# Patient Record
Sex: MALE | Race: WHITE | NOT HISPANIC OR LATINO | Employment: OTHER | URBAN - METROPOLITAN AREA
[De-identification: names, ages, dates, MRNs, and addresses within clinical notes are randomized per-mention and may not be internally consistent; named-entity substitution may affect disease eponyms.]

---

## 2025-05-12 ENCOUNTER — HOSPITAL ENCOUNTER (INPATIENT)
Facility: HOSPITAL | Age: 70
DRG: 329 | End: 2025-05-12
Attending: EMERGENCY MEDICINE | Admitting: INTERNAL MEDICINE
Payer: MEDICARE

## 2025-05-12 ENCOUNTER — APPOINTMENT (OUTPATIENT)
Dept: RADIOLOGY | Facility: HOSPITAL | Age: 70
DRG: 329 | End: 2025-05-12
Payer: MEDICARE

## 2025-05-12 DIAGNOSIS — K63.1: Primary | ICD-10-CM

## 2025-05-12 DIAGNOSIS — K57.20 DIVERTICULITIS OF LARGE INTESTINE WITH PERFORATION WITHOUT ABSCESS OR BLEEDING: ICD-10-CM

## 2025-05-12 LAB
ALBUMIN SERPL BCP-MCNC: 4 G/DL (ref 3.4–5)
ALP SERPL-CCNC: 57 U/L (ref 33–136)
ALT SERPL W P-5'-P-CCNC: 16 U/L (ref 10–52)
ANION GAP SERPL CALC-SCNC: 10 MMOL/L (ref 10–20)
APPEARANCE UR: CLEAR
AST SERPL W P-5'-P-CCNC: 17 U/L (ref 9–39)
BASOPHILS # BLD AUTO: 0.03 X10*3/UL (ref 0–0.1)
BASOPHILS NFR BLD AUTO: 0.2 %
BILIRUB SERPL-MCNC: 2 MG/DL (ref 0–1.2)
BILIRUB UR STRIP.AUTO-MCNC: NEGATIVE MG/DL
BUN SERPL-MCNC: 22 MG/DL (ref 6–23)
CALCIUM SERPL-MCNC: 9 MG/DL (ref 8.6–10.3)
CARDIAC TROPONIN I PNL SERPL HS: 6 NG/L (ref 0–20)
CHLORIDE SERPL-SCNC: 99 MMOL/L (ref 98–107)
CO2 SERPL-SCNC: 28 MMOL/L (ref 21–32)
COLOR UR: ABNORMAL
CREAT SERPL-MCNC: 0.98 MG/DL (ref 0.5–1.3)
EGFRCR SERPLBLD CKD-EPI 2021: 82 ML/MIN/1.73M*2
EOSINOPHIL # BLD AUTO: 0.01 X10*3/UL (ref 0–0.4)
EOSINOPHIL NFR BLD AUTO: 0.1 %
ERYTHROCYTE [DISTWIDTH] IN BLOOD BY AUTOMATED COUNT: 12.3 % (ref 11.5–14.5)
GLUCOSE SERPL-MCNC: 91 MG/DL (ref 74–99)
GLUCOSE UR STRIP.AUTO-MCNC: NORMAL MG/DL
HCT VFR BLD AUTO: 41.1 % (ref 41–52)
HGB BLD-MCNC: 14.7 G/DL (ref 13.5–17.5)
IMM GRANULOCYTES # BLD AUTO: 0.05 X10*3/UL (ref 0–0.5)
IMM GRANULOCYTES NFR BLD AUTO: 0.4 % (ref 0–0.9)
INR PPP: 1.3 (ref 0.9–1.1)
KETONES UR STRIP.AUTO-MCNC: ABNORMAL MG/DL
LACTATE SERPL-SCNC: 0.8 MMOL/L (ref 0.4–2)
LEUKOCYTE ESTERASE UR QL STRIP.AUTO: NEGATIVE
LIPASE SERPL-CCNC: 13 U/L (ref 9–82)
LYMPHOCYTES # BLD AUTO: 0.51 X10*3/UL (ref 0.8–3)
LYMPHOCYTES NFR BLD AUTO: 3.9 %
MAGNESIUM SERPL-MCNC: 1.63 MG/DL (ref 1.6–2.4)
MCH RBC QN AUTO: 33.4 PG (ref 26–34)
MCHC RBC AUTO-ENTMCNC: 35.8 G/DL (ref 32–36)
MCV RBC AUTO: 93 FL (ref 80–100)
MONOCYTES # BLD AUTO: 0.51 X10*3/UL (ref 0.05–0.8)
MONOCYTES NFR BLD AUTO: 3.9 %
NEUTROPHILS # BLD AUTO: 11.99 X10*3/UL (ref 1.6–5.5)
NEUTROPHILS NFR BLD AUTO: 91.5 %
NITRITE UR QL STRIP.AUTO: NEGATIVE
NRBC BLD-RTO: 0 /100 WBCS (ref 0–0)
PH UR STRIP.AUTO: 5.5 [PH]
PLATELET # BLD AUTO: 167 X10*3/UL (ref 150–450)
POTASSIUM SERPL-SCNC: 3.9 MMOL/L (ref 3.5–5.3)
PROT SERPL-MCNC: 6.4 G/DL (ref 6.4–8.2)
PROT UR STRIP.AUTO-MCNC: NEGATIVE MG/DL
PROTHROMBIN TIME: 14.8 SECONDS (ref 9.8–12.4)
RBC # BLD AUTO: 4.4 X10*6/UL (ref 4.5–5.9)
RBC # UR STRIP.AUTO: NEGATIVE MG/DL
SODIUM SERPL-SCNC: 133 MMOL/L (ref 136–145)
SP GR UR STRIP.AUTO: 1.02
UROBILINOGEN UR STRIP.AUTO-MCNC: NORMAL MG/DL
WBC # BLD AUTO: 13.1 X10*3/UL (ref 4.4–11.3)

## 2025-05-12 PROCEDURE — 84484 ASSAY OF TROPONIN QUANT: CPT | Performed by: HEALTH CARE PROVIDER

## 2025-05-12 PROCEDURE — 96361 HYDRATE IV INFUSION ADD-ON: CPT

## 2025-05-12 PROCEDURE — 99223 1ST HOSP IP/OBS HIGH 75: CPT | Performed by: INTERNAL MEDICINE

## 2025-05-12 PROCEDURE — 96365 THER/PROPH/DIAG IV INF INIT: CPT

## 2025-05-12 PROCEDURE — 74177 CT ABD & PELVIS W/CONTRAST: CPT | Performed by: RADIOLOGY

## 2025-05-12 PROCEDURE — 83605 ASSAY OF LACTIC ACID: CPT | Performed by: HEALTH CARE PROVIDER

## 2025-05-12 PROCEDURE — 71260 CT THORAX DX C+: CPT | Performed by: RADIOLOGY

## 2025-05-12 PROCEDURE — 2500000004 HC RX 250 GENERAL PHARMACY W/ HCPCS (ALT 636 FOR OP/ED): Mod: JZ | Performed by: HEALTH CARE PROVIDER

## 2025-05-12 PROCEDURE — 71046 X-RAY EXAM CHEST 2 VIEWS: CPT | Performed by: RADIOLOGY

## 2025-05-12 PROCEDURE — 80053 COMPREHEN METABOLIC PANEL: CPT | Performed by: HEALTH CARE PROVIDER

## 2025-05-12 PROCEDURE — 81003 URINALYSIS AUTO W/O SCOPE: CPT | Performed by: HEALTH CARE PROVIDER

## 2025-05-12 PROCEDURE — 85025 COMPLETE CBC W/AUTO DIFF WBC: CPT | Performed by: HEALTH CARE PROVIDER

## 2025-05-12 PROCEDURE — 83735 ASSAY OF MAGNESIUM: CPT | Performed by: HEALTH CARE PROVIDER

## 2025-05-12 PROCEDURE — 99285 EMERGENCY DEPT VISIT HI MDM: CPT | Mod: 25 | Performed by: EMERGENCY MEDICINE

## 2025-05-12 PROCEDURE — 71046 X-RAY EXAM CHEST 2 VIEWS: CPT

## 2025-05-12 PROCEDURE — 74177 CT ABD & PELVIS W/CONTRAST: CPT

## 2025-05-12 PROCEDURE — 36415 COLL VENOUS BLD VENIPUNCTURE: CPT | Performed by: HEALTH CARE PROVIDER

## 2025-05-12 PROCEDURE — 83690 ASSAY OF LIPASE: CPT | Performed by: HEALTH CARE PROVIDER

## 2025-05-12 PROCEDURE — 1100000001 HC PRIVATE ROOM DAILY

## 2025-05-12 PROCEDURE — 2550000001 HC RX 255 CONTRASTS: Performed by: HEALTH CARE PROVIDER

## 2025-05-12 PROCEDURE — 85610 PROTHROMBIN TIME: CPT | Performed by: HEALTH CARE PROVIDER

## 2025-05-12 RX ORDER — ASPIRIN 81 MG/1
1 TABLET ORAL DAILY
COMMUNITY
Start: 2025-05-28

## 2025-05-12 RX ORDER — METOPROLOL SUCCINATE 25 MG/1
25 TABLET, EXTENDED RELEASE ORAL DAILY
COMMUNITY

## 2025-05-12 RX ADMIN — IOHEXOL 75 ML: 350 INJECTION, SOLUTION INTRAVENOUS at 19:52

## 2025-05-12 RX ADMIN — PIPERACILLIN SODIUM AND TAZOBACTAM SODIUM 4.5 G: 4; .5 INJECTION, SOLUTION INTRAVENOUS at 20:35

## 2025-05-12 RX ADMIN — SODIUM CHLORIDE 500 ML: 0.9 INJECTION, SOLUTION INTRAVENOUS at 19:12

## 2025-05-12 SDOH — SOCIAL STABILITY: SOCIAL INSECURITY: WITHIN THE LAST YEAR, HAVE YOU BEEN HUMILIATED OR EMOTIONALLY ABUSED IN OTHER WAYS BY YOUR PARTNER OR EX-PARTNER?: NO

## 2025-05-12 SDOH — SOCIAL STABILITY: SOCIAL INSECURITY: WERE YOU ABLE TO COMPLETE ALL THE BEHAVIORAL HEALTH SCREENINGS?: YES

## 2025-05-12 SDOH — SOCIAL STABILITY: SOCIAL INSECURITY: DO YOU FEEL ANYONE HAS EXPLOITED OR TAKEN ADVANTAGE OF YOU FINANCIALLY OR OF YOUR PERSONAL PROPERTY?: NO

## 2025-05-12 SDOH — SOCIAL STABILITY: SOCIAL INSECURITY: WITHIN THE LAST YEAR, HAVE YOU BEEN AFRAID OF YOUR PARTNER OR EX-PARTNER?: NO

## 2025-05-12 SDOH — ECONOMIC STABILITY: INCOME INSECURITY: IN THE PAST 12 MONTHS HAS THE ELECTRIC, GAS, OIL, OR WATER COMPANY THREATENED TO SHUT OFF SERVICES IN YOUR HOME?: NO

## 2025-05-12 SDOH — SOCIAL STABILITY: SOCIAL INSECURITY
WITHIN THE LAST YEAR, HAVE YOU BEEN KICKED, HIT, SLAPPED, OR OTHERWISE PHYSICALLY HURT BY YOUR PARTNER OR EX-PARTNER?: NO

## 2025-05-12 SDOH — SOCIAL STABILITY: SOCIAL INSECURITY: ARE YOU OR HAVE YOU BEEN THREATENED OR ABUSED PHYSICALLY, EMOTIONALLY, OR SEXUALLY BY ANYONE?: NO

## 2025-05-12 SDOH — ECONOMIC STABILITY: FOOD INSECURITY: WITHIN THE PAST 12 MONTHS, THE FOOD YOU BOUGHT JUST DIDN'T LAST AND YOU DIDN'T HAVE MONEY TO GET MORE.: NEVER TRUE

## 2025-05-12 SDOH — SOCIAL STABILITY: SOCIAL INSECURITY: ABUSE: ADULT

## 2025-05-12 SDOH — SOCIAL STABILITY: SOCIAL INSECURITY
WITHIN THE LAST YEAR, HAVE YOU BEEN RAPED OR FORCED TO HAVE ANY KIND OF SEXUAL ACTIVITY BY YOUR PARTNER OR EX-PARTNER?: NO

## 2025-05-12 SDOH — ECONOMIC STABILITY: FOOD INSECURITY: WITHIN THE PAST 12 MONTHS, YOU WORRIED THAT YOUR FOOD WOULD RUN OUT BEFORE YOU GOT THE MONEY TO BUY MORE.: NEVER TRUE

## 2025-05-12 SDOH — SOCIAL STABILITY: SOCIAL INSECURITY: DO YOU FEEL UNSAFE GOING BACK TO THE PLACE WHERE YOU ARE LIVING?: NO

## 2025-05-12 SDOH — SOCIAL STABILITY: SOCIAL INSECURITY: ARE THERE ANY APPARENT SIGNS OF INJURIES/BEHAVIORS THAT COULD BE RELATED TO ABUSE/NEGLECT?: NO

## 2025-05-12 SDOH — SOCIAL STABILITY: SOCIAL INSECURITY: HAS ANYONE EVER THREATENED TO HURT YOUR FAMILY OR YOUR PETS?: NO

## 2025-05-12 SDOH — SOCIAL STABILITY: SOCIAL INSECURITY: HAVE YOU HAD THOUGHTS OF HARMING ANYONE ELSE?: NO

## 2025-05-12 SDOH — SOCIAL STABILITY: SOCIAL INSECURITY: HAVE YOU HAD ANY THOUGHTS OF HARMING ANYONE ELSE?: NO

## 2025-05-12 SDOH — SOCIAL STABILITY: SOCIAL INSECURITY: DOES ANYONE TRY TO KEEP YOU FROM HAVING/CONTACTING OTHER FRIENDS OR DOING THINGS OUTSIDE YOUR HOME?: NO

## 2025-05-12 ASSESSMENT — LIFESTYLE VARIABLES
AUDIT-C TOTAL SCORE: 4
HOW OFTEN DURING THE LAST YEAR HAVE YOU BEEN UNABLE TO REMEMBER WHAT HAPPENED THE NIGHT BEFORE BECAUSE YOU HAD BEEN DRINKING: NEVER
SKIP TO QUESTIONS 9-10: 1
HAVE YOU OR SOMEONE ELSE BEEN INJURED AS A RESULT OF YOUR DRINKING: NO
HOW OFTEN DURING THE LAST YEAR HAVE YOU FAILED TO DO WHAT WAS NORMALLY EXPECTED FROM YOU BECAUSE OF DRINKING: NEVER
HOW MANY STANDARD DRINKS CONTAINING ALCOHOL DO YOU HAVE ON A TYPICAL DAY: 1 OR 2
HAS A RELATIVE, FRIEND, DOCTOR, OR ANOTHER HEALTH PROFESSIONAL EXPRESSED CONCERN ABOUT YOUR DRINKING OR SUGGESTED YOU CUT DOWN: NO
AUDIT-C TOTAL SCORE: 4
AUDIT TOTAL SCORE: 4
HOW OFTEN DURING THE LAST YEAR HAVE YOU FOUND THAT YOU WERE NOT ABLE TO STOP DRINKING ONCE YOU HAD STARTED: NEVER
HOW OFTEN DO YOU HAVE A DRINK CONTAINING ALCOHOL: 4 OR MORE TIMES A WEEK
HOW OFTEN DO YOU HAVE 6 OR MORE DRINKS ON ONE OCCASION: NEVER
HOW OFTEN DURING THE LAST YEAR HAVE YOU HAD A FEELING OF GUILT OR REMORSE AFTER DRINKING: NEVER
AUDIT TOTAL SCORE: 0
HOW OFTEN DURING THE LAST YEAR HAVE YOU NEEDED AN ALCOHOLIC DRINK FIRST THING IN THE MORNING TO GET YOURSELF GOING AFTER A NIGHT OF HEAVY DRINKING: NEVER

## 2025-05-12 ASSESSMENT — ACTIVITIES OF DAILY LIVING (ADL)
GROOMING: INDEPENDENT
PATIENT'S MEMORY ADEQUATE TO SAFELY COMPLETE DAILY ACTIVITIES?: YES
ADEQUATE_TO_COMPLETE_ADL: YES
TOILETING: INDEPENDENT
JUDGMENT_ADEQUATE_SAFELY_COMPLETE_DAILY_ACTIVITIES: YES
HEARING - LEFT EAR: FUNCTIONAL
LACK_OF_TRANSPORTATION: NO
WALKS IN HOME: INDEPENDENT
FEEDING YOURSELF: INDEPENDENT
BATHING: INDEPENDENT
DRESSING YOURSELF: INDEPENDENT
HEARING - RIGHT EAR: FUNCTIONAL

## 2025-05-12 ASSESSMENT — PAIN - FUNCTIONAL ASSESSMENT
PAIN_FUNCTIONAL_ASSESSMENT: 0-10
PAIN_FUNCTIONAL_ASSESSMENT: 0-10

## 2025-05-12 ASSESSMENT — COGNITIVE AND FUNCTIONAL STATUS - GENERAL
WALKING IN HOSPITAL ROOM: A LITTLE
MOBILITY SCORE: 22
DAILY ACTIVITIY SCORE: 24
PATIENT BASELINE BEDBOUND: NO
CLIMB 3 TO 5 STEPS WITH RAILING: A LITTLE

## 2025-05-12 ASSESSMENT — PATIENT HEALTH QUESTIONNAIRE - PHQ9
1. LITTLE INTEREST OR PLEASURE IN DOING THINGS: NOT AT ALL
2. FEELING DOWN, DEPRESSED OR HOPELESS: NOT AT ALL
SUM OF ALL RESPONSES TO PHQ9 QUESTIONS 1 & 2: 0

## 2025-05-12 ASSESSMENT — PAIN SCALES - GENERAL
PAINLEVEL_OUTOF10: 5 - MODERATE PAIN
PAINLEVEL_OUTOF10: 5 - MODERATE PAIN

## 2025-05-12 ASSESSMENT — COLUMBIA-SUICIDE SEVERITY RATING SCALE - C-SSRS
1. IN THE PAST MONTH, HAVE YOU WISHED YOU WERE DEAD OR WISHED YOU COULD GO TO SLEEP AND NOT WAKE UP?: NO
2. HAVE YOU ACTUALLY HAD ANY THOUGHTS OF KILLING YOURSELF?: NO
6. HAVE YOU EVER DONE ANYTHING, STARTED TO DO ANYTHING, OR PREPARED TO DO ANYTHING TO END YOUR LIFE?: NO

## 2025-05-12 NOTE — ED TRIAGE NOTES
Pt BIB POV from home w/ c/o abdominal pain. 2000 yesterday excruciating. Taking Ibuprofen PRN. Not as bad today. Ate today. Denies n/v. Pain is lingering. Dull pain. C/o some dysuria and constipation yesterday. Feels similar to a kidney stone in past. Ambulatory, speaking in full sentences. Oriented.

## 2025-05-12 NOTE — ED PROVIDER NOTES
HPI   Chief Complaint   Patient presents with    Abdominal Pain       CC: Diffuse abdominal pain  HPI:   70-year-old male presents ED complaining of diffuse lower abdominal pain he reported pain started acutely last night after he had a milkshake, he notes having normal bowel movements today, he states his appetite is decreased, he is not sure if he had a appendectomy as a child, he notes that the pain has subsided slightly however still feels some diffuse tenderness cramping in the lower abdominal region he denies any history of diverticulitis he does report prior kidney stones, he also noted that when the pain initially started he was having pain in the groin however that has since resolved.  He denies having any fevers, denies any chest pain, shortness of breath.    Additional Limitations to History:   External Records Reviewed: I reviewed recent and relevant outside records including   History Obtained From:     Past Medical History: Per HPI  Medications: Reviewed in EMR and with patient  Allergies:  Reviewed in EMR  Past Surgical History:   Social History:     ------------------------------------------------------------------------------------------------------  Physical Exam:  --Vital signs reviewed in nursing triage note, EMR flow sheets, and at patient's bedside  GEN:  A&Ox3, no acute distress, appears comfortable.  Conversational and appropriate.  No confusion or gross mental status changes.  EYES: EOMI, non-injected sclera.  ENT: Moist mucous membranes, no apparent injuries or lesions.   CARDIO: Normal rate and regular rhythm. No murmurs, rubs, or gallops.  2+ equal pulses of the distal extremities.   PULM: Clear to auscultation bilaterally. No rales, rhonchi, or wheezes. Good symmetric chest expansion.  GI: Soft, non-distended, mild diffuse tenderness however no rigidity, nonperitonitic no rebound tenderness or guarding.  SKIN: Warm and dry, no rashes or lesions.  MSK: ROM intact the extremities without  contractures.   EXT: No peripheral edema, contusions, or wounds.   NEURO: Cranial nerves II-XII grossly intact. Sensation to light touch intact and equal bilaterally in upper and lower extremities.  Symmetric 5/5 strength in upper and lower extremities.  PSYCH: Appropriate mood and behavior, converses and responds appropriately during exam.  -------------------------------------------------------------------------------------------------------      Differential Diagnoses Considered:   Chronic Medical Conditions Significantly Affecting Care:   Diagnostic testing considered: [PERC, D-Dimer, PECARN, etc.]    -  - I independently interpreted: [CXR, CT, POCUS, etc. including your interpretation]  - Labs notable for     Escalation of Care: Appropriate for   Social Determinants of Health Significantly Affecting Care: [Homelessness, lacking transportation, uninsured, unable to afford medications]  Prescription Drug Consideration: [Antibiotics, antivirals, pain medications, etc.]  Discussion of Management with Other Providers:  I discussed the patient/results with: [admitting team, consultant, radiologist, social work, EPAT, case management, PT/OT, RT, PCP, etc.]      Guzman Green PA-C              Patient History   Medical History[1]  Surgical History[2]  Family History[3]  Social History[4]    Physical Exam   ED Triage Vitals [05/12/25 1608]   Temperature Heart Rate Respirations BP   37 °C (98.6 °F) 82 16 107/71      Pulse Ox Temp Source Heart Rate Source Patient Position   96 % Temporal Monitor Sitting      BP Location FiO2 (%)     Left arm --       Physical Exam      ED Course & MDM   Diagnoses as of 05/14/25 1054   Intestinal perforation (Multi)                 No data recorded     Srini Coma Scale Score: 15 (05/12/25 1607 : Ellis Garcia, RN)                           Medical Decision Making  70-year-old male with acute onset of diffuse abdominal pain likely in the setting of hollow visceral perforation,  indeterminant location there is from CT findings suggesting acute sigmoidal diverticulitis, mild free air within the abdomen and pelvis, patient has a mild leukocytosis at 13.1 remaining laboratory workup appears unremarkable, patient was started on Zosyn, and given some IV fluids, he is neurologically intact hemodynamically stable, nontoxic-appearing afebrile in no acute distress, there is low suspicion for SBP, discussed findings with the on-call general surgery patient will be admitted to medicine at this time for further evaluation surgery consult.        Procedure  Procedures       Guzman Green PA-C  05/12/25 9099       [1] History reviewed. No pertinent past medical history.  [2]   Past Surgical History:  Procedure Laterality Date    TONSILLECTOMY  05/11/2016    Tonsillectomy   [3] No family history on file.  [4]   Social History  Tobacco Use    Smoking status: Never    Smokeless tobacco: Never   Substance Use Topics    Alcohol use: Yes     Comment: social    Drug use: Never        Guzman Green PA-C  05/14/25 2061

## 2025-05-13 ENCOUNTER — APPOINTMENT (OUTPATIENT)
Dept: CARDIOLOGY | Facility: HOSPITAL | Age: 70
DRG: 329 | End: 2025-05-13
Payer: MEDICARE

## 2025-05-13 ENCOUNTER — APPOINTMENT (OUTPATIENT)
Dept: RADIOLOGY | Facility: HOSPITAL | Age: 70
DRG: 329 | End: 2025-05-13
Payer: MEDICARE

## 2025-05-13 LAB
ANION GAP SERPL CALC-SCNC: 12 MMOL/L (ref 10–20)
BUN SERPL-MCNC: 15 MG/DL (ref 6–23)
CALCIUM SERPL-MCNC: 8.4 MG/DL (ref 8.6–10.3)
CHLORIDE SERPL-SCNC: 104 MMOL/L (ref 98–107)
CO2 SERPL-SCNC: 25 MMOL/L (ref 21–32)
CREAT SERPL-MCNC: 0.9 MG/DL (ref 0.5–1.3)
EGFRCR SERPLBLD CKD-EPI 2021: >90 ML/MIN/1.73M*2
ERYTHROCYTE [DISTWIDTH] IN BLOOD BY AUTOMATED COUNT: 12.3 % (ref 11.5–14.5)
GLUCOSE SERPL-MCNC: 82 MG/DL (ref 74–99)
HCT VFR BLD AUTO: 41.1 % (ref 41–52)
HGB BLD-MCNC: 14.7 G/DL (ref 13.5–17.5)
HOLD SPECIMEN: NORMAL
MCH RBC QN AUTO: 33.5 PG (ref 26–34)
MCHC RBC AUTO-ENTMCNC: 35.8 G/DL (ref 32–36)
MCV RBC AUTO: 94 FL (ref 80–100)
NRBC BLD-RTO: 0 /100 WBCS (ref 0–0)
PLATELET # BLD AUTO: 162 X10*3/UL (ref 150–450)
POTASSIUM SERPL-SCNC: 3.7 MMOL/L (ref 3.5–5.3)
RBC # BLD AUTO: 4.39 X10*6/UL (ref 4.5–5.9)
SODIUM SERPL-SCNC: 137 MMOL/L (ref 136–145)
WBC # BLD AUTO: 11.3 X10*3/UL (ref 4.4–11.3)

## 2025-05-13 PROCEDURE — 1200000002 HC GENERAL ROOM WITH TELEMETRY DAILY

## 2025-05-13 PROCEDURE — 2500000004 HC RX 250 GENERAL PHARMACY W/ HCPCS (ALT 636 FOR OP/ED): Mod: JZ | Performed by: PHYSICIAN ASSISTANT

## 2025-05-13 PROCEDURE — 80048 BASIC METABOLIC PNL TOTAL CA: CPT | Performed by: INTERNAL MEDICINE

## 2025-05-13 PROCEDURE — 2500000004 HC RX 250 GENERAL PHARMACY W/ HCPCS (ALT 636 FOR OP/ED): Mod: JZ | Performed by: INTERNAL MEDICINE

## 2025-05-13 PROCEDURE — 2500000001 HC RX 250 WO HCPCS SELF ADMINISTERED DRUGS (ALT 637 FOR MEDICARE OP): Performed by: PHYSICIAN ASSISTANT

## 2025-05-13 PROCEDURE — 36415 COLL VENOUS BLD VENIPUNCTURE: CPT | Performed by: INTERNAL MEDICINE

## 2025-05-13 PROCEDURE — 74177 CT ABD & PELVIS W/CONTRAST: CPT | Performed by: RADIOLOGY

## 2025-05-13 PROCEDURE — 2500000004 HC RX 250 GENERAL PHARMACY W/ HCPCS (ALT 636 FOR OP/ED): Performed by: STUDENT IN AN ORGANIZED HEALTH CARE EDUCATION/TRAINING PROGRAM

## 2025-05-13 PROCEDURE — 94760 N-INVAS EAR/PLS OXIMETRY 1: CPT

## 2025-05-13 PROCEDURE — 86481 TB AG RESPONSE T-CELL SUSP: CPT | Performed by: INTERNAL MEDICINE

## 2025-05-13 PROCEDURE — 93005 ELECTROCARDIOGRAM TRACING: CPT

## 2025-05-13 PROCEDURE — 99233 SBSQ HOSP IP/OBS HIGH 50: CPT | Performed by: STUDENT IN AN ORGANIZED HEALTH CARE EDUCATION/TRAINING PROGRAM

## 2025-05-13 PROCEDURE — 9420000001 HC RT PATIENT EDUCATION 5 MIN

## 2025-05-13 PROCEDURE — 74177 CT ABD & PELVIS W/CONTRAST: CPT

## 2025-05-13 PROCEDURE — 2550000001 HC RX 255 CONTRASTS: Performed by: STUDENT IN AN ORGANIZED HEALTH CARE EDUCATION/TRAINING PROGRAM

## 2025-05-13 PROCEDURE — 2500000001 HC RX 250 WO HCPCS SELF ADMINISTERED DRUGS (ALT 637 FOR MEDICARE OP): Performed by: NURSE PRACTITIONER

## 2025-05-13 PROCEDURE — 99223 1ST HOSP IP/OBS HIGH 75: CPT | Performed by: SURGERY

## 2025-05-13 PROCEDURE — 2500000004 HC RX 250 GENERAL PHARMACY W/ HCPCS (ALT 636 FOR OP/ED): Mod: JZ | Performed by: ANESTHESIOLOGY

## 2025-05-13 PROCEDURE — 85027 COMPLETE CBC AUTOMATED: CPT | Performed by: INTERNAL MEDICINE

## 2025-05-13 RX ORDER — ACETAMINOPHEN 325 MG/1
650 TABLET ORAL EVERY 6 HOURS
Status: DISCONTINUED | OUTPATIENT
Start: 2025-05-13 | End: 2025-05-15

## 2025-05-13 RX ORDER — MORPHINE SULFATE 2 MG/ML
2 INJECTION, SOLUTION INTRAMUSCULAR; INTRAVENOUS EVERY 4 HOURS PRN
Status: DISCONTINUED | OUTPATIENT
Start: 2025-05-13 | End: 2025-05-13

## 2025-05-13 RX ORDER — METOPROLOL SUCCINATE 25 MG/1
25 TABLET, EXTENDED RELEASE ORAL DAILY
Status: DISCONTINUED | OUTPATIENT
Start: 2025-05-13 | End: 2025-05-15

## 2025-05-13 RX ORDER — ONDANSETRON 4 MG/1
4 TABLET, FILM COATED ORAL EVERY 8 HOURS PRN
Status: DISCONTINUED | OUTPATIENT
Start: 2025-05-13 | End: 2025-05-15

## 2025-05-13 RX ORDER — KETOROLAC TROMETHAMINE 15 MG/ML
15 INJECTION, SOLUTION INTRAMUSCULAR; INTRAVENOUS EVERY 6 HOURS
Status: DISCONTINUED | OUTPATIENT
Start: 2025-05-13 | End: 2025-05-15

## 2025-05-13 RX ORDER — ACETAMINOPHEN 325 MG/1
650 TABLET ORAL EVERY 4 HOURS PRN
Status: DISCONTINUED | OUTPATIENT
Start: 2025-05-13 | End: 2025-05-13

## 2025-05-13 RX ORDER — ONDANSETRON HYDROCHLORIDE 2 MG/ML
4 INJECTION, SOLUTION INTRAVENOUS EVERY 8 HOURS PRN
Status: DISCONTINUED | OUTPATIENT
Start: 2025-05-13 | End: 2025-05-21 | Stop reason: HOSPADM

## 2025-05-13 RX ORDER — ENOXAPARIN SODIUM 100 MG/ML
40 INJECTION SUBCUTANEOUS DAILY
Status: DISCONTINUED | OUTPATIENT
Start: 2025-05-13 | End: 2025-05-17

## 2025-05-13 RX ORDER — NALOXONE HYDROCHLORIDE 0.4 MG/ML
0.2 INJECTION, SOLUTION INTRAMUSCULAR; INTRAVENOUS; SUBCUTANEOUS EVERY 5 MIN PRN
Status: DISCONTINUED | OUTPATIENT
Start: 2025-05-13 | End: 2025-05-21 | Stop reason: HOSPADM

## 2025-05-13 RX ORDER — SODIUM CHLORIDE 9 MG/ML
125 INJECTION, SOLUTION INTRAVENOUS CONTINUOUS
Status: DISCONTINUED | OUTPATIENT
Start: 2025-05-13 | End: 2025-05-13

## 2025-05-13 RX ORDER — TALC
3 POWDER (GRAM) TOPICAL NIGHTLY PRN
Status: DISCONTINUED | OUTPATIENT
Start: 2025-05-13 | End: 2025-05-21 | Stop reason: HOSPADM

## 2025-05-13 RX ORDER — ACETAMINOPHEN 650 MG/1
650 SUPPOSITORY RECTAL EVERY 4 HOURS PRN
Status: DISCONTINUED | OUTPATIENT
Start: 2025-05-13 | End: 2025-05-13

## 2025-05-13 RX ORDER — MORPHINE SULFATE 2 MG/ML
1 INJECTION, SOLUTION INTRAMUSCULAR; INTRAVENOUS EVERY 4 HOURS PRN
Status: DISCONTINUED | OUTPATIENT
Start: 2025-05-13 | End: 2025-05-13

## 2025-05-13 RX ORDER — DEXTROSE MONOHYDRATE, SODIUM CHLORIDE, AND POTASSIUM CHLORIDE 50; 1.49; 4.5 G/1000ML; G/1000ML; G/1000ML
125 INJECTION, SOLUTION INTRAVENOUS CONTINUOUS
Status: DISCONTINUED | OUTPATIENT
Start: 2025-05-13 | End: 2025-05-17

## 2025-05-13 RX ORDER — ACETAMINOPHEN 160 MG/5ML
650 SOLUTION ORAL EVERY 4 HOURS PRN
Status: DISCONTINUED | OUTPATIENT
Start: 2025-05-13 | End: 2025-05-13

## 2025-05-13 RX ORDER — SODIUM CHLORIDE, SODIUM LACTATE, POTASSIUM CHLORIDE, CALCIUM CHLORIDE 600; 310; 30; 20 MG/100ML; MG/100ML; MG/100ML; MG/100ML
500 INJECTION, SOLUTION INTRAVENOUS ONCE
Status: COMPLETED | OUTPATIENT
Start: 2025-05-13 | End: 2025-05-13

## 2025-05-13 RX ORDER — POLYETHYLENE GLYCOL 3350 17 G/17G
17 POWDER, FOR SOLUTION ORAL DAILY PRN
Status: DISCONTINUED | OUTPATIENT
Start: 2025-05-13 | End: 2025-05-15

## 2025-05-13 RX ADMIN — ACETAMINOPHEN 650 MG: 325 TABLET, FILM COATED ORAL at 23:54

## 2025-05-13 RX ADMIN — IOHEXOL 75 ML: 350 INJECTION, SOLUTION INTRAVENOUS at 22:08

## 2025-05-13 RX ADMIN — HYDROMORPHONE HYDROCHLORIDE 0.2 MG: 1 INJECTION, SOLUTION INTRAMUSCULAR; INTRAVENOUS; SUBCUTANEOUS at 18:45

## 2025-05-13 RX ADMIN — SODIUM CHLORIDE 125 ML/HR: 9 INJECTION, SOLUTION INTRAVENOUS at 01:18

## 2025-05-13 RX ADMIN — METOPROLOL SUCCINATE 25 MG: 25 TABLET, EXTENDED RELEASE ORAL at 21:17

## 2025-05-13 RX ADMIN — ACETAMINOPHEN 650 MG: 325 TABLET, FILM COATED ORAL at 10:21

## 2025-05-13 RX ADMIN — PIPERACILLIN SODIUM AND TAZOBACTAM SODIUM 4.5 G: 4; .5 INJECTION, SOLUTION INTRAVENOUS at 21:17

## 2025-05-13 RX ADMIN — KETOROLAC TROMETHAMINE 15 MG: 15 INJECTION, SOLUTION INTRAMUSCULAR; INTRAVENOUS at 23:54

## 2025-05-13 RX ADMIN — DEXTROSE MONOHYDRATE, SODIUM CHLORIDE, AND POTASSIUM CHLORIDE 100 ML/HR: 50; 1.49; 4.5 INJECTION, SOLUTION INTRAVENOUS at 21:17

## 2025-05-13 RX ADMIN — KETOROLAC TROMETHAMINE 15 MG: 15 INJECTION, SOLUTION INTRAMUSCULAR; INTRAVENOUS at 15:42

## 2025-05-13 RX ADMIN — SODIUM CHLORIDE, SODIUM LACTATE, POTASSIUM CHLORIDE, AND CALCIUM CHLORIDE 500 ML: .6; .31; .03; .02 INJECTION, SOLUTION INTRAVENOUS at 21:40

## 2025-05-13 RX ADMIN — PIPERACILLIN SODIUM AND TAZOBACTAM SODIUM 4.5 G: 4; .5 INJECTION, SOLUTION INTRAVENOUS at 15:42

## 2025-05-13 RX ADMIN — HYDROMORPHONE HYDROCHLORIDE 0.4 MG: 1 INJECTION, SOLUTION INTRAMUSCULAR; INTRAVENOUS; SUBCUTANEOUS at 18:08

## 2025-05-13 RX ADMIN — PIPERACILLIN SODIUM AND TAZOBACTAM SODIUM 4.5 G: 4; .5 INJECTION, SOLUTION INTRAVENOUS at 10:21

## 2025-05-13 RX ADMIN — DEXTROSE MONOHYDRATE, SODIUM CHLORIDE, AND POTASSIUM CHLORIDE 100 ML/HR: 50; 1.49; 4.5 INJECTION, SOLUTION INTRAVENOUS at 10:23

## 2025-05-13 RX ADMIN — KETOROLAC TROMETHAMINE 15 MG: 15 INJECTION, SOLUTION INTRAMUSCULAR; INTRAVENOUS at 10:23

## 2025-05-13 RX ADMIN — ACETAMINOPHEN 650 MG: 325 TABLET, FILM COATED ORAL at 15:42

## 2025-05-13 ASSESSMENT — COGNITIVE AND FUNCTIONAL STATUS - GENERAL
MOBILITY SCORE: 24
WALKING IN HOSPITAL ROOM: A LITTLE
MOBILITY SCORE: 22
CLIMB 3 TO 5 STEPS WITH RAILING: A LITTLE
DAILY ACTIVITIY SCORE: 24
DAILY ACTIVITIY SCORE: 24

## 2025-05-13 ASSESSMENT — ENCOUNTER SYMPTOMS
APPETITE CHANGE: 1
CHILLS: 0
ABDOMINAL PAIN: 1
PALPITATIONS: 0
FEVER: 0
SHORTNESS OF BREATH: 0
WEAKNESS: 0
DIZZINESS: 0
COUGH: 0

## 2025-05-13 ASSESSMENT — PAIN - FUNCTIONAL ASSESSMENT
PAIN_FUNCTIONAL_ASSESSMENT: 0-10
PAIN_FUNCTIONAL_ASSESSMENT: 0-10

## 2025-05-13 ASSESSMENT — PAIN SCALES - GENERAL
PAINLEVEL_OUTOF10: 0 - NO PAIN
PAINLEVEL_OUTOF10: 0 - NO PAIN

## 2025-05-13 NOTE — CONSULTS
"General/Trauma Surgery History and Physical      History Of Present Illness  Diego Morales is a 70 y.o. male who presents with complaints of abdominal pain starting Sunday night. Patient reports right lower abdominal pain starting without any elicited events that gradually worsened. Pain has been constant, sharp, however worsened while at work yesterday and therefore came to the ED. Pain is more controlled after IV medications, however is still present in the RLQ. Denies nausea or vomiting but does have a decreased appetite, lacking hunger. He does report several episodes of diarrhea throughout the night. Denies blood in the stool. Denies fevers or chills. He has never been diagnosed with diverticulitis, has never had a colonoscopy or any abdominal surgeries. There is history of cardiac arrest after a cardiac catheterization in 2016 that was related to ventricular fibrillation requiring cardioversion.        Past Medical History  History of angina pectoris, heart murmur, cardiac arrest following cardiac catheterization with cardioversion     Surgical History  Tonsillectomy  No history of colonoscopy  No history of abdominal surgery  Cardiac catheterization      Social History  Never smoker, drinks 1 beer daily. Denies illicits. Lives at home with wife. Works as a .     Family History  Denies family history.     Allergies  Patient has no known allergies.    Meds  Current Outpatient Medications   Medication Instructions    aspirin 81 mg, Daily    metoprolol succinate XL (TOPROL-XL) 25 mg, Daily        Review of Systems   A complete 10 point review of systems was performed and is negative except as noted in the history of present illness.     Last Recorded Vitals  Blood pressure 116/60, pulse 81, temperature 36.6 °C (97.9 °F), temperature source Temporal, resp. rate 17, height 1.803 m (5' 10.98\"), weight 67.7 kg (149 lb 4.8 oz), SpO2 96%.    0-10 (Numeric) Pain Score: 0 - No pain     Physical " Exam  Constitutional: No acute distress. Laying in bed.  Neuro: Alert, oriented. Follows commands.   Eyes: Extraocular motions intact. No scleral icterus. Conjunctiva pink.   EENT: Mucous membranes moist. Normal dentition. Hears normal speaking voice.  Neck: Supple. No masses.  Cardiovascular: Regular rate. Palpable pulses bilaterally. No pitting edema.   Respiratory: No increased work of breathing or audible wheeze. Equal expansion.  Abdomen: Soft, thin abdomen. Nondistended. TTP RLQ without guarding or involuntary rigidity. No scars or hernias appreciated.  MSK: Moves all extremities. No atrophy.  Lymphatic: No palpable lymph nodes. No lymphedema.   Skin: Warm, dry, intact. No rashes or lesions.   Psychological: Appropriate mood and behavior.           Relevant Results  Laboratory Results:  CBC:   Lab Results   Component Value Date    WBC 11.3 05/13/2025    RBC 4.39 (L) 05/13/2025    HGB 14.7 05/13/2025    HCT 41.1 05/13/2025     05/13/2025       RFP:   Lab Results   Component Value Date     05/13/2025    K 3.7 05/13/2025     05/13/2025    CO2 25 05/13/2025    BUN 15 05/13/2025    CREATININE 0.90 05/13/2025    CALCIUM 8.4 (L) 05/13/2025    MG 1.63 05/12/2025        LFTs:   Lab Results   Component Value Date    PROT 6.4 05/12/2025    ALBUMIN 4.0 05/12/2025    BILITOT 2.0 (H) 05/12/2025    ALKPHOS 57 05/12/2025    AST 17 05/12/2025    ALT 16 05/12/2025         Imaging:  CT chest abdomen pelvis w IV contrast  Narrative: Interpreted By:  Elena Gallardo,   STUDY:  CT CHEST ABDOMEN PELVIS W IV CONTRAST;  5/12/2025 7:57 pm      INDICATION:  Signs/Symptoms:acute abd pain, cxr showing free air in the  intraperitoneal right hemidiaphragm.      COMPARISON:  06/23/2016      ACCESSION NUMBER(S):  [id number]      ORDERING CLINICIAN:  LORRIE MOORE      TECHNIQUE:  Axial CT images of the chest, abdomen and pelvis with coronal and  sagittal reconstructed images obtained after intravenous  administration of  contrast.      FINDINGS:  CHEST:      VESSELS: Aorta is normal caliber. Mild aortic calcifications.  Coronary arteries are grossly unremarkable. HEART: Normal size. No  significant pericardial effusion. MEDIASTINUM AND AIDE: No  pathologically enlarged thoracic lymph nodes.  Shotty subcentimeter  lymph nodes measuring up to 9 mm. No pneumomediastinum. The esophagus  appears within normal limits. LUNG, PLEURA, LARGE AIRWAYS:  Redemonstrated biapical pleuroparenchymal thickening as well as  reticulonodular opacities in the upper lobes, greater on the right,  similar to prior imaging. Lungs are hyperinflated. No sizable pleural  effusion or pneumothorax. CHEST WALL AND LOWER NECK: Within normal  limits. No acute osseous abnormality.      ABDOMEN:      BONES: No acute osseous abnormality. Multilevel degenerative changes.  ABDOMINAL WALL: Within normal limits.      LIVER: Diffusely decreased attenuation of the liver parenchyma.  BILE DUCTS: Normal caliber.  GALLBLADDER: No calcified gallstones. No wall thickening.  PANCREAS: Within normal limits.  SPLEEN: Within normal limits.  ADRENALS: Within normal limits.  KIDNEYS AND URETERS: Symmetric renal enhancement. No hydronephrosis  or perinephric fluid collection.  No hydroureter.      VESSELS: Atherosclerotic calcifications without aneurysmal dilatation  seen. RETROPERITONEUM: Within normal limits.      PELVIS:      REPRODUCTIVE ORGANS: The prostate is mildly enlarged up to 5.7 cm.  BLADDER: Within normal limits.      BOWEL: Th. Fluid contents are present within the proximal colon.  There is diffuse wall thickening of the sigmoid colon with  diverticulosis and pericolonic stranding. E stomach is grossly  unremarkable. There are few fluid filled dilated loops of small bowel  with possible mild wall thickening. There is a indeterminate locule  of air also noted in the pelvis adjacent to small bowel. Normal  appendix. PERITONEUM: Trace pelvic ascites and mild  intraperitoneal  free air corresponding with the same day radiograph. No discrete  fluid collection.      Impression: Reticulonodular opacities may right greater than left upper lobe  similar to prior imaging from 06/23/2016 suggestive of chronic  etiology. Mycobacterial infection again not excluded.      Mild free air noted within the abdomen and pelvis in keeping with  hollow visceral perforation. There are regions of small bowel with  wall thickening and fluid including the indeterminate region with  adjacent non dependent free air which is indeterminate for location  of perforation. There is also findings suggestive of acute sigmoidal  diverticulitis. Surgical recommended.      Prostatomegaly. Correlation with PSA is suggested.      Additional findings as detailed.          MACRO:  Elena Gallardo discussed the significance and urgency of this  critical finding by telephone with  LORRIE MOORE on 5/12/2025 at  8:54 pm.  (**-RCF-**) Findings:  See findings.      Signed by: Elena Gallardo 5/12/2025 8:59 PM  Dictation workstation:   [id number]  XR chest 2 views  Narrative: Interpreted By:  Laron Baez,   STUDY:  XR CHEST 2 VIEWS;  5/12/2025 7:22 pm      INDICATION:  Signs/Symptoms:diffuse abd pain,.          COMPARISON:  None.      ACCESSION NUMBER(S):  [id number]      ORDERING CLINICIAN:  LORRIE MOORE      FINDINGS:                  CARDIOMEDIASTINAL SILHOUETTE:  Cardiomediastinal silhouette is normal in size and configuration.      LUNGS:  Lungs lungs are hyperinflated but clear. No consolidation seen.      ABDOMEN:  There is free intraperitoneal air underlying the right hemidiaphragm..      BONES:  No acute osseous changes.      Impression: 1.  Free intraperitoneal air under the right hemidiaphragm. In the  absence of recent of ancient CT is advised for complete evaluation  2. Emphysematous changes in the lungs without acute abnormality              MACRO:  None      Signed by: Laron Baez 5/12/2025  7:28 PM  Dictation workstation:   [id number]         Assessment/Plan   This is a 70 y.o. male who presents with complaints of RLQ abdominal pain starting Sunday night. Mild leukocytosis of 13 on admission. In ED, he was noted to have free air under the right hemidiaphragm on XR and proceeded with a CT that has a mild amount of free air over the liver with inflammation consistent with sigmoid diverticulitis. Patient is nontoxic appearing and abdominal exam is mild with tenderness to the RLQ, non peritonitic in nature. Despite mild exam findings, patient is at higher risk of needing operative intervention this hospital stay depending on how he improves with conservative care. In anticipation of surgery, we consulted cardiology for perioperative optimization given a history of cardiac arrest following a previous cardiac catheterization.     Plan:  -- NPO, ice chips and sips with meds okay  -- IV Zosyn q6h  -- Multimodal pain regimen with tylenol, toradol, prn narcotic  -- Ambulate  -- Daily CBC, RFP, Mg with repletion to maintain a K > 4.0, Phos >2.0, Mg > 2.0  -- Lovenox, SCD  -- Cardiology consult for perioperative optimization  -- Potentially will need operative intervention pending response to conservative care          Seen and discussed with Dr. Canales who is in agreement with plan. Please secure chat with questions.    Cindy Baldwin PA-C      Currently, we will plan on nonoperative management.  He is at high risk of worsening and needing urgent operative management.    -- Complete a course of 2 weeks of antibiotics, usually either ciprofloxacin and metronidazole, or augmentin.  -- Continue with a low residue (low fiber) diet for the first 2-4 weeks after the infection, until the inflammation has subsided.  -- After the inflammation has subsided, start and continue a high fiber diet.  I recommend over-the-counter fiber supplements such as Metamucil or Benafiber.  -- After the inflammation has subsided, nuts,  popcorn, etc, are okay to eat.  -- I recommend a colonoscopy in 6-8 weeks to ensure there is no colon cancer.  -- At this stage no surgery is required.  -- Continued outpatient follow up with me to discuss when the bouts of diverticulitis become either severe enough or frequent enough to outweigh the risks and recovery time of surgery.  At that time, we will plan for partial colectomy.      I have seen and reviewed the patient with the PA.  I agree with their note as written above.  Any changes or corrections have been made in the body of the note.    Anoop Canales MD  General Surgery  Office: [phone]  Fax:     [phone]  1:02 PM   05/13/25

## 2025-05-13 NOTE — ASSESSMENT & PLAN NOTE
Diego Morales is a 70 y.o. male with a past medical history of cardiac arrest during cardiac catheterization in 2016 secondary to ventricular arrhythmia/V-fib requiring cardioversion (normal coronary arteries), heart murmur, and angina pectoris who was admitted to the hospital for suspected small intestinal perforation and acute diverticulitis.      Small intestinal perforation/acute diverticulitis  - CT scan showed mild free air noted within the abdomen and pelvis in keeping with hollow visceral perforation. There are regions of small bowel with wall thickening and fluid including the indeterminate region with adjacent non dependent free air which is indeterminate for location  of perforation. There is also findings suggestive of acute sigmoidal diverticulitis.  - Admit to general medicine  - Consult general surgery  - Continue Zosyn  - Give IV fluids  - Keep n.p.o.  - Continue morphine as needed for pain    Bilateral lung reticulonodular opacity  - Right greater than left based on CT scan which is similar to prior imaging from 6/23/2016  - Mycobacterial infection not excluded per CT scan report  - Patient is asymptomatic  - Follow-up with [facility]    DVT prophylaxis  - SCDs

## 2025-05-13 NOTE — ED PROVIDER NOTES
The patient was seen by the midlevel/resident.  I have personally saw the patient and made/approved the management plan and take responsibility for the patient management.  I reviewed the EKG's (when done) and agree with the interpretation.  I have seen and examined the patient; agree with the workup, evaluation, MDM, and diagnosis.  The care plan has been discussed with the midlevel/resident; I have reviewed the note and agree with the documented findings.     Presents ED with complaint of abdominal pain.  Diagnoses as of 05/13/25 0621   Intestinal perforation (Multi)   Patient reports the starting pain last night after having milkshake.  He is unsure exact was causing it.  His plain films suggest possible free air under diaphragm.  He was sent for a CT scan.  We are awaiting results but anticipate if this is a perforation hospitalization with general surgery consult.  Patient was given antibiotics in the interim.  MD Anton Ambriz MD  05/13/25 0621

## 2025-05-13 NOTE — CONSULTS
Inpatient consult to Cardiology  Consult performed by: DAVID Egan-CNP  Consult ordered by: Cindy Baldwin PA-C  Reason for consult: pre op clearance          History Of Present Illness  Diego Morales is a 70 y.o. male presenting with complaints of sudden onset abd pain. He denies any chest pain, shortness of breath, dizziness, or palpitations. His appetite was decreased and he tried taking meds to help the pain but it didn't help.     Lab work in the ER showed glucose 91, sodium 133, potassium 3.9, BUN/creatinine 22/0.98, lactate 0.8, troponin negative, INR 1.3, WBCs 13.1, H&H 14.7/41.1, UA negative, chest x-ray showed free intraperitoneal air under the right hemidiaphragm and emphysematous changes in the lungs without acute abnormality.  CTA of the chest abdomen and pelvis showed reticular nodular opacities right greater than left upper lobe, mild free air noted within the abdomen and pelvis, small bowel with wall thickening and fluid including the indeterminate region with adjacent nondependent free air which is indeterminate for location of perforation.    EKG showed SR, no acute ischemic changes.        Past Medical History  Normal coronary arteries, brief episode of ventricular fibrillation during injection of RCA during LHC, converted with cardioversion, mitral valve prolapse with mild to moderate MR, hyperlipidemia    Surgical History  Surgical History[1]     Social History  He reports that he has never smoked. He has never used smokeless tobacco. He reports current alcohol use. He reports that he does not use drugs.    Family History  Family History[2]     Allergies  Patient has no known allergies.    Review of Systems  Review of Systems   Constitutional:  Positive for appetite change. Negative for chills and fever.   Respiratory:  Negative for cough and shortness of breath.    Cardiovascular:  Negative for chest pain, palpitations and leg swelling.   Gastrointestinal:  Positive for abdominal  "pain.   Neurological:  Negative for dizziness and weakness.   All other systems reviewed and are negative.         Physical Exam  Constitutional: Well developed, awake/alert/oriented x3, no distress, alert and cooperative  Eyes: PERRL, EOMI, clear sclera  ENMT: mucous membranes moist, no apparent injury, no lesions seen  Head/Neck: Neck supple, no apparent injury, thyroid without mass or tenderness, No JVD, trachea midline, no bruits  Respiratory/Thorax: Patent airways, CTAB, normal breath sounds with good chest expansion, thorax symmetric  Cardiovascular: Regular, rate and rhythm, 2/6 systolic murmur, 2+ equal pulses of the extremities, normal S 1and S 2  Gastrointestinal: Nondistended, soft, tender, no masses palpable, no organomegaly, +BS, no bruits  Musculoskeletal: ROM intact, no joint swelling, normal strength  Extremities: normal extremities, no cyanosis edema, contusions or wounds, no clubbing  Neurological: alert and oriented x3, intact senses, motor, response and reflexes, normal strength  Lymphatic: No significant lymphadenopathy  Psychological: Appropriate mood and behavior  Skin: Warm and dry, no lesions, no rashes       Last Recorded Vitals  Blood pressure 118/70, pulse 85, temperature 36.6 °C (97.9 °F), temperature source Temporal, resp. rate 21, height 1.803 m (5' 10.98\"), weight 67.7 kg (149 lb 4.8 oz), SpO2 97%.    Medications  Scheduled medications  Scheduled Medications[3]  Continuous medications  Continuous Medications[4]  PRN medications  PRN Medications[5]    Relevant Results  Results for orders placed or performed during the hospital encounter of 05/12/25 (from the past 24 hours)   CBC and Auto Differential   Result Value Ref Range    WBC 13.1 (H) 4.4 - 11.3 x10*3/uL    nRBC 0.0 0.0 - 0.0 /100 WBCs    RBC 4.40 (L) 4.50 - 5.90 x10*6/uL    Hemoglobin 14.7 13.5 - 17.5 g/dL    Hematocrit 41.1 41.0 - 52.0 %    MCV 93 80 - 100 fL    MCH 33.4 26.0 - 34.0 pg    MCHC 35.8 32.0 - 36.0 g/dL    RDW 12.3 " 11.5 - 14.5 %    Platelets 167 150 - 450 x10*3/uL    Neutrophils % 91.5 40.0 - 80.0 %    Immature Granulocytes %, Automated 0.4 0.0 - 0.9 %    Lymphocytes % 3.9 13.0 - 44.0 %    Monocytes % 3.9 2.0 - 10.0 %    Eosinophils % 0.1 0.0 - 6.0 %    Basophils % 0.2 0.0 - 2.0 %    Neutrophils Absolute 11.99 (H) 1.60 - 5.50 x10*3/uL    Immature Granulocytes Absolute, Automated 0.05 0.00 - 0.50 x10*3/uL    Lymphocytes Absolute 0.51 (L) 0.80 - 3.00 x10*3/uL    Monocytes Absolute 0.51 0.05 - 0.80 x10*3/uL    Eosinophils Absolute 0.01 0.00 - 0.40 x10*3/uL    Basophils Absolute 0.03 0.00 - 0.10 x10*3/uL   Magnesium   Result Value Ref Range    Magnesium 1.63 1.60 - 2.40 mg/dL   Comprehensive metabolic panel   Result Value Ref Range    Glucose 91 74 - 99 mg/dL    Sodium 133 (L) 136 - 145 mmol/L    Potassium 3.9 3.5 - 5.3 mmol/L    Chloride 99 98 - 107 mmol/L    Bicarbonate 28 21 - 32 mmol/L    Anion Gap 10 10 - 20 mmol/L    Urea Nitrogen 22 6 - 23 mg/dL    Creatinine 0.98 0.50 - 1.30 mg/dL    eGFR 82 >60 mL/min/1.73m*2    Calcium 9.0 8.6 - 10.3 mg/dL    Albumin 4.0 3.4 - 5.0 g/dL    Alkaline Phosphatase 57 33 - 136 U/L    Total Protein 6.4 6.4 - 8.2 g/dL    AST 17 9 - 39 U/L    Bilirubin, Total 2.0 (H) 0.0 - 1.2 mg/dL    ALT 16 10 - 52 U/L   Lactate   Result Value Ref Range    Lactate 0.8 0.4 - 2.0 mmol/L   Lipase   Result Value Ref Range    Lipase 13 9 - 82 U/L   Protime-INR   Result Value Ref Range    Protime 14.8 (H) 9.8 - 12.4 seconds    INR 1.3 (H) 0.9 - 1.1   Troponin I, High Sensitivity   Result Value Ref Range    Troponin I, High Sensitivity 6 0 - 20 ng/L   Urinalysis with Reflex Culture and Microscopic   Result Value Ref Range    Color, Urine Light-Orange (N) Light-Yellow, Yellow, Dark-Yellow    Appearance, Urine Clear Clear    Specific Gravity, Urine 1.020 1.005 - 1.035    pH, Urine 5.5 5.0, 5.5, 6.0, 6.5, 7.0, 7.5, 8.0    Protein, Urine NEGATIVE NEGATIVE, 10 (TRACE), 20 (TRACE) mg/dL    Glucose, Urine Normal Normal mg/dL     Blood, Urine NEGATIVE NEGATIVE mg/dL    Ketones, Urine 10 (1+) (A) NEGATIVE mg/dL    Bilirubin, Urine NEGATIVE NEGATIVE mg/dL    Urobilinogen, Urine Normal Normal mg/dL    Nitrite, Urine NEGATIVE NEGATIVE    Leukocyte Esterase, Urine NEGATIVE NEGATIVE   Extra Urine Gray Tube   Result Value Ref Range    Extra Tube Hold for add-ons.    CBC   Result Value Ref Range    WBC 11.3 4.4 - 11.3 x10*3/uL    nRBC 0.0 0.0 - 0.0 /100 WBCs    RBC 4.39 (L) 4.50 - 5.90 x10*6/uL    Hemoglobin 14.7 13.5 - 17.5 g/dL    Hematocrit 41.1 41.0 - 52.0 %    MCV 94 80 - 100 fL    MCH 33.5 26.0 - 34.0 pg    MCHC 35.8 32.0 - 36.0 g/dL    RDW 12.3 11.5 - 14.5 %    Platelets 162 150 - 450 x10*3/uL   Basic metabolic panel   Result Value Ref Range    Glucose 82 74 - 99 mg/dL    Sodium 137 136 - 145 mmol/L    Potassium 3.7 3.5 - 5.3 mmol/L    Chloride 104 98 - 107 mmol/L    Bicarbonate 25 21 - 32 mmol/L    Anion Gap 12 10 - 20 mmol/L    Urea Nitrogen 15 6 - 23 mg/dL    Creatinine 0.90 0.50 - 1.30 mg/dL    eGFR >90 >60 mL/min/1.73m*2    Calcium 8.4 (L) 8.6 - 10.3 mg/dL   ECG 12 Lead   Result Value Ref Range    Ventricular Rate 76 BPM    Atrial Rate 76 BPM    MS Interval 158 ms    QRS Duration 100 ms    QT Interval 406 ms    QTC Calculation(Bazett) 456 ms    P Axis 64 degrees    R Axis 23 degrees    T Axis 4 degrees    QRS Count 13 beats    Q Onset 222 ms    P Onset 143 ms    P Offset 206 ms    T Offset 425 ms    QTC Fredericia 439 ms       CT chest abdomen pelvis w IV contrast   Final Result   Reticulonodular opacities may right greater than left upper lobe   similar to prior imaging from 06/23/2016 suggestive of chronic   etiology. Mycobacterial infection again not excluded.        Mild free air noted within the abdomen and pelvis in keeping with   hollow visceral perforation. There are regions of small bowel with   wall thickening and fluid including the indeterminate region with   adjacent non dependent free air which is indeterminate for  location   of perforation. There is also findings suggestive of acute sigmoidal   diverticulitis. Surgical recommended.        Prostatomegaly. Correlation with PSA is suggested.        Additional findings as detailed.             MACRO:   Elena Gallardo discussed the significance and urgency of this   critical finding by telephone with  LORRIE MOORE on 5/12/2025 at   8:54 pm.  (**-RCF-**) Findings:  See findings.        Signed by: Elena Gallardo 5/12/2025 8:59 PM   Dictation workstation:   [id number]      XR chest 2 views   Final Result   1.  Free intraperitoneal air under the right hemidiaphragm. In the   absence of recent of ancient CT is advised for complete evaluation   2. Emphysematous changes in the lungs without acute abnormality                  MACRO:   None        Signed by: Laron Baez 5/12/2025 7:28 PM   Dictation workstation:   [id number]          No echocardiogram results found for the past 12 months       Assessment/Plan   Echocardiogram from May 2, 2025, LVEF 60 to 65%, mild to moderate LAE, mild AI, bileaflet mitral valve prolapse, moderate to severe MR, moderate TR, mild LVH and diastolic dysfunction    Acute diverticulitis/small intestinal perforation  -surgery consulted, note reviewed  -Antibiotics   -NPO  -IVF  -Pain meds    -I reviewed the EKG, no acute changes, ST elevation, or depression  -Denies ACS symptoms  -Normal coronary arteries per LHC 2016  -Had VF during [facility] with injection of RCA  -Ok to hold aspirin  -Cont metoprolol  -He is cleared for surgery at a low cardiac risk  -Monitor on tele    2. Tachycardia  -I reviewed the EKG, no acute changes, ST elevation, or depression  -Telemetry reviewed, short run of tachycardia  -Restart Metoprolol succinate 25mg daily  -Monitor on tele    3. Hx of HTN  -BP stable  -metoprolol  -Monitor    4. Mitral valve prolapse  -Echo as above        DAVID Egan-CNP         [1]   Past Surgical History:  Procedure Laterality Date     TONSILLECTOMY  05/11/2016    Tonsillectomy   [2] No family history on file.  [3] acetaminophen, 650 mg, oral, q6h  enoxaparin, 40 mg, subcutaneous, Daily  ketorolac, 15 mg, intravenous, q6h  metoprolol succinate XL, 25 mg, oral, Daily  piperacillin-tazobactam, 4.5 g, intravenous, q6h  [4] potassium [id number]%NaCl, 100 mL/hr, Last Rate: 100 mL/hr (05/13/25 1439)  [5] PRN medications: HYDROmorphone, HYDROmorphone, melatonin, naloxone, ondansetron **OR** ondansetron, polyethylene glycol

## 2025-05-13 NOTE — PROGRESS NOTES
"Diego Morales is a 70 y.o. male on day 1 of admission presenting with Intestinal perforation (Multi).    Subjective   Pt is complaining of sharp abdominal pain in lower abdomen that is constant.        Objective     Physical Exam  Vitals and nursing note reviewed.   Constitutional:       General: He is not in acute distress.     Appearance: He is ill-appearing. He is not toxic-appearing.   HENT:      Head: Normocephalic and atraumatic.      Nose: Nose normal.      Mouth/Throat:      Mouth: Mucous membranes are moist.   Eyes:      Extraocular Movements: Extraocular movements intact.      Conjunctiva/sclera: Conjunctivae normal.      Pupils: Pupils are equal, round, and reactive to light.   Cardiovascular:      Rate and Rhythm: Normal rate and regular rhythm.      Heart sounds: No murmur heard.     No gallop.   Pulmonary:      Effort: Pulmonary effort is normal. No respiratory distress.      Breath sounds: Normal breath sounds. No wheezing, rhonchi or rales.   Abdominal:      General: Abdomen is flat. Bowel sounds are normal. There is no distension.      Palpations: Abdomen is soft. There is no mass.      Tenderness: There is abdominal tenderness.   Musculoskeletal:         General: No swelling or tenderness. Normal range of motion.      Cervical back: Normal range of motion and neck supple.   Skin:     General: Skin is warm and dry.   Neurological:      General: No focal deficit present.      Mental Status: He is alert and oriented to person, place, and time. Mental status is at baseline.   Psychiatric:         Mood and Affect: Mood normal.         Behavior: Behavior normal.         Thought Content: Thought content normal.         Judgment: Judgment normal.         Last Recorded Vitals:  /60 (BP Location: Right arm)   Pulse 81   Temp 36.6 °C (97.9 °F) (Temporal)   Resp 17   Ht 1.803 m (5' 10.98\")   Wt 67.7 kg (149 lb 4.8 oz)   SpO2 96%   BMI 20.83 kg/m²      Scheduled medications:  Scheduled " Medications[1]  Continuous medications:  Continuous Medications[2]  PRN medications:  PRN Medications[3]     Relevant Results:  Results for orders placed or performed during the hospital encounter of 05/12/25 (from the past 24 hours)   CBC and Auto Differential   Result Value Ref Range    WBC 13.1 (H) 4.4 - 11.3 x10*3/uL    nRBC 0.0 0.0 - 0.0 /100 WBCs    RBC 4.40 (L) 4.50 - 5.90 x10*6/uL    Hemoglobin 14.7 13.5 - 17.5 g/dL    Hematocrit 41.1 41.0 - 52.0 %    MCV 93 80 - 100 fL    MCH 33.4 26.0 - 34.0 pg    MCHC 35.8 32.0 - 36.0 g/dL    RDW 12.3 11.5 - 14.5 %    Platelets 167 150 - 450 x10*3/uL    Neutrophils % 91.5 40.0 - 80.0 %    Immature Granulocytes %, Automated 0.4 0.0 - 0.9 %    Lymphocytes % 3.9 13.0 - 44.0 %    Monocytes % 3.9 2.0 - 10.0 %    Eosinophils % 0.1 0.0 - 6.0 %    Basophils % 0.2 0.0 - 2.0 %    Neutrophils Absolute 11.99 (H) 1.60 - 5.50 x10*3/uL    Immature Granulocytes Absolute, Automated 0.05 0.00 - 0.50 x10*3/uL    Lymphocytes Absolute 0.51 (L) 0.80 - 3.00 x10*3/uL    Monocytes Absolute 0.51 0.05 - 0.80 x10*3/uL    Eosinophils Absolute 0.01 0.00 - 0.40 x10*3/uL    Basophils Absolute 0.03 0.00 - 0.10 x10*3/uL   Magnesium   Result Value Ref Range    Magnesium 1.63 1.60 - 2.40 mg/dL   Comprehensive metabolic panel   Result Value Ref Range    Glucose 91 74 - 99 mg/dL    Sodium 133 (L) 136 - 145 mmol/L    Potassium 3.9 3.5 - 5.3 mmol/L    Chloride 99 98 - 107 mmol/L    Bicarbonate 28 21 - 32 mmol/L    Anion Gap 10 10 - 20 mmol/L    Urea Nitrogen 22 6 - 23 mg/dL    Creatinine 0.98 0.50 - 1.30 mg/dL    eGFR 82 >60 mL/min/1.73m*2    Calcium 9.0 8.6 - 10.3 mg/dL    Albumin 4.0 3.4 - 5.0 g/dL    Alkaline Phosphatase 57 33 - 136 U/L    Total Protein 6.4 6.4 - 8.2 g/dL    AST 17 9 - 39 U/L    Bilirubin, Total 2.0 (H) 0.0 - 1.2 mg/dL    ALT 16 10 - 52 U/L   Lactate   Result Value Ref Range    Lactate 0.8 0.4 - 2.0 mmol/L   Lipase   Result Value Ref Range    Lipase 13 9 - 82 U/L   Protime-INR   Result Value  Ref Range    Protime 14.8 (H) 9.8 - 12.4 seconds    INR 1.3 (H) 0.9 - 1.1   Troponin I, High Sensitivity   Result Value Ref Range    Troponin I, High Sensitivity 6 0 - 20 ng/L   Urinalysis with Reflex Culture and Microscopic   Result Value Ref Range    Color, Urine Light-Orange (N) Light-Yellow, Yellow, Dark-Yellow    Appearance, Urine Clear Clear    Specific Gravity, Urine 1.020 1.005 - 1.035    pH, Urine 5.5 5.0, 5.5, 6.0, 6.5, 7.0, 7.5, 8.0    Protein, Urine NEGATIVE NEGATIVE, 10 (TRACE), 20 (TRACE) mg/dL    Glucose, Urine Normal Normal mg/dL    Blood, Urine NEGATIVE NEGATIVE mg/dL    Ketones, Urine 10 (1+) (A) NEGATIVE mg/dL    Bilirubin, Urine NEGATIVE NEGATIVE mg/dL    Urobilinogen, Urine Normal Normal mg/dL    Nitrite, Urine NEGATIVE NEGATIVE    Leukocyte Esterase, Urine NEGATIVE NEGATIVE   Extra Urine Gray Tube   Result Value Ref Range    Extra Tube Hold for add-ons.    CBC   Result Value Ref Range    WBC 11.3 4.4 - 11.3 x10*3/uL    nRBC 0.0 0.0 - 0.0 /100 WBCs    RBC 4.39 (L) 4.50 - 5.90 x10*6/uL    Hemoglobin 14.7 13.5 - 17.5 g/dL    Hematocrit 41.1 41.0 - 52.0 %    MCV 94 80 - 100 fL    MCH 33.5 26.0 - 34.0 pg    MCHC 35.8 32.0 - 36.0 g/dL    RDW 12.3 11.5 - 14.5 %    Platelets 162 150 - 450 x10*3/uL   Basic metabolic panel   Result Value Ref Range    Glucose 82 74 - 99 mg/dL    Sodium 137 136 - 145 mmol/L    Potassium 3.7 3.5 - 5.3 mmol/L    Chloride 104 98 - 107 mmol/L    Bicarbonate 25 21 - 32 mmol/L    Anion Gap 12 10 - 20 mmol/L    Urea Nitrogen 15 6 - 23 mg/dL    Creatinine 0.90 0.50 - 1.30 mg/dL    eGFR >90 >60 mL/min/1.73m*2    Calcium 8.4 (L) 8.6 - 10.3 mg/dL       Imaging  CT chest abdomen pelvis w IV contrast  Result Date: 5/12/2025  Reticulonodular opacities may right greater than left upper lobe similar to prior imaging from 06/23/2016 suggestive of chronic etiology. Mycobacterial infection again not excluded.   Mild free air noted within the abdomen and pelvis in keeping with hollow visceral  perforation. There are regions of small bowel with wall thickening and fluid including the indeterminate region with adjacent non dependent free air which is indeterminate for location of perforation. There is also findings suggestive of acute sigmoidal diverticulitis. Surgical recommended.   Prostatomegaly. Correlation with PSA is suggested.   Additional findings as detailed.     MACRO: Elena Gallardo discussed the significance and urgency of this critical finding by telephone with  LORRIE WYATTR on 5/12/2025 at 8:54 pm.  (**-RCF-**) Findings:  See findings.   Signed by: Elena Gallardo 5/12/2025 8:59 PM Dictation workstation:   [id number]    XR chest 2 views  Result Date: 5/12/2025  1.  Free intraperitoneal air under the right hemidiaphragm. In the absence of recent of ancient CT is advised for complete evaluation 2. Emphysematous changes in the lungs without acute abnormality       MACRO: None   Signed by: Laron Baez 5/12/2025 7:28 PM Dictation workstation:   [id number]      Cardiology, Vascular, and Other Imaging  No other imaging results found for the past 2 days                     Assessment/Plan   Assessment & Plan  Intestinal perforation (Multi)      Diego Morales is a 70 y.o. male with a past medical history of cardiac arrest during cardiac catheterization in 2016 secondary to ventricular arrhythmia/V-fib requiring cardioversion (normal coronary arteries), heart murmur, and angina pectoris who was admitted to the hospital for suspected small intestinal perforation and acute diverticulitis.       Small intestinal perforation/acute diverticulitis  - CT scan showed mild free air noted within the abdomen and pelvis in keeping with hollow visceral perforation. There are regions of small bowel with wall thickening and fluid including the indeterminate region with adjacent non dependent free air which is indeterminate for location  of perforation. There is also findings suggestive of acute sigmoidal  diverticulitis.  - surg consulted  - Zosyn  - IVF  - Keep n.p.o.  - Continue morphine as needed for pain     Bilateral lung reticulonodular opacity  - Right greater than left based on CT scan which is similar to prior imaging from 6/23/2016  - Mycobacterial infection not excluded per CT scan report  - Patient is asymptomatic  - Follow-up with [facility]  - ID consult     HTN  BP low  - hold metoprolol    DVT prophylaxis: SCD  Dispo: monitor clinically          Ofeila Felix MD  Hospitalist         [1] piperacillin-tazobactam, 4.5 g, intravenous, q6h  [2] sodium chloride 0.9%, 125 mL/hr, Last Rate: 125 mL/hr (05/13/25 0908)  [3] PRN medications: acetaminophen **OR** acetaminophen **OR** acetaminophen, melatonin, morphine, morphine, naloxone, ondansetron **OR** ondansetron, polyethylene glycol

## 2025-05-13 NOTE — H&P
History Of Present Illness  Diego Morales is a 70 y.o. male with a past medical history of cardiac arrest during cardiac catheterization in 2016 secondary to ventricular arrhythmia/V-fib requiring cardioversion (normal coronary arteries), heart murmur, and angina pectoris who was admitted to the hospital for suspected small intestinal perforation and acute diverticulitis.  Patient states that he started to have pain Sunday night.  He thought that it was a kidney stone.  He works as a .  He went to work yesterday but decided to come to the hospital because the pain was worsening.  He denies fever but states that he had chills last night.  He has been taking ibuprofen for the pain.  Nausea, vomiting or diarrhea.  Denies melena, hematochezia or hematuria.  Denies similar episode in the past.     Past Medical History  As stated above in the HPI    Surgical History  He has a past surgical history that includes Tonsillectomy (05/11/2016).  Cardiac catheterization     Social History  He reports that he has never smoked. He has never used smokeless tobacco.  He drinks 1 beer a day.  Denies alcohol problem. He reports that he does not use drugs.  He lives with his wife.  Works as a .    Family History  Family History[1]     Allergies  Patient has no known allergies.    Medications  Scheduled medications  Scheduled Medications[2]  Continuous medications  Continuous Medications[3]  PRN medications  PRN Medications[4]  He takes aspirin and metoprolol at home.    Review of systems: 10-point review of systems is negative.     Physical Exam  Constitutional: alert and oriented x 3, awake, cooperative, no acute distress  Skin: warm and dry  Head/Neck: Normocephalic, atraumatic  Eyes: clear sclera  ENMT: mucous membranes moist  Cardio: Regular rate and rhythm  Resp: CTA bilaterally, good respiratory effort  Gastrointestinal: Soft, mild diffuse tenderness, nondistended, bowel sounds are present  Musculoskeletal: ROM  intact, no joint swelling  Extremities: No edema, cyanosis, or clubbing  Neuro: lert and oriented x 3, sensation is intact.  Patient moves all limbs against resistance.  Psychological: Appropriate mood and behavior     Last Recorded Vitals  /60 (BP Location: Right arm)   Pulse 81   Temp 36.6 °C (97.9 °F) (Temporal)   Resp 17   Wt 67.7 kg (149 lb 4.8 oz)   SpO2 96%     Relevant Results  Results for orders placed or performed during the hospital encounter of 05/12/25 (from the past 24 hours)   CBC and Auto Differential   Result Value Ref Range    WBC 13.1 (H) 4.4 - 11.3 x10*3/uL    nRBC 0.0 0.0 - 0.0 /100 WBCs    RBC 4.40 (L) 4.50 - 5.90 x10*6/uL    Hemoglobin 14.7 13.5 - 17.5 g/dL    Hematocrit 41.1 41.0 - 52.0 %    MCV 93 80 - 100 fL    MCH 33.4 26.0 - 34.0 pg    MCHC 35.8 32.0 - 36.0 g/dL    RDW 12.3 11.5 - 14.5 %    Platelets 167 150 - 450 x10*3/uL    Neutrophils % 91.5 40.0 - 80.0 %    Immature Granulocytes %, Automated 0.4 0.0 - 0.9 %    Lymphocytes % 3.9 13.0 - 44.0 %    Monocytes % 3.9 2.0 - 10.0 %    Eosinophils % 0.1 0.0 - 6.0 %    Basophils % 0.2 0.0 - 2.0 %    Neutrophils Absolute 11.99 (H) 1.60 - 5.50 x10*3/uL    Immature Granulocytes Absolute, Automated 0.05 0.00 - 0.50 x10*3/uL    Lymphocytes Absolute 0.51 (L) 0.80 - 3.00 x10*3/uL    Monocytes Absolute 0.51 0.05 - 0.80 x10*3/uL    Eosinophils Absolute 0.01 0.00 - 0.40 x10*3/uL    Basophils Absolute 0.03 0.00 - 0.10 x10*3/uL   Magnesium   Result Value Ref Range    Magnesium 1.63 1.60 - 2.40 mg/dL   Comprehensive metabolic panel   Result Value Ref Range    Glucose 91 74 - 99 mg/dL    Sodium 133 (L) 136 - 145 mmol/L    Potassium 3.9 3.5 - 5.3 mmol/L    Chloride 99 98 - 107 mmol/L    Bicarbonate 28 21 - 32 mmol/L    Anion Gap 10 10 - 20 mmol/L    Urea Nitrogen 22 6 - 23 mg/dL    Creatinine 0.98 0.50 - 1.30 mg/dL    eGFR 82 >60 mL/min/1.73m*2    Calcium 9.0 8.6 - 10.3 mg/dL    Albumin 4.0 3.4 - 5.0 g/dL    Alkaline Phosphatase 57 33 - 136 U/L     Total Protein 6.4 6.4 - 8.2 g/dL    AST 17 9 - 39 U/L    Bilirubin, Total 2.0 (H) 0.0 - 1.2 mg/dL    ALT 16 10 - 52 U/L   Lactate   Result Value Ref Range    Lactate 0.8 0.4 - 2.0 mmol/L   Lipase   Result Value Ref Range    Lipase 13 9 - 82 U/L   Protime-INR   Result Value Ref Range    Protime 14.8 (H) 9.8 - 12.4 seconds    INR 1.3 (H) 0.9 - 1.1   Troponin I, High Sensitivity   Result Value Ref Range    Troponin I, High Sensitivity 6 0 - 20 ng/L   Urinalysis with Reflex Culture and Microscopic   Result Value Ref Range    Color, Urine Light-Orange (N) Light-Yellow, Yellow, Dark-Yellow    Appearance, Urine Clear Clear    Specific Gravity, Urine 1.020 1.005 - 1.035    pH, Urine 5.5 5.0, 5.5, 6.0, 6.5, 7.0, 7.5, 8.0    Protein, Urine NEGATIVE NEGATIVE, 10 (TRACE), 20 (TRACE) mg/dL    Glucose, Urine Normal Normal mg/dL    Blood, Urine NEGATIVE NEGATIVE mg/dL    Ketones, Urine 10 (1+) (A) NEGATIVE mg/dL    Bilirubin, Urine NEGATIVE NEGATIVE mg/dL    Urobilinogen, Urine Normal Normal mg/dL    Nitrite, Urine NEGATIVE NEGATIVE    Leukocyte Esterase, Urine NEGATIVE NEGATIVE   Extra Urine Gray Tube   Result Value Ref Range    Extra Tube Hold for add-ons.    CBC   Result Value Ref Range    WBC 11.3 4.4 - 11.3 x10*3/uL    nRBC 0.0 0.0 - 0.0 /100 WBCs    RBC 4.39 (L) 4.50 - 5.90 x10*6/uL    Hemoglobin 14.7 13.5 - 17.5 g/dL    Hematocrit 41.1 41.0 - 52.0 %    MCV 94 80 - 100 fL    MCH 33.5 26.0 - 34.0 pg    MCHC 35.8 32.0 - 36.0 g/dL    RDW 12.3 11.5 - 14.5 %    Platelets 162 150 - 450 x10*3/uL   Basic metabolic panel   Result Value Ref Range    Glucose 82 74 - 99 mg/dL    Sodium 137 136 - 145 mmol/L    Potassium 3.7 3.5 - 5.3 mmol/L    Chloride 104 98 - 107 mmol/L    Bicarbonate 25 21 - 32 mmol/L    Anion Gap 12 10 - 20 mmol/L    Urea Nitrogen 15 6 - 23 mg/dL    Creatinine 0.90 0.50 - 1.30 mg/dL    eGFR >90 >60 mL/min/1.73m*2    Calcium 8.4 (L) 8.6 - 10.3 mg/dL     Imaging  CT chest abdomen pelvis w IV contrast  Result Date:  5/12/2025  Reticulonodular opacities may right greater than left upper lobe similar to prior imaging from 06/23/2016 suggestive of chronic etiology. Mycobacterial infection again not excluded.   Mild free air noted within the abdomen and pelvis in keeping with hollow visceral perforation. There are regions of small bowel with wall thickening and fluid including the indeterminate region with adjacent non dependent free air which is indeterminate for location of perforation. There is also findings suggestive of acute sigmoidal diverticulitis. Surgical recommended.   Prostatomegaly. Correlation with PSA is suggested.   Additional findings as detailed.     MACRO: Elena Gallardo discussed the significance and urgency of this critical finding by telephone with  LORRIE WYATTR on 5/12/2025 at 8:54 pm.  (**-RCF-**) Findings:  See findings.   Signed by: Elena Gallardo 5/12/2025 8:59 PM Dictation workstation:   [id number]    XR chest 2 views  Result Date: 5/12/2025  1.  Free intraperitoneal air under the right hemidiaphragm. In the absence of recent of ancient CT is advised for complete evaluation 2. Emphysematous changes in the lungs without acute abnormality       MACRO: None   Signed by: Laron Baez 5/12/2025 7:28 PM Dictation workstation:   [id number]      Cardiology, Vascular, and Other Imaging  No other imaging results found for the past 7 days       Assessment/Plan   Assessment & Plan  Intestinal perforation (Multi)  Diego Morales is a 70 y.o. male with a past medical history of cardiac arrest during cardiac catheterization in 2016 secondary to ventricular arrhythmia/V-fib requiring cardioversion (normal coronary arteries), heart murmur, and angina pectoris who was admitted to the hospital for suspected small intestinal perforation and acute diverticulitis.      Small intestinal perforation/acute diverticulitis  - CT scan showed mild free air noted within the abdomen and pelvis in keeping with hollow visceral  perforation. There are regions of small bowel with wall thickening and fluid including the indeterminate region with adjacent non dependent free air which is indeterminate for location  of perforation. There is also findings suggestive of acute sigmoidal diverticulitis.  - Admit to general medicine  - Consult general surgery  - Continue Zosyn  - Give IV fluids  - Keep n.p.o.  - Continue morphine as needed for pain    Bilateral lung reticulonodular opacity  - Right greater than left based on CT scan which is similar to prior imaging from 6/23/2016  - Mycobacterial infection not excluded per CT scan report  - Patient is asymptomatic  - Follow-up with [facility]    DVT prophylaxis  - SCDs                   Josie Berman MD         [1] No family history on file.  [2] piperacillin-tazobactam, 4.5 g, intravenous, q6h  [3] sodium chloride 0.9%, 125 mL/hr, Last Rate: 125 mL/hr (05/13/25 0118)  [4] PRN medications: acetaminophen **OR** acetaminophen **OR** acetaminophen, melatonin, morphine, morphine, naloxone, ondansetron **OR** ondansetron, polyethylene glycol

## 2025-05-13 NOTE — CONSULTS
Consults  Referred by DOMINGUEZ Felix MD: Ramon Gould MD    Reason For Consult  Concern for mycobacterial disease    History Of Present Illness  Diego Morales is a 70 y.o. male, hx of cardiac arrythmias / arrest, he was admitted for a 2 day hx of lower abdominal pain, severe, sharp, intermittent, radiates to the groin and rectal area, relived by pain medications, no fever, no emesis, no diarrhea, no bleeding, he had dysuria, WBC were up, the CT with sigmoid diverticulitis / ? 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    Surgical History  He has a past surgical history that includes Tonsillectomy (05/11/2016).     Social History     Occupational History    Not on file   Tobacco Use    Smoking status: Never    Smokeless tobacco: Never   Substance and Sexual Activity    Alcohol use: Yes     Comment: social    Drug use: Never    Sexual activity: Not on file     Travel History   Travel since 04/13/25    No documented travel since 04/13/25          Family History  Family History[1], no immunodeficiency  Allergies  Patient has no known allergies.       There is no immunization history on file for this patient.  Medications  Home medications:  Prescriptions Prior to Admission[2]  Current medications:  Scheduled medications  Scheduled Medications[3]  Continuous medications  Continuous Medications[4]  PRN medications  PRN Medications[5]    Review of Systems   All other systems reviewed and are negative.       Objective  Range of Vitals (last 24 hours)  Heart Rate:  [77-85]   Temp:  [36.5 °C (97.7 °F)-37.1 °C (98.8 °F)]   Resp:  [16-21]   BP:  "(107-119)/(54-78)   Height:  [180.3 cm (5' 10.98\")-180.3 cm (5' 11\")]   Weight:  [67.7 kg (149 lb 4.8 oz)-68 kg (150 lb)]   SpO2:  [95 %-97 %]   Daily Weight  05/12/25 : 67.7 kg (149 lb 4.8 oz)    Body mass index is 20.83 kg/m².     Physical Exam  Constitutional:       Appearance: Normal appearance.   HENT:      Head: Normocephalic and atraumatic.      Mouth/Throat:      Mouth: Mucous membranes are moist.      Pharynx: Oropharynx is clear.   Eyes:      Pupils: Pupils are equal, round, and reactive to light.   Cardiovascular:      Rate and Rhythm: Normal rate and regular rhythm.      Heart sounds: Murmur heard.   Pulmonary:      Effort: Pulmonary effort is normal.      Breath sounds: Normal breath sounds.   Abdominal:      General: Abdomen is flat. Bowel sounds are normal.      Palpations: Abdomen is soft.      Tenderness: There is abdominal tenderness.   Musculoskeletal:      Cervical back: Normal range of motion.   Neurological:      Mental Status: He is alert.          Relevant Results  Outside Hospital Results  reviewed  Labs  Results from last 72 hours   Lab Units 05/13/25  0710 05/12/25 1907   WBC AUTO x10*3/uL 11.3 13.1*   HEMOGLOBIN g/dL 14.7 14.7   HEMATOCRIT % 41.1 41.1   PLATELETS AUTO x10*3/uL 162 167   NEUTROS PCT AUTO %  --  91.5   LYMPHS PCT AUTO %  --  3.9   MONOS PCT AUTO %  --  3.9   EOS PCT AUTO %  --  0.1     Results from last 72 hours   Lab Units 05/13/25  0710 05/12/25  1907   SODIUM mmol/L 137 133*   POTASSIUM mmol/L 3.7 3.9   CHLORIDE mmol/L 104 99   CO2 mmol/L 25 28   BUN mg/dL 15 22   CREATININE mg/dL 0.90 0.98   GLUCOSE mg/dL 82 91   CALCIUM mg/dL 8.4* 9.0   ANION GAP mmol/L 12 10   EGFR mL/min/1.73m*2 >90 82     Results from last 72 hours   Lab Units 05/12/25 1907   ALK PHOS U/L 57   BILIRUBIN TOTAL mg/dL 2.0*   PROTEIN TOTAL g/dL 6.4   ALT U/L 16   AST U/L 17   ALBUMIN g/dL 4.0     Estimated Creatinine Clearance: 72.1 mL/min (by C-G formula based on SCr of 0.9 mg/dL).  CRP   Date Value " "Ref Range Status   06/18/2022 0.17 mg/dL Final     Comment:     REF VALUE  < 1.00       Sedimentation Rate   Date Value Ref Range Status   06/18/2022 1 0 - 20 mm/h Final     No results found for: \"HIV1X2\", \"HIVCONF\", \"TKELMV4MD\"  No results found for: \"HEPCABINIT\", \"HEPCAB\", \"HCVPCRQUANT\"  Microbiology  Reviewed  Imaging  Reviewed      Assessment/Plan   Abnormal CT of the chest, seems to be stable compared to old CT, likely old granulomatous disease, most likely fungal or atypical mycobacteria, MTB is unlikely  Sigmoid diverticulitis    Recommendations :  Continue Zosyn  No need for TB isolation  Chest PT  Bowel rest    I spent minutes in the professional and overall care of this patient.          Umm Vega MD       [1] No family history on file.  [2]   Medications Prior to Admission   Medication Sig Dispense Refill Last Dose/Taking    aspirin 81 mg EC tablet Take 1 tablet (81 mg) by mouth once daily.   5/11/2025 Evening    metoprolol succinate XL (Toprol-XL) 25 mg 24 hr tablet Take 1 tablet (25 mg) by mouth once daily.   5/11/2025 Evening   [3] acetaminophen, 650 mg, oral, q6h  enoxaparin, 40 mg, subcutaneous, Daily  ketorolac, 15 mg, intravenous, q6h  piperacillin-tazobactam, 4.5 g, intravenous, q6h    [4] potassium gvljabn-L6-4.45%NaCl, 100 mL/hr, Last Rate: 100 mL/hr (05/13/25 1100)    [5] PRN medications: HYDROmorphone, HYDROmorphone, melatonin, naloxone, ondansetron **OR** ondansetron, polyethylene glycol    "

## 2025-05-13 NOTE — NURSING NOTE
1930: assumed pt care, pt's family at bedside, pt is stable     5-14-25/0106: let Dr. Velasquez know pt's results to his CT abdomen are back, pt is still stable, sleeping in bed    5-14-25/0623: pt was nauseated, offered to give IV zofran, but pt states he felt better after he threw up

## 2025-05-13 NOTE — CARE PLAN
The patient's goals for the shift include  pain controlled    The clinical goals for the shift include pain management      Problem: Pain - Adult  Goal: Verbalizes/displays adequate comfort level or baseline comfort level  Outcome: Progressing     Problem: Safety - Adult  Goal: Free from fall injury  Outcome: Progressing     Problem: Discharge Planning  Goal: Discharge to home or other facility with appropriate resources  Outcome: Progressing     Problem: Chronic Conditions and Co-morbidities  Goal: Patient's chronic conditions and co-morbidity symptoms are monitored and maintained or improved  Outcome: Progressing     Problem: Nutrition  Goal: Nutrient intake appropriate for maintaining nutritional needs  Outcome: Progressing     Problem: Pain  Goal: Takes deep breaths with improved pain control throughout the shift  Outcome: Progressing  Goal: Turns in bed with improved pain control throughout the shift  Outcome: Progressing  Goal: Walks with improved pain control throughout the shift  Outcome: Progressing  Goal: Performs ADL's with improved pain control throughout shift  Outcome: Progressing  Goal: Participates in PT with improved pain control throughout the shift  Outcome: Progressing  Goal: Free from opioid side effects throughout the shift  Outcome: Progressing  Goal: Free from acute confusion related to pain meds throughout the shift  Outcome: Progressing

## 2025-05-13 NOTE — PROGRESS NOTES
05/13/25 1124   Discharge Planning   Living Arrangements Spouse/significant other   Support Systems Spouse/significant other   Assistance Needed patient is A&Ox3, independent in ADL's, no AD, no DME, drives PCP- Ramon Gould MD   Type of Residence Private residence   Number of Stairs to Enter Residence 3   Number of Stairs Within Residence 20   Do you have animals or pets at home? Yes   Type of Animals or Pets 1 dog- Max   Who is requesting discharge planning? Provider   Home or Post Acute Services None   Expected Discharge Disposition Home

## 2025-05-14 ENCOUNTER — ANESTHESIA EVENT (OUTPATIENT)
Dept: OPERATING ROOM | Facility: HOSPITAL | Age: 70
End: 2025-05-14
Payer: MEDICARE

## 2025-05-14 PROBLEM — K57.20 DIVERTICULITIS OF LARGE INTESTINE WITH PERFORATION WITHOUT ABSCESS OR BLEEDING: Status: ACTIVE | Noted: 2025-05-12

## 2025-05-14 LAB
ALBUMIN SERPL BCP-MCNC: 3.3 G/DL (ref 3.4–5)
ANION GAP SERPL CALC-SCNC: 9 MMOL/L (ref 10–20)
BUN SERPL-MCNC: 13 MG/DL (ref 6–23)
CALCIUM SERPL-MCNC: 8.2 MG/DL (ref 8.6–10.3)
CHLORIDE SERPL-SCNC: 103 MMOL/L (ref 98–107)
CO2 SERPL-SCNC: 27 MMOL/L (ref 21–32)
CREAT SERPL-MCNC: 1.07 MG/DL (ref 0.5–1.3)
EGFRCR SERPLBLD CKD-EPI 2021: 74 ML/MIN/1.73M*2
ERYTHROCYTE [DISTWIDTH] IN BLOOD BY AUTOMATED COUNT: 12.7 % (ref 11.5–14.5)
GLUCOSE SERPL-MCNC: 128 MG/DL (ref 74–99)
HCT VFR BLD AUTO: 40.5 % (ref 41–52)
HGB BLD-MCNC: 14.4 G/DL (ref 13.5–17.5)
MAGNESIUM SERPL-MCNC: 1.74 MG/DL (ref 1.6–2.4)
MCH RBC QN AUTO: 33.6 PG (ref 26–34)
MCHC RBC AUTO-ENTMCNC: 35.6 G/DL (ref 32–36)
MCV RBC AUTO: 95 FL (ref 80–100)
NRBC BLD-RTO: 0 /100 WBCS (ref 0–0)
PHOSPHATE SERPL-MCNC: 1.8 MG/DL (ref 2.5–4.9)
PLATELET # BLD AUTO: 169 X10*3/UL (ref 150–450)
POTASSIUM SERPL-SCNC: 4 MMOL/L (ref 3.5–5.3)
RBC # BLD AUTO: 4.28 X10*6/UL (ref 4.5–5.9)
SODIUM SERPL-SCNC: 135 MMOL/L (ref 136–145)
WBC # BLD AUTO: 10.5 X10*3/UL (ref 4.4–11.3)

## 2025-05-14 PROCEDURE — 85027 COMPLETE CBC AUTOMATED: CPT | Performed by: PHYSICIAN ASSISTANT

## 2025-05-14 PROCEDURE — 83735 ASSAY OF MAGNESIUM: CPT | Performed by: PHYSICIAN ASSISTANT

## 2025-05-14 PROCEDURE — 1200000002 HC GENERAL ROOM WITH TELEMETRY DAILY

## 2025-05-14 PROCEDURE — 2500000004 HC RX 250 GENERAL PHARMACY W/ HCPCS (ALT 636 FOR OP/ED): Mod: JZ | Performed by: INTERNAL MEDICINE

## 2025-05-14 PROCEDURE — 99232 SBSQ HOSP IP/OBS MODERATE 35: CPT | Performed by: SURGERY

## 2025-05-14 PROCEDURE — 2500000001 HC RX 250 WO HCPCS SELF ADMINISTERED DRUGS (ALT 637 FOR MEDICARE OP): Performed by: NURSE PRACTITIONER

## 2025-05-14 PROCEDURE — 2500000001 HC RX 250 WO HCPCS SELF ADMINISTERED DRUGS (ALT 637 FOR MEDICARE OP): Performed by: SURGERY

## 2025-05-14 PROCEDURE — 2500000001 HC RX 250 WO HCPCS SELF ADMINISTERED DRUGS (ALT 637 FOR MEDICARE OP): Performed by: PHYSICIAN ASSISTANT

## 2025-05-14 PROCEDURE — 36415 COLL VENOUS BLD VENIPUNCTURE: CPT | Performed by: PHYSICIAN ASSISTANT

## 2025-05-14 PROCEDURE — 2500000004 HC RX 250 GENERAL PHARMACY W/ HCPCS (ALT 636 FOR OP/ED): Performed by: PHYSICIAN ASSISTANT

## 2025-05-14 PROCEDURE — 80069 RENAL FUNCTION PANEL: CPT | Performed by: PHYSICIAN ASSISTANT

## 2025-05-14 PROCEDURE — 2500000004 HC RX 250 GENERAL PHARMACY W/ HCPCS (ALT 636 FOR OP/ED): Performed by: SURGERY

## 2025-05-14 PROCEDURE — 99233 SBSQ HOSP IP/OBS HIGH 50: CPT | Performed by: STUDENT IN AN ORGANIZED HEALTH CARE EDUCATION/TRAINING PROGRAM

## 2025-05-14 RX ORDER — MAGNESIUM SULFATE HEPTAHYDRATE 40 MG/ML
2 INJECTION, SOLUTION INTRAVENOUS ONCE
Status: COMPLETED | OUTPATIENT
Start: 2025-05-14 | End: 2025-05-14

## 2025-05-14 RX ADMIN — ACETAMINOPHEN 650 MG: 325 TABLET, FILM COATED ORAL at 06:03

## 2025-05-14 RX ADMIN — METOPROLOL SUCCINATE 25 MG: 25 TABLET, EXTENDED RELEASE ORAL at 20:17

## 2025-05-14 RX ADMIN — HYDROMORPHONE HYDROCHLORIDE 0.2 MG: 1 INJECTION, SOLUTION INTRAMUSCULAR; INTRAVENOUS; SUBCUTANEOUS at 11:15

## 2025-05-14 RX ADMIN — PIPERACILLIN SODIUM AND TAZOBACTAM SODIUM 4.5 G: 4; .5 INJECTION, SOLUTION INTRAVENOUS at 03:16

## 2025-05-14 RX ADMIN — Medication 500 MG: at 09:18

## 2025-05-14 RX ADMIN — DEXTROSE MONOHYDRATE, SODIUM CHLORIDE, AND POTASSIUM CHLORIDE 100 ML/HR: 50; 1.49; 4.5 INJECTION, SOLUTION INTRAVENOUS at 20:18

## 2025-05-14 RX ADMIN — KETOROLAC TROMETHAMINE 15 MG: 15 INJECTION, SOLUTION INTRAMUSCULAR; INTRAVENOUS at 13:43

## 2025-05-14 RX ADMIN — ACETAMINOPHEN 650 MG: 325 TABLET, FILM COATED ORAL at 16:08

## 2025-05-14 RX ADMIN — PIPERACILLIN SODIUM AND TAZOBACTAM SODIUM 4.5 G: 4; .5 INJECTION, SOLUTION INTRAVENOUS at 14:01

## 2025-05-14 RX ADMIN — DEXTROSE MONOHYDRATE, SODIUM CHLORIDE, AND POTASSIUM CHLORIDE 100 ML/HR: 50; 1.49; 4.5 INJECTION, SOLUTION INTRAVENOUS at 08:16

## 2025-05-14 RX ADMIN — Medication 500 MG: at 14:01

## 2025-05-14 RX ADMIN — KETOROLAC TROMETHAMINE 15 MG: 15 INJECTION, SOLUTION INTRAMUSCULAR; INTRAVENOUS at 20:16

## 2025-05-14 RX ADMIN — PIPERACILLIN SODIUM AND TAZOBACTAM SODIUM 4.5 G: 4; .5 INJECTION, SOLUTION INTRAVENOUS at 20:16

## 2025-05-14 RX ADMIN — KETOROLAC TROMETHAMINE 15 MG: 15 INJECTION, SOLUTION INTRAMUSCULAR; INTRAVENOUS at 06:03

## 2025-05-14 RX ADMIN — MAGNESIUM SULFATE HEPTAHYDRATE 2 G: 40 INJECTION, SOLUTION INTRAVENOUS at 09:28

## 2025-05-14 RX ADMIN — PIPERACILLIN SODIUM AND TAZOBACTAM SODIUM 4.5 G: 4; .5 INJECTION, SOLUTION INTRAVENOUS at 08:08

## 2025-05-14 RX ADMIN — ACETAMINOPHEN 650 MG: 325 TABLET, FILM COATED ORAL at 23:46

## 2025-05-14 ASSESSMENT — PAIN - FUNCTIONAL ASSESSMENT
PAIN_FUNCTIONAL_ASSESSMENT: 0-10
PAIN_FUNCTIONAL_ASSESSMENT: 0-10

## 2025-05-14 ASSESSMENT — COGNITIVE AND FUNCTIONAL STATUS - GENERAL
MOBILITY SCORE: 19
WALKING IN HOSPITAL ROOM: A LITTLE
CLIMB 3 TO 5 STEPS WITH RAILING: A LITTLE
STANDING UP FROM CHAIR USING ARMS: A LITTLE
DAILY ACTIVITIY SCORE: 24
TOILETING: A LITTLE
DRESSING REGULAR UPPER BODY CLOTHING: A LITTLE
TURNING FROM BACK TO SIDE WHILE IN FLAT BAD: A LITTLE
DRESSING REGULAR LOWER BODY CLOTHING: A LITTLE
DAILY ACTIVITIY SCORE: 20
HELP NEEDED FOR BATHING: A LITTLE
MOBILITY SCORE: 24
MOVING TO AND FROM BED TO CHAIR: A LITTLE

## 2025-05-14 ASSESSMENT — PAIN SCALES - GENERAL
PAINLEVEL_OUTOF10: 0 - NO PAIN
PAINLEVEL_OUTOF10: 0 - NO PAIN

## 2025-05-14 NOTE — PROGRESS NOTES
Diego Morales is a 70 y.o. male on day 2 of admission presenting with Intestinal perforation (Multi).    Subjective   Interval History: no fever, has abdominal pain, no emesis        Review of Systems    Objective   Range of Vitals (last 24 hours)  Heart Rate:  [68-89]   Temp:  [36.4 °C (97.5 °F)-36.9 °C (98.4 °F)]   Resp:  [16-21]   BP: (111-132)/(63-80)   SpO2:  [94 %-97 %]   Daily Weight  05/12/25 : 67.7 kg (149 lb 4.8 oz)    Body mass index is 20.83 kg/m².    Physical Exam  Constitutional:       Appearance: Normal appearance.   HENT:      Head: Normocephalic and atraumatic.      Mouth/Throat:      Mouth: Mucous membranes are moist.      Pharynx: Oropharynx is clear.   Eyes:      Pupils: Pupils are equal, round, and reactive to light.   Cardiovascular:      Rate and Rhythm: Normal rate and regular rhythm.      Heart sounds: Normal heart sounds.   Pulmonary:      Effort: Pulmonary effort is normal.      Breath sounds: Normal breath sounds.   Abdominal:      General: Abdomen is flat. Bowel sounds are normal.      Palpations: Abdomen is soft.      Tenderness: There is abdominal tenderness.   Musculoskeletal:      Cervical back: Normal range of motion.   Neurological:      Mental Status: He is alert.         Antibiotics  piperacillin-tazobactam - 4.5 gram/100 mL    Relevant Results  Labs  Results from last 72 hours   Lab Units 05/14/25  0706 05/13/25  0710 05/12/25  1907   WBC AUTO x10*3/uL 10.5 11.3 13.1*   HEMOGLOBIN g/dL 14.4 14.7 14.7   HEMATOCRIT % 40.5* 41.1 41.1   PLATELETS AUTO x10*3/uL 169 162 167   NEUTROS PCT AUTO %  --   --  91.5   LYMPHS PCT AUTO %  --   --  3.9   MONOS PCT AUTO %  --   --  3.9   EOS PCT AUTO %  --   --  0.1     Results from last 72 hours   Lab Units 05/14/25  0706 05/13/25  0710 05/12/25  1907   SODIUM mmol/L 135* 137 133*   POTASSIUM mmol/L 4.0 3.7 3.9   CHLORIDE mmol/L 103 104 99   CO2 mmol/L 27 25 28   BUN mg/dL 13 15 22   CREATININE mg/dL 1.07 0.90 0.98   GLUCOSE mg/dL 128* 82 91    CALCIUM mg/dL 8.2* 8.4* 9.0   ANION GAP mmol/L 9* 12 10   EGFR mL/min/1.73m*2 74 >90 82   PHOSPHORUS mg/dL 1.8*  --   --      Results from last 72 hours   Lab Units 05/14/25  0706 05/12/25  1907   ALK PHOS U/L  --  57   BILIRUBIN TOTAL mg/dL  --  2.0*   PROTEIN TOTAL g/dL  --  6.4   ALT U/L  --  16   AST U/L  --  17   ALBUMIN g/dL 3.3* 4.0     Estimated Creatinine Clearance: 60.6 mL/min (by C-G formula based on SCr of 1.07 mg/dL).  CRP   Date Value Ref Range Status   06/18/2022 0.17 mg/dL Final     Comment:     REF VALUE  < 1.00       Microbiology  Reviewed  Imaging  Reviewed        Assessment/Plan   Abnormal CT of the chest, seems to be stable compared to old CT, likely old granulomatous disease, most likely fungal or atypical mycobacteria, MTB is unlikely  Sigmoid diverticulitis, the repeat CT was reviewed     Recommendations :  Continue Zosyn  Discussed with the medical team     I spent minutes in the professional and overall care of this patient.          Umm Vega MD

## 2025-05-14 NOTE — CARE PLAN
The patient's goals for the shift include  pain management    The clinical goals for the shift include pt's pain will be under control      Problem: Pain - Adult  Goal: Verbalizes/displays adequate comfort level or baseline comfort level  Outcome: Progressing     Problem: Safety - Adult  Goal: Free from fall injury  Outcome: Progressing     Problem: Discharge Planning  Goal: Discharge to home or other facility with appropriate resources  Outcome: Progressing     Problem: Chronic Conditions and Co-morbidities  Goal: Patient's chronic conditions and co-morbidity symptoms are monitored and maintained or improved  Outcome: Progressing     Problem: Nutrition  Goal: Nutrient intake appropriate for maintaining nutritional needs  Outcome: Progressing     Problem: Pain  Goal: Takes deep breaths with improved pain control throughout the shift  Outcome: Progressing  Goal: Turns in bed with improved pain control throughout the shift  Outcome: Progressing  Goal: Walks with improved pain control throughout the shift  Outcome: Progressing  Goal: Performs ADL's with improved pain control throughout shift  Outcome: Progressing  Goal: Participates in PT with improved pain control throughout the shift  Outcome: Progressing  Goal: Free from opioid side effects throughout the shift  Outcome: Progressing  Goal: Free from acute confusion related to pain meds throughout the shift  Outcome: Progressing

## 2025-05-14 NOTE — PROGRESS NOTES
"Diego Morales is a 70 y.o. male on day 2 of admission presenting with Intestinal perforation (Multi).    Subjective   Pt is complaining of sharp abdominal pain in lower abdomen that is constant. He had episode overnight where dilaudid wasn't even helping.        Objective     Physical Exam  Vitals and nursing note reviewed.   Constitutional:       General: He is not in acute distress.     Appearance: He is ill-appearing. He is not toxic-appearing.   HENT:      Head: Normocephalic and atraumatic.      Nose: Nose normal.      Mouth/Throat:      Mouth: Mucous membranes are moist.   Eyes:      Extraocular Movements: Extraocular movements intact.      Conjunctiva/sclera: Conjunctivae normal.      Pupils: Pupils are equal, round, and reactive to light.   Cardiovascular:      Rate and Rhythm: Normal rate and regular rhythm.      Heart sounds: No murmur heard.     No gallop.   Pulmonary:      Effort: Pulmonary effort is normal. No respiratory distress.      Breath sounds: Normal breath sounds. No wheezing, rhonchi or rales.   Abdominal:      General: Abdomen is flat. Bowel sounds are normal. There is no distension.      Palpations: Abdomen is soft. There is no mass.      Tenderness: There is abdominal tenderness.   Musculoskeletal:         General: No swelling or tenderness. Normal range of motion.      Cervical back: Normal range of motion and neck supple.   Skin:     General: Skin is warm and dry.   Neurological:      General: No focal deficit present.      Mental Status: He is alert and oriented to person, place, and time. Mental status is at baseline.   Psychiatric:         Mood and Affect: Mood normal.         Behavior: Behavior normal.         Thought Content: Thought content normal.         Judgment: Judgment normal.         Last Recorded Vitals:  /80 (BP Location: Left arm)   Pulse 68   Temp 36.4 °C (97.5 °F) (Temporal)   Resp 20   Ht 1.803 m (5' 10.98\")   Wt 67.7 kg (149 lb 4.8 oz)   SpO2 97%   BMI " 20.83 kg/m²      Scheduled medications:  Scheduled Medications[1]  Continuous medications:  Continuous Medications[2]  PRN medications:  PRN Medications[3]     Relevant Results:  Results for orders placed or performed during the hospital encounter of 05/12/25 (from the past 24 hours)   ECG 12 Lead   Result Value Ref Range    Ventricular Rate 76 BPM    Atrial Rate 76 BPM    MS Interval 158 ms    QRS Duration 100 ms    QT Interval 406 ms    QTC Calculation(Bazett) 456 ms    P Axis 64 degrees    R Axis 23 degrees    T Axis 4 degrees    QRS Count 13 beats    Q Onset 222 ms    P Onset 143 ms    P Offset 206 ms    T Offset 425 ms    QTC Fredericia 439 ms   CBC   Result Value Ref Range    WBC 10.5 4.4 - 11.3 x10*3/uL    nRBC 0.0 0.0 - 0.0 /100 WBCs    RBC 4.28 (L) 4.50 - 5.90 x10*6/uL    Hemoglobin 14.4 13.5 - 17.5 g/dL    Hematocrit 40.5 (L) 41.0 - 52.0 %    MCV 95 80 - 100 fL    MCH 33.6 26.0 - 34.0 pg    MCHC 35.6 32.0 - 36.0 g/dL    RDW 12.7 11.5 - 14.5 %    Platelets 169 150 - 450 x10*3/uL   Renal Function Panel   Result Value Ref Range    Glucose 128 (H) 74 - 99 mg/dL    Sodium 135 (L) 136 - 145 mmol/L    Potassium 4.0 3.5 - 5.3 mmol/L    Chloride 103 98 - 107 mmol/L    Bicarbonate 27 21 - 32 mmol/L    Anion Gap 9 (L) 10 - 20 mmol/L    Urea Nitrogen 13 6 - 23 mg/dL    Creatinine 1.07 0.50 - 1.30 mg/dL    eGFR 74 >60 mL/min/1.73m*2    Calcium 8.2 (L) 8.6 - 10.3 mg/dL    Phosphorus 1.8 (L) 2.5 - 4.9 mg/dL    Albumin 3.3 (L) 3.4 - 5.0 g/dL   Magnesium   Result Value Ref Range    Magnesium 1.74 1.60 - 2.40 mg/dL       Imaging  CT abdomen pelvis w IV contrast  Result Date: 5/14/2025  Redemonstrated scattered foci of free air throughout the abdomen and pelvis most compatible with a perforated viscus. Inflammatory stranding is most pronounced in the lower pelvis surrounding loops of small bowel with wall thickening as well as the sigmoid colon where there are several associated diverticula. Findings suggest diverticulitis,  which may represent a site of perforation in the setting of free air. Perforation of adjacent small bowel, however, is not entirely excluded.   Dilated loops of proximal small bowel with tapering to more normal caliber in the lower pelvis. Dilated loops of bowel are new compared to prior imaging and may represent a reactive ileus versus developing obstruction.     MACRO: None.   Signed by: Evan Finkelstein 5/14/2025 12:27 AM Dictation workstation:   [id number]    CT chest abdomen pelvis w IV contrast  Result Date: 5/12/2025  Reticulonodular opacities may right greater than left upper lobe similar to prior imaging from 06/23/2016 suggestive of chronic etiology. Mycobacterial infection again not excluded.   Mild free air noted within the abdomen and pelvis in keeping with hollow visceral perforation. There are regions of small bowel with wall thickening and fluid including the indeterminate region with adjacent non dependent free air which is indeterminate for location of perforation. There is also findings suggestive of acute sigmoidal diverticulitis. Surgical recommended.   Prostatomegaly. Correlation with PSA is suggested.   Additional findings as detailed.     MACRO: Elena Gallardo discussed the significance and urgency of this critical finding by telephone with  LORRIE MOORE on 5/12/2025 at 8:54 pm.  (**-RCF-**) Findings:  See findings.   Signed by: Elena Gallardo 5/12/2025 8:59 PM Dictation workstation:   [id number]    XR chest 2 views  Result Date: 5/12/2025  1.  Free intraperitoneal air under the right hemidiaphragm. In the absence of recent of ancient CT is advised for complete evaluation 2. Emphysematous changes in the lungs without acute abnormality       MACRO: None   Signed by: Laron Baez 5/12/2025 7:28 PM Dictation workstation:   [id number]      Cardiology, Vascular, and Other Imaging  ECG 12 Lead  Result Date: 5/13/2025  Normal sinus rhythm Normal ECG When compared with ECG of 23-JUN-2016  06:47, Premature ventricular complexes are no longer Present T wave inversion now evident in Inferior leads                       Assessment/Plan   Assessment & Plan  Intestinal perforation (Multi)      Diego Morales is a 70 y.o. male with a past medical history of cardiac arrest during cardiac catheterization in 2016 secondary to ventricular arrhythmia/V-fib requiring cardioversion (normal coronary arteries), heart murmur, and angina pectoris who was admitted to the hospital for suspected small intestinal perforation and acute diverticulitis.       Small intestinal perforation/acute diverticulitis  - CT scan showed mild free air noted within the abdomen and pelvis in keeping with hollow visceral perforation. There are regions of small bowel with wall thickening and fluid including the indeterminate region with adjacent non dependent free air which is indeterminate for location  Repeat CT on 5/13 shows no changes from above CT  of perforation. There is also findings suggestive of acute sigmoidal diverticulitis.  - surg following  - Zosyn  - IVF  - ID following  - Keep n.p.o.  - Continue dilaudid as needed for pain     Bilateral lung reticulonodular opacity  - Right greater than left based on CT scan which is similar to prior imaging from 6/23/2016  - Mycobacterial infection not excluded per CT scan report  - Patient is asymptomatic  - Follow-up with [facility]  - ID following     HTN  BP low  - hold metoprolol    DVT prophylaxis: SCD  Dispo: monitor clinically, continue pain control          Ofelia Felix MD  Hospitalist         [1] acetaminophen, 650 mg, oral, q6h  enoxaparin, 40 mg, subcutaneous, Daily  ketorolac, 15 mg, intravenous, q6h  metoprolol succinate XL, 25 mg, oral, Daily  piperacillin-tazobactam, 4.5 g, intravenous, q6h  potassium phosphate (monobasic), 500 mg, oral, 4x daily     [2] potassium qlmhkxb-R2-5.45%NaCl, 100 mL/hr, Last Rate: 100 mL/hr (05/14/25 1030)     [3] PRN medications: HYDROmorphone,  HYDROmorphone, melatonin, naloxone, ondansetron **OR** ondansetron, polyethylene glycol

## 2025-05-14 NOTE — PROGRESS NOTES
"Diego Morales is a 70 y.o. male on day 2 of admission presenting with Intestinal perforation (Multi).      Subjective   He is resting in the bed, had abd pain overnight but now resolved. Still with diarrhea.       Review of systems:  Constitutional: negative for fever, chills, or malaise  Neuro: negative for dizziness, headache, numbness, tingling  ENT: Negative for nasal congestion or sore throat  Resp: negative for shortness of breath, cough, or wheezing  CV: negative for chest pain, palpitations  GI: negative for abd pain, nausea, vomiting  : negative for dysuria, frequency, or urgency  Skin: negative for lesions, wounds, or rash  Musculoskeletal: Negative for weakness, myalgia, or arthralgia  Endocrine: Negative for polyuria or polydipsia         Objective   Constitutional: Well developed, awake/alert/oriented x3, no distress, alert and cooperative  Eyes: PERRL, EOMI, clear sclera  ENMT: mucous membranes moist, no apparent injury, no lesions seen  Head/Neck: Neck supple, no apparent injury, thyroid without mass or tenderness, No JVD, trachea midline, no bruits  Respiratory/Thorax: Patent airways, CTAB, normal breath sounds with good chest expansion, thorax symmetric  Cardiovascular: Regular, rate and rhythm, 2/6 systolic murmur, 2+ equal pulses of the extremities, normal S 1and S 2  Gastrointestinal: Nondistended, soft, tender, no masses palpable, no organomegaly, +BS, no bruits  Musculoskeletal: ROM intact, no joint swelling, normal strength  Extremities: normal extremities, no cyanosis edema, contusions or wounds, no clubbing  Neurological: alert and oriented x3, intact senses, motor, response and reflexes, normal strength  Lymphatic: No significant lymphadenopathy  Psychological: Appropriate mood and behavior  Skin: Warm and dry, no lesions, no rashes      Last Recorded Vitals  /67   Pulse 75   Temp 36.3 °C (97.3 °F) (Temporal)   Resp 20   Ht 1.803 m (5' 10.98\")   Wt 67.7 kg (149 lb 4.8 oz)   " SpO2 94%   BMI 20.83 kg/m²     Intake/Output last 3 Shifts:  I/O last 3 completed shifts:  In: 4065 (60 mL/kg) [P.O.:120; I.V.:1583.3 (23.4 mL/kg); IV Piggyback:2361.7]  Out: - (0 mL/kg)   Weight: 67.7 kg   I/O this shift:  In: 631.7 [I.V.:48.3; IV Piggyback:583.3]  Out: -     Relevant Results  Scheduled medications  Scheduled Medications[1]  Continuous medications  Continuous Medications[2]  PRN medications  PRN Medications[3]    Results for orders placed or performed during the hospital encounter of 05/12/25 (from the past 24 hours)   CBC   Result Value Ref Range    WBC 10.5 4.4 - 11.3 x10*3/uL    nRBC 0.0 0.0 - 0.0 /100 WBCs    RBC 4.28 (L) 4.50 - 5.90 x10*6/uL    Hemoglobin 14.4 13.5 - 17.5 g/dL    Hematocrit 40.5 (L) 41.0 - 52.0 %    MCV 95 80 - 100 fL    MCH 33.6 26.0 - 34.0 pg    MCHC 35.6 32.0 - 36.0 g/dL    RDW 12.7 11.5 - 14.5 %    Platelets 169 150 - 450 x10*3/uL   Renal Function Panel   Result Value Ref Range    Glucose 128 (H) 74 - 99 mg/dL    Sodium 135 (L) 136 - 145 mmol/L    Potassium 4.0 3.5 - 5.3 mmol/L    Chloride 103 98 - 107 mmol/L    Bicarbonate 27 21 - 32 mmol/L    Anion Gap 9 (L) 10 - 20 mmol/L    Urea Nitrogen 13 6 - 23 mg/dL    Creatinine 1.07 0.50 - 1.30 mg/dL    eGFR 74 >60 mL/min/1.73m*2    Calcium 8.2 (L) 8.6 - 10.3 mg/dL    Phosphorus 1.8 (L) 2.5 - 4.9 mg/dL    Albumin 3.3 (L) 3.4 - 5.0 g/dL   Magnesium   Result Value Ref Range    Magnesium 1.74 1.60 - 2.40 mg/dL       CT abdomen pelvis w IV contrast   Final Result   Redemonstrated scattered foci of free air throughout the abdomen and   pelvis most compatible with a perforated viscus. Inflammatory   stranding is most pronounced in the lower pelvis surrounding loops of   small bowel with wall thickening as well as the sigmoid colon where   there are several associated diverticula. Findings suggest   diverticulitis, which may represent a site of perforation in the   setting of free air. Perforation of adjacent small bowel, however, is    not entirely excluded.        Dilated loops of proximal small bowel with tapering to more normal   caliber in the lower pelvis. Dilated loops of bowel are new compared   to prior imaging and may represent a reactive ileus versus developing   obstruction.             MACRO:   None.        Signed by: Evan Finkelstein 5/14/2025 12:27 AM   Dictation workstation:   [id number]      CT chest abdomen pelvis w IV contrast   Final Result   Reticulonodular opacities may right greater than left upper lobe   similar to prior imaging from 06/23/2016 suggestive of chronic   etiology. Mycobacterial infection again not excluded.        Mild free air noted within the abdomen and pelvis in keeping with   hollow visceral perforation. There are regions of small bowel with   wall thickening and fluid including the indeterminate region with   adjacent non dependent free air which is indeterminate for location   of perforation. There is also findings suggestive of acute sigmoidal   diverticulitis. Surgical recommended.        Prostatomegaly. Correlation with PSA is suggested.        Additional findings as detailed.             MACRO:   Elena Gallardo discussed the significance and urgency of this   critical finding by telephone with  LORRIE MOORE on 5/12/2025 at   8:54 pm.  (**-RCF-**) Findings:  See findings.        Signed by: Elena Gallardo 5/12/2025 8:59 PM   Dictation workstation:   [id number]      XR chest 2 views   Final Result   1.  Free intraperitoneal air under the right hemidiaphragm. In the   absence of recent of ancient CT is advised for complete evaluation   2. Emphysematous changes in the lungs without acute abnormality                  MACRO:   None        Signed by: Laron Baez 5/12/2025 7:28 PM   Dictation workstation:   [id number]          No echocardiogram results found for the past 12 months         Assessment/Plan   Assessment & Plan  Intestinal perforation (Multi)    Diverticulitis of large intestine with  perforation without abscess or bleeding      Echocardiogram from May 2, 2025, LVEF 60 to 65%, mild to moderate LAE, mild AI, bileaflet mitral valve prolapse, moderate to severe MR, moderate TR, mild LVH and diastolic dysfunction     Acute diverticulitis/small intestinal perforation  -surgery consulted, note reviewed  -Antibiotics   -NPO  -IVF  -Pain meds     -I reviewed the EKG, no acute changes, ST elevation, or depression  -Denies ACS symptoms  -Normal coronary arteries per [facility] 2016  -Had VF during [facility] with injection of RCA  -Ok to hold aspirin  -Cont metoprolol  -He is cleared for surgery at a low cardiac risk  -Monitor on tele     2. Tachycardia  -I reviewed the EKG, no acute changes, ST elevation, or depression  -Telemetry reviewed 5/13, short run of tachycardia  -Metoprolol restarted and no further tachycardia  -Cont Metoprolol succinate 25mg daily  -Monitor on tele     3. Hx of HTN  -BP stable  -metoprolol  -Monitor     4. Mitral valve prolapse  -Echo as above         Imani Nieves, DAVID-CNP         [1] acetaminophen, 650 mg, oral, q6h  enoxaparin, 40 mg, subcutaneous, Daily  ketorolac, 15 mg, intravenous, q6h  metoprolol succinate XL, 25 mg, oral, Daily  piperacillin-tazobactam, 4.5 g, intravenous, q6h  potassium phosphate (monobasic), 500 mg, oral, 4x daily  [2] potassium uygbtng-W9-4.45%NaCl, 100 mL/hr, Last Rate: 100 mL/hr (05/14/25 1150)  [3] PRN medications: HYDROmorphone, HYDROmorphone, melatonin, naloxone, ondansetron **OR** ondansetron, polyethylene glycol

## 2025-05-14 NOTE — PROGRESS NOTES
"General/Trauma Surgery Daily Progress Note    Subjective   Had an episode of severe abdominal pain last night requiring extra dosing of IV narcotic that then improved pain.  Overnight, slept well with no issues.  Denies nausea currently but did have an episode of emesis this morning.  Diarrhea has improved.  Abdominal pain is controlled this morning, reports pain experienced last night is improved.  Also reports that the pain experienced last night is to a lesser degree than when he first presented to the emergency department.  Afebrile, nontachycardic.       Objective   Last Recorded Vitals:  Blood pressure 122/67, pulse 75, temperature 36.3 °C (97.3 °F), temperature source Temporal, resp. rate 20, height 1.803 m (5' 10.98\"), weight 67.7 kg (149 lb 4.8 oz), SpO2 94%.    Intake/Output last 3 Shifts:  I/O last 3 completed shifts:  In: 4065 (60 mL/kg) [P.O.:120; I.V.:1583.3 (23.4 mL/kg); IV Piggyback:2361.7]  Out: - (0 mL/kg)   Weight: 67.7 kg     Pain Score:  0-10 (Numeric) Pain Score: 0 - No pain     Physical Exam:  Constitutional: No acute distress, sitting up in bed.  Neuro: Alert, oriented. Follows commands.   Eyes: EOMI. No scleral icterus. Conjunctiva pink.  ENT: MMM.   Heart: Regular rate.  Respiratory: No increased work of breathing or audible wheeze.  Abdomen: Soft, slightly more distended.  Nontender on exam.  MSK: Moves all extremities.  Vascular: Palpable pulses throughout, no pitting edema.  Skin: No rashes.   Psychological: Appropriate mood and behavior.            Relevant Results  Laboratory Results:  CBC:   Lab Results   Component Value Date    WBC 10.5 05/14/2025    RBC 4.28 (L) 05/14/2025    HGB 14.4 05/14/2025    HCT 40.5 (L) 05/14/2025     05/14/2025       RFP:   Lab Results   Component Value Date     (L) 05/14/2025    K 4.0 05/14/2025     05/14/2025    CO2 27 05/14/2025    BUN 13 05/14/2025    CREATININE 1.07 05/14/2025    CALCIUM 8.2 (L) 05/14/2025    MG 1.74 05/14/2025    " PHOS 1.8 (L) 05/14/2025        LFTs:   Lab Results   Component Value Date    PROT 6.4 05/12/2025    ALBUMIN 3.3 (L) 05/14/2025    BILITOT 2.0 (H) 05/12/2025    ALKPHOS 57 05/12/2025    AST 17 05/12/2025    ALT 16 05/12/2025         Imaging:  Imaging  CT abdomen pelvis w IV contrast  Result Date: 5/14/2025  Redemonstrated scattered foci of free air throughout the abdomen and pelvis most compatible with a perforated viscus. Inflammatory stranding is most pronounced in the lower pelvis surrounding loops of small bowel with wall thickening as well as the sigmoid colon where there are several associated diverticula. Findings suggest diverticulitis, which may represent a site of perforation in the setting of free air. Perforation of adjacent small bowel, however, is not entirely excluded.   Dilated loops of proximal small bowel with tapering to more normal caliber in the lower pelvis. Dilated loops of bowel are new compared to prior imaging and may represent a reactive ileus versus developing obstruction.     MACRO: None.   Signed by: Evan Finkelstein 5/14/2025 12:27 AM Dictation workstation:   [id number]    CT chest abdomen pelvis w IV contrast  Result Date: 5/12/2025  Reticulonodular opacities may right greater than left upper lobe similar to prior imaging from 06/23/2016 suggestive of chronic etiology. Mycobacterial infection again not excluded.   Mild free air noted within the abdomen and pelvis in keeping with hollow visceral perforation. There are regions of small bowel with wall thickening and fluid including the indeterminate region with adjacent non dependent free air which is indeterminate for location of perforation. There is also findings suggestive of acute sigmoidal diverticulitis. Surgical recommended.   Prostatomegaly. Correlation with PSA is suggested.   Additional findings as detailed.     MACRO: Elena Gallardo discussed the significance and urgency of this critical finding by telephone with  LORRIE  TERESA on 5/12/2025 at 8:54 pm.  (**-RCF-**) Findings:  See findings.   Signed by: Elena Gallardo 5/12/2025 8:59 PM Dictation workstation:   [id number]    XR chest 2 views  Result Date: 5/12/2025  1.  Free intraperitoneal air under the right hemidiaphragm. In the absence of recent of ancient CT is advised for complete evaluation 2. Emphysematous changes in the lungs without acute abnormality       MACRO: None   Signed by: Laron Baez 5/12/2025 7:28 PM Dictation workstation:   [id number]      Cardiology, Vascular, and Other Imaging  ECG 12 Lead  Result Date: 5/13/2025  Normal sinus rhythm Normal ECG When compared with ECG of 23-JUN-2016 06:47, Premature ventricular complexes are no longer Present T wave inversion now evident in Inferior leads             Assessment/Plan   This is a 70 y.o. male who presents with complaints of RLQ abdominal pain starting Sunday night. Mild leukocytosis of 13 on admission. In ED, he was noted to have free air under the right hemidiaphragm on XR and proceeded with a CT that has a mild amount of free air over the liver with inflammation consistent with sigmoid diverticulitis. Patient is nontoxic appearing and abdominal exam is mild with tenderness to the RLQ, non peritonitic in nature.     Patient had increased, severe abdominal pain last night requiring extra dose of IV narcotic.  He remained nontachycardic, afebrile and pain improved with pain medication.  He did receive a CT last night that revealed increased distention of the small bowel related to likely ileus as well as the previously seen pneumoperitoneum.  This morning, he is ambulating hallways without difficulty and is nontoxic-appearing.    We did discuss that he likely will require surgery this admission, however does not appear emergent at this time.  Patient is very hopeful to avoid surgery, but does understand that he may require surgical intervention if unable to significantly improve by tomorrow.     Plan:  --  NPO, ice chips and sips with meds okay  -- IV Zosyn q6h  -- Multimodal pain regimen with tylenol, toradol, prn narcotic  -- Ambulate  -- Daily CBC, RFP, Mg with repletion to maintain a K > 4.0, Phos >2.0, Mg > 2.0  -- Lovenox, SCD  -- Cardiology consult for perioperative optimization  -- Will plan for surgical intervention tomorrow with there is no significant improvement              Seen and discussed with Dr. Canales who is in agreement with plan. Please secure chat with questions.     Cindy Baldwin PA-C          Plan for OR tomorrow if he does have significant improvement.  Plan for OR emergently if he deteriorates.      I have seen and reviewed the patient with the PA.  I agree with their note as written above.  Any changes or corrections have been made in the body of the note.    Anoop Canales MD  General Surgery  Office: [phone]  Fax:     [phone]  1:16 PM   05/14/25

## 2025-05-14 NOTE — PROGRESS NOTES
05/14/25 1032   Discharge Planning   Living Arrangements Spouse/significant other   Support Systems Spouse/significant other   Assistance Needed patient is A&Ox3, independent in ADL's, no AD, no DME, drives PCP- Ramon Gould MD   Type of Residence Private residence   Number of Stairs to Enter Residence 3   Number of Stairs Within Residence 20   Do you have animals or pets at home? Yes   Type of Animals or Pets 1 dog- Max   Who is requesting discharge planning? Provider   Home or Post Acute Services None   Expected Discharge Disposition Home   Does the patient need discharge transport arranged? No   Stroke Family Assessment   Stroke Family Assessment Needed No   Intensity of Service   Intensity of Service 0-30 min

## 2025-05-15 ENCOUNTER — APPOINTMENT (OUTPATIENT)
Dept: RADIOLOGY | Facility: HOSPITAL | Age: 70
DRG: 329 | End: 2025-05-15
Payer: MEDICARE

## 2025-05-15 ENCOUNTER — ANESTHESIA (OUTPATIENT)
Dept: OPERATING ROOM | Facility: HOSPITAL | Age: 70
End: 2025-05-15
Payer: MEDICARE

## 2025-05-15 ENCOUNTER — SURGERY (OUTPATIENT)
Age: 70
End: 2025-05-15
Payer: MEDICARE

## 2025-05-15 LAB
ALBUMIN SERPL BCP-MCNC: 3.1 G/DL (ref 3.4–5)
ANION GAP SERPL CALC-SCNC: 11 MMOL/L (ref 10–20)
BUN SERPL-MCNC: 12 MG/DL (ref 6–23)
CALCIUM SERPL-MCNC: 8 MG/DL (ref 8.6–10.3)
CHLORIDE SERPL-SCNC: 104 MMOL/L (ref 98–107)
CO2 SERPL-SCNC: 24 MMOL/L (ref 21–32)
CREAT SERPL-MCNC: 1.02 MG/DL (ref 0.5–1.3)
EGFRCR SERPLBLD CKD-EPI 2021: 79 ML/MIN/1.73M*2
ERYTHROCYTE [DISTWIDTH] IN BLOOD BY AUTOMATED COUNT: 12.6 % (ref 11.5–14.5)
GLUCOSE SERPL-MCNC: 113 MG/DL (ref 74–99)
HCT VFR BLD AUTO: 39.6 % (ref 41–52)
HGB BLD-MCNC: 14 G/DL (ref 13.5–17.5)
MAGNESIUM SERPL-MCNC: 1.9 MG/DL (ref 1.6–2.4)
MCH RBC QN AUTO: 33.6 PG (ref 26–34)
MCHC RBC AUTO-ENTMCNC: 35.4 G/DL (ref 32–36)
MCV RBC AUTO: 95 FL (ref 80–100)
NIL(NEG) CONTROL SPOT COUNT: NORMAL
NRBC BLD-RTO: 0 /100 WBCS (ref 0–0)
PANEL A SPOT COUNT: 0
PANEL B SPOT COUNT: 0
PHOSPHATE SERPL-MCNC: 1.9 MG/DL (ref 2.5–4.9)
PLATELET # BLD AUTO: 185 X10*3/UL (ref 150–450)
POS CONTROL SPOT COUNT: NORMAL
POTASSIUM SERPL-SCNC: 3.9 MMOL/L (ref 3.5–5.3)
RBC # BLD AUTO: 4.17 X10*6/UL (ref 4.5–5.9)
SODIUM SERPL-SCNC: 135 MMOL/L (ref 136–145)
T-SPOT. TB INTERPRETATION: NEGATIVE
WBC # BLD AUTO: 7.6 X10*3/UL (ref 4.4–11.3)

## 2025-05-15 PROCEDURE — 36415 COLL VENOUS BLD VENIPUNCTURE: CPT | Performed by: PHYSICIAN ASSISTANT

## 2025-05-15 PROCEDURE — 99233 SBSQ HOSP IP/OBS HIGH 50: CPT

## 2025-05-15 PROCEDURE — 0DBP0ZZ EXCISION OF RECTUM, OPEN APPROACH: ICD-10-PCS | Performed by: SURGERY

## 2025-05-15 PROCEDURE — 3700000001 HC GENERAL ANESTHESIA TIME - INITIAL BASE CHARGE: Performed by: SURGERY

## 2025-05-15 PROCEDURE — 85027 COMPLETE CBC AUTOMATED: CPT | Performed by: PHYSICIAN ASSISTANT

## 2025-05-15 PROCEDURE — 7100000002 HC RECOVERY ROOM TIME - EACH INCREMENTAL 1 MINUTE: Performed by: SURGERY

## 2025-05-15 PROCEDURE — 0DJD8ZZ INSPECTION OF LOWER INTESTINAL TRACT, VIA NATURAL OR ARTIFICIAL OPENING ENDOSCOPIC: ICD-10-PCS | Performed by: SURGERY

## 2025-05-15 PROCEDURE — 2500000004 HC RX 250 GENERAL PHARMACY W/ HCPCS (ALT 636 FOR OP/ED): Performed by: INTERNAL MEDICINE

## 2025-05-15 PROCEDURE — 0DTN0ZZ RESECTION OF SIGMOID COLON, OPEN APPROACH: ICD-10-PCS | Performed by: SURGERY

## 2025-05-15 PROCEDURE — 99232 SBSQ HOSP IP/OBS MODERATE 35: CPT | Performed by: SURGERY

## 2025-05-15 PROCEDURE — 2500000004 HC RX 250 GENERAL PHARMACY W/ HCPCS (ALT 636 FOR OP/ED): Performed by: SURGERY

## 2025-05-15 PROCEDURE — 83735 ASSAY OF MAGNESIUM: CPT | Performed by: PHYSICIAN ASSISTANT

## 2025-05-15 PROCEDURE — 80069 RENAL FUNCTION PANEL: CPT | Performed by: PHYSICIAN ASSISTANT

## 2025-05-15 PROCEDURE — 3600000004 HC OR TIME - INITIAL BASE CHARGE - PROCEDURE LEVEL FOUR: Performed by: SURGERY

## 2025-05-15 PROCEDURE — 0D1M0Z4 BYPASS DESCENDING COLON TO CUTANEOUS, OPEN APPROACH: ICD-10-PCS | Performed by: SURGERY

## 2025-05-15 PROCEDURE — 7100000001 HC RECOVERY ROOM TIME - INITIAL BASE CHARGE: Performed by: SURGERY

## 2025-05-15 PROCEDURE — 74018 RADEX ABDOMEN 1 VIEW: CPT

## 2025-05-15 PROCEDURE — 3700000002 HC GENERAL ANESTHESIA TIME - EACH INCREMENTAL 1 MINUTE: Performed by: SURGERY

## 2025-05-15 PROCEDURE — 3600000009 HC OR TIME - EACH INCREMENTAL 1 MINUTE - PROCEDURE LEVEL FOUR: Performed by: SURGERY

## 2025-05-15 PROCEDURE — 2500000004 HC RX 250 GENERAL PHARMACY W/ HCPCS (ALT 636 FOR OP/ED): Mod: JZ | Performed by: ANESTHESIOLOGY

## 2025-05-15 PROCEDURE — 2500000001 HC RX 250 WO HCPCS SELF ADMINISTERED DRUGS (ALT 637 FOR MEDICARE OP): Performed by: INTERNAL MEDICINE

## 2025-05-15 PROCEDURE — 87116 MYCOBACTERIA CULTURE: CPT | Mod: GEALAB | Performed by: SURGERY

## 2025-05-15 PROCEDURE — 2500000004 HC RX 250 GENERAL PHARMACY W/ HCPCS (ALT 636 FOR OP/ED): Mod: JZ | Performed by: INTERNAL MEDICINE

## 2025-05-15 PROCEDURE — 44143 PARTIAL REMOVAL OF COLON: CPT | Performed by: SURGERY

## 2025-05-15 PROCEDURE — 2720000007 HC OR 272 NO HCPCS: Performed by: SURGERY

## 2025-05-15 PROCEDURE — 2500000004 HC RX 250 GENERAL PHARMACY W/ HCPCS (ALT 636 FOR OP/ED): Mod: JZ | Performed by: PHYSICIAN ASSISTANT

## 2025-05-15 PROCEDURE — 2500000001 HC RX 250 WO HCPCS SELF ADMINISTERED DRUGS (ALT 637 FOR MEDICARE OP): Performed by: PHYSICIAN ASSISTANT

## 2025-05-15 PROCEDURE — C1889 IMPLANT/INSERT DEVICE, NOC: HCPCS | Performed by: SURGERY

## 2025-05-15 PROCEDURE — 2500000004 HC RX 250 GENERAL PHARMACY W/ HCPCS (ALT 636 FOR OP/ED): Mod: JZ | Performed by: SURGERY

## 2025-05-15 PROCEDURE — 2500000004 HC RX 250 GENERAL PHARMACY W/ HCPCS (ALT 636 FOR OP/ED): Performed by: NURSE ANESTHETIST, CERTIFIED REGISTERED

## 2025-05-15 PROCEDURE — 87102 FUNGUS ISOLATION CULTURE: CPT | Mod: GEALAB | Performed by: SURGERY

## 2025-05-15 PROCEDURE — A6213 FOAM DRG >16<=48 SQ IN W/BDR: HCPCS | Performed by: SURGERY

## 2025-05-15 PROCEDURE — 74018 RADEX ABDOMEN 1 VIEW: CPT | Performed by: RADIOLOGY

## 2025-05-15 PROCEDURE — 1200000002 HC GENERAL ROOM WITH TELEMETRY DAILY

## 2025-05-15 PROCEDURE — 87205 SMEAR GRAM STAIN: CPT | Mod: GEALAB | Performed by: SURGERY

## 2025-05-15 RX ORDER — ONDANSETRON HYDROCHLORIDE 2 MG/ML
4 INJECTION, SOLUTION INTRAVENOUS ONCE
Status: DISCONTINUED | OUTPATIENT
Start: 2025-05-15 | End: 2025-05-15 | Stop reason: HOSPADM

## 2025-05-15 RX ORDER — ACETAMINOPHEN 10 MG/ML
1000 INJECTION, SOLUTION INTRAVENOUS EVERY 6 HOURS
Status: DISCONTINUED | OUTPATIENT
Start: 2025-05-15 | End: 2025-05-17

## 2025-05-15 RX ORDER — PROPOFOL 10 MG/ML
INJECTION, EMULSION INTRAVENOUS AS NEEDED
Status: DISCONTINUED | OUTPATIENT
Start: 2025-05-15 | End: 2025-05-15

## 2025-05-15 RX ORDER — ACETAMINOPHEN 325 MG/1
650 TABLET ORAL EVERY 4 HOURS PRN
Status: DISCONTINUED | OUTPATIENT
Start: 2025-05-15 | End: 2025-05-15 | Stop reason: HOSPADM

## 2025-05-15 RX ORDER — DIPHENHYDRAMINE HYDROCHLORIDE 50 MG/ML
12.5 INJECTION, SOLUTION INTRAMUSCULAR; INTRAVENOUS ONCE
Status: COMPLETED | OUTPATIENT
Start: 2025-05-15 | End: 2025-05-15

## 2025-05-15 RX ORDER — DEXMEDETOMIDINE IN 0.9 % NACL 20 MCG/5ML
SYRINGE (ML) INTRAVENOUS AS NEEDED
Status: DISCONTINUED | OUTPATIENT
Start: 2025-05-15 | End: 2025-05-15

## 2025-05-15 RX ORDER — GABAPENTIN 250 MG/5ML
100 SOLUTION ORAL EVERY 12 HOURS SCHEDULED
Status: DISCONTINUED | OUTPATIENT
Start: 2025-05-15 | End: 2025-05-20

## 2025-05-15 RX ORDER — ALBUTEROL SULFATE 0.83 MG/ML
2.5 SOLUTION RESPIRATORY (INHALATION) ONCE AS NEEDED
Status: DISCONTINUED | OUTPATIENT
Start: 2025-05-15 | End: 2025-05-15 | Stop reason: HOSPADM

## 2025-05-15 RX ORDER — METHOCARBAMOL 100 MG/ML
500 INJECTION, SOLUTION INTRAMUSCULAR; INTRAVENOUS EVERY 8 HOURS
Status: DISCONTINUED | OUTPATIENT
Start: 2025-05-15 | End: 2025-05-20

## 2025-05-15 RX ORDER — SODIUM CHLORIDE, SODIUM LACTATE, POTASSIUM CHLORIDE, CALCIUM CHLORIDE 600; 310; 30; 20 MG/100ML; MG/100ML; MG/100ML; MG/100ML
100 INJECTION, SOLUTION INTRAVENOUS CONTINUOUS
Status: DISCONTINUED | OUTPATIENT
Start: 2025-05-15 | End: 2025-05-15 | Stop reason: HOSPADM

## 2025-05-15 RX ORDER — BUPIVACAINE HYDROCHLORIDE 5 MG/ML
INJECTION, SOLUTION PERINEURAL AS NEEDED
Status: DISCONTINUED | OUTPATIENT
Start: 2025-05-15 | End: 2025-05-15 | Stop reason: HOSPADM

## 2025-05-15 RX ORDER — KETOROLAC TROMETHAMINE 30 MG/ML
INJECTION, SOLUTION INTRAMUSCULAR; INTRAVENOUS AS NEEDED
Status: DISCONTINUED | OUTPATIENT
Start: 2025-05-15 | End: 2025-05-15

## 2025-05-15 RX ORDER — KETOROLAC TROMETHAMINE 15 MG/ML
15 INJECTION, SOLUTION INTRAMUSCULAR; INTRAVENOUS EVERY 6 HOURS
Status: DISCONTINUED | OUTPATIENT
Start: 2025-05-15 | End: 2025-05-17

## 2025-05-15 RX ORDER — MAGNESIUM SULFATE HEPTAHYDRATE 40 MG/ML
2 INJECTION, SOLUTION INTRAVENOUS ONCE
Status: COMPLETED | OUTPATIENT
Start: 2025-05-15 | End: 2025-05-16

## 2025-05-15 RX ORDER — ROCURONIUM BROMIDE 10 MG/ML
INJECTION, SOLUTION INTRAVENOUS AS NEEDED
Status: DISCONTINUED | OUTPATIENT
Start: 2025-05-15 | End: 2025-05-15

## 2025-05-15 RX ORDER — LIDOCAINE HCL/PF 100 MG/5ML
SYRINGE (ML) INTRAVENOUS AS NEEDED
Status: DISCONTINUED | OUTPATIENT
Start: 2025-05-15 | End: 2025-05-15

## 2025-05-15 RX ADMIN — GABAPENTIN 100 MG: 250 SOLUTION ORAL at 22:28

## 2025-05-15 RX ADMIN — KETOROLAC TROMETHAMINE 30 MG: 30 INJECTION, SOLUTION INTRAMUSCULAR at 15:29

## 2025-05-15 RX ADMIN — ROCURONIUM BROMIDE 20 MG: 10 INJECTION INTRAVENOUS at 14:40

## 2025-05-15 RX ADMIN — PIPERACILLIN SODIUM AND TAZOBACTAM SODIUM 4.5 G: 4; .5 INJECTION, SOLUTION INTRAVENOUS at 13:37

## 2025-05-15 RX ADMIN — MAGNESIUM SULFATE HEPTAHYDRATE 2 G: 2 INJECTION, SOLUTION INTRAVENOUS at 09:28

## 2025-05-15 RX ADMIN — HYDROMORPHONE HYDROCHLORIDE 0.2 MG: 1 INJECTION, SOLUTION INTRAMUSCULAR; INTRAVENOUS; SUBCUTANEOUS at 09:27

## 2025-05-15 RX ADMIN — KETOROLAC TROMETHAMINE 15 MG: 15 INJECTION, SOLUTION INTRAMUSCULAR; INTRAVENOUS at 22:39

## 2025-05-15 RX ADMIN — ACETAMINOPHEN 650 MG: 325 TABLET, FILM COATED ORAL at 06:23

## 2025-05-15 RX ADMIN — HYDROMORPHONE HYDROCHLORIDE 0.4 MG: 1 INJECTION, SOLUTION INTRAMUSCULAR; INTRAVENOUS; SUBCUTANEOUS at 03:26

## 2025-05-15 RX ADMIN — PIPERACILLIN SODIUM AND TAZOBACTAM SODIUM 4.5 G: 4; .5 INJECTION, SOLUTION INTRAVENOUS at 09:34

## 2025-05-15 RX ADMIN — PROPOFOL 150 MG: 10 INJECTION, EMULSION INTRAVENOUS at 12:32

## 2025-05-15 RX ADMIN — DIPHENHYDRAMINE HYDROCHLORIDE 12.5 MG: 50 INJECTION, SOLUTION INTRAMUSCULAR; INTRAVENOUS at 16:11

## 2025-05-15 RX ADMIN — BUPIVACAINE HYDROCHLORIDE 30 ML: 5 INJECTION, SOLUTION PERINEURAL at 14:36

## 2025-05-15 RX ADMIN — PIPERACILLIN SODIUM AND TAZOBACTAM SODIUM 4.5 G: 4; .5 INJECTION, SOLUTION INTRAVENOUS at 22:09

## 2025-05-15 RX ADMIN — ONDANSETRON 4 MG: 2 INJECTION, SOLUTION INTRAMUSCULAR; INTRAVENOUS at 18:27

## 2025-05-15 RX ADMIN — Medication 8 MCG: at 15:38

## 2025-05-15 RX ADMIN — DEXAMETHASONE SODIUM PHOSPHATE 4 MG: 4 INJECTION INTRA-ARTICULAR; INTRALESIONAL; INTRAMUSCULAR; INTRAVENOUS; SOFT TISSUE at 15:29

## 2025-05-15 RX ADMIN — LIDOCAINE HYDROCHLORIDE 50 MG: 20 INJECTION INTRAVENOUS at 12:32

## 2025-05-15 RX ADMIN — ACETAMINOPHEN 1000 MG: 1000 INJECTION, SOLUTION INTRAVENOUS at 22:39

## 2025-05-15 RX ADMIN — ENOXAPARIN SODIUM 40 MG: 40 INJECTION SUBCUTANEOUS at 09:28

## 2025-05-15 RX ADMIN — HYDROMORPHONE HYDROCHLORIDE 1 MG: 2 INJECTION, SOLUTION INTRAMUSCULAR; INTRAVENOUS; SUBCUTANEOUS at 13:11

## 2025-05-15 RX ADMIN — KETOROLAC TROMETHAMINE 15 MG: 15 INJECTION, SOLUTION INTRAMUSCULAR; INTRAVENOUS at 06:23

## 2025-05-15 RX ADMIN — HYDROMORPHONE HYDROCHLORIDE 1 MG: 2 INJECTION, SOLUTION INTRAMUSCULAR; INTRAVENOUS; SUBCUTANEOUS at 15:21

## 2025-05-15 RX ADMIN — ROCURONIUM BROMIDE 50 MG: 10 INJECTION INTRAVENOUS at 12:33

## 2025-05-15 RX ADMIN — SUGAMMADEX 200 MG: 100 INJECTION, SOLUTION INTRAVENOUS at 15:29

## 2025-05-15 RX ADMIN — ONDANSETRON 4 MG: 2 INJECTION, SOLUTION INTRAMUSCULAR; INTRAVENOUS at 15:25

## 2025-05-15 RX ADMIN — PIPERACILLIN SODIUM AND TAZOBACTAM SODIUM 4.5 G: 4; .5 INJECTION, SOLUTION INTRAVENOUS at 02:59

## 2025-05-15 RX ADMIN — ROCURONIUM BROMIDE 20 MG: 10 INJECTION INTRAVENOUS at 14:00

## 2025-05-15 RX ADMIN — DEXTROSE MONOHYDRATE, SODIUM CHLORIDE, AND POTASSIUM CHLORIDE 100 ML/HR: 50; 1.49; 4.5 INJECTION, SOLUTION INTRAVENOUS at 06:30

## 2025-05-15 SDOH — HEALTH STABILITY: MENTAL HEALTH: CURRENT SMOKER: 0

## 2025-05-15 ASSESSMENT — COGNITIVE AND FUNCTIONAL STATUS - GENERAL
CLIMB 3 TO 5 STEPS WITH RAILING: A LITTLE
TURNING FROM BACK TO SIDE WHILE IN FLAT BAD: A LITTLE
TOILETING: A LITTLE
TURNING FROM BACK TO SIDE WHILE IN FLAT BAD: A LITTLE
CLIMB 3 TO 5 STEPS WITH RAILING: A LITTLE
DRESSING REGULAR UPPER BODY CLOTHING: A LITTLE
DRESSING REGULAR LOWER BODY CLOTHING: A LITTLE
TURNING FROM BACK TO SIDE WHILE IN FLAT BAD: A LITTLE
CLIMB 3 TO 5 STEPS WITH RAILING: A LITTLE
MOVING TO AND FROM BED TO CHAIR: A LITTLE
WALKING IN HOSPITAL ROOM: A LITTLE
MOVING TO AND FROM BED TO CHAIR: A LITTLE
STANDING UP FROM CHAIR USING ARMS: A LITTLE
DAILY ACTIVITIY SCORE: 20
TOILETING: A LITTLE
HELP NEEDED FOR BATHING: A LITTLE
TOILETING: A LITTLE
DRESSING REGULAR UPPER BODY CLOTHING: A LITTLE
DRESSING REGULAR LOWER BODY CLOTHING: A LITTLE
MOBILITY SCORE: 19
MOBILITY SCORE: 19
HELP NEEDED FOR BATHING: A LITTLE
DRESSING REGULAR LOWER BODY CLOTHING: A LITTLE
STANDING UP FROM CHAIR USING ARMS: A LITTLE
WALKING IN HOSPITAL ROOM: A LITTLE
DAILY ACTIVITIY SCORE: 21
DAILY ACTIVITIY SCORE: 20
MOBILITY SCORE: 19
DRESSING REGULAR UPPER BODY CLOTHING: A LITTLE
MOVING TO AND FROM BED TO CHAIR: A LITTLE
STANDING UP FROM CHAIR USING ARMS: A LITTLE
WALKING IN HOSPITAL ROOM: A LITTLE

## 2025-05-15 ASSESSMENT — PAIN DESCRIPTION - ORIENTATION: ORIENTATION: LOWER

## 2025-05-15 ASSESSMENT — PAIN DESCRIPTION - LOCATION
LOCATION: ABDOMEN
LOCATION: ABDOMEN

## 2025-05-15 ASSESSMENT — PAIN - FUNCTIONAL ASSESSMENT
PAIN_FUNCTIONAL_ASSESSMENT: 0-10

## 2025-05-15 ASSESSMENT — PAIN DESCRIPTION - DESCRIPTORS: DESCRIPTORS: DISCOMFORT

## 2025-05-15 ASSESSMENT — PAIN SCALES - GENERAL
PAINLEVEL_OUTOF10: 0 - NO PAIN
PAINLEVEL_OUTOF10: 0 - NO PAIN
PAINLEVEL_OUTOF10: 6
PAINLEVEL_OUTOF10: 0 - NO PAIN
PAINLEVEL_OUTOF10: 7
PAINLEVEL_OUTOF10: 0 - NO PAIN

## 2025-05-15 NOTE — PROGRESS NOTES
"General/Trauma Surgery Daily Progress Note    Subjective   He remains unchanged.  Still having pain.  Breathing shallowly.       Objective   Last Recorded Vitals:  Blood pressure 128/83, pulse 72, temperature 35.8 °C (96.4 °F), temperature source Temporal, resp. rate 16, height 1.803 m (5' 10.98\"), weight 67.7 kg (149 lb 4.8 oz), SpO2 96%.    Intake/Output last 3 Shifts:  I/O last 3 completed shifts:  In: 5015 (74.1 mL/kg) [P.O.:150; I.V.:548.3 (8.1 mL/kg); IV Piggyback:4316.7]  Out: - (0 mL/kg)   Weight: 67.7 kg     Pain Score:  0-10 (Numeric) Pain Score: 6     Physical Exam:  Constitutional: No acute distress, sitting up in bed.  Neuro: Alert, oriented. Follows commands.   Eyes: EOMI. No scleral icterus. Conjunctiva pink.  ENT: MMM.   Heart: Regular rate.  Respiratory: No increased work of breathing or audible wheeze.  Abdomen: Soft, slightly more distended.  Nontender on exam.  More firm.  MSK: Moves all extremities.  Vascular: Palpable pulses throughout, no pitting edema.  Skin: No rashes.   Psychological: Appropriate mood and behavior.            Relevant Results  Laboratory Results:  CBC:   Lab Results   Component Value Date    WBC 7.6 05/15/2025    RBC 4.17 (L) 05/15/2025    HGB 14.0 05/15/2025    HCT 39.6 (L) 05/15/2025     05/15/2025       RFP:   Lab Results   Component Value Date     (L) 05/15/2025    K 3.9 05/15/2025     05/15/2025    CO2 24 05/15/2025    BUN 12 05/15/2025    CREATININE 1.02 05/15/2025    CALCIUM 8.0 (L) 05/15/2025    MG 1.90 05/15/2025    PHOS 1.9 (L) 05/15/2025        LFTs:   Lab Results   Component Value Date    PROT 6.4 05/12/2025    ALBUMIN 3.1 (L) 05/15/2025    BILITOT 2.0 (H) 05/12/2025    ALKPHOS 57 05/12/2025    AST 17 05/12/2025    ALT 16 05/12/2025         Imaging:  No new      Assessment/Plan   This is a 70 y.o. male who presents with complaints of RLQ abdominal pain starting Sunday night. Mild leukocytosis of 13 on admission. In ED, he was noted to have " free air under the right hemidiaphragm on XR and proceeded with a CT that has a mild amount of free air over the liver with inflammation consistent with sigmoid diverticulitis. Patient is nontoxic appearing and abdominal exam is mild with tenderness to the RLQ, non peritonitic in nature.     He has the same to worse clinical exam.  We will plan for surgery today, likely a sigmoid colectomy, washout, and colostomy.     Plan:  -- OR today  -- NPO, ice chips and sips with meds okay  -- IV Zosyn q6h  -- Multimodal pain regimen with tylenol, toradol, prn narcotic  -- Ambulate  -- Daily CBC, RFP, Mg with repletion to maintain a K > 4.0, Phos >2.0, Mg > 2.0  -- Lovenox, SCD  -- Cardiology consult for perioperative optimization          Anoop Canales MD  General Surgery  Office: [phone]  Fax:     [phone]  10:41 AM   05/15/25

## 2025-05-15 NOTE — OP NOTE
RESECTION, RECTUM AND SIGMOID COLON, COLONOSCOPY     Operative Date: 05/15/25  Patient's Name: Diego Morales  Patient's YOB: 1955  Patient's MRN: [id number]  Patient's Age: 70 y.o.  Operating Room Location: [facility]  Patient's ASA: II  Patient's Estimated Blood Loss: 50 mL        PREOPERATIVE DIAGNOSIS:   Perforated sigmoid diverticulitis         POSTOPERATIVE DIAGNOSIS:   Perforated sigmoid diverticulitis         OPERATION/PROCEDURE:   Open sigmoid colectomy with end colostomy (Surinder Procedure)        SURGEON:   Iván Canales MD.         ASSISTANT:   Scrub assist.     No surgical resident was available to assist.  Hospital-employed assistant was available to assist.  The SA assisted with opening, exposure, dissection, and closure.           INDICATIONS:   This is a 70 y.o. male who presented with abdominal pain and free air on CT imaging.  His findings are most consistent with perforated sigmoid diverticulitis and a reactive ileus.  Interestingly, he was hemodynamically normal without peritonitis on presentation.  We therefore attempted to treat this nonoperatively for couple of days.  However, he slowly worsened.  We therefore planned on proceeding with surgery, with a likely open Guerrero's colectomy.        OPERATION:   The reasons for, benefits to, and risks of surgery were discussed with the patient.  The risks included, but not limited to, bleeding, infection, persistent pain, injury to surrounding structures, and postoperative abscess.  The patient appeared to understand and consented for surgery.  He was therefore brought back to the operating room and placed on the operating room table in the supine position.  His abdomen was prepped and draped in a standard fashion.       We started with an open midline incision.  After dissecting through the abdomen we came to the feculent peritonitis.  We swabbed this for culture.  We then irrigated out the  abdomen.  We then found the phlegmon and broke this up with blunt dissection.  We then inspected the sigmoid colon.  It was significantly inflamed.  We then began attempting dissection of the sigmoid colon from the lateral sidewall.  We used a combination of blunt dissection and electrocautery with right angled dissectors.  This was slow and tedious.  However, we did make progress.  We then went back to the medial side, and opened up the peritoneum along the length of the sigmoid colon towards the rectum to gain mobility.  We continued our dissection going back and forth between medial and lateral working inferiorly towards the rectum and superiorly towards the descending colon.  We began taking down the white line of Toldt working up the descending colon to give us mobility.  Eventually, the phlegmon of the sigmoid colon was too large to continue mobilizing.  We therefore bluntly dissected a window under the descending colon superior to the inflammation.  We then used a blue load TA stapler to staple across the descending colon.  We then began dissecting longitudinally down the sigmoid colon towards the rectum transecting the mesocolon with a LigaSure.  Given the poor visibility of the planes, we made this dissection plane right along the sigmoid colon to stay out of the retroperitoneum.  This obviously did not give us a high ligation.  However, we were successful in dissecting the inflamed sigmoid colon up and out of the abdomen without any obvious injury to any retroperitoneal structures.  As we came down to the rectum, we able to dissect around the rectum itself.  Unfortunately, this area of the rectosigmoid was still inflamed enough that we could not fit the TA stapler around it.  It appeared that we were quite low, and I did not want to proceed any further for fear of leaving to short of a rectal stump.  We therefore sharply transected across the rectosigmoid at this location.  We then closed the rectal stump  with a running 2-0 PDS suture.  We started on the 2 corners, and met in the middle.  We then sent off the rectosigmoid colon as specimen.    Given that our resection of the rectosigmoid was not standard, I elected to perform a sigmoidoscopy.  We used a disposable EGD scope to inspect the rectum.  Externally, we held the suture line under saline while we insufflated the rectum.  We did not see any obvious air leak.  We measured the sutured end of the rectum at about 20 cm from the anal verge.  We then desufflated the rectum and removed the scope.    We then irrigated out the abdomen again.  We confirmed hemostasis.  We then began mobilizing the lateral side of the descending colon to gain mobility and form a colostomy.  We eventually felt that we had adequate mobility to bring up the descending colon.  We therefore performed a bilateral transversus abdominis plane block instilling 15 mL of half percent Marcaine into each transversus abdominis plane under direct visualization.  We then brought a 19 Vincentian [place] drain out of the abdomen and the right mid abdomen, and brought this down the right paracolic gutter and into the pelvis and up the left paracolic gutter.  We then grasped the left side of the fascia and marked out our colostomy in the left upper quadrant.  Made a circular incision through the skin, and then dissected down to the anterior rectus sheath longitudinally.  We then opened the anterior rectus sheath, bluntly retracted the rectus muscle out of the way, and then opened the posterior rectus sheath longitudinally.  We then brought the stapled end of the descending colon out through our colostomy site.  We then closed the fascia with running looped #1 PDS sutures.  We then stapled the skin closed . We then sutured the drain in place with a 2-0 Prolene suture.  We then matured the colostomy with interrupted 3-0 Vicryl sutures.  We then applied a colostomy appliance over top.        DISPOSITION:   The  patient tolerated the procedure well.  There were no immediate complications.  He will be brought to the postanesthesia care unit. From there, he will be admitted for ongoing care.  He will have a Maxwell catheter, an NG tube, and a KATY drain.      I was present and scrubbed in for the entire procedure.      CPT Code:  87817       Operating Room Staff:  Anesthesiologist: Marshal Marcos MD  CRNA: ZACH Luciano  Frontline Breaker: ZACH Rock  Circulator: Saniya Sanchez RN; Jessi Liu RN  Relief Scrub: Maria Del Carmen Heaton  Scrub Person: Seema Canales MD  General Surgery  Office: [phone]  Fax:     [phone]  3:40 PM  05/15/25

## 2025-05-15 NOTE — PROGRESS NOTES
Diego Morales is a 70 y.o. male on day 3 of admission presenting with Intestinal perforation (Multi).    Subjective   Interval History: no fever, has abdominal pain, no emesis        Review of Systems    Objective   Range of Vitals (last 24 hours)  Heart Rate:  [71-82]   Temp:  [35.8 °C (96.4 °F)-37.1 °C (98.8 °F)]   Resp:  [16-20]   BP: (120-144)/(67-92)   SpO2:  [93 %-97 %]   Daily Weight  05/12/25 : 67.7 kg (149 lb 4.8 oz)    Body mass index is 20.83 kg/m².    Physical Exam  Constitutional:       Appearance: Normal appearance.   HENT:      Head: Normocephalic and atraumatic.      Mouth/Throat:      Mouth: Mucous membranes are moist.      Pharynx: Oropharynx is clear.   Eyes:      Pupils: Pupils are equal, round, and reactive to light.   Cardiovascular:      Rate and Rhythm: Normal rate and regular rhythm.      Heart sounds: Normal heart sounds.   Pulmonary:      Effort: Pulmonary effort is normal.      Breath sounds: Normal breath sounds.   Abdominal:      General: Abdomen is flat. Bowel sounds are normal.      Palpations: Abdomen is soft.      Tenderness: There is abdominal tenderness.   Musculoskeletal:      Cervical back: Normal range of motion.   Neurological:      Mental Status: He is alert.         Antibiotics  piperacillin-tazobactam - 4.5 gram/100 mL    Relevant Results  Labs  Results from last 72 hours   Lab Units 05/15/25  0732 05/14/25  0706 05/13/25  0710 05/12/25  1907   WBC AUTO x10*3/uL 7.6 10.5 11.3 13.1*   HEMOGLOBIN g/dL 14.0 14.4 14.7 14.7   HEMATOCRIT % 39.6* 40.5* 41.1 41.1   PLATELETS AUTO x10*3/uL 185 169 162 167   NEUTROS PCT AUTO %  --   --   --  91.5   LYMPHS PCT AUTO %  --   --   --  3.9   MONOS PCT AUTO %  --   --   --  3.9   EOS PCT AUTO %  --   --   --  0.1     Results from last 72 hours   Lab Units 05/15/25  0732 05/14/25  0706 05/13/25  0710   SODIUM mmol/L 135* 135* 137   POTASSIUM mmol/L 3.9 4.0 3.7   CHLORIDE mmol/L 104 103 104   CO2 mmol/L 24 27 25   BUN mg/dL 12 13 15    CREATININE mg/dL 1.02 1.07 0.90   GLUCOSE mg/dL 113* 128* 82   CALCIUM mg/dL 8.0* 8.2* 8.4*   ANION GAP mmol/L 11 9* 12   EGFR mL/min/1.73m*2 79 74 >90   PHOSPHORUS mg/dL 1.9* 1.8*  --      Results from last 72 hours   Lab Units 05/15/25  0732 05/14/25  0706 05/12/25  1907   ALK PHOS U/L  --   --  57   BILIRUBIN TOTAL mg/dL  --   --  2.0*   PROTEIN TOTAL g/dL  --   --  6.4   ALT U/L  --   --  16   AST U/L  --   --  17   ALBUMIN g/dL 3.1* 3.3* 4.0     Estimated Creatinine Clearance: 63.6 mL/min (by C-G formula based on SCr of 1.02 mg/dL).  CRP   Date Value Ref Range Status   06/18/2022 0.17 mg/dL Final     Comment:     REF VALUE  < 1.00       Microbiology  Reviewed  Imaging  Reviewed        Assessment/Plan   Abnormal CT of the chest, seems to be stable compared to old CT, likely old granulomatous disease, most likely fungal or atypical mycobacteria, MTB is unlikely  Sigmoid diverticulitis, the repeat CT was reviewed, for surgery     Recommendations :  Continue Zosyn  Discussed with the medical team     I spent minutes in the professional and overall care of this patient.          Umm Vega MD

## 2025-05-15 NOTE — ANESTHESIA PREPROCEDURE EVALUATION
Patient: Diego Morales    Procedure Information       Date/Time: 05/15/25 1155    Procedure: RESECTION, RECTUM AND SIGMOID COLON    Location: [facility] / [facility]    Surgeons: Iván Canales MD            Relevant Problems   No relevant active problems       Clinical information reviewed:   Tobacco  Allergies  Meds  Problems  Med Hx  Surg Hx   Fam Hx  Soc   Hx        NPO Detail:  NPO/Void Status  Carbohydrate Drink Given Prior to Surgery? : N  Date of Last Liquid: 25  Time of Last Liquid: 30  Date of Last Solid: 25  Last Intake Type: Clear fluids  Time of Last Void: 003         Physical Exam    Airway  Mallampati: II  TM distance: >3 FB  Mouth openin finger widths     Cardiovascular - normal exam   Dental    Pulmonary - normal exam   Abdominal Abdomen: rigid and tender  Bowel sounds: decreased           Anesthesia Plan    History of general anesthesia?: no  History of complications of general anesthesia?: unknown/emergency    ASA 2 - emergent     general     The patient is not a current smoker.    intravenous induction     Plan discussed with attending and CRNA.

## 2025-05-15 NOTE — ANESTHESIA PROCEDURE NOTES
Airway  Date/Time: 5/15/2025 12:34 PM  Reason: elective    Airway not difficult    Staffing  Performed: CRNA   Authorized by: Marshal Marcos MD    Performed by: DAVID Luciano-LINO  Patient location during procedure: OR    Patient Condition  Indications for airway management: anesthesia  Sedation level: deep     Final Airway Details   Preoxygenated: yes  Final airway type: endotracheal airway  Successful airway: ETT  Cuffed: yes   Successful intubation technique: video laryngoscopy  Adjuncts used in placement: intubating stylet  Blade: [place]  Blade size: #4  ETT size (mm): 7.0  Cormack-Lehane Classification: grade IIa - partial view of glottis  Placement verified by: chest auscultation and capnometry   Measured from: lips  ETT to lips (cm): 24  Number of attempts at approach: 1

## 2025-05-15 NOTE — PROGRESS NOTES
"Diego Morales is a 70 y.o. male on day 3 of admission presenting with Intestinal perforation (Multi).    Subjective   Laying comfortably in pain  Pain is well managed with the current regimen   Spouse at bedside          Objective     Physical Exam  HENT:      Head: Normocephalic and atraumatic.      Nose: No congestion or rhinorrhea.      Mouth/Throat:      Mouth: Mucous membranes are moist.   Eyes:      Conjunctiva/sclera: Conjunctivae normal.   Cardiovascular:      Rate and Rhythm: Normal rate and regular rhythm.      Pulses: Normal pulses.      Heart sounds: Murmur heard.   Pulmonary:      Effort: Pulmonary effort is normal.      Breath sounds: Normal breath sounds.   Abdominal:      General: There is distension.      Tenderness: There is abdominal tenderness. There is no guarding or rebound.   Musculoskeletal:      Right lower leg: No edema.      Left lower leg: No edema.   Skin:     General: Skin is warm.      Capillary Refill: Capillary refill takes less than 2 seconds.   Neurological:      Mental Status: He is alert and oriented to person, place, and time.   Psychiatric:         Mood and Affect: Mood normal.         Behavior: Behavior normal.         Thought Content: Thought content normal.         Judgment: Judgment normal.         Last Recorded Vitals  Blood pressure 128/83, pulse 72, temperature 35.8 °C (96.4 °F), temperature source Temporal, resp. rate 16, height 1.803 m (5' 10.98\"), weight 67.7 kg (149 lb 4.8 oz), SpO2 96%.  Intake/Output last 3 Shifts:  I/O last 3 completed shifts:  In: 5015 (74.1 mL/kg) [P.O.:150; I.V.:548.3 (8.1 mL/kg); IV Piggyback:4316.7]  Out: - (0 mL/kg)   Weight: 67.7 kg     Relevant Results    Results for orders placed or performed during the hospital encounter of 05/12/25 (from the past 24 hours)   CBC   Result Value Ref Range    WBC 7.6 4.4 - 11.3 x10*3/uL    nRBC 0.0 0.0 - 0.0 /100 WBCs    RBC 4.17 (L) 4.50 - 5.90 x10*6/uL    Hemoglobin 14.0 13.5 - 17.5 g/dL    Hematocrit " 39.6 (L) 41.0 - 52.0 %    MCV 95 80 - 100 fL    MCH 33.6 26.0 - 34.0 pg    MCHC 35.4 32.0 - 36.0 g/dL    RDW 12.6 11.5 - 14.5 %    Platelets 185 150 - 450 x10*3/uL   Renal Function Panel   Result Value Ref Range    Glucose 113 (H) 74 - 99 mg/dL    Sodium 135 (L) 136 - 145 mmol/L    Potassium 3.9 3.5 - 5.3 mmol/L    Chloride 104 98 - 107 mmol/L    Bicarbonate 24 21 - 32 mmol/L    Anion Gap 11 10 - 20 mmol/L    Urea Nitrogen 12 6 - 23 mg/dL    Creatinine 1.02 0.50 - 1.30 mg/dL    eGFR 79 >60 mL/min/1.73m*2    Calcium 8.0 (L) 8.6 - 10.3 mg/dL    Phosphorus 1.9 (L) 2.5 - 4.9 mg/dL    Albumin 3.1 (L) 3.4 - 5.0 g/dL   Magnesium   Result Value Ref Range    Magnesium 1.90 1.60 - 2.40 mg/dL     CT abdomen pelvis w IV contrast  Result Date: 5/14/2025  Interpreted By:  Finkelstein, Evan, STUDY: CT ABDOMEN PELVIS W IV CONTRAST;  5/13/2025 10:06 pm   INDICATION: Signs/Symptoms:Abd pain.     COMPARISON: CT chest abdomen pelvis 05/12/2025   ACCESSION NUMBER(S): [id number]   ORDERING CLINICIAN: EDNA GREENFIELD   TECHNIQUE: Axial CT images of the abdomen and pelvis with coronal and sagittal reconstructed images obtained after intravenous administration of 75 mL Omnipaque 350   FINDINGS: LOWER CHEST: Mild bibasilar atelectasis.   ABDOMEN:   LIVER: Normal attenuation and contour. BILE DUCTS: Normal caliber. GALLBLADDER: No calcified gallstones. No wall thickening. PORTAL VEIN: Patent SPLEEN: Unremarkable. PANCREAS: Unremarkable. ADRENALS: Unremarkable. KIDNEYS, URETERS and URINARY BLADDER: Symmetric renal enhancement. No hydronephrosis or perinephric fluid collection. The bladder is not fully distended, which limits evaluation. There is adjacent stranding. REPRODUCTIVE ORGANS: No pelvic masses.   ABDOMINAL WALL: Within normal limits. PERITONEUM: Scattered foci of free air throughout the abdomen and pelvis.   BOWEL: Dilated loops of proximal small bowel with tapering to more normal caliber in the lower pelvis. There are scattered colonic  diverticula with wall thickening of the sigmoid colon and adjacent inflammatory stranding. There is wall thickening involving small bowel in the lower pelvis where there is adjacent inflammatory stranding as well as foci of extraluminal air. Nondilated appendix.   VESSELS: No aortic aneurysm. RETROPERITONEUM: No pathologically enlarged retroperitoneal lymph nodes.   BONES: No acute osseous abnormality.       Redemonstrated scattered foci of free air throughout the abdomen and pelvis most compatible with a perforated viscus. Inflammatory stranding is most pronounced in the lower pelvis surrounding loops of small bowel with wall thickening as well as the sigmoid colon where there are several associated diverticula. Findings suggest diverticulitis, which may represent a site of perforation in the setting of free air. Perforation of adjacent small bowel, however, is not entirely excluded.   Dilated loops of proximal small bowel with tapering to more normal caliber in the lower pelvis. Dilated loops of bowel are new compared to prior imaging and may represent a reactive ileus versus developing obstruction.     MACRO: None.   Signed by: Evan Finkelstein 5/14/2025 12:27 AM Dictation workstation:   [id number]    ECG 12 Lead  Result Date: 5/13/2025  Normal sinus rhythm Normal ECG When compared with ECG of 23-JUN-2016 06:47, Premature ventricular complexes are no longer Present T wave inversion now evident in Inferior leads                     Assessment & Plan  Intestinal perforation (Multi)      Diverticulitis of large intestine with perforation without abscess or bleeding    Diego Morales is a 70 y.o. male with a past medical history of cardiac arrest during cardiac catheterization in 2016 secondary to ventricular arrhythmia/V-fib requiring cardioversion (normal coronary arteries), heart murmur, and angina pectoris who was admitted to the hospital for suspected small intestinal perforation and acute diverticulitis.        Small intestinal perforation/acute diverticulitis  - CT scan showed mild free air noted within the abdomen and pelvis in keeping with hollow visceral perforation. There are regions of small bowel with wall thickening and fluid including the indeterminate region with adjacent non dependent free air which is indeterminate for location  Repeat CT on 5/13 shows no changes from above CT  of perforation. There is also findings suggestive of acute sigmoidal diverticulitis.  - surg following with plan for surgery 5/15 AM   - Continue Zosyn  - IVF D5/NS while NPO  - ID following recommendations to continue zosyn  - Keep n.p.o.  - Continue dilaudid as needed for pain  - Zofran for nausea PRN  - potassium phosphate tablets given to keep K> 4 and phosphate >2 and Mg >2      Bilateral lung reticulonodular opacity  - Right greater than left based on CT scan which is similar to prior imaging from 6/23/2016  - Mycobacterial infection not excluded per CT scan report  - Patient is asymptomatic  - Follow-up with [facility] continue to be pending  - ID following      CAD with hx of VF with C   - Cardiology following for cardiac clearance  - Holding home aspiring   - continuing metoprolol 25 mg   - cleared for surgery at low cardiac risk  - on telemetry     HTN  - continue metoprolol 25mg      DVT prophylaxis: SCD, lovenox   Dispo: monitor clinically, continue pain control       Patient seen and discussed with Dr. Isidro Flores MD  Family Medicine, PGY3

## 2025-05-16 LAB
ALBUMIN SERPL BCP-MCNC: 2.8 G/DL (ref 3.4–5)
ANION GAP SERPL CALC-SCNC: 12 MMOL/L (ref 10–20)
ATRIAL RATE: 76 BPM
BUN SERPL-MCNC: 18 MG/DL (ref 6–23)
CALCIUM SERPL-MCNC: 7.5 MG/DL (ref 8.6–10.3)
CHLORIDE SERPL-SCNC: 101 MMOL/L (ref 98–107)
CO2 SERPL-SCNC: 25 MMOL/L (ref 21–32)
CREAT SERPL-MCNC: 1.2 MG/DL (ref 0.5–1.3)
EGFRCR SERPLBLD CKD-EPI 2021: 65 ML/MIN/1.73M*2
ERYTHROCYTE [DISTWIDTH] IN BLOOD BY AUTOMATED COUNT: 12.8 % (ref 11.5–14.5)
GLUCOSE SERPL-MCNC: 148 MG/DL (ref 74–99)
HCT VFR BLD AUTO: 35.8 % (ref 41–52)
HGB BLD-MCNC: 12.2 G/DL (ref 13.5–17.5)
MAGNESIUM SERPL-MCNC: 1.95 MG/DL (ref 1.6–2.4)
MCH RBC QN AUTO: 32.8 PG (ref 26–34)
MCHC RBC AUTO-ENTMCNC: 34.1 G/DL (ref 32–36)
MCV RBC AUTO: 96 FL (ref 80–100)
NRBC BLD-RTO: 0 /100 WBCS (ref 0–0)
P AXIS: 64 DEGREES
P OFFSET: 206 MS
P ONSET: 143 MS
PHOSPHATE SERPL-MCNC: 3 MG/DL (ref 2.5–4.9)
PLATELET # BLD AUTO: 234 X10*3/UL (ref 150–450)
POTASSIUM SERPL-SCNC: 4.5 MMOL/L (ref 3.5–5.3)
PR INTERVAL: 158 MS
Q ONSET: 222 MS
QRS COUNT: 13 BEATS
QRS DURATION: 100 MS
QT INTERVAL: 406 MS
QTC CALCULATION(BAZETT): 456 MS
QTC FREDERICIA: 439 MS
R AXIS: 23 DEGREES
RBC # BLD AUTO: 3.72 X10*6/UL (ref 4.5–5.9)
SODIUM SERPL-SCNC: 133 MMOL/L (ref 136–145)
T AXIS: 4 DEGREES
T OFFSET: 425 MS
VENTRICULAR RATE: 76 BPM
WBC # BLD AUTO: 7.8 X10*3/UL (ref 4.4–11.3)

## 2025-05-16 PROCEDURE — 2500000004 HC RX 250 GENERAL PHARMACY W/ HCPCS (ALT 636 FOR OP/ED): Mod: JZ | Performed by: SURGERY

## 2025-05-16 PROCEDURE — 2500000004 HC RX 250 GENERAL PHARMACY W/ HCPCS (ALT 636 FOR OP/ED): Mod: JZ | Performed by: INTERNAL MEDICINE

## 2025-05-16 PROCEDURE — 1100000001 HC PRIVATE ROOM DAILY

## 2025-05-16 PROCEDURE — 36415 COLL VENOUS BLD VENIPUNCTURE: CPT | Performed by: INTERNAL MEDICINE

## 2025-05-16 PROCEDURE — 83735 ASSAY OF MAGNESIUM: CPT | Performed by: INTERNAL MEDICINE

## 2025-05-16 PROCEDURE — 2500000001 HC RX 250 WO HCPCS SELF ADMINISTERED DRUGS (ALT 637 FOR MEDICARE OP): Performed by: INTERNAL MEDICINE

## 2025-05-16 PROCEDURE — 2500000004 HC RX 250 GENERAL PHARMACY W/ HCPCS (ALT 636 FOR OP/ED): Performed by: STUDENT IN AN ORGANIZED HEALTH CARE EDUCATION/TRAINING PROGRAM

## 2025-05-16 PROCEDURE — 99233 SBSQ HOSP IP/OBS HIGH 50: CPT

## 2025-05-16 PROCEDURE — 80069 RENAL FUNCTION PANEL: CPT | Performed by: INTERNAL MEDICINE

## 2025-05-16 PROCEDURE — 85027 COMPLETE CBC AUTOMATED: CPT | Performed by: INTERNAL MEDICINE

## 2025-05-16 RX ORDER — METOPROLOL SUCCINATE 25 MG/1
25 TABLET, EXTENDED RELEASE ORAL NIGHTLY
Status: DISCONTINUED | OUTPATIENT
Start: 2025-05-16 | End: 2025-05-21 | Stop reason: HOSPADM

## 2025-05-16 RX ORDER — ASPIRIN 81 MG/1
81 TABLET ORAL DAILY
Status: DISCONTINUED | OUTPATIENT
Start: 2025-05-16 | End: 2025-05-21 | Stop reason: HOSPADM

## 2025-05-16 RX ADMIN — DEXTROSE MONOHYDRATE, SODIUM CHLORIDE, AND POTASSIUM CHLORIDE 125 ML/HR: 50; 1.49; 4.5 INJECTION, SOLUTION INTRAVENOUS at 09:44

## 2025-05-16 RX ADMIN — METHOCARBAMOL 500 MG: 1000 INJECTION, SOLUTION INTRAMUSCULAR; INTRAVENOUS at 10:24

## 2025-05-16 RX ADMIN — GABAPENTIN 100 MG: 250 SOLUTION ORAL at 20:43

## 2025-05-16 RX ADMIN — HYDROMORPHONE HYDROCHLORIDE 0.2 MG: 1 INJECTION, SOLUTION INTRAMUSCULAR; INTRAVENOUS; SUBCUTANEOUS at 17:21

## 2025-05-16 RX ADMIN — GABAPENTIN 100 MG: 250 SOLUTION ORAL at 10:48

## 2025-05-16 RX ADMIN — ACETAMINOPHEN 1000 MG: 1000 INJECTION, SOLUTION INTRAVENOUS at 09:46

## 2025-05-16 RX ADMIN — KETOROLAC TROMETHAMINE 15 MG: 15 INJECTION, SOLUTION INTRAMUSCULAR; INTRAVENOUS at 09:45

## 2025-05-16 RX ADMIN — ENOXAPARIN SODIUM 40 MG: 40 INJECTION SUBCUTANEOUS at 08:06

## 2025-05-16 RX ADMIN — HYDROMORPHONE HYDROCHLORIDE 0.2 MG: 1 INJECTION, SOLUTION INTRAMUSCULAR; INTRAVENOUS; SUBCUTANEOUS at 19:06

## 2025-05-16 RX ADMIN — PIPERACILLIN SODIUM AND TAZOBACTAM SODIUM 4.5 G: 4; .5 INJECTION, SOLUTION INTRAVENOUS at 21:14

## 2025-05-16 RX ADMIN — PIPERACILLIN SODIUM AND TAZOBACTAM SODIUM 4.5 G: 4; .5 INJECTION, SOLUTION INTRAVENOUS at 08:06

## 2025-05-16 RX ADMIN — PIPERACILLIN SODIUM AND TAZOBACTAM SODIUM 4.5 G: 4; .5 INJECTION, SOLUTION INTRAVENOUS at 15:55

## 2025-05-16 RX ADMIN — KETOROLAC TROMETHAMINE 15 MG: 15 INJECTION, SOLUTION INTRAMUSCULAR; INTRAVENOUS at 03:35

## 2025-05-16 RX ADMIN — METOPROLOL SUCCINATE 25 MG: 25 TABLET, EXTENDED RELEASE ORAL at 21:07

## 2025-05-16 RX ADMIN — PIPERACILLIN SODIUM AND TAZOBACTAM SODIUM 4.5 G: 4; .5 INJECTION, SOLUTION INTRAVENOUS at 03:35

## 2025-05-16 RX ADMIN — ACETAMINOPHEN 1000 MG: 1000 INJECTION, SOLUTION INTRAVENOUS at 04:12

## 2025-05-16 RX ADMIN — METHOCARBAMOL 500 MG: 1000 INJECTION, SOLUTION INTRAMUSCULAR; INTRAVENOUS at 17:26

## 2025-05-16 RX ADMIN — ACETAMINOPHEN 1000 MG: 1000 INJECTION, SOLUTION INTRAVENOUS at 16:11

## 2025-05-16 RX ADMIN — DEXTROSE MONOHYDRATE, SODIUM CHLORIDE, AND POTASSIUM CHLORIDE 125 ML/HR: 50; 1.49; 4.5 INJECTION, SOLUTION INTRAVENOUS at 19:57

## 2025-05-16 RX ADMIN — DEXTROSE MONOHYDRATE, SODIUM CHLORIDE, AND POTASSIUM CHLORIDE 100 ML/HR: 50; 1.49; 4.5 INJECTION, SOLUTION INTRAVENOUS at 00:14

## 2025-05-16 RX ADMIN — KETOROLAC TROMETHAMINE 15 MG: 15 INJECTION, SOLUTION INTRAMUSCULAR; INTRAVENOUS at 16:12

## 2025-05-16 RX ADMIN — KETOROLAC TROMETHAMINE 15 MG: 15 INJECTION, SOLUTION INTRAMUSCULAR; INTRAVENOUS at 21:49

## 2025-05-16 ASSESSMENT — COGNITIVE AND FUNCTIONAL STATUS - GENERAL
CLIMB 3 TO 5 STEPS WITH RAILING: A LITTLE
DRESSING REGULAR LOWER BODY CLOTHING: A LITTLE
DRESSING REGULAR LOWER BODY CLOTHING: A LITTLE
MOVING TO AND FROM BED TO CHAIR: A LITTLE
DRESSING REGULAR UPPER BODY CLOTHING: A LITTLE
MOBILITY SCORE: 19
WALKING IN HOSPITAL ROOM: A LITTLE
TURNING FROM BACK TO SIDE WHILE IN FLAT BAD: A LITTLE
MOVING TO AND FROM BED TO CHAIR: A LITTLE
STANDING UP FROM CHAIR USING ARMS: A LITTLE
MOBILITY SCORE: 19
CLIMB 3 TO 5 STEPS WITH RAILING: A LITTLE
STANDING UP FROM CHAIR USING ARMS: A LITTLE
DAILY ACTIVITIY SCORE: 21
TOILETING: A LITTLE
TOILETING: A LITTLE
DAILY ACTIVITIY SCORE: 21
DRESSING REGULAR UPPER BODY CLOTHING: A LITTLE
WALKING IN HOSPITAL ROOM: A LITTLE
TURNING FROM BACK TO SIDE WHILE IN FLAT BAD: A LITTLE

## 2025-05-16 ASSESSMENT — PAIN DESCRIPTION - DESCRIPTORS: DESCRIPTORS: SHOOTING

## 2025-05-16 ASSESSMENT — PAIN DESCRIPTION - LOCATION
LOCATION: ABDOMEN
LOCATION: PENIS

## 2025-05-16 ASSESSMENT — PAIN - FUNCTIONAL ASSESSMENT
PAIN_FUNCTIONAL_ASSESSMENT: 0-10

## 2025-05-16 ASSESSMENT — PAIN SCALES - GENERAL
PAINLEVEL_OUTOF10: 6
PAINLEVEL_OUTOF10: 0 - NO PAIN
PAINLEVEL_OUTOF10: 0 - NO PAIN

## 2025-05-16 NOTE — CARE PLAN
The patient's goals for the shift include      The clinical goals for the shift include Patient will have pain managed, remain free from injury and rest comfortably this shift

## 2025-05-16 NOTE — PROGRESS NOTES
05/16/25 1606   Discharge Planning   Living Arrangements Spouse/significant other   Support Systems Spouse/significant other   Assistance Needed patient is A&Ox3, independent in ADL's, no AD, no DME, drives PCP- Ramon Gould MD   Type of Residence Private residence   Number of Stairs to Enter Residence 3   Number of Stairs Within Residence 20   Do you have animals or pets at home? Yes   Type of Animals or Pets 1 dog- Max   Who is requesting discharge planning? Provider   Home or Post Acute Services None   Expected Discharge Disposition Home H  (new home care services for ostomy care/education)   Does the patient need discharge transport arranged? No

## 2025-05-16 NOTE — CARE PLAN
The patient's goals for the shift include      The clinical goals for the shift include pt will ambulate safetly and continue bronchial hygine.

## 2025-05-16 NOTE — CONSULTS
Wound Care Consult     Visit Date: 5/16/2025      Patient Name: Diego Morales         MRN: [id number]             Reason for Consult:   New end colostomy     Wound History:   Patient admitted 5-12-25 with questionable small intestinal perforation and acute diverticulitis.  Underwent resection of rectum and sigmoid colon with open colectomy and Guerrero's end colostomy on 5-15-25.  EMR reviewed and status d/w patient's RN, Janet.  RN reports Diego has been up to walk in halls, is anxious about his surgery and the course of recovery post-op.  His wife is at his side, his support person who identifies herself as a caregiver.     Assessment:    Met with patient and wife Yohana.  Patient is soft spoken and reports feeling great fatigue, was congratulated on his walking and encouraged to rest in between walks.  At this time they have no questions re: colostomy care.  Our colostomy education folder was provided.  We planned to meet on Monday 5/19 to begin incremental ostomy care education.      NPO with NGT to LIS, large amount dark brown fluid.  The left mid-abdominal colostomy is with fully intact pouching system, scant amount s/s fluid, no flatus or enteric output yet.  The stoma is nicely budded, pale pink and moist.  The postop AG dressing is intact with abdominal binder in place.            Colostomy LUQ (Active)   Placement Date/Time: 05/15/25 1447   Placed by: PAULINA FLORES MD  Hand Hygiene Completed: Yes  Location: LUQ   Number of days: 1      Colostomy LUQ (Active)   Stomal Appliance 1 piece;Clean;Dry;Intact 05/16/25 0610   Site/Stoma Assessment Clean;Dry;Intact 05/16/25 0610   Peristomal Assessment Clean;Intact 05/16/25 0610   Output (mL) 0 mL 05/16/25 0610                 Wound Plan:   Begin ostomy care education with patient and his wife on Monday, 5-19-25.      Ted Carreon RN, [facility], C  5/16/2025  3:28 PM

## 2025-05-16 NOTE — PROGRESS NOTES
"General/Trauma Surgery Daily Progress Note    Subjective   Pain is controlled. NG is his main complaint, however is tolerable, bilious output. Denies nausea. Colostomy without function. Motivated to ambulate today. KATY serosanguinous.       Objective   Last Recorded Vitals:  Blood pressure 137/84, pulse 85, temperature 36.5 °C (97.7 °F), temperature source Temporal, resp. rate 18, height 1.803 m (5' 10.98\"), weight 67.7 kg (149 lb 4.8 oz), SpO2 95%.    Intake/Output last 3 Shifts:  I/O last 3 completed shifts:  In: 3795.3 (56 mL/kg) [P.O.:510; I.V.:1132 (16.7 mL/kg); NG/GT:200; IV Piggyback:1953.3]  Out: 815 (12 mL/kg) [Urine:275 (0.1 mL/kg/hr); Emesis/NG output:450; Drains:90]  Weight: 67.7 kg     Pain Score:  0-10 (Numeric) Pain Score: 0 - No pain     Physical Exam:  Constitutional: No acute distress, sitting up in bed.  Neuro: Alert, oriented. Follows commands.   Eyes: EOMI. No scleral icterus. Conjunctiva pink.  ENT: NG bilious.  Heart: Regular rate.  Respiratory: No increased work of breathing or audible wheeze.  Abdomen: Soft, nondistended. Appropriately tender. Incisions clean, dry, intact. Colostomy pink and budded. No gas or stool. KATY serosanguinous.   MSK: Moves all extremities.  Vascular: Palpable pulses throughout, no pitting edema.  Skin: No rashes.   Psychological: Appropriate mood and behavior.            Relevant Results  Laboratory Results:  CBC:   Lab Results   Component Value Date    WBC 7.8 05/16/2025    RBC 3.72 (L) 05/16/2025    HGB 12.2 (L) 05/16/2025    HCT 35.8 (L) 05/16/2025     05/16/2025       RFP:   Lab Results   Component Value Date     (L) 05/16/2025    K 4.5 05/16/2025     05/16/2025    CO2 25 05/16/2025    BUN 18 05/16/2025    CREATININE 1.20 05/16/2025    CALCIUM 7.5 (L) 05/16/2025    MG 1.95 05/16/2025    PHOS 3.0 05/16/2025        LFTs:   Lab Results   Component Value Date    ALBUMIN 2.8 (L) 05/16/2025         Imaging:  XR abdomen 1 view  Result Date: " 5/15/2025  1. Enteric tube terminates in the left upper quadrant with side hole at or just below the GE junction. Consider slight advancement. 2. There are multiple gaseous distended dilated loops of bowel centrally in the visualized upper abdomen. Cutaneous staples noted. Findings may relate to postoperative ileus with developing obstruction not excluded. Attention on continued radiographic follow-up is advised.   MACRO: None   Signed by: Quin Reich 5/15/2025 8:57 PM Dictation workstation:   [id number]      Cardiology, Vascular, and Other Imaging  No other imaging results found for the past 2 days           Assessment/Plan   This is a 70 y.o. male who is now POD 1 from laparotomy with Hartmanns procedure for Hinchey 4 diverticulitis. Doing well, however anticipate a post operative ileus over the next few days.      Plan:   -- NG decompression to low continuous suction, flush 4 times daily  -- NPO, ice chips and sips with meds ok. Clamp NG for 30 min after PO meds.   -- Multimodal pain regimen with tylenol, toradol, robaxin, gabapentin, prn narcotic  -- IV hydration with 125ml/hr D5 1/2NS +20K  -- Encourage ambulation, please walk in halls 5 times per day and sit in bedside chair when not asleep  -- Continue IV Zosyn  -- Incentive spirometer  -- Daily CBC, RFP, Mg - maintain K > 4.0, Mg > 2.0, Phos >2.5  -- Ostomy education            Seen and discussed with Dr. Canales who is in agreement with plan. Please secure chat with questions.    Cindy Baldwin PA-C

## 2025-05-16 NOTE — PROGRESS NOTES
"Diego Morales is a 70 y.o. male on day 4 of admission presenting with Intestinal perforation (Multi).    Subjective     Laying in bed with spouse at bedside   He reports a difficult night with pain management however he reports that the pain is now post surgical and manageable          Objective     Physical Exam  HENT:      Nose:      Comments: NG tube draining actively     Mouth/Throat:      Mouth: Mucous membranes are moist.   Eyes:      Conjunctiva/sclera: Conjunctivae normal.   Cardiovascular:      Rate and Rhythm: Normal rate and regular rhythm.      Heart sounds: Murmur heard.   Pulmonary:      Effort: Pulmonary effort is normal.      Breath sounds: Normal breath sounds.   Abdominal:      General: There is no distension.      Tenderness: There is abdominal tenderness.      Comments: Abdominal binder with colostomy bag   KATY drain    Musculoskeletal:      Right lower leg: No edema.      Left lower leg: No edema.   Skin:     General: Skin is warm.      Capillary Refill: Capillary refill takes less than 2 seconds.   Neurological:      Mental Status: He is alert and oriented to person, place, and time.   Psychiatric:         Mood and Affect: Mood normal.         Behavior: Behavior normal.         Thought Content: Thought content normal.         Judgment: Judgment normal.         Last Recorded Vitals  Blood pressure 129/77, pulse 74, temperature 36.5 °C (97.7 °F), temperature source Temporal, resp. rate 18, height 1.803 m (5' 10.98\"), weight 67.7 kg (149 lb 4.8 oz), SpO2 96%.  Intake/Output last 3 Shifts:  I/O last 3 completed shifts:  In: 3795.3 (56 mL/kg) [P.O.:510; I.V.:1132 (16.7 mL/kg); NG/GT:200; IV Piggyback:1953.3]  Out: 815 (12 mL/kg) [Urine:275 (0.1 mL/kg/hr); Emesis/NG output:450; Drains:90]  Weight: 67.7 kg     Relevant Results    Results for orders placed or performed during the hospital encounter of 05/12/25 (from the past 24 hours)   CBC   Result Value Ref Range    WBC 7.8 4.4 - 11.3 x10*3/uL    " nRBC 0.0 0.0 - 0.0 /100 WBCs    RBC 3.72 (L) 4.50 - 5.90 x10*6/uL    Hemoglobin 12.2 (L) 13.5 - 17.5 g/dL    Hematocrit 35.8 (L) 41.0 - 52.0 %    MCV 96 80 - 100 fL    MCH 32.8 26.0 - 34.0 pg    MCHC 34.1 32.0 - 36.0 g/dL    RDW 12.8 11.5 - 14.5 %    Platelets 234 150 - 450 x10*3/uL   Renal Function Panel   Result Value Ref Range    Glucose 148 (H) 74 - 99 mg/dL    Sodium 133 (L) 136 - 145 mmol/L    Potassium 4.5 3.5 - 5.3 mmol/L    Chloride 101 98 - 107 mmol/L    Bicarbonate 25 21 - 32 mmol/L    Anion Gap 12 10 - 20 mmol/L    Urea Nitrogen 18 6 - 23 mg/dL    Creatinine 1.20 0.50 - 1.30 mg/dL    eGFR 65 >60 mL/min/1.73m*2    Calcium 7.5 (L) 8.6 - 10.3 mg/dL    Phosphorus 3.0 2.5 - 4.9 mg/dL    Albumin 2.8 (L) 3.4 - 5.0 g/dL   Magnesium   Result Value Ref Range    Magnesium 1.95 1.60 - 2.40 mg/dL     XR abdomen 1 view  Result Date: 5/15/2025  Interpreted By:  Quin Reich, STUDY: XR ABDOMEN 1 VIEW;  5/15/2025 8:42 pm   INDICATION: Signs/Symptoms:Confim NGT placement.   COMPARISON: None.   ACCESSION NUMBER(S): [id number]   ORDERING CLINICIAN: LATASHA RILEY   FINDINGS: Enteric tube terminates in the left upper quadrant with side hole at or just below the GE junction. Consider slight advancement. Multiple overlying leads are present.   The mid abdomen and pelvis are excluded from the field of view thereby limiting evaluation. There are multiple gaseous distended dilated loops of bowel centrally in the visualized abdomen. Cutaneous staples noted. Findings may relate to postoperative ileus with developing obstruction not excluded.   Bibasilar atelectasis.   Osseous structures demonstrate no acute bony changes.       1. Enteric tube terminates in the left upper quadrant with side hole at or just below the GE junction. Consider slight advancement. 2. There are multiple gaseous distended dilated loops of bowel centrally in the visualized upper abdomen. Cutaneous staples noted. Findings may relate to postoperative ileus  with developing obstruction not excluded. Attention on continued radiographic follow-up is advised.   MACRO: None   Signed by: Quin Reich 5/15/2025 8:57 PM Dictation workstation:   [id number]                   Assessment & Plan  Intestinal perforation (Multi)      Diverticulitis of large intestine with perforation without abscess or bleeding      Diego Morales is a 70 y.o. male with a past medical history of cardiac arrest during cardiac catheterization in 2016 secondary to ventricular arrhythmia/V-fib requiring cardioversion (normal coronary arteries), heart murmur, and angina pectoris who was admitted to the hospital for suspected small intestinal perforation and acute diverticulitis.       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 There are regions of small bowel with wall thickening and fluid including the indeterminate region with adjacent non dependent free air which is indeterminate for location  Repeat CT on 5/13 shows no changes from above CT  of perforation. There is also findings suggestive of acute sigmoidal diverticulitis.  - now s/p sigmoid colectomy on 5/15 monitoring with NG and KATY and colostomy output  - surgical pathology and cultures pending   - Xray confirmed NG positioning however will need to monitor for postoperative ileus an obstruction  - Continue Zosyn  - IVF D5/NS while NPO and NG tube continues to drain  - ID following recommendations to continue zosyn  - Continue dilaudid as needed for pain  - Zofran for nausea PRN  - potassium phosphate tablets given to keep K> 4 and phosphate >2 and Mg >2      Bilateral lung reticulonodular opacity  - Right greater than left based on CT scan which is similar to prior imaging from 6/23/2016  - Patient is asymptomatic continues to be remain on RA  - T spot negative TB excluded  - ID following        CAD  with hx of VF with C   - Cardiology following for cardiac clearance  - continue home aspiring   - continuing metoprolol 25 mg   - cleared for surgery at low cardiac risk  - on telemetry      HTN  - continue metoprolol 25mg      DVT prophylaxis: SCD, lovenox   Dispo: monitor clinically, continue pain control         Patient seen and discussed with Dr. Isidro Flores MD  Family Medicine, PGY3

## 2025-05-16 NOTE — PROGRESS NOTES
Diego Morales is a 70 y.o. male on day 4 of admission presenting with Intestinal perforation (Multi).    Subjective   Interval History: no fever, has abdominal pain,        Review of Systems    Objective   Range of Vitals (last 24 hours)  Heart Rate:  [65-85]   Temp:  [36.1 °C (97 °F)-36.7 °C (98.1 °F)]   Resp:  [12-18]   BP: ()/(56-84)   SpO2:  [93 %-100 %]   Daily Weight  05/12/25 : 67.7 kg (149 lb 4.8 oz)    Body mass index is 20.83 kg/m².    Physical Exam  Constitutional:       Appearance: Normal appearance.   HENT:      Head: Normocephalic and atraumatic.      Mouth/Throat:      Mouth: Mucous membranes are moist.      Pharynx: Oropharynx is clear.   Eyes:      Pupils: Pupils are equal, round, and reactive to light.   Cardiovascular:      Rate and Rhythm: Normal rate and regular rhythm.      Heart sounds: Normal heart sounds.   Pulmonary:      Effort: Pulmonary effort is normal.      Breath sounds: Normal breath sounds.   Abdominal:      General: There is distension.      Palpations: Abdomen is soft.      Tenderness: There is abdominal tenderness.      Comments: Ostomy, dressing   Musculoskeletal:      Cervical back: Normal range of motion.   Neurological:      Mental Status: He is alert.         Antibiotics  piperacillin-tazobactam - 4.5 gram/100 mL    Relevant Results  Labs  Results from last 72 hours   Lab Units 05/16/25  0748 05/15/25  0732 05/14/25  0706   WBC AUTO x10*3/uL 7.8 7.6 10.5   HEMOGLOBIN g/dL 12.2* 14.0 14.4   HEMATOCRIT % 35.8* 39.6* 40.5*   PLATELETS AUTO x10*3/uL 234 185 169     Results from last 72 hours   Lab Units 05/16/25  0748 05/15/25  0732 05/14/25  0706   SODIUM mmol/L 133* 135* 135*   POTASSIUM mmol/L 4.5 3.9 4.0   CHLORIDE mmol/L 101 104 103   CO2 mmol/L 25 24 27   BUN mg/dL 18 12 13   CREATININE mg/dL 1.20 1.02 1.07   GLUCOSE mg/dL 148* 113* 128*   CALCIUM mg/dL 7.5* 8.0* 8.2*   ANION GAP mmol/L 12 11 9*   EGFR mL/min/1.73m*2 65 79 74   PHOSPHORUS mg/dL 3.0 1.9* 1.8*      Results from last 72 hours   Lab Units 05/16/25  0748 05/15/25  0732 05/14/25  0706   ALBUMIN g/dL 2.8* 3.1* 3.3*     Estimated Creatinine Clearance: 54.1 mL/min (by C-G formula based on SCr of 1.2 mg/dL).  CRP   Date Value Ref Range Status   06/18/2022 0.17 mg/dL Final     Comment:     REF VALUE  < 1.00       Microbiology  Reviewed  Imaging  Reviewed        Assessment/Plan   Abnormal CT of the chest, seems to be stable compared to old CT, likely old granulomatous disease, most likely fungal or atypical mycobacteria, MTB is unlikely  Sigmoid diverticulitis, the repeat CT was reviewed, sp surgery     Recommendations :  Continue Zosyn  Incentive spirometry  Discussed with the medical team     I spent minutes in the professional and overall care of this patient.          Umm Vega MD

## 2025-05-16 NOTE — ANESTHESIA POSTPROCEDURE EVALUATION
Patient: Diego Morales    Procedure Summary       Date: 05/15/25 Room / Location: [facility] OR 07 / [facility]    Anesthesia Start: 1222 Anesthesia Stop: 1549    Procedures:       RESECTION, RECTUM AND SIGMOID COLON      COLONOSCOPY (Anus) Diagnosis:       Diverticulitis of large intestine with perforation without abscess or bleeding      (Diverticulitis of large intestine with perforation without abscess or bleeding [K57.20])    Surgeons: Iván Canales MD Responsible Provider: Marshal Marcos MD    Anesthesia Type: general ASA Status: 2 - Emergent            Anesthesia Type: general    Vitals Value Taken Time   /63 05/15/25 17:00   Temp 36.1 °C (97 °F) 05/15/25 15:44   Pulse 70 05/15/25 17:00   Resp 16 05/15/25 17:00   SpO2 95 % 05/15/25 17:00       Anesthesia Post Evaluation    Patient participation: complete - patient cannot participate  Level of consciousness: responsive to verbal stimuli  Pain management: adequate  Airway patency: patent  Cardiovascular status: acceptable  Respiratory status: acceptable  Hydration status: acceptable  Postoperative Nausea and Vomiting: none        No notable events documented.

## 2025-05-17 ENCOUNTER — APPOINTMENT (OUTPATIENT)
Dept: RADIOLOGY | Facility: HOSPITAL | Age: 70
DRG: 329 | End: 2025-05-17
Payer: MEDICARE

## 2025-05-17 LAB
ACID FAST STN SPEC: NORMAL
ALBUMIN SERPL BCP-MCNC: 2.8 G/DL (ref 3.4–5)
ALBUMIN SERPL BCP-MCNC: 2.8 G/DL (ref 3.4–5)
ALP SERPL-CCNC: 78 U/L (ref 33–136)
ALT SERPL W P-5'-P-CCNC: 11 U/L (ref 10–52)
ANION GAP SERPL CALC-SCNC: 13 MMOL/L (ref 10–20)
AST SERPL W P-5'-P-CCNC: 13 U/L (ref 9–39)
BILIRUB DIRECT SERPL-MCNC: 0.3 MG/DL (ref 0–0.3)
BILIRUB SERPL-MCNC: 0.9 MG/DL (ref 0–1.2)
BUN SERPL-MCNC: 28 MG/DL (ref 6–23)
CALCIUM SERPL-MCNC: 7.6 MG/DL (ref 8.6–10.3)
CHLORIDE SERPL-SCNC: 101 MMOL/L (ref 98–107)
CO2 SERPL-SCNC: 21 MMOL/L (ref 21–32)
CREAT SERPL-MCNC: 1.96 MG/DL (ref 0.5–1.3)
EGFRCR SERPLBLD CKD-EPI 2021: 36 ML/MIN/1.73M*2
ERYTHROCYTE [DISTWIDTH] IN BLOOD BY AUTOMATED COUNT: 12.8 % (ref 11.5–14.5)
ERYTHROCYTE [DISTWIDTH] IN BLOOD BY AUTOMATED COUNT: 12.8 % (ref 11.5–14.5)
GLUCOSE SERPL-MCNC: 217 MG/DL (ref 74–99)
HCT VFR BLD AUTO: 23.5 % (ref 41–52)
HCT VFR BLD AUTO: 25.3 % (ref 41–52)
HGB BLD-MCNC: 8.1 G/DL (ref 13.5–17.5)
HGB BLD-MCNC: 8.7 G/DL (ref 13.5–17.5)
MAGNESIUM SERPL-MCNC: 1.95 MG/DL (ref 1.6–2.4)
MCH RBC QN AUTO: 32.8 PG (ref 26–34)
MCH RBC QN AUTO: 33.5 PG (ref 26–34)
MCHC RBC AUTO-ENTMCNC: 34.4 G/DL (ref 32–36)
MCHC RBC AUTO-ENTMCNC: 34.5 G/DL (ref 32–36)
MCV RBC AUTO: 96 FL (ref 80–100)
MCV RBC AUTO: 97 FL (ref 80–100)
MYCOBACTERIUM SPEC CULT: NORMAL
NRBC BLD-RTO: 0 /100 WBCS (ref 0–0)
NRBC BLD-RTO: 0 /100 WBCS (ref 0–0)
PHOSPHATE SERPL-MCNC: 3.9 MG/DL (ref 2.5–4.9)
PLATELET # BLD AUTO: 253 X10*3/UL (ref 150–450)
PLATELET # BLD AUTO: 311 X10*3/UL (ref 150–450)
POTASSIUM SERPL-SCNC: 4.9 MMOL/L (ref 3.5–5.3)
PROT SERPL-MCNC: 5.1 G/DL (ref 6.4–8.2)
RBC # BLD AUTO: 2.42 X10*6/UL (ref 4.5–5.9)
RBC # BLD AUTO: 2.65 X10*6/UL (ref 4.5–5.9)
SODIUM SERPL-SCNC: 130 MMOL/L (ref 136–145)
WBC # BLD AUTO: 10.7 X10*3/UL (ref 4.4–11.3)
WBC # BLD AUTO: 11.7 X10*3/UL (ref 4.4–11.3)

## 2025-05-17 PROCEDURE — 94760 N-INVAS EAR/PLS OXIMETRY 1: CPT

## 2025-05-17 PROCEDURE — 1100000001 HC PRIVATE ROOM DAILY

## 2025-05-17 PROCEDURE — 2500000001 HC RX 250 WO HCPCS SELF ADMINISTERED DRUGS (ALT 637 FOR MEDICARE OP)

## 2025-05-17 PROCEDURE — 84100 ASSAY OF PHOSPHORUS: CPT | Performed by: INTERNAL MEDICINE

## 2025-05-17 PROCEDURE — 2500000004 HC RX 250 GENERAL PHARMACY W/ HCPCS (ALT 636 FOR OP/ED): Performed by: INTERNAL MEDICINE

## 2025-05-17 PROCEDURE — 74018 RADEX ABDOMEN 1 VIEW: CPT | Performed by: STUDENT IN AN ORGANIZED HEALTH CARE EDUCATION/TRAINING PROGRAM

## 2025-05-17 PROCEDURE — 2500000004 HC RX 250 GENERAL PHARMACY W/ HCPCS (ALT 636 FOR OP/ED): Mod: JZ | Performed by: STUDENT IN AN ORGANIZED HEALTH CARE EDUCATION/TRAINING PROGRAM

## 2025-05-17 PROCEDURE — 2500000004 HC RX 250 GENERAL PHARMACY W/ HCPCS (ALT 636 FOR OP/ED): Performed by: SURGERY

## 2025-05-17 PROCEDURE — 2500000004 HC RX 250 GENERAL PHARMACY W/ HCPCS (ALT 636 FOR OP/ED): Mod: JZ | Performed by: SURGERY

## 2025-05-17 PROCEDURE — 85027 COMPLETE CBC AUTOMATED: CPT | Performed by: SURGERY

## 2025-05-17 PROCEDURE — 2500000001 HC RX 250 WO HCPCS SELF ADMINISTERED DRUGS (ALT 637 FOR MEDICARE OP): Performed by: SURGERY

## 2025-05-17 PROCEDURE — 2500000001 HC RX 250 WO HCPCS SELF ADMINISTERED DRUGS (ALT 637 FOR MEDICARE OP): Performed by: INTERNAL MEDICINE

## 2025-05-17 PROCEDURE — 36415 COLL VENOUS BLD VENIPUNCTURE: CPT | Performed by: SURGERY

## 2025-05-17 PROCEDURE — 99233 SBSQ HOSP IP/OBS HIGH 50: CPT

## 2025-05-17 PROCEDURE — 84075 ASSAY ALKALINE PHOSPHATASE: CPT

## 2025-05-17 PROCEDURE — 83735 ASSAY OF MAGNESIUM: CPT | Performed by: INTERNAL MEDICINE

## 2025-05-17 PROCEDURE — 2500000004 HC RX 250 GENERAL PHARMACY W/ HCPCS (ALT 636 FOR OP/ED): Mod: JZ | Performed by: INTERNAL MEDICINE

## 2025-05-17 PROCEDURE — 36415 COLL VENOUS BLD VENIPUNCTURE: CPT | Performed by: INTERNAL MEDICINE

## 2025-05-17 PROCEDURE — 94669 MECHANICAL CHEST WALL OSCILL: CPT

## 2025-05-17 PROCEDURE — 85027 COMPLETE CBC AUTOMATED: CPT | Performed by: INTERNAL MEDICINE

## 2025-05-17 PROCEDURE — 74018 RADEX ABDOMEN 1 VIEW: CPT

## 2025-05-17 PROCEDURE — 2500000002 HC RX 250 W HCPCS SELF ADMINISTERED DRUGS (ALT 637 FOR MEDICARE OP, ALT 636 FOR OP/ED): Performed by: INTERNAL MEDICINE

## 2025-05-17 RX ORDER — ACETAMINOPHEN 325 MG/1
975 TABLET ORAL EVERY 6 HOURS
Status: DISCONTINUED | OUTPATIENT
Start: 2025-05-17 | End: 2025-05-17

## 2025-05-17 RX ORDER — CALCIUM GLUCONATE 20 MG/ML
1 INJECTION, SOLUTION INTRAVENOUS EVERY 4 HOURS
Status: COMPLETED | OUTPATIENT
Start: 2025-05-17 | End: 2025-05-17

## 2025-05-17 RX ORDER — ACETAMINOPHEN 160 MG/5ML
650 SOLUTION ORAL EVERY 6 HOURS
Status: DISCONTINUED | OUTPATIENT
Start: 2025-05-17 | End: 2025-05-20

## 2025-05-17 RX ORDER — KETOROLAC TROMETHAMINE 30 MG/ML
INJECTION, SOLUTION INTRAMUSCULAR; INTRAVENOUS EVERY 6 HOURS PRN
Status: CANCELLED | OUTPATIENT
Start: 2025-05-17 | End: 2025-05-22

## 2025-05-17 RX ORDER — METOPROLOL TARTRATE 1 MG/ML
5 INJECTION, SOLUTION INTRAVENOUS ONCE
Status: COMPLETED | OUTPATIENT
Start: 2025-05-17 | End: 2025-05-17

## 2025-05-17 RX ORDER — GUAIFENESIN 600 MG/1
600 TABLET, EXTENDED RELEASE ORAL 2 TIMES DAILY PRN
Status: DISCONTINUED | OUTPATIENT
Start: 2025-05-17 | End: 2025-05-21

## 2025-05-17 RX ORDER — FLUTICASONE PROPIONATE 50 MCG
2 SPRAY, SUSPENSION (ML) NASAL DAILY
Status: DISCONTINUED | OUTPATIENT
Start: 2025-05-17 | End: 2025-05-21

## 2025-05-17 RX ORDER — DEXTROSE MONOHYDRATE AND SODIUM CHLORIDE 5; .9 G/100ML; G/100ML
100 INJECTION, SOLUTION INTRAVENOUS CONTINUOUS
Status: ACTIVE | OUTPATIENT
Start: 2025-05-17 | End: 2025-05-18

## 2025-05-17 RX ORDER — HEPARIN SODIUM 5000 [USP'U]/ML
5000 INJECTION, SOLUTION INTRAVENOUS; SUBCUTANEOUS EVERY 8 HOURS
Status: DISCONTINUED | OUTPATIENT
Start: 2025-05-17 | End: 2025-05-21 | Stop reason: HOSPADM

## 2025-05-17 RX ADMIN — PIPERACILLIN SODIUM AND TAZOBACTAM SODIUM 4.5 G: 4; .5 INJECTION, SOLUTION INTRAVENOUS at 20:51

## 2025-05-17 RX ADMIN — DEXTROSE AND SODIUM CHLORIDE 150 ML/HR: 5; 900 INJECTION, SOLUTION INTRAVENOUS at 20:50

## 2025-05-17 RX ADMIN — METOPROLOL SUCCINATE 25 MG: 25 TABLET, EXTENDED RELEASE ORAL at 20:53

## 2025-05-17 RX ADMIN — CALCIUM GLUCONATE 1 G: 20 INJECTION, SOLUTION INTRAVENOUS at 12:43

## 2025-05-17 RX ADMIN — GABAPENTIN 100 MG: 250 SOLUTION ORAL at 21:36

## 2025-05-17 RX ADMIN — METOPROLOL TARTRATE 5 MG: 5 INJECTION INTRAVENOUS at 10:20

## 2025-05-17 RX ADMIN — KETOROLAC TROMETHAMINE 15 MG: 15 INJECTION, SOLUTION INTRAMUSCULAR; INTRAVENOUS at 04:38

## 2025-05-17 RX ADMIN — FLUTICASONE PROPIONATE 2 SPRAY: 50 SPRAY, METERED NASAL at 12:45

## 2025-05-17 RX ADMIN — PIPERACILLIN SODIUM AND TAZOBACTAM SODIUM 4.5 G: 4; .5 INJECTION, SOLUTION INTRAVENOUS at 02:06

## 2025-05-17 RX ADMIN — ONDANSETRON 4 MG: 2 INJECTION, SOLUTION INTRAMUSCULAR; INTRAVENOUS at 08:17

## 2025-05-17 RX ADMIN — METHOCARBAMOL 500 MG: 1000 INJECTION, SOLUTION INTRAMUSCULAR; INTRAVENOUS at 10:21

## 2025-05-17 RX ADMIN — PIPERACILLIN SODIUM AND TAZOBACTAM SODIUM 4.5 G: 4; .5 INJECTION, SOLUTION INTRAVENOUS at 15:30

## 2025-05-17 RX ADMIN — HEPARIN SODIUM 5000 UNITS: 5000 INJECTION, SOLUTION INTRAVENOUS; SUBCUTANEOUS at 20:52

## 2025-05-17 RX ADMIN — ACETAMINOPHEN 650 MG: 650 SOLUTION ORAL at 16:07

## 2025-05-17 RX ADMIN — PIPERACILLIN SODIUM AND TAZOBACTAM SODIUM 4.5 G: 4; .5 INJECTION, SOLUTION INTRAVENOUS at 08:17

## 2025-05-17 RX ADMIN — BENZOCAINE AND MENTHOL 1 LOZENGE: 15; 3.6 LOZENGE ORAL at 08:37

## 2025-05-17 RX ADMIN — SALINE NASAL SPRAY 1 SPRAY: 1.5 SOLUTION NASAL at 16:05

## 2025-05-17 RX ADMIN — DEXTROSE MONOHYDRATE, SODIUM CHLORIDE, AND POTASSIUM CHLORIDE 125 ML/HR: 50; 1.49; 4.5 INJECTION, SOLUTION INTRAVENOUS at 04:38

## 2025-05-17 RX ADMIN — ACETAMINOPHEN 650 MG: 650 SOLUTION ORAL at 21:00

## 2025-05-17 RX ADMIN — DEXTROSE AND SODIUM CHLORIDE 150 ML/HR: 5; 900 INJECTION, SOLUTION INTRAVENOUS at 12:43

## 2025-05-17 RX ADMIN — CALCIUM GLUCONATE 1 G: 20 INJECTION, SOLUTION INTRAVENOUS at 16:07

## 2025-05-17 RX ADMIN — ACETAMINOPHEN 1000 MG: 1000 INJECTION, SOLUTION INTRAVENOUS at 04:44

## 2025-05-17 RX ADMIN — BENZOCAINE AND MENTHOL 1 LOZENGE: 15; 3.6 LOZENGE ORAL at 18:51

## 2025-05-17 RX ADMIN — HEPARIN SODIUM 5000 UNITS: 5000 INJECTION, SOLUTION INTRAVENOUS; SUBCUTANEOUS at 12:45

## 2025-05-17 RX ADMIN — ACETAMINOPHEN 1000 MG: 1000 INJECTION, SOLUTION INTRAVENOUS at 10:20

## 2025-05-17 RX ADMIN — GABAPENTIN 100 MG: 250 SOLUTION ORAL at 08:17

## 2025-05-17 ASSESSMENT — COGNITIVE AND FUNCTIONAL STATUS - GENERAL
TURNING FROM BACK TO SIDE WHILE IN FLAT BAD: A LITTLE
TOILETING: A LITTLE
MOVING TO AND FROM BED TO CHAIR: A LITTLE
WALKING IN HOSPITAL ROOM: A LITTLE
DRESSING REGULAR LOWER BODY CLOTHING: A LITTLE
DRESSING REGULAR LOWER BODY CLOTHING: A LITTLE
MOBILITY SCORE: 19
CLIMB 3 TO 5 STEPS WITH RAILING: A LITTLE
STANDING UP FROM CHAIR USING ARMS: A LITTLE
MOVING TO AND FROM BED TO CHAIR: A LITTLE
DRESSING REGULAR UPPER BODY CLOTHING: A LITTLE
DAILY ACTIVITIY SCORE: 21
WALKING IN HOSPITAL ROOM: A LITTLE
DRESSING REGULAR UPPER BODY CLOTHING: A LITTLE
STANDING UP FROM CHAIR USING ARMS: A LITTLE
MOBILITY SCORE: 19
CLIMB 3 TO 5 STEPS WITH RAILING: A LITTLE
DAILY ACTIVITIY SCORE: 21
TOILETING: A LITTLE
TURNING FROM BACK TO SIDE WHILE IN FLAT BAD: A LITTLE

## 2025-05-17 ASSESSMENT — PAIN SCALES - GENERAL
PAINLEVEL_OUTOF10: 0 - NO PAIN
PAINLEVEL_OUTOF10: 0 - NO PAIN

## 2025-05-17 NOTE — CARE PLAN
The patient's goals for the shift include  rest    The clinical goals for the shift include patient will have no pain and remain comfortable

## 2025-05-17 NOTE — CARE PLAN
The patient's goals for the shift include      The clinical goals for the shift include Patient will remain safe and rest comfortably this shift

## 2025-05-17 NOTE — PROGRESS NOTES
"Diego Morales is a 70 y.o. male on day 5 of admission presenting with Intestinal perforation (Multi).    Subjective     Diego had a difficult night of coughing and pain as well as increased sputum production making him spit up a lot  Spouse reports he had a nosebleed yesterday and is having difficulty swallowing   He reports an episode of \"desks and spinning when I closed my eyes\" after receiving breakthrough pain medication at 4am from pain exacerbated by coughing  Feels defeated by current status of health       Objective     Physical Exam  Constitutional:       Appearance: He is ill-appearing.   HENT:      Head: Normocephalic and atraumatic.      Nose:      Comments: NG tube in place     Mouth/Throat:      Mouth: Mucous membranes are moist.   Eyes:      Conjunctiva/sclera: Conjunctivae normal.   Cardiovascular:      Rate and Rhythm: Normal rate and regular rhythm.      Heart sounds: Murmur heard.   Pulmonary:      Effort: Pulmonary effort is normal.      Breath sounds: Rhonchi present. No wheezing.      Comments: Coughing up clear sputum  Abdominal:      Tenderness: There is abdominal tenderness.      Comments: KATY drain  Colostomy bag in place   Musculoskeletal:      Right lower leg: No edema.      Left lower leg: No edema.   Skin:     General: Skin is warm.   Neurological:      Mental Status: He is oriented to person, place, and time.   Psychiatric:         Mood and Affect: Mood normal.         Behavior: Behavior normal.         Thought Content: Thought content normal.         Judgment: Judgment normal.         Last Recorded Vitals  Blood pressure 136/86, pulse 104, temperature 36.5 °C (97.7 °F), temperature source Temporal, resp. rate 20, height 1.803 m (5' 10.98\"), weight 67.7 kg (149 lb 4.8 oz), SpO2 98%.  Intake/Output last 3 Shifts:  I/O last 3 completed shifts:  In: 2144 (31.7 mL/kg) [P.O.:510; I.V.:54 (0.8 mL/kg); NG/GT:380; IV Piggyback:1200]  Out: 2375 (35.1 mL/kg) [Urine:550 (0.2 mL/kg/hr); " Emesis/NG output:1700; Drains:125]  Weight: 67.7 kg     Relevant Results    Results for orders placed or performed during the hospital encounter of 05/12/25 (from the past 24 hours)   CBC   Result Value Ref Range    WBC 11.7 (H) 4.4 - 11.3 x10*3/uL    nRBC 0.0 0.0 - 0.0 /100 WBCs    RBC 2.65 (L) 4.50 - 5.90 x10*6/uL    Hemoglobin 8.7 (L) 13.5 - 17.5 g/dL    Hematocrit 25.3 (L) 41.0 - 52.0 %    MCV 96 80 - 100 fL    MCH 32.8 26.0 - 34.0 pg    MCHC 34.4 32.0 - 36.0 g/dL    RDW 12.8 11.5 - 14.5 %    Platelets 311 150 - 450 x10*3/uL   Renal Function Panel   Result Value Ref Range    Glucose 217 (H) 74 - 99 mg/dL    Sodium 130 (L) 136 - 145 mmol/L    Potassium 4.9 3.5 - 5.3 mmol/L    Chloride 101 98 - 107 mmol/L    Bicarbonate 21 21 - 32 mmol/L    Anion Gap 13 10 - 20 mmol/L    Urea Nitrogen 28 (H) 6 - 23 mg/dL    Creatinine 1.96 (H) 0.50 - 1.30 mg/dL    eGFR 36 (L) >60 mL/min/1.73m*2    Calcium 7.6 (L) 8.6 - 10.3 mg/dL    Phosphorus 3.9 2.5 - 4.9 mg/dL    Albumin 2.8 (L) 3.4 - 5.0 g/dL   Magnesium   Result Value Ref Range    Magnesium 1.95 1.60 - 2.40 mg/dL     XR abdomen 1 view  Result Date: 5/17/2025  Interpreted By:  Marissa Weeks, STUDY: XR ABDOMEN 1 VIEW;  5/17/2025 6:13 am   INDICATION: Signs/Symptoms:Abdominal pain, confirm NG tube placement.   COMPARISON: Radiographs 05/15/2025.   ACCESSION NUMBER(S): [id number]   ORDERING CLINICIAN: LATASHA RILEY   FINDINGS: Tip of NG tube in the proximal stomach and sidehole below the diaphragm. Left lower quadrant ostomy and surgical drain. A few loops of dilated small bowel again noted measuring up to 5.1 cm, previously 4.5 cm in the midabdomen. No obvious hemoperitoneum visualized.       Satisfactory NG tube placement. Worsening ileus versus small-bowel obstruction.   MACRO None   Signed by: Marissa Weeks 5/17/2025 7:04 AM Dictation workstation:   [id number]    XR abdomen 1 view  Result Date: 5/15/2025  Interpreted By:  Quin Reich, STUDY: XR ABDOMEN 1 VIEW;   5/15/2025 8:42 pm   INDICATION: Signs/Symptoms:Confim NGT placement.   COMPARISON: None.   ACCESSION NUMBER(S): [id number]   ORDERING CLINICIAN: LATASHA RILEY   FINDINGS: Enteric tube terminates in the left upper quadrant with side hole at or just below the GE junction. Consider slight advancement. Multiple overlying leads are present.   The mid abdomen and pelvis are excluded from the field of view thereby limiting evaluation. There are multiple gaseous distended dilated loops of bowel centrally in the visualized abdomen. Cutaneous staples noted. Findings may relate to postoperative ileus with developing obstruction not excluded.   Bibasilar atelectasis.   Osseous structures demonstrate no acute bony changes.       1. Enteric tube terminates in the left upper quadrant with side hole at or just below the GE junction. Consider slight advancement. 2. There are multiple gaseous distended dilated loops of bowel centrally in the visualized upper abdomen. Cutaneous staples noted. Findings may relate to postoperative ileus with developing obstruction not excluded. Attention on continued radiographic follow-up is advised.   MACRO: None   Signed by: Quin Reich 5/15/2025 8:57 PM Dictation workstation:   [id number]    Scheduled medications  Scheduled Medications[1]  Continuous medications  Continuous Medications[2]  PRN medications  PRN Medications[3]                 Assessment & Plan  Intestinal perforation (Multi)      Diverticulitis of large intestine with perforation without abscess or bleeding    Diego Morales is a 70 y.o. male with a past medical history of cardiac arrest during cardiac catheterization in 2016 secondary to ventricular arrhythmia/V-fib requiring cardioversion (normal coronary arteries), heart murmur, and angina pectoris who was admitted to the hospital for suspected small intestinal perforation and acute diverticulitis.       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 There are regions of small bowel with wall thickening and fluid including the indeterminate region with adjacent non dependent free air which is indeterminate for location  Repeat CT on 5/13 shows no changes from above CT  of perforation. There is also findings suggestive of acute sigmoidal diverticulitis.  - now s/p sigmoid colectomy on 5/15 monitoring with NG and KATY and colostomy output  - surgical pathology and cultures pending no NGTD no organisms seen   - Xray confirmed NG positioning however will need to monitor for postoperative ileus an obstruction  - is experiencing audible bowel sounds as of this AM 5/17  - Continue Zosyn  - IVF D5/NS with KCL while NPO and NG tube continues to drain, draining less on 5/17   - ID following recommendations to continue zosyn  - discontinued toradol due to ESTELITA and hallucination effect on 5/17   - Zofran for nausea PRN  - potassium phosphate tablets given to keep K> 4 and phosphate >2 and Mg >2   - discontinued lovenox for concerns of nosebleed, will continue aspirin     Bilateral lung reticulonodular opacity  - Right greater than left based on CT scan which is similar to prior imaging from 6/23/2016  - Patient is asymptomatic continues to be remain on RA  - T spot negative TB excluded  - ID following  - IS being used, throat lozenges used   - Nasal saline spray given   - Chest PT ordered  - mucinex ordered     ESTELITA 2/2 to decreased urine output vs toradol use  - increase in Cr to 1.96 from 1.20 and GFR of 36 from 65  - is on IVF 125ml/hr   - is NPO   -  has been receiving toradol every 6 hours since 5/15   - discontinuing toradol 5/17  - bladder scan ordered for evaluation of urine retention as urine output 5/16 was 350ml       CAD with hx of VF with LHC   - Cardiology following for cardiac clearance  - continue home aspirin  - continuing metoprolol  25 mg   - cleared for surgery at low cardiac risk  - on telemetry      HTN  - continue metoprolol 25mg   - IV metoprolol 5mg given for episodes of tachycardia 5/17 AM   - may be secondary to increase in pain and stress  - should tachycardia resolve will increase metoprolol to 50mg      DVT prophylaxis: SCD, continuing aspirin, discontinued Lovenox for concerns of increased bleeding  Dispo: monitor clinically, continue pain control        Patient seen and discussed with Dr. Isidro Flores MD  Family Medicine, PGY3         [1] acetaminophen, 1,000 mg, intravenous, q6h  aspirin, 81 mg, oral, Daily  [Held by provider] enoxaparin, 40 mg, subcutaneous, Daily  gabapentin, 100 mg, nasogastric tube, q12h LOU  ketorolac, 15 mg, intravenous, q6h  methocarbamol, 500 mg, intravenous, q8h  metoprolol succinate XL, 25 mg, oral, Nightly  metoprolol, 5 mg, intravenous, Once  piperacillin-tazobactam, 4.5 g, intravenous, q6h  [2] potassium [id number]%NaCl, 125 mL/hr, Last Rate: 125 mL/hr (05/17/25 2796)  [3] PRN medications: benzocaine-menthol, HYDROmorphone, HYDROmorphone, melatonin, naloxone, [DISCONTINUED] ondansetron **OR** ondansetron

## 2025-05-17 NOTE — PROGRESS NOTES
Diego Morales is a 70 y.o. male on day 5 of admission presenting with Intestinal perforation (Multi).    Subjective   Interval History: no fever, no abdominal pain,, gaging with postnasal drip        Review of Systems    Objective   Range of Vitals (last 24 hours)  Heart Rate:  []   Temp:  [36.4 °C (97.5 °F)-36.9 °C (98.4 °F)]   Resp:  [16-20]   BP: (115-151)/(75-98)   SpO2:  [94 %-98 %]   Daily Weight  05/12/25 : 67.7 kg (149 lb 4.8 oz)    Body mass index is 20.83 kg/m².    Physical Exam  Constitutional:       Appearance: Normal appearance.   HENT:      Head: Normocephalic and atraumatic.      Mouth/Throat:      Mouth: Mucous membranes are moist.      Pharynx: Oropharynx is clear.   Eyes:      Pupils: Pupils are equal, round, and reactive to light.   Cardiovascular:      Rate and Rhythm: Normal rate and regular rhythm.      Heart sounds: Normal heart sounds.   Pulmonary:      Effort: Pulmonary effort is normal.      Breath sounds: Normal breath sounds.   Abdominal:      General: There is distension.      Palpations: Abdomen is soft.      Tenderness: There is abdominal tenderness.      Comments: Ostomy, dressing   Musculoskeletal:      Cervical back: Normal range of motion.   Neurological:      Mental Status: He is alert.         Antibiotics  piperacillin-tazobactam - 4.5 gram/100 mL    Relevant Results  Labs  Results from last 72 hours   Lab Units 05/17/25  0635 05/16/25  0748 05/15/25  0732   WBC AUTO x10*3/uL 11.7* 7.8 7.6   HEMOGLOBIN g/dL 8.7* 12.2* 14.0   HEMATOCRIT % 25.3* 35.8* 39.6*   PLATELETS AUTO x10*3/uL 311 234 185     Results from last 72 hours   Lab Units 05/17/25  0635 05/16/25  0748 05/15/25  0732   SODIUM mmol/L 130* 133* 135*   POTASSIUM mmol/L 4.9 4.5 3.9   CHLORIDE mmol/L 101 101 104   CO2 mmol/L 21 25 24   BUN mg/dL 28* 18 12   CREATININE mg/dL 1.96* 1.20 1.02   GLUCOSE mg/dL 217* 148* 113*   CALCIUM mg/dL 7.6* 7.5* 8.0*   ANION GAP mmol/L 13 12 11   EGFR mL/min/1.73m*2 36* 65 79    PHOSPHORUS mg/dL 3.9 3.0 1.9*     Results from last 72 hours   Lab Units 05/17/25  0635 05/16/25  0748 05/15/25  0732   ALBUMIN g/dL 2.8* 2.8* 3.1*     Estimated Creatinine Clearance: 33.1 mL/min (A) (by C-G formula based on SCr of 1.96 mg/dL (H)).  CRP   Date Value Ref Range Status   06/18/2022 0.17 mg/dL Final     Comment:     REF VALUE  < 1.00       Microbiology  Reviewed  Imaging  Reviewed        Assessment/Plan   Abnormal CT of the chest, seems to be stable compared to old CT, likely old granulomatous disease, most likely fungal or atypical mycobacteria, MTB is unlikely  Sigmoid diverticulitis, the repeat CT was reviewed, sp surgery     Recommendations :  Continue Zosyn  Nasal spray saline / steroid  Discussed with the medical team     I spent minutes in the professional and overall care of this patient.          Umm Vega MD

## 2025-05-18 ENCOUNTER — APPOINTMENT (OUTPATIENT)
Dept: RADIOLOGY | Facility: HOSPITAL | Age: 70
DRG: 329 | End: 2025-05-18
Payer: MEDICARE

## 2025-05-18 VITALS
HEART RATE: 85 BPM | HEIGHT: 71 IN | BODY MASS INDEX: 20.9 KG/M2 | TEMPERATURE: 98.6 F | RESPIRATION RATE: 18 BRPM | WEIGHT: 149.3 LBS | OXYGEN SATURATION: 95 % | SYSTOLIC BLOOD PRESSURE: 150 MMHG | DIASTOLIC BLOOD PRESSURE: 80 MMHG

## 2025-05-18 LAB
ABO GROUP (TYPE) IN BLOOD: NORMAL
ABO GROUP (TYPE) IN BLOOD: NORMAL
ALBUMIN SERPL BCP-MCNC: 2.4 G/DL (ref 3.4–5)
ALP SERPL-CCNC: 104 U/L (ref 33–136)
ALT SERPL W P-5'-P-CCNC: 14 U/L (ref 10–52)
ANION GAP SERPL CALC-SCNC: 12 MMOL/L (ref 10–20)
ANTIBODY SCREEN: NORMAL
AST SERPL W P-5'-P-CCNC: 16 U/L (ref 9–39)
BACTERIA FLD CULT: NORMAL
BILIRUB DIRECT SERPL-MCNC: 0.3 MG/DL (ref 0–0.3)
BILIRUB SERPL-MCNC: 0.8 MG/DL (ref 0–1.2)
BLOOD EXPIRATION DATE: NORMAL
BLOOD EXPIRATION DATE: NORMAL
BUN SERPL-MCNC: 31 MG/DL (ref 6–23)
CALCIUM SERPL-MCNC: 7.2 MG/DL (ref 8.6–10.3)
CHLORIDE SERPL-SCNC: 105 MMOL/L (ref 98–107)
CO2 SERPL-SCNC: 24 MMOL/L (ref 21–32)
CREAT SERPL-MCNC: 1.45 MG/DL (ref 0.5–1.3)
DISPENSE STATUS: NORMAL
DISPENSE STATUS: NORMAL
EGFRCR SERPLBLD CKD-EPI 2021: 52 ML/MIN/1.73M*2
ERYTHROCYTE [DISTWIDTH] IN BLOOD BY AUTOMATED COUNT: 12.9 % (ref 11.5–14.5)
ERYTHROCYTE [DISTWIDTH] IN BLOOD BY AUTOMATED COUNT: 13.2 % (ref 11.5–14.5)
ERYTHROCYTE [DISTWIDTH] IN BLOOD BY AUTOMATED COUNT: 14.4 % (ref 11.5–14.5)
ERYTHROCYTE [DISTWIDTH] IN BLOOD BY AUTOMATED COUNT: 14.9 % (ref 11.5–14.5)
FUNGUS SPEC CULT: NORMAL
FUNGUS SPEC FUNGUS STN: NORMAL
GLUCOSE SERPL-MCNC: 131 MG/DL (ref 74–99)
GRAM STN SPEC: NORMAL
GRAM STN SPEC: NORMAL
HCT VFR BLD AUTO: 18.2 % (ref 41–52)
HCT VFR BLD AUTO: 18.9 % (ref 41–52)
HCT VFR BLD AUTO: 21.4 % (ref 41–52)
HCT VFR BLD AUTO: 26.5 % (ref 41–52)
HGB BLD-MCNC: 6 G/DL (ref 13.5–17.5)
HGB BLD-MCNC: 6.2 G/DL (ref 13.5–17.5)
HGB BLD-MCNC: 7.4 G/DL (ref 13.5–17.5)
HGB BLD-MCNC: 9 G/DL (ref 13.5–17.5)
MAGNESIUM SERPL-MCNC: 2.01 MG/DL (ref 1.6–2.4)
MCH RBC QN AUTO: 31.4 PG (ref 26–34)
MCH RBC QN AUTO: 32 PG (ref 26–34)
MCH RBC QN AUTO: 32.4 PG (ref 26–34)
MCH RBC QN AUTO: 32.6 PG (ref 26–34)
MCHC RBC AUTO-ENTMCNC: 32.8 G/DL (ref 32–36)
MCHC RBC AUTO-ENTMCNC: 33 G/DL (ref 32–36)
MCHC RBC AUTO-ENTMCNC: 34 G/DL (ref 32–36)
MCHC RBC AUTO-ENTMCNC: 34.6 G/DL (ref 32–36)
MCV RBC AUTO: 92 FL (ref 80–100)
MCV RBC AUTO: 94 FL (ref 80–100)
MCV RBC AUTO: 97 FL (ref 80–100)
MCV RBC AUTO: 98 FL (ref 80–100)
NRBC BLD-RTO: 0 /100 WBCS (ref 0–0)
NRBC BLD-RTO: 0.3 /100 WBCS (ref 0–0)
PLATELET # BLD AUTO: 203 X10*3/UL (ref 150–450)
PLATELET # BLD AUTO: 210 X10*3/UL (ref 150–450)
PLATELET # BLD AUTO: 217 X10*3/UL (ref 150–450)
PLATELET # BLD AUTO: 219 X10*3/UL (ref 150–450)
POTASSIUM SERPL-SCNC: 3.8 MMOL/L (ref 3.5–5.3)
PRODUCT BLOOD TYPE: 600
PRODUCT BLOOD TYPE: 6200
PRODUCT CODE: NORMAL
PRODUCT CODE: NORMAL
PROT SERPL-MCNC: 4.6 G/DL (ref 6.4–8.2)
RBC # BLD AUTO: 1.85 X10*6/UL (ref 4.5–5.9)
RBC # BLD AUTO: 1.94 X10*6/UL (ref 4.5–5.9)
RBC # BLD AUTO: 2.27 X10*6/UL (ref 4.5–5.9)
RBC # BLD AUTO: 2.87 X10*6/UL (ref 4.5–5.9)
RH FACTOR (ANTIGEN D): NORMAL
RH FACTOR (ANTIGEN D): NORMAL
SODIUM SERPL-SCNC: 137 MMOL/L (ref 136–145)
UNIT ABO: NORMAL
UNIT ABO: NORMAL
UNIT NUMBER: NORMAL
UNIT NUMBER: NORMAL
UNIT RH: NORMAL
UNIT RH: NORMAL
UNIT VOLUME: 290
UNIT VOLUME: 350
WBC # BLD AUTO: 8.2 X10*3/UL (ref 4.4–11.3)
WBC # BLD AUTO: 9.1 X10*3/UL (ref 4.4–11.3)
WBC # BLD AUTO: 9.7 X10*3/UL (ref 4.4–11.3)
WBC # BLD AUTO: 9.9 X10*3/UL (ref 4.4–11.3)
XM INTEP: NORMAL
XM INTEP: NORMAL

## 2025-05-18 PROCEDURE — P9016 RBC LEUKOCYTES REDUCED: HCPCS

## 2025-05-18 PROCEDURE — 2500000001 HC RX 250 WO HCPCS SELF ADMINISTERED DRUGS (ALT 637 FOR MEDICARE OP): Performed by: INTERNAL MEDICINE

## 2025-05-18 PROCEDURE — 2550000001 HC RX 255 CONTRASTS: Performed by: STUDENT IN AN ORGANIZED HEALTH CARE EDUCATION/TRAINING PROGRAM

## 2025-05-18 PROCEDURE — 36415 COLL VENOUS BLD VENIPUNCTURE: CPT | Performed by: INTERNAL MEDICINE

## 2025-05-18 PROCEDURE — 85027 COMPLETE CBC AUTOMATED: CPT | Performed by: INTERNAL MEDICINE

## 2025-05-18 PROCEDURE — 2500000004 HC RX 250 GENERAL PHARMACY W/ HCPCS (ALT 636 FOR OP/ED): Mod: JZ | Performed by: SURGERY

## 2025-05-18 PROCEDURE — 85027 COMPLETE CBC AUTOMATED: CPT | Performed by: STUDENT IN AN ORGANIZED HEALTH CARE EDUCATION/TRAINING PROGRAM

## 2025-05-18 PROCEDURE — 74177 CT ABD & PELVIS W/CONTRAST: CPT | Performed by: STUDENT IN AN ORGANIZED HEALTH CARE EDUCATION/TRAINING PROGRAM

## 2025-05-18 PROCEDURE — 74177 CT ABD & PELVIS W/CONTRAST: CPT

## 2025-05-18 PROCEDURE — 2500000001 HC RX 250 WO HCPCS SELF ADMINISTERED DRUGS (ALT 637 FOR MEDICARE OP): Performed by: SURGERY

## 2025-05-18 PROCEDURE — 2500000001 HC RX 250 WO HCPCS SELF ADMINISTERED DRUGS (ALT 637 FOR MEDICARE OP): Performed by: STUDENT IN AN ORGANIZED HEALTH CARE EDUCATION/TRAINING PROGRAM

## 2025-05-18 PROCEDURE — 99233 SBSQ HOSP IP/OBS HIGH 50: CPT

## 2025-05-18 PROCEDURE — 85027 COMPLETE CBC AUTOMATED: CPT

## 2025-05-18 PROCEDURE — 86923 COMPATIBILITY TEST ELECTRIC: CPT

## 2025-05-18 PROCEDURE — 80076 HEPATIC FUNCTION PANEL: CPT | Performed by: STUDENT IN AN ORGANIZED HEALTH CARE EDUCATION/TRAINING PROGRAM

## 2025-05-18 PROCEDURE — 36415 COLL VENOUS BLD VENIPUNCTURE: CPT | Performed by: STUDENT IN AN ORGANIZED HEALTH CARE EDUCATION/TRAINING PROGRAM

## 2025-05-18 PROCEDURE — 2500000004 HC RX 250 GENERAL PHARMACY W/ HCPCS (ALT 636 FOR OP/ED): Mod: JZ | Performed by: INTERNAL MEDICINE

## 2025-05-18 PROCEDURE — 83735 ASSAY OF MAGNESIUM: CPT | Performed by: INTERNAL MEDICINE

## 2025-05-18 PROCEDURE — 86901 BLOOD TYPING SEROLOGIC RH(D): CPT | Performed by: STUDENT IN AN ORGANIZED HEALTH CARE EDUCATION/TRAINING PROGRAM

## 2025-05-18 PROCEDURE — 36430 TRANSFUSION BLD/BLD COMPNT: CPT

## 2025-05-18 PROCEDURE — 1100000001 HC PRIVATE ROOM DAILY

## 2025-05-18 PROCEDURE — 82248 BILIRUBIN DIRECT: CPT | Performed by: SURGERY

## 2025-05-18 RX ORDER — DEXAMETHASONE SODIUM PHOSPHATE 1 MG/ML
2 SOLUTION/ DROPS OPHTHALMIC EVERY 6 HOURS SCHEDULED
Status: DISCONTINUED | OUTPATIENT
Start: 2025-05-18 | End: 2025-05-21

## 2025-05-18 RX ORDER — DEXTROSE MONOHYDRATE AND SODIUM CHLORIDE 5; .9 G/100ML; G/100ML
100 INJECTION, SOLUTION INTRAVENOUS CONTINUOUS
Status: DISCONTINUED | OUTPATIENT
Start: 2025-05-18 | End: 2025-05-19

## 2025-05-18 RX ADMIN — ACETAMINOPHEN 650 MG: 650 SOLUTION ORAL at 16:51

## 2025-05-18 RX ADMIN — FLUTICASONE PROPIONATE 2 SPRAY: 50 SPRAY, METERED NASAL at 09:32

## 2025-05-18 RX ADMIN — ACETAMINOPHEN 650 MG: 650 SOLUTION ORAL at 03:29

## 2025-05-18 RX ADMIN — PIPERACILLIN SODIUM AND TAZOBACTAM SODIUM 4.5 G: 4; .5 INJECTION, SOLUTION INTRAVENOUS at 08:30

## 2025-05-18 RX ADMIN — IOHEXOL 75 ML: 350 INJECTION, SOLUTION INTRAVENOUS at 08:55

## 2025-05-18 RX ADMIN — METHOCARBAMOL 500 MG: 1000 INJECTION, SOLUTION INTRAMUSCULAR; INTRAVENOUS at 03:29

## 2025-05-18 RX ADMIN — ACETAMINOPHEN 650 MG: 650 SOLUTION ORAL at 09:48

## 2025-05-18 RX ADMIN — METOPROLOL SUCCINATE 25 MG: 25 TABLET, EXTENDED RELEASE ORAL at 20:23

## 2025-05-18 RX ADMIN — SALINE NASAL SPRAY 1 SPRAY: 1.5 SOLUTION NASAL at 11:25

## 2025-05-18 RX ADMIN — DEXAMETHASONE SODIUM PHOSPHATE 2 DROP: 1 SOLUTION/ DROPS OPHTHALMIC at 13:15

## 2025-05-18 RX ADMIN — PIPERACILLIN SODIUM AND TAZOBACTAM SODIUM 4.5 G: 4; .5 INJECTION, SOLUTION INTRAVENOUS at 20:23

## 2025-05-18 RX ADMIN — DEXTROSE AND SODIUM CHLORIDE 150 ML/HR: 5; 900 INJECTION, SOLUTION INTRAVENOUS at 03:34

## 2025-05-18 RX ADMIN — SALINE NASAL SPRAY 1 SPRAY: 1.5 SOLUTION NASAL at 16:23

## 2025-05-18 RX ADMIN — PIPERACILLIN SODIUM AND TAZOBACTAM SODIUM 4.5 G: 4; .5 INJECTION, SOLUTION INTRAVENOUS at 03:29

## 2025-05-18 RX ADMIN — METHOCARBAMOL 500 MG: 1000 INJECTION, SOLUTION INTRAMUSCULAR; INTRAVENOUS at 09:48

## 2025-05-18 RX ADMIN — METHOCARBAMOL 500 MG: 1000 INJECTION, SOLUTION INTRAMUSCULAR; INTRAVENOUS at 18:08

## 2025-05-18 RX ADMIN — GABAPENTIN 100 MG: 250 SOLUTION ORAL at 20:23

## 2025-05-18 RX ADMIN — PIPERACILLIN SODIUM AND TAZOBACTAM SODIUM 4.5 G: 4; .5 INJECTION, SOLUTION INTRAVENOUS at 15:34

## 2025-05-18 RX ADMIN — GABAPENTIN 100 MG: 250 SOLUTION ORAL at 09:32

## 2025-05-18 RX ADMIN — ACETAMINOPHEN 650 MG: 650 SOLUTION ORAL at 21:02

## 2025-05-18 RX ADMIN — HEPARIN SODIUM 5000 UNITS: 5000 INJECTION, SOLUTION INTRAVENOUS; SUBCUTANEOUS at 03:29

## 2025-05-18 ASSESSMENT — COGNITIVE AND FUNCTIONAL STATUS - GENERAL
WALKING IN HOSPITAL ROOM: A LITTLE
DAILY ACTIVITIY SCORE: 21
STANDING UP FROM CHAIR USING ARMS: A LITTLE
WALKING IN HOSPITAL ROOM: A LITTLE
CLIMB 3 TO 5 STEPS WITH RAILING: A LITTLE
DAILY ACTIVITIY SCORE: 21
MOVING TO AND FROM BED TO CHAIR: A LITTLE
TOILETING: A LITTLE
DRESSING REGULAR UPPER BODY CLOTHING: A LITTLE
CLIMB 3 TO 5 STEPS WITH RAILING: A LITTLE
DRESSING REGULAR LOWER BODY CLOTHING: A LITTLE
TURNING FROM BACK TO SIDE WHILE IN FLAT BAD: A LITTLE
MOBILITY SCORE: 19
DRESSING REGULAR LOWER BODY CLOTHING: A LITTLE
DRESSING REGULAR UPPER BODY CLOTHING: A LITTLE
MOVING TO AND FROM BED TO CHAIR: A LITTLE
TURNING FROM BACK TO SIDE WHILE IN FLAT BAD: A LITTLE
STANDING UP FROM CHAIR USING ARMS: A LITTLE
TOILETING: A LITTLE
MOBILITY SCORE: 19

## 2025-05-18 ASSESSMENT — PAIN SCALES - GENERAL
PAINLEVEL_OUTOF10: 0 - NO PAIN
PAINLEVEL_OUTOF10: 0 - NO PAIN

## 2025-05-18 NOTE — PROGRESS NOTES
"Diego Morales is a 70 y.o. male on day 6 of admission presenting with Intestinal perforation (Multi).    Subjective   Alert, reports feeling well rested  Pain is well controlled and localized just to his incision sites now  Noting L eye irritation  Nasal saline helped with his nasal irritation from the NG tube  Reports peeing large volume and in frequency         Objective     Physical Exam  Constitutional:       Appearance: He is ill-appearing.   HENT:      Nose:      Comments: NG tube in place     Mouth/Throat:      Mouth: Mucous membranes are moist.   Eyes:      Conjunctiva/sclera: Conjunctivae normal.   Cardiovascular:      Rate and Rhythm: Normal rate and regular rhythm.      Heart sounds: Murmur heard.   Pulmonary:      Effort: Pulmonary effort is normal.      Breath sounds: No wheezing or rhonchi.   Abdominal:      General: Bowel sounds are normal. There is no distension.      Palpations: Abdomen is soft.      Tenderness: There is abdominal tenderness.      Comments: KATY drain with serosanguinous output  Colostomy with minimal yellow tinged output   Musculoskeletal:      Right lower leg: No edema.      Left lower leg: No edema.   Skin:     General: Skin is warm.      Capillary Refill: Capillary refill takes less than 2 seconds.      Coloration: Skin is jaundiced.   Neurological:      Mental Status: He is alert and oriented to person, place, and time.   Psychiatric:         Mood and Affect: Mood normal.         Behavior: Behavior normal.         Thought Content: Thought content normal.         Judgment: Judgment normal.         Last Recorded Vitals  Blood pressure 146/72, pulse 89, temperature 36.7 °C (98.1 °F), temperature source Temporal, resp. rate 19, height 1.803 m (5' 10.98\"), weight 67.7 kg (149 lb 4.8 oz), SpO2 94%.  Intake/Output last 3 Shifts:  I/O last 3 completed shifts:  In: 3624.5 (53.5 mL/kg) [P.O.:30; I.V.:2334.5 (34.5 mL/kg); NG/GT:260; IV Piggyback:1000]  Out: 1765 (26.1 mL/kg) [Urine:475 " (0.2 mL/kg/hr); Emesis/NG output:1200; Drains:90]  Weight: 67.7 kg     Relevant Results    Results for orders placed or performed during the hospital encounter of 05/12/25 (from the past 24 hours)   CBC   Result Value Ref Range    WBC 10.7 4.4 - 11.3 x10*3/uL    nRBC 0.0 0.0 - 0.0 /100 WBCs    RBC 2.42 (L) 4.50 - 5.90 x10*6/uL    Hemoglobin 8.1 (L) 13.5 - 17.5 g/dL    Hematocrit 23.5 (L) 41.0 - 52.0 %    MCV 97 80 - 100 fL    MCH 33.5 26.0 - 34.0 pg    MCHC 34.5 32.0 - 36.0 g/dL    RDW 12.8 11.5 - 14.5 %    Platelets 253 150 - 450 x10*3/uL   CBC   Result Value Ref Range    WBC 8.2 4.4 - 11.3 x10*3/uL    nRBC 0.0 0.0 - 0.0 /100 WBCs    RBC 1.85 (L) 4.50 - 5.90 x10*6/uL    Hemoglobin 6.0 (LL) 13.5 - 17.5 g/dL    Hematocrit 18.2 (L) 41.0 - 52.0 %    MCV 98 80 - 100 fL    MCH 32.4 26.0 - 34.0 pg    MCHC 33.0 32.0 - 36.0 g/dL    RDW 12.9 11.5 - 14.5 %    Platelets 203 150 - 450 x10*3/uL   Magnesium   Result Value Ref Range    Magnesium 2.01 1.60 - 2.40 mg/dL   Comprehensive Metabolic Panel   Result Value Ref Range    Glucose 131 (H) 74 - 99 mg/dL    Sodium 137 136 - 145 mmol/L    Potassium 3.8 3.5 - 5.3 mmol/L    Chloride 105 98 - 107 mmol/L    Bicarbonate 24 21 - 32 mmol/L    Anion Gap 12 10 - 20 mmol/L    Urea Nitrogen 31 (H) 6 - 23 mg/dL    Creatinine 1.45 (H) 0.50 - 1.30 mg/dL    eGFR 52 (L) >60 mL/min/1.73m*2    Calcium 7.2 (L) 8.6 - 10.3 mg/dL    Albumin 2.4 (L) 3.4 - 5.0 g/dL    Alkaline Phosphatase 104 33 - 136 U/L    Total Protein 4.6 (L) 6.4 - 8.2 g/dL    AST 16 9 - 39 U/L    Bilirubin, Total 0.8 0.0 - 1.2 mg/dL    ALT 14 10 - 52 U/L   Bilirubin, Direct   Result Value Ref Range    Bilirubin, Direct 0.3 0.0 - 0.3 mg/dL   Prepare RBC: 1 Units   Result Value Ref Range    PRODUCT CODE A4131L38     Unit Number [id number]     Unit ABO A     Unit RH NEG     XM INTEP COMP     Dispense Status XM     Blood Expiration Date 5/19/2025 11:59:00 PM EDT     PRODUCT BLOOD TYPE 0600     UNIT VOLUME 290    Type and screen    Result Value Ref Range    ABO TYPE A     Rh TYPE POS     ANTIBODY SCREEN NEG    CBC   Result Value Ref Range    WBC 9.1 4.4 - 11.3 x10*3/uL    nRBC 0.0 0.0 - 0.0 /100 WBCs    RBC 1.94 (L) 4.50 - 5.90 x10*6/uL    Hemoglobin 6.2 (LL) 13.5 - 17.5 g/dL    Hematocrit 18.9 (L) 41.0 - 52.0 %    MCV 97 80 - 100 fL    MCH 32.0 26.0 - 34.0 pg    MCHC 32.8 32.0 - 36.0 g/dL    RDW 13.2 11.5 - 14.5 %    Platelets 219 150 - 450 x10*3/uL   VERIFY ABO/Rh Group Test   Result Value Ref Range    ABO TYPE A     Rh TYPE POS      XR abdomen 1 view  Result Date: 5/17/2025  Interpreted By:  Marissa Weeks, STUDY: XR ABDOMEN 1 VIEW;  5/17/2025 6:13 am   INDICATION: Signs/Symptoms:Abdominal pain, confirm NG tube placement.   COMPARISON: Radiographs 05/15/2025.   ACCESSION NUMBER(S): [id number]   ORDERING CLINICIAN: LATASHA RILEY   FINDINGS: Tip of NG tube in the proximal stomach and sidehole below the diaphragm. Left lower quadrant ostomy and surgical drain. A few loops of dilated small bowel again noted measuring up to 5.1 cm, previously 4.5 cm in the midabdomen. No obvious hemoperitoneum visualized.       Satisfactory NG tube placement. Worsening ileus versus small-bowel obstruction.   MACRO None   Signed by: Marissa Weeks 5/17/2025 7:04 AM Dictation workstation:   [id number]    Scheduled medications  Scheduled Medications[1]  Continuous medications  Continuous Medications[2]  PRN medications  PRN Medications[3]                   Assessment & Plan  Intestinal perforation (Multi)      Diverticulitis of large intestine with perforation without abscess or bleeding    Diego Morales is a 70 y.o. male with a past medical history of cardiac arrest during cardiac catheterization in 2016 secondary to ventricular arrhythmia/V-fib requiring cardioversion (normal coronary arteries), heart murmur, and angina pectoris who was admitted to the hospital for suspected small intestinal perforation and acute diverticulitis.         Concerns for  acute blood loss anemia   - 5/18 AM CBC noting hemoglobin drop to 6.0 with repeat 6.2 and hematocrit 18.9 from 23.5  - H&H have been declining since surgery  - NGT drain has been declining in amount of output since surgery   - 2U RBC ordered today   - CTAP ruled out an active bleed  - Heparin and aspirin held    Small intestinal perforation s/p sigmoid colectomy with end colostomy 5/15  - CT scan showed mild free air noted within the abdomen and pelvis in keeping with hollow visceral perforation. There are regions of small bowel with wall thickening and fluid including the indeterminate region with adjacent non dependent free air which is indeterminate for location  Repeat CT on 5/13 shows no changes from above CT  of perforation. There is also findings suggestive of acute sigmoidal diverticulitis.  - now s/p sigmoid colectomy on 5/15 monitoring with NG and KATY and colostomy output  - surgical pathology and cultures pending no NGTD no organisms seen   - Xray confirmed NG positioning however will need to monitor for postoperative ileus an obstruction  - is experiencing audible bowel sounds as of this AM 5/17  - Continue Zosyn  - IVF D5/NS with KCL while NPO and NG tube continues to drain, draining less on 5/17   - ID following recommendations to continue zosyn  - discontinued toradol due to ESTELITA and hallucination effect on 5/17   - Zofran for nausea PRN  - potassium phosphate tablets given to keep K> 4 and phosphate >2 and Mg >2   - discontinued lovenox for concerns of nosebleed, will continue aspirin       ESTELITA 2/2 to decreased urine output vs toradol use  - increase in Cr to 1.96 from 1.20 and GFR of 36 from 65  - is on IVF 125ml/hr   - is NPO   -  has been receiving toradol every 6 hours since 5/15   - discontinuing toradol 5/17   - improvement of Cr and GFR since discontinuing toradol        CAD with hx of VF with LHC   - continuing metoprolol 25mg nightly per home regimen  - Cardiology following for cardiac  clearance  - continue home aspirin  - increasing metoprolol to 50mg should HR continue to be elevated after 2U RBC  - cleared for surgery at low cardiac risk  - on telemetry       Bilateral lung reticulonodular opacity  - Right greater than left based on CT scan which is similar to prior imaging from 6/23/2016  - Patient is asymptomatic continues to be remain on RA  - T spot negative TB excluded  - ID following  - IS being used, throat lozenges used   - Nasal saline spray given   - Chest PT ordered  - mucinex ordered    HTN  - home regimen metoprolol 25mg   - IV metoprolol 5mg given for episodes of tachycardia 5/17 AM   - may be secondary to increase in pain and stress  - Will increase metoprolol 50mg should HR continue to be elevated after 2U RBC    Jaundiced appearance  - AST and ALT within normal limits  - indirect and direct bilirubin within normal limits  - family notes improvement in jaundiced appearance 5/18 from previously  - Total protein levels are lowering 2/2 to NPO status    L corneal irritation  - dexamethasone ophthalmic solution drops      DVT prophylaxis: SCD, held aspirin and lovenox for concerns of increased bleeding, 2U RBC ordered 5/18  Dispo: monitor clinically, continue pain control         Patient seen and discussed with Dr. Isidro Flores MD  Family Medicine, PGY3            [1] acetaminophen, 650 mg, nasogastric tube, q6h  [Held by provider] aspirin, 81 mg, oral, Daily  dexAMETHasone, 2 drop, Left Eye, q6h LOU  fluticasone, 2 spray, Each Nostril, Daily  gabapentin, 100 mg, nasogastric tube, q12h LOU  [Held by provider] heparin, 5,000 Units, subcutaneous, q8h  methocarbamol, 500 mg, intravenous, q8h  metoprolol succinate XL, 25 mg, oral, Nightly  piperacillin-tazobactam, 4.5 g, intravenous, q6h  [2] D5 % and 0.9 % sodium chloride, 150 mL/hr, Last Rate: 150 mL/hr (05/18/25 0334)  [3] PRN medications: benzocaine-menthol, guaiFENesin, HYDROmorphone, HYDROmorphone, melatonin, naloxone,  [DISCONTINUED] ondansetron **OR** ondansetron, sodium chloride

## 2025-05-18 NOTE — PROGRESS NOTES
General Surgery Daily Progress Note      Subjective:  The patient is stable today.  He is still tired, like he was yesterday.  However, he still reports his abdominal pain is basically nonexistent, like it was yesterday.  He still has a KATY drain in place, and it is not been putting out anything significant.  He still has bilious output from his NG tube.        Objective:  Vitals:   Vitals:    05/18/25 1106   BP: 136/76   Pulse: 87   Resp: 20   Temp: 36.7 °C (98.1 °F)   SpO2: 95%       Physical Exam:   General: No acute distress. Sitting up in bed.   Neuro: Alert and oriented ×3. Follows commands.  Head: Atraumatic.  NG tube with bilious output  Eyes: Pupils equal reactive to light. Extraocular motions intact.  Ears: Hears normal speaking voice.  Neck: Supple. No appreciable masses.  Heart: Regular rate  Lungs: No audible wheeze.  Breathing comfortably.  Abdomen: Soft. Nondistended. Appropriately Tender.  Incision clean, dry, and intact.  KATY drain with scant serosanguineous output  Musculoskeletal: Moves all extremities.  Normal range of motion.  Skin: No rashes or lesions.  Psychological: Normal affect        Labs:  CBC:   Lab Results   Component Value Date    WBC 9.1 05/18/2025    RBC 1.94 (L) 05/18/2025    HGB 6.2 (LL) 05/18/2025    HCT 18.9 (L) 05/18/2025     05/18/2025       RFP:   Lab Results   Component Value Date     05/18/2025    K 3.8 05/18/2025     05/18/2025    CO2 24 05/18/2025    BUN 31 (H) 05/18/2025    CREATININE 1.45 (H) 05/18/2025    CALCIUM 7.2 (L) 05/18/2025    MG 2.01 05/18/2025    PHOS 3.9 05/17/2025        LFTs:   Lab Results   Component Value Date    PROT 4.6 (L) 05/18/2025    ALBUMIN 2.4 (L) 05/18/2025    BILITOT 0.8 05/18/2025    BILIDIR 0.3 05/18/2025    ALKPHOS 104 05/18/2025    AST 16 05/18/2025    ALT 14 05/18/2025              Images:  I personally reviewed the images and the radiologist's report.  CT abdomen pelvis: Hematoma in the pelvis with a KATY drain going  through it.      Assessment:  This is a 70 y.o. year old male who is Post Operative Day #3 from open Guerrero's colectomy for perforated sigmoid diverticulitis.  He was doing very well on postoperative day #1.  On postoperative day #2, he was mildly tachycardic, and appeared tired and unwell.  His KATY drain never put out anything significant.  However, on postoperative day #3, his hemoglobin and hematocrit continue to drift down, and a CT scan was performed.  The CT scan does show some hematoma in his pelvis, but no active bleeding.  The KATY drain is going straight through the hematoma, but is not draining for some reason.  His vital signs remain normal.      Plan:  -- 2 units packed red blood cells  -- Repeat CBC this afternoon  -- Currently, no plans to reoperate.  However, if he has signs of persistent bleeding, we will plan for an exploratory laparotomy.  -- Continue NG tube to low continuous suction with frequent flushing        Iván Canales MD  General Surgery  Office: [phone]  Fax: [phone]  11:23 AM  05/18/25

## 2025-05-18 NOTE — CARE PLAN
The patient's goals for the shift include  rest    The clinical goals for the shift include pain control

## 2025-05-18 NOTE — PROGRESS NOTES
Diego Morales is a 70 y.o. male on day 6 of admission presenting with Intestinal perforation (Multi).    Subjective   Interval History: no fever, no new complaints        Review of Systems    Objective   Range of Vitals (last 24 hours)  Heart Rate:  [86-98]   Temp:  [36.5 °C (97.7 °F)-37 °C (98.6 °F)]   Resp:  [18-24]   BP: (114-147)/(72-82)   SpO2:  [93 %-97 %]   Daily Weight  05/12/25 : 67.7 kg (149 lb 4.8 oz)    Body mass index is 20.83 kg/m².    Physical Exam  Constitutional:       Appearance: Normal appearance.   HENT:      Head: Normocephalic and atraumatic.      Mouth/Throat:      Mouth: Mucous membranes are moist.      Pharynx: Oropharynx is clear.   Eyes:      Pupils: Pupils are equal, round, and reactive to light.   Cardiovascular:      Rate and Rhythm: Normal rate and regular rhythm.      Heart sounds: Normal heart sounds.   Pulmonary:      Effort: Pulmonary effort is normal.      Breath sounds: Normal breath sounds.   Abdominal:      General: There is distension.      Palpations: Abdomen is soft.      Tenderness: There is abdominal tenderness.      Comments: Ostomy, dressing   Musculoskeletal:      Cervical back: Normal range of motion.   Neurological:      Mental Status: He is alert.         Antibiotics  piperacillin-tazobactam - 4.5 gram/100 mL    Relevant Results  Labs  Results from last 72 hours   Lab Units 05/18/25  0805 05/18/25  0627 05/17/25  1346   WBC AUTO x10*3/uL 9.1 8.2 10.7   HEMOGLOBIN g/dL 6.2* 6.0* 8.1*   HEMATOCRIT % 18.9* 18.2* 23.5*   PLATELETS AUTO x10*3/uL 219 203 253     Results from last 72 hours   Lab Units 05/18/25  0627 05/17/25  0635 05/16/25  0748   SODIUM mmol/L 137 130* 133*   POTASSIUM mmol/L 3.8 4.9 4.5   CHLORIDE mmol/L 105 101 101   CO2 mmol/L 24 21 25   BUN mg/dL 31* 28* 18   CREATININE mg/dL 1.45* 1.96* 1.20   GLUCOSE mg/dL 131* 217* 148*   CALCIUM mg/dL 7.2* 7.6* 7.5*   ANION GAP mmol/L 12 13 12   EGFR mL/min/1.73m*2 52* 36* 65   PHOSPHORUS mg/dL  --  3.9 3.0      Results from last 72 hours   Lab Units 05/18/25  0627 05/17/25  0635 05/16/25  0748   ALK PHOS U/L 104 78  --    BILIRUBIN TOTAL mg/dL 0.8 0.9  --    BILIRUBIN DIRECT mg/dL 0.3 0.3  --    PROTEIN TOTAL g/dL 4.6* 5.1*  --    ALT U/L 14 11  --    AST U/L 16 13  --    ALBUMIN g/dL 2.4* 2.8*  2.8* 2.8*     Estimated Creatinine Clearance: 44.7 mL/min (A) (by C-G formula based on SCr of 1.45 mg/dL (H)).  CRP   Date Value Ref Range Status   06/18/2022 0.17 mg/dL Final     Comment:     REF VALUE  < 1.00       Microbiology  Reviewed  Imaging  Reviewed        Assessment/Plan   Abnormal CT of the chest, seems to be stable compared to old CT, likely old granulomatous disease, most likely fungal or atypical mycobacteria, MTB is unlikely  Sigmoid diverticulitis, the repeat CT was reviewed, sp surgery, the culture is pending     Recommendations :  Continue Zosyn  Increase the activity  Discussed with the medical team     I spent minutes in the professional and overall care of this patient.          Umm Vega MD

## 2025-05-19 LAB
ALBUMIN SERPL BCP-MCNC: 2.7 G/DL (ref 3.4–5)
ALP SERPL-CCNC: 175 U/L (ref 33–136)
ALT SERPL W P-5'-P-CCNC: 32 U/L (ref 10–52)
ANION GAP SERPL CALC-SCNC: 12 MMOL/L (ref 10–20)
AST SERPL W P-5'-P-CCNC: 36 U/L (ref 9–39)
BACTERIA FLD CULT: NORMAL
BILIRUB SERPL-MCNC: 1.3 MG/DL (ref 0–1.2)
BUN SERPL-MCNC: 21 MG/DL (ref 6–23)
CALCIUM SERPL-MCNC: 7.4 MG/DL (ref 8.6–10.3)
CHLORIDE SERPL-SCNC: 106 MMOL/L (ref 98–107)
CO2 SERPL-SCNC: 23 MMOL/L (ref 21–32)
CREAT SERPL-MCNC: 0.91 MG/DL (ref 0.5–1.3)
EGFRCR SERPLBLD CKD-EPI 2021: 90 ML/MIN/1.73M*2
ERYTHROCYTE [DISTWIDTH] IN BLOOD BY AUTOMATED COUNT: 15.4 % (ref 11.5–14.5)
ERYTHROCYTE [DISTWIDTH] IN BLOOD BY AUTOMATED COUNT: 15.8 % (ref 11.5–14.5)
GLUCOSE SERPL-MCNC: 117 MG/DL (ref 74–99)
GRAM STN SPEC: NORMAL
GRAM STN SPEC: NORMAL
HCT VFR BLD AUTO: 23.6 % (ref 41–52)
HCT VFR BLD AUTO: 24.4 % (ref 41–52)
HGB BLD-MCNC: 8.1 G/DL (ref 13.5–17.5)
HGB BLD-MCNC: 8.6 G/DL (ref 13.5–17.5)
MAGNESIUM SERPL-MCNC: 2.07 MG/DL (ref 1.6–2.4)
MCH RBC QN AUTO: 31.8 PG (ref 26–34)
MCH RBC QN AUTO: 32.3 PG (ref 26–34)
MCHC RBC AUTO-ENTMCNC: 34.3 G/DL (ref 32–36)
MCHC RBC AUTO-ENTMCNC: 35.2 G/DL (ref 32–36)
MCV RBC AUTO: 92 FL (ref 80–100)
MCV RBC AUTO: 93 FL (ref 80–100)
NRBC BLD-RTO: 0.5 /100 WBCS (ref 0–0)
NRBC BLD-RTO: 0.6 /100 WBCS (ref 0–0)
PHOSPHATE SERPL-MCNC: 1.8 MG/DL (ref 2.5–4.9)
PLATELET # BLD AUTO: 210 X10*3/UL (ref 150–450)
PLATELET # BLD AUTO: 210 X10*3/UL (ref 150–450)
POTASSIUM SERPL-SCNC: 3.5 MMOL/L (ref 3.5–5.3)
PROT SERPL-MCNC: 4.9 G/DL (ref 6.4–8.2)
RBC # BLD AUTO: 2.55 X10*6/UL (ref 4.5–5.9)
RBC # BLD AUTO: 2.66 X10*6/UL (ref 4.5–5.9)
SODIUM SERPL-SCNC: 137 MMOL/L (ref 136–145)
WBC # BLD AUTO: 9.1 X10*3/UL (ref 4.4–11.3)
WBC # BLD AUTO: 9.5 X10*3/UL (ref 4.4–11.3)

## 2025-05-19 PROCEDURE — 99233 SBSQ HOSP IP/OBS HIGH 50: CPT

## 2025-05-19 PROCEDURE — 36415 COLL VENOUS BLD VENIPUNCTURE: CPT | Performed by: STUDENT IN AN ORGANIZED HEALTH CARE EDUCATION/TRAINING PROGRAM

## 2025-05-19 PROCEDURE — 2500000001 HC RX 250 WO HCPCS SELF ADMINISTERED DRUGS (ALT 637 FOR MEDICARE OP): Performed by: INTERNAL MEDICINE

## 2025-05-19 PROCEDURE — 2500000004 HC RX 250 GENERAL PHARMACY W/ HCPCS (ALT 636 FOR OP/ED): Mod: JZ | Performed by: PHYSICIAN ASSISTANT

## 2025-05-19 PROCEDURE — 2500000004 HC RX 250 GENERAL PHARMACY W/ HCPCS (ALT 636 FOR OP/ED): Mod: JZ | Performed by: SURGERY

## 2025-05-19 PROCEDURE — 84100 ASSAY OF PHOSPHORUS: CPT | Performed by: SURGERY

## 2025-05-19 PROCEDURE — 2500000004 HC RX 250 GENERAL PHARMACY W/ HCPCS (ALT 636 FOR OP/ED): Mod: JZ | Performed by: INTERNAL MEDICINE

## 2025-05-19 PROCEDURE — 83735 ASSAY OF MAGNESIUM: CPT | Performed by: INTERNAL MEDICINE

## 2025-05-19 PROCEDURE — 36415 COLL VENOUS BLD VENIPUNCTURE: CPT

## 2025-05-19 PROCEDURE — 2500000005 HC RX 250 GENERAL PHARMACY W/O HCPCS: Performed by: PHYSICIAN ASSISTANT

## 2025-05-19 PROCEDURE — 2500000001 HC RX 250 WO HCPCS SELF ADMINISTERED DRUGS (ALT 637 FOR MEDICARE OP): Performed by: STUDENT IN AN ORGANIZED HEALTH CARE EDUCATION/TRAINING PROGRAM

## 2025-05-19 PROCEDURE — 1100000001 HC PRIVATE ROOM DAILY

## 2025-05-19 PROCEDURE — 85027 COMPLETE CBC AUTOMATED: CPT

## 2025-05-19 PROCEDURE — 2500000001 HC RX 250 WO HCPCS SELF ADMINISTERED DRUGS (ALT 637 FOR MEDICARE OP): Performed by: SURGERY

## 2025-05-19 PROCEDURE — 85027 COMPLETE CBC AUTOMATED: CPT | Performed by: INTERNAL MEDICINE

## 2025-05-19 PROCEDURE — 2500000004 HC RX 250 GENERAL PHARMACY W/ HCPCS (ALT 636 FOR OP/ED): Mod: JZ

## 2025-05-19 PROCEDURE — 80053 COMPREHEN METABOLIC PANEL: CPT | Performed by: STUDENT IN AN ORGANIZED HEALTH CARE EDUCATION/TRAINING PROGRAM

## 2025-05-19 RX ORDER — DEXTROSE MONOHYDRATE, SODIUM CHLORIDE, AND POTASSIUM CHLORIDE 50; 1.49; 4.5 G/1000ML; G/1000ML; G/1000ML
20 INJECTION, SOLUTION INTRAVENOUS CONTINUOUS
Status: DISCONTINUED | OUTPATIENT
Start: 2025-05-19 | End: 2025-05-20

## 2025-05-19 RX ORDER — FUROSEMIDE 10 MG/ML
20 INJECTION INTRAMUSCULAR; INTRAVENOUS ONCE
Status: COMPLETED | OUTPATIENT
Start: 2025-05-19 | End: 2025-05-19

## 2025-05-19 RX ADMIN — DEXAMETHASONE SODIUM PHOSPHATE 2 DROP: 1 SOLUTION/ DROPS OPHTHALMIC at 17:09

## 2025-05-19 RX ADMIN — ACETAMINOPHEN 650 MG: 650 SOLUTION ORAL at 03:00

## 2025-05-19 RX ADMIN — DEXTROSE MONOHYDRATE, SODIUM CHLORIDE, AND POTASSIUM CHLORIDE 100 ML/HR: 50; 1.49; 4.5 INJECTION, SOLUTION INTRAVENOUS at 10:15

## 2025-05-19 RX ADMIN — FLUTICASONE PROPIONATE 2 SPRAY: 50 SPRAY, METERED NASAL at 08:04

## 2025-05-19 RX ADMIN — DEXTROSE MONOHYDRATE, SODIUM CHLORIDE, AND POTASSIUM CHLORIDE 100 ML/HR: 50; 1.49; 4.5 INJECTION, SOLUTION INTRAVENOUS at 20:52

## 2025-05-19 RX ADMIN — METOPROLOL SUCCINATE 25 MG: 25 TABLET, EXTENDED RELEASE ORAL at 20:43

## 2025-05-19 RX ADMIN — PIPERACILLIN SODIUM AND TAZOBACTAM SODIUM 4.5 G: 4; .5 INJECTION, SOLUTION INTRAVENOUS at 02:53

## 2025-05-19 RX ADMIN — DEXTROSE MONOHYDRATE, SODIUM CHLORIDE, AND POTASSIUM CHLORIDE 100 ML/HR: 50; 1.49; 4.5 INJECTION, SOLUTION INTRAVENOUS at 10:23

## 2025-05-19 RX ADMIN — PIPERACILLIN SODIUM AND TAZOBACTAM SODIUM 4.5 G: 4; .5 INJECTION, SOLUTION INTRAVENOUS at 08:04

## 2025-05-19 RX ADMIN — GABAPENTIN 100 MG: 250 SOLUTION ORAL at 20:42

## 2025-05-19 RX ADMIN — GABAPENTIN 100 MG: 250 SOLUTION ORAL at 08:04

## 2025-05-19 RX ADMIN — METHOCARBAMOL 500 MG: 1000 INJECTION, SOLUTION INTRAMUSCULAR; INTRAVENOUS at 02:54

## 2025-05-19 RX ADMIN — DEXAMETHASONE SODIUM PHOSPHATE 2 DROP: 1 SOLUTION/ DROPS OPHTHALMIC at 11:44

## 2025-05-19 RX ADMIN — FUROSEMIDE 20 MG: 10 INJECTION, SOLUTION INTRAMUSCULAR; INTRAVENOUS at 14:00

## 2025-05-19 RX ADMIN — ACETAMINOPHEN 650 MG: 650 SOLUTION ORAL at 20:42

## 2025-05-19 RX ADMIN — POTASSIUM PHOSPHATE, MONOBASIC AND POTASSIUM PHOSPHATE, DIBASIC 21 MMOL: 224; 236 INJECTION, SOLUTION, CONCENTRATE INTRAVENOUS at 10:14

## 2025-05-19 RX ADMIN — DEXAMETHASONE SODIUM PHOSPHATE 2 DROP: 1 SOLUTION/ DROPS OPHTHALMIC at 05:12

## 2025-05-19 RX ADMIN — METHOCARBAMOL 500 MG: 1000 INJECTION, SOLUTION INTRAMUSCULAR; INTRAVENOUS at 10:15

## 2025-05-19 RX ADMIN — DEXTROSE AND SODIUM CHLORIDE 100 ML/HR: 5; 900 INJECTION, SOLUTION INTRAVENOUS at 02:56

## 2025-05-19 RX ADMIN — PIPERACILLIN SODIUM AND TAZOBACTAM SODIUM 4.5 G: 4; .5 INJECTION, SOLUTION INTRAVENOUS at 20:42

## 2025-05-19 RX ADMIN — ACETAMINOPHEN 650 MG: 650 SOLUTION ORAL at 10:15

## 2025-05-19 RX ADMIN — PIPERACILLIN SODIUM AND TAZOBACTAM SODIUM 4.5 G: 4; .5 INJECTION, SOLUTION INTRAVENOUS at 15:18

## 2025-05-19 RX ADMIN — ACETAMINOPHEN 650 MG: 650 SOLUTION ORAL at 15:18

## 2025-05-19 RX ADMIN — METHOCARBAMOL 500 MG: 1000 INJECTION, SOLUTION INTRAMUSCULAR; INTRAVENOUS at 17:09

## 2025-05-19 ASSESSMENT — COGNITIVE AND FUNCTIONAL STATUS - GENERAL
MOBILITY SCORE: 19
STANDING UP FROM CHAIR USING ARMS: A LITTLE
TOILETING: A LITTLE
TURNING FROM BACK TO SIDE WHILE IN FLAT BAD: A LITTLE
MOVING TO AND FROM BED TO CHAIR: A LITTLE
TOILETING: A LITTLE
MOVING TO AND FROM BED TO CHAIR: A LITTLE
DAILY ACTIVITIY SCORE: 21
MOBILITY SCORE: 19
DRESSING REGULAR LOWER BODY CLOTHING: A LITTLE
TURNING FROM BACK TO SIDE WHILE IN FLAT BAD: A LITTLE
DRESSING REGULAR UPPER BODY CLOTHING: A LITTLE
WALKING IN HOSPITAL ROOM: A LITTLE
DAILY ACTIVITIY SCORE: 21
DRESSING REGULAR LOWER BODY CLOTHING: A LITTLE
CLIMB 3 TO 5 STEPS WITH RAILING: A LITTLE
STANDING UP FROM CHAIR USING ARMS: A LITTLE
WALKING IN HOSPITAL ROOM: A LITTLE
CLIMB 3 TO 5 STEPS WITH RAILING: A LITTLE
DRESSING REGULAR UPPER BODY CLOTHING: A LITTLE

## 2025-05-19 ASSESSMENT — PAIN SCALES - GENERAL
PAINLEVEL_OUTOF10: 0 - NO PAIN
PAINLEVEL_OUTOF10: 0 - NO PAIN

## 2025-05-19 NOTE — PROGRESS NOTES
"General/Trauma Surgery Daily Progress Note    Subjective   Denies abdominal pain. Remains fatigued, however excited to get NG out and ambulated to bathroom with me today with no lightheadedness. No nausea or bloating. Colostomy has been emptied 2 times this morning. KATY with only 20cc output overnight.      Objective   Last Recorded Vitals:  Blood pressure 154/81, pulse 80, temperature 36.6 °C (97.9 °F), resp. rate 16, height 1.803 m (5' 10.98\"), weight 67.7 kg (149 lb 4.8 oz), SpO2 94%.    Intake/Output last 3 Shifts:  I/O last 3 completed shifts:  In: 4402.5 (65 mL/kg) [I.V.:2792.5 (41.2 mL/kg); Blood:680; NG/GT:230; IV Piggyback:700]  Out: 2230 (32.9 mL/kg) [Urine:1075 (0.4 mL/kg/hr); Emesis/NG output:900; Drains:55; Stool:200]  Weight: 67.7 kg     Pain Score:  0-10 (Numeric) Pain Score: 0 - No pain     Physical Exam:  Constitutional: No acute distress, sitting up in bed.  Neuro: Alert, oriented. Follows commands.   Eyes: EOMI. No scleral icterus. Conjunctiva pink.  ENT: NG lightly bilious, mainly mucous. Low output.  Heart: Regular rate.  Respiratory: No increased work of breathing or audible wheeze.  Abdomen: Soft, nondistended. Appropriately tender. Incisions clean, dry, intact. KATY has mild liquid sanguinous output.   MSK: Moves all extremities.  Vascular: Palpable pulses throughout, no pitting edema.  Skin: No rashes.   Psychological: Appropriate mood and behavior.            Relevant Results  Laboratory Results:  CBC:   Lab Results   Component Value Date    WBC 9.1 05/19/2025    RBC 2.55 (L) 05/19/2025    HGB 8.1 (L) 05/19/2025    HCT 23.6 (L) 05/19/2025     05/19/2025       RFP:   Lab Results   Component Value Date     05/19/2025    K 3.5 05/19/2025     05/19/2025    CO2 23 05/19/2025    BUN 21 05/19/2025    CREATININE 0.91 05/19/2025    CALCIUM 7.4 (L) 05/19/2025    MG 2.07 05/19/2025    PHOS 1.8 (L) 05/19/2025        LFTs:   Lab Results   Component Value Date    PROT 4.9 (L) 05/19/2025 "    ALBUMIN 2.7 (L) 05/19/2025    BILITOT 1.3 (H) 05/19/2025    BILIDIR 0.3 05/18/2025    ALKPHOS 175 (H) 05/19/2025    AST 36 05/19/2025    ALT 32 05/19/2025         Imaging:  CT abdomen pelvis w IV contrast  Result Date: 5/19/2025  1.  Interval postsurgical changes of partial sigmoid colectomy with colostomy in the left lower quadrant. Findings concerning for hematoma in the lower pelvis, predominantly located in the rectovesical pouch extending superiorly to the perihepatic and perisplenic region. The largest pocket measures up to 14 x 14 x 7 cm in CC, AP and transverse dimensions respectively the surgical drain extending through the hematoma. Please note that evaluation for active contrast extravasation is limited due to single phase contrast examination. Recommend correlation with trending hemoglobin and hematocrit levels. A CT angiogram with arterial and delayed phases can be obtained for definitive evaluation if clinically warranted. 2. Diffusely distended fluid-filled small bowel loops throughout the abdomen without definite CT evidence of transition point. This could be related to postoperative ileus, however recommend correlation with patient's symptomatology for bowel obstruction. Serial close interval follow-up radiographs can be obtained as clinically warranted. 3. Incidental note of a 6.5 x 6 mm well-circumscribed lesion in the pancreatic neck demonstrating macroscopic fat with possible contiguity with the main pancreatic duct. This is most likely favored to represent a lipoma versus an interdigitating fat. No definite evidence of pancreatic ductal dilatation or pancreatic parenchymal atrophy. An MRI can be obtained for further evaluation as clinically warranted. 4. Interval evidence of body wall edema, likely favored to be reactive versus fluid overload. Recommend clinical correlation. 5. Small amount of gas in the non dependent region of the urinary bladder, likely favored to be related to recent  catheterization. However recommend correlation for emphysematous cystitis. 6. Mild prostatomegaly. Recommend correlation with PSA levels.   MACRO: Critical Finding:  See findings. Notification was initiated on 5/19/2025 at 9:50 am by Dr. Inés Hung.  (**-OCF-**) Instructions:     Signed by: Inés Hung 5/19/2025 9:50 AM Dictation workstation:   [id number]      Cardiology, Vascular, and Other Imaging  No other imaging results found for the past 2 days           Assessment/Plan   This is a 70 y.o. male now POD #4 from open Surinder's colectomy for perforated sigmoid diverticulitis. Over the weekend, he experienced increased lethargy and H&H downtrended, lowest being 6.0/18.2 and received 2 units pRBC. Improved today, however will need H&H trending.       Plan:   -- Continue CBC q12h  -- NG removed, will start CLD  -- Continue IVF 100cc/hr  -- Ambulate  -- Daily CBC, RFP, Mg with repletions  -- Will likely remove KATY tomorrow  -- Will continue to monitor closely, no plans to re-operate currently         Seen and discussed with Dr. Canales who is in agreement with plan. Please secure chat with questions.    Cindy Baldwin PA-C      I have seen and reviewed the patient with the PA.  I agree with their note as written above.  Any changes or corrections have been made in the body of the note.    Anoop Canales MD  General Surgery  Office: [phone]  Fax:     [phone]  2:25 PM   05/19/25

## 2025-05-19 NOTE — PROGRESS NOTES
05/19/25 1556   Discharge Planning   Living Arrangements Spouse/significant other   Support Systems Spouse/significant other   Assistance Needed patient is A&Ox3, independent in ADL's, no AD, no DME, drives PCP- Ramon Gould MD   Type of Residence Private residence   Number of Stairs to Enter Residence 3   Number of Stairs Within Residence 20   Do you have animals or pets at home? Yes   Type of Animals or Pets 1 dog- Max   Who is requesting discharge planning? Provider   Home or Post Acute Services None   Expected Discharge Disposition Home H  (new home care services for ostomy care/education)   Does the patient need discharge transport arranged? No   Stroke Family Assessment   Stroke Family Assessment Needed No   Intensity of Service   Intensity of Service 0-30 min

## 2025-05-19 NOTE — NURSING NOTE
Met with patient and wife.  Ho now has NGT out, stoma functioning for loose brown stool, beginning clear liquid diet.  Patient anemic, transfused, but c/o great fatigue.  Wife Yohana requests we begin stoma care education tomorrow - this will be the new plan, he needs to rest prior to being able to focus on this.  Meanwhile his stoma is pale pink, moist, budded and functional with ostomy pouching system fully intact.  Abdominal dressing dry with minimal s/s KTAY drainage.

## 2025-05-19 NOTE — PROGRESS NOTES
Diego Morales is a 70 y.o. male on day 7 of admission presenting with Intestinal perforation (Multi).    Subjective   Interval History: no fever, no new complaints        Review of Systems    Objective   Range of Vitals (last 24 hours)  Heart Rate:  [80-98]   Temp:  [36.5 °C (97.7 °F)-37.1 °C (98.8 °F)]   Resp:  [16-20]   BP: (129-154)/(74-82)   SpO2:  [93 %-97 %]   Daily Weight  05/12/25 : 67.7 kg (149 lb 4.8 oz)    Body mass index is 20.83 kg/m².    Physical Exam  Constitutional:       Appearance: Normal appearance.   HENT:      Head: Normocephalic and atraumatic.      Mouth/Throat:      Mouth: Mucous membranes are moist.      Pharynx: Oropharynx is clear.   Eyes:      Pupils: Pupils are equal, round, and reactive to light.   Cardiovascular:      Rate and Rhythm: Normal rate and regular rhythm.      Heart sounds: Normal heart sounds.   Pulmonary:      Effort: Pulmonary effort is normal.      Breath sounds: Normal breath sounds.   Abdominal:      General: There is distension.      Palpations: Abdomen is soft.      Tenderness: There is abdominal tenderness.      Comments: Ostomy, dressing   Musculoskeletal:      Cervical back: Normal range of motion.   Neurological:      Mental Status: He is alert.         Antibiotics  piperacillin-tazobactam - 4.5 gram/100 mL    Relevant Results  Labs  Results from last 72 hours   Lab Units 05/19/25  0642 05/18/25  1650 05/18/25  1222   WBC AUTO x10*3/uL 9.1 9.9 9.7   HEMOGLOBIN g/dL 8.1* 9.0* 7.4*   HEMATOCRIT % 23.6* 26.5* 21.4*   PLATELETS AUTO x10*3/uL 210 217 210     Results from last 72 hours   Lab Units 05/19/25  0642 05/18/25  0627 05/17/25  0635   SODIUM mmol/L 137 137 130*   POTASSIUM mmol/L 3.5 3.8 4.9   CHLORIDE mmol/L 106 105 101   CO2 mmol/L 23 24 21   BUN mg/dL 21 31* 28*   CREATININE mg/dL 0.91 1.45* 1.96*   GLUCOSE mg/dL 117* 131* 217*   CALCIUM mg/dL 7.4* 7.2* 7.6*   ANION GAP mmol/L 12 12 13   EGFR mL/min/1.73m*2 90 52* 36*   PHOSPHORUS mg/dL 1.8*  --  3.9      Results from last 72 hours   Lab Units 05/19/25  0642 05/18/25  0627 05/17/25  0635   ALK PHOS U/L 175* 104 78   BILIRUBIN TOTAL mg/dL 1.3* 0.8 0.9   BILIRUBIN DIRECT mg/dL  --  0.3 0.3   PROTEIN TOTAL g/dL 4.9* 4.6* 5.1*   ALT U/L 32 14 11   AST U/L 36 16 13   ALBUMIN g/dL 2.7* 2.4* 2.8*  2.8*     Estimated Creatinine Clearance: 71.3 mL/min (by C-G formula based on SCr of 0.91 mg/dL).  CRP   Date Value Ref Range Status   06/18/2022 0.17 mg/dL Final     Comment:     REF VALUE  < 1.00       Microbiology  Reviewed  Imaging  Reviewed        Assessment/Plan   Abnormal CT of the chest, seems to be stable compared to old CT, likely old granulomatous disease, most likely fungal or atypical mycobacteria, MTB is unlikely, T spot negative  Sigmoid diverticulitis, the repeat CT was reviewed, sp surgery, the culture is pending     Recommendations :  Continue Zosyn  Discussed with the medical team     I spent minutes in the professional and overall care of this patient.          Umm Vega MD

## 2025-05-19 NOTE — PROGRESS NOTES
"Diego Morales is a 70 y.o. male on day 7 of admission presenting with Intestinal perforation (Multi).    Subjective     POD 4  Reports feeling well and optimistic for his recovery  He reports abdominal soreness but no pain  His colectomy began working yesterday evening   No acute concerns     Objective     Physical Exam  Constitutional:       Appearance: He is ill-appearing.   HENT:      Nose:      Comments: NG tube in place     Mouth/Throat:      Mouth: Mucous membranes are moist.   Eyes:      Conjunctiva/sclera: Conjunctivae normal.   Cardiovascular:      Rate and Rhythm: Normal rate and regular rhythm.      Heart sounds: Murmur heard.   Pulmonary:      Effort: Pulmonary effort is normal.      Breath sounds: No wheezing or rhonchi.   Abdominal:      General: Bowel sounds are normal. There is no distension.      Palpations: Abdomen is soft.      Tenderness: There is abdominal tenderness.      Comments: KATY drain with serosanguinous output  Colostomy with moderate amount of dark brown/black output   Musculoskeletal:      Right lower leg: No edema.      Left lower leg: No edema.   Skin:     General: Skin is warm.      Capillary Refill: Capillary refill takes less than 2 seconds.   Neurological:      Mental Status: He is alert and oriented to person, place, and time.   Psychiatric:         Mood and Affect: Mood normal.         Behavior: Behavior normal.         Thought Content: Thought content normal.         Judgment: Judgment normal.       Last Recorded Vitals  Blood pressure 153/79, pulse 78, temperature 36.6 °C (97.9 °F), resp. rate 17, height 1.803 m (5' 10.98\"), weight 67.7 kg (149 lb 4.8 oz), SpO2 96%.  Intake/Output last 3 Shifts:  I/O last 3 completed shifts:  In: 4402.5 (65 mL/kg) [I.V.:2792.5 (41.2 mL/kg); Blood:680; NG/GT:230; IV Piggyback:700]  Out: 2230 (32.9 mL/kg) [Urine:1075 (0.4 mL/kg/hr); Emesis/NG output:900; Drains:55; Stool:200]  Weight: 67.7 kg     Relevant Results    Results for orders placed " or performed during the hospital encounter of 05/12/25 (from the past 24 hours)   CBC   Result Value Ref Range    WBC 9.9 4.4 - 11.3 x10*3/uL    nRBC 0.3 (H) 0.0 - 0.0 /100 WBCs    RBC 2.87 (L) 4.50 - 5.90 x10*6/uL    Hemoglobin 9.0 (L) 13.5 - 17.5 g/dL    Hematocrit 26.5 (L) 41.0 - 52.0 %    MCV 92 80 - 100 fL    MCH 31.4 26.0 - 34.0 pg    MCHC 34.0 32.0 - 36.0 g/dL    RDW 14.9 (H) 11.5 - 14.5 %    Platelets 217 150 - 450 x10*3/uL   CBC   Result Value Ref Range    WBC 9.1 4.4 - 11.3 x10*3/uL    nRBC 0.6 (H) 0.0 - 0.0 /100 WBCs    RBC 2.55 (L) 4.50 - 5.90 x10*6/uL    Hemoglobin 8.1 (L) 13.5 - 17.5 g/dL    Hematocrit 23.6 (L) 41.0 - 52.0 %    MCV 93 80 - 100 fL    MCH 31.8 26.0 - 34.0 pg    MCHC 34.3 32.0 - 36.0 g/dL    RDW 15.8 (H) 11.5 - 14.5 %    Platelets 210 150 - 450 x10*3/uL   Magnesium   Result Value Ref Range    Magnesium 2.07 1.60 - 2.40 mg/dL   Comprehensive Metabolic Panel   Result Value Ref Range    Glucose 117 (H) 74 - 99 mg/dL    Sodium 137 136 - 145 mmol/L    Potassium 3.5 3.5 - 5.3 mmol/L    Chloride 106 98 - 107 mmol/L    Bicarbonate 23 21 - 32 mmol/L    Anion Gap 12 10 - 20 mmol/L    Urea Nitrogen 21 6 - 23 mg/dL    Creatinine 0.91 0.50 - 1.30 mg/dL    eGFR 90 >60 mL/min/1.73m*2    Calcium 7.4 (L) 8.6 - 10.3 mg/dL    Albumin 2.7 (L) 3.4 - 5.0 g/dL    Alkaline Phosphatase 175 (H) 33 - 136 U/L    Total Protein 4.9 (L) 6.4 - 8.2 g/dL    AST 36 9 - 39 U/L    Bilirubin, Total 1.3 (H) 0.0 - 1.2 mg/dL    ALT 32 10 - 52 U/L   Phosphorus   Result Value Ref Range    Phosphorus 1.8 (L) 2.5 - 4.9 mg/dL   CBC   Result Value Ref Range    WBC 9.5 4.4 - 11.3 x10*3/uL    nRBC 0.5 (H) 0.0 - 0.0 /100 WBCs    RBC 2.66 (L) 4.50 - 5.90 x10*6/uL    Hemoglobin 8.6 (L) 13.5 - 17.5 g/dL    Hematocrit 24.4 (L) 41.0 - 52.0 %    MCV 92 80 - 100 fL    MCH 32.3 26.0 - 34.0 pg    MCHC 35.2 32.0 - 36.0 g/dL    RDW 15.4 (H) 11.5 - 14.5 %    Platelets 210 150 - 450 x10*3/uL     CT abdomen pelvis w IV contrast  Result Date:  5/19/2025  Interpreted By:  Inés Hung, STUDY: CT ABDOMEN PELVIS W IV CONTRAST;  5/18/2025 9:00 am   INDICATION: Signs/Symptoms:concern for bleed.     COMPARISON: CT abdomen pelvis with contrast dated 05/13/2025.   ACCESSION NUMBER(S): [id number]   ORDERING CLINICIAN: EDNA GREENFIELD   TECHNIQUE: CT of the abdomen and pelvis was performed after administration of intravenous contrast. Standard contiguous axial images were obtained at 3 mm slice thickness through the abdomen and pelvis. Coronal and sagittal reconstructions at 3 mm slice thickness were performed.  75 ML of Omnipaque 350 was administered intravenously without immediate complication.   FINDINGS: LOWER CHEST: There are bandlike airspace opacities in the dependent region of bilateral lower lobes, likely favored to represent atelectasis. Otherwise lungs are clear. Heart is mildly enlarged. No pericardial or pleural effusions. Nasogastric tube extends through distal thoracic esophagus included in the field of view.   ABDOMEN:   LIVER: Liver demonstrates mild hypoattenuation, which can be seen with mild hepatic steatosis. However no focal hepatic mass lesion is noted.   BILE DUCTS: The intrahepatic and extrahepatic ducts are not dilated.   GALLBLADDER: Gallbladder is moderately distended and does not demonstrate calcified stones in the lumen.   PANCREAS: There is a small 6 x 6.5 mm fat containing lesion in the neck of pancreas demonstrating macroscopic fat. This demonstrates contiguity with the main pancreatic duct. However there is no significant pancreatic ductal dilatation or pancreatic atrophy.   SPLEEN: The spleen is normal in size.   ADRENAL GLANDS: Bilateral adrenal glands appear normal.   KIDNEYS AND URETERS: Bilateral kidneys enhance symmetrically. No hydronephrosis.   PELVIS:   BLADDER: Urinary bladder is moderately distended and demonstrates small amount of air in the non dependent region, likely favored to be related to recent catheterization.  Otherwise urinary bladder is unremarkable.   REPRODUCTIVE ORGANS: Prostate gland is mildly enlarged and demonstrates median lobe hypertrophy indenting on the base of the bladder.   BOWEL/RETROPERITONEUM/LYMPH NODES/ABDOMINAL WALL: Nasogastric tube extends through the distal thoracic esophagus with its tip located in the gastric body. Otherwise stomach is not well distended limiting evaluation. Postsurgical changes of partial sigmoid colectomy with colostomy in the left lower quadrant are noted. There is diffuse distention of small-bowel loops demonstrating fluid-filled lumen with few scattered areas of air-fluid levels. However no significant transition point is noted. The small bowel is distended to approximately 3.7 cm in the largest width. The degree of distention is predominantly seen in the distal jejunal and ileal loops. Appendix is not distinctly visualized. There is a moderate-sized hyperdense fluid collection in the lower pelvis measuring approximately 14 x 14 x 7 cm in CC, AP and transverse dimensions respectively. This measures up to 67 Hounsfield units and is suggestive of postoperative hematoma. There is a surgical drain extending through the hematoma with its tip located in the left mid hemiabdomen. There is also small amount of perihepatic and perisplenic fluid collection measuring up to 35 Hounsfield units and is likely favored to represent extension of the evolving pelvic hematoma. There is interval improvement in intraperitoneal free air compared to immediate prior CT examination dated 05/13/2025. However there is small amount of subcutaneous emphysema in the anterior abdominal wall soft tissues with midline abdominal wall incision and overlying staples, suggestive of recent postsurgical status. There is also diffuse body wall edema, predominantly involving the lower abdomen and pelvis. This is new compared to prior CT examination and is likely favored to be reactive/due to fluid overload. Small  fat containing right-sided inguinal hernia is noted.   No evidence of enlarged lymphadenopathy in the abdomen and pelvis.   VESSELS: Abdominal aorta and IVC appears grossly unremarkable.     BONES: No suspicious osseous lesions.       1.  Interval postsurgical changes of partial sigmoid colectomy with colostomy in the left lower quadrant. Findings concerning for hematoma in the lower pelvis, predominantly located in the rectovesical pouch extending superiorly to the perihepatic and perisplenic region. The largest pocket measures up to 14 x 14 x 7 cm in CC, AP and transverse dimensions respectively the surgical drain extending through the hematoma. Please note that evaluation for active contrast extravasation is limited due to single phase contrast examination. Recommend correlation with trending hemoglobin and hematocrit levels. A CT angiogram with arterial and delayed phases can be obtained for definitive evaluation if clinically warranted. 2. Diffusely distended fluid-filled small bowel loops throughout the abdomen without definite CT evidence of transition point. This could be related to postoperative ileus, however recommend correlation with patient's symptomatology for bowel obstruction. Serial close interval follow-up radiographs can be obtained as clinically warranted. 3. Incidental note of a 6.5 x 6 mm well-circumscribed lesion in the pancreatic neck demonstrating macroscopic fat with possible contiguity with the main pancreatic duct. This is most likely favored to represent a lipoma versus an interdigitating fat. No definite evidence of pancreatic ductal dilatation or pancreatic parenchymal atrophy. An MRI can be obtained for further evaluation as clinically warranted. 4. Interval evidence of body wall edema, likely favored to be reactive versus fluid overload. Recommend clinical correlation. 5. Small amount of gas in the non dependent region of the urinary bladder, likely favored to be related to recent  catheterization. However recommend correlation for emphysematous cystitis. 6. Mild prostatomegaly. Recommend correlation with PSA levels.   MACRO: Critical Finding:  See findings. Notification was initiated on 5/19/2025 at 9:50 am by Dr. Inés Hung.  (**-OCF-**) Instructions:     Signed by: Inés Hung 5/19/2025 9:50 AM Dictation workstation:   [id number]      Scheduled medications  Scheduled Medications[1]  Continuous medications  Continuous Medications[2]  PRN medications  PRN Medications[3]                   Assessment & Plan  Intestinal perforation (Multi)      Diverticulitis of large intestine with perforation without abscess or bleeding    Diego Morales is a 70 y.o. male with a past medical history of cardiac arrest during cardiac catheterization in 2016 secondary to ventricular arrhythmia/V-fib requiring cardioversion (normal coronary arteries), heart murmur, and angina pectoris who was admitted to the hospital for suspected small intestinal perforation and acute diverticulitis.       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 There are regions of small bowel with wall thickening and fluid including the indeterminate region with adjacent non dependent free air which is indeterminate for location  Repeat CT on 5/13 shows no changes from above CT  of perforation. There is also findings suggestive of acute sigmoidal diverticulitis.  - now s/p sigmoid colectomy on 5/15 monitoring colostomy output; NG and KATY removed 5/19  - surgical  pathology and cultures pending no NGTD no organisms seen   - Continue Zosyn  - IVF D5/NS with KCL  - Clear liquid diet  - Zofran for nausea PRN  - potassium phosphate tablets given to keep K> 4 and phosphate >2 and Mg >2     ESTELITA 2/2 to decreased urine output vs toradol use [resolved]  - 5/19 Cr 0.91 < 1.45 < 1.96  - is on IVF 125ml/hr   - is NPO   - improvement of Cr and GFR since discontinuing toradol     CAD with hx of VF with LHC   - continuing metoprolol 25mg nightly per home regimen  - Cardiology following for cardiac clearance  - holding home aspirin  - increasing metoprolol to 50mg should HR continue to be elevated after 2U RBC  - on telemetry       HTN  - home regimen metoprolol 25mg   - IV metoprolol 5mg given for episodes of tachycardia 5/17 AM   - may be secondary to increase in pain and stress  - Will increase metoprolol to 50mg should HR continue to be elevated    Bilateral lung reticulonodular opacity  - Right greater than left based on CT scan which is similar to prior imaging from 6/23/2016  - Patient is asymptomatic continues to be remain on RA  - T spot negative TB excluded  - ID following  - IS being used, throat lozenges used   - Nasal saline spray given   - Chest PT ordered  - mucinex ordered    L corneal irritation  - dexamethasone ophthalmic solution drops      DVT prophylaxis: SCD, held aspirin and lovenox for concerns of increased bleeding, 2U RBC ordered 5/18  Dispo: monitor clinically, continue pain control         Patient seen and discussed with Dr. Isidro Lawrence, DO  PGY-2 Family Medicine         [1] acetaminophen, 650 mg, nasogastric tube, q6h  [Held by provider] aspirin, 81 mg, oral, Daily  dexAMETHasone, 2 drop, Left Eye, q6h LOU  fluticasone, 2 spray, Each Nostril, Daily  furosemide, 20 mg, intravenous, Once  gabapentin, 100 mg, nasogastric tube, q12h LOU  [Held by provider] heparin, 5,000 Units, subcutaneous, q8h  methocarbamol, 500 mg, intravenous, q8h  metoprolol succinate  XL, 25 mg, oral, Nightly  piperacillin-tazobactam, 4.5 g, intravenous, q6h  potassium phosphate, 21 mmol, intravenous, Once     [2] potassium riivowe-Q1-3.45%NaCl, 100 mL/hr, Last Rate: 100 mL/hr (05/19/25 1147)     [3] PRN medications: benzocaine-menthol, guaiFENesin, HYDROmorphone, HYDROmorphone, melatonin, naloxone, [DISCONTINUED] ondansetron **OR** ondansetron, sodium chloride

## 2025-05-19 NOTE — CARE PLAN
The patient's goals for the shift include  patient will remain safe and comfortable throughout shift.    The clinical goals for the shift include patient will have controlled pain throughout shift.      Problem: Pain - Adult  Goal: Verbalizes/displays adequate comfort level or baseline comfort level  Outcome: Progressing     Problem: Safety - Adult  Goal: Free from fall injury  Outcome: Progressing     Problem: Discharge Planning  Goal: Discharge to home or other facility with appropriate resources  Outcome: Progressing     Problem: Chronic Conditions and Co-morbidities  Goal: Patient's chronic conditions and co-morbidity symptoms are monitored and maintained or improved  Outcome: Progressing     Problem: Nutrition  Goal: Nutrient intake appropriate for maintaining nutritional needs  Outcome: Progressing     Problem: Pain  Goal: Takes deep breaths with improved pain control throughout the shift  Outcome: Progressing  Goal: Turns in bed with improved pain control throughout the shift  Outcome: Progressing  Goal: Walks with improved pain control throughout the shift  Outcome: Progressing  Goal: Performs ADL's with improved pain control throughout shift  Outcome: Progressing  Goal: Participates in PT with improved pain control throughout the shift  Outcome: Progressing  Goal: Free from opioid side effects throughout the shift  Outcome: Progressing  Goal: Free from acute confusion related to pain meds throughout the shift  Outcome: Progressing

## 2025-05-20 LAB
ALBUMIN SERPL BCP-MCNC: 2.8 G/DL (ref 3.4–5)
ALP SERPL-CCNC: 173 U/L (ref 33–136)
ALT SERPL W P-5'-P-CCNC: 32 U/L (ref 10–52)
ANION GAP SERPL CALC-SCNC: 12 MMOL/L (ref 10–20)
AST SERPL W P-5'-P-CCNC: 26 U/L (ref 9–39)
BILIRUB SERPL-MCNC: 1.2 MG/DL (ref 0–1.2)
BUN SERPL-MCNC: 11 MG/DL (ref 6–23)
CALCIUM SERPL-MCNC: 7.4 MG/DL (ref 8.6–10.3)
CHLORIDE SERPL-SCNC: 101 MMOL/L (ref 98–107)
CO2 SERPL-SCNC: 22 MMOL/L (ref 21–32)
CREAT SERPL-MCNC: 0.78 MG/DL (ref 0.5–1.3)
EGFRCR SERPLBLD CKD-EPI 2021: >90 ML/MIN/1.73M*2
ERYTHROCYTE [DISTWIDTH] IN BLOOD BY AUTOMATED COUNT: 14.7 % (ref 11.5–14.5)
GLUCOSE SERPL-MCNC: 114 MG/DL (ref 74–99)
HCT VFR BLD AUTO: 26 % (ref 41–52)
HGB BLD-MCNC: 9.2 G/DL (ref 13.5–17.5)
MAGNESIUM SERPL-MCNC: 1.84 MG/DL (ref 1.6–2.4)
MCH RBC QN AUTO: 32.6 PG (ref 26–34)
MCHC RBC AUTO-ENTMCNC: 35.4 G/DL (ref 32–36)
MCV RBC AUTO: 92 FL (ref 80–100)
NRBC BLD-RTO: 0.3 /100 WBCS (ref 0–0)
PHOSPHATE SERPL-MCNC: 2.1 MG/DL (ref 2.5–4.9)
PLATELET # BLD AUTO: 235 X10*3/UL (ref 150–450)
POTASSIUM SERPL-SCNC: 3.7 MMOL/L (ref 3.5–5.3)
PROT SERPL-MCNC: 5 G/DL (ref 6.4–8.2)
RBC # BLD AUTO: 2.82 X10*6/UL (ref 4.5–5.9)
SODIUM SERPL-SCNC: 131 MMOL/L (ref 136–145)
WBC # BLD AUTO: 9.1 X10*3/UL (ref 4.4–11.3)

## 2025-05-20 PROCEDURE — 2500000001 HC RX 250 WO HCPCS SELF ADMINISTERED DRUGS (ALT 637 FOR MEDICARE OP): Performed by: SURGERY

## 2025-05-20 PROCEDURE — 2500000001 HC RX 250 WO HCPCS SELF ADMINISTERED DRUGS (ALT 637 FOR MEDICARE OP)

## 2025-05-20 PROCEDURE — 80053 COMPREHEN METABOLIC PANEL: CPT | Performed by: STUDENT IN AN ORGANIZED HEALTH CARE EDUCATION/TRAINING PROGRAM

## 2025-05-20 PROCEDURE — 36415 COLL VENOUS BLD VENIPUNCTURE: CPT | Performed by: STUDENT IN AN ORGANIZED HEALTH CARE EDUCATION/TRAINING PROGRAM

## 2025-05-20 PROCEDURE — 2500000004 HC RX 250 GENERAL PHARMACY W/ HCPCS (ALT 636 FOR OP/ED): Mod: JZ | Performed by: FAMILY MEDICINE

## 2025-05-20 PROCEDURE — 2500000004 HC RX 250 GENERAL PHARMACY W/ HCPCS (ALT 636 FOR OP/ED): Mod: JZ | Performed by: PHYSICIAN ASSISTANT

## 2025-05-20 PROCEDURE — 2500000004 HC RX 250 GENERAL PHARMACY W/ HCPCS (ALT 636 FOR OP/ED): Mod: JZ

## 2025-05-20 PROCEDURE — 2500000001 HC RX 250 WO HCPCS SELF ADMINISTERED DRUGS (ALT 637 FOR MEDICARE OP): Performed by: INTERNAL MEDICINE

## 2025-05-20 PROCEDURE — 2500000004 HC RX 250 GENERAL PHARMACY W/ HCPCS (ALT 636 FOR OP/ED): Mod: JZ | Performed by: INTERNAL MEDICINE

## 2025-05-20 PROCEDURE — 1100000001 HC PRIVATE ROOM DAILY

## 2025-05-20 PROCEDURE — 85027 COMPLETE CBC AUTOMATED: CPT | Performed by: INTERNAL MEDICINE

## 2025-05-20 PROCEDURE — 99233 SBSQ HOSP IP/OBS HIGH 50: CPT

## 2025-05-20 PROCEDURE — 84100 ASSAY OF PHOSPHORUS: CPT | Performed by: PHYSICIAN ASSISTANT

## 2025-05-20 PROCEDURE — 2500000004 HC RX 250 GENERAL PHARMACY W/ HCPCS (ALT 636 FOR OP/ED): Mod: JZ | Performed by: SURGERY

## 2025-05-20 PROCEDURE — 83735 ASSAY OF MAGNESIUM: CPT | Performed by: INTERNAL MEDICINE

## 2025-05-20 RX ORDER — ACETAMINOPHEN 325 MG/1
650 TABLET ORAL EVERY 6 HOURS
Status: DISCONTINUED | OUTPATIENT
Start: 2025-05-20 | End: 2025-05-21 | Stop reason: HOSPADM

## 2025-05-20 RX ORDER — POLYETHYLENE GLYCOL 3350 17 G/17G
17 POWDER, FOR SOLUTION ORAL DAILY
Status: DISCONTINUED | OUTPATIENT
Start: 2025-05-20 | End: 2025-05-21 | Stop reason: HOSPADM

## 2025-05-20 RX ORDER — SODIUM CHLORIDE 1000 MG
1000 TABLET, SOLUBLE MISCELLANEOUS
Status: DISCONTINUED | OUTPATIENT
Start: 2025-05-20 | End: 2025-05-20

## 2025-05-20 RX ORDER — CYCLOBENZAPRINE HCL 5 MG
5 TABLET ORAL 3 TIMES DAILY
Status: DISCONTINUED | OUTPATIENT
Start: 2025-05-20 | End: 2025-05-21 | Stop reason: HOSPADM

## 2025-05-20 RX ORDER — MAGNESIUM SULFATE HEPTAHYDRATE 40 MG/ML
2 INJECTION, SOLUTION INTRAVENOUS ONCE
Status: COMPLETED | OUTPATIENT
Start: 2025-05-20 | End: 2025-05-20

## 2025-05-20 RX ORDER — TRAMADOL HYDROCHLORIDE 50 MG/1
50 TABLET, FILM COATED ORAL EVERY 4 HOURS PRN
Status: DISCONTINUED | OUTPATIENT
Start: 2025-05-20 | End: 2025-05-21 | Stop reason: HOSPADM

## 2025-05-20 RX ORDER — OXYCODONE HYDROCHLORIDE 5 MG/1
5 TABLET ORAL EVERY 4 HOURS PRN
Refills: 0 | Status: DISCONTINUED | OUTPATIENT
Start: 2025-05-20 | End: 2025-05-21 | Stop reason: HOSPADM

## 2025-05-20 RX ORDER — SODIUM CHLORIDE, SODIUM LACTATE, POTASSIUM CHLORIDE, CALCIUM CHLORIDE 600; 310; 30; 20 MG/100ML; MG/100ML; MG/100ML; MG/100ML
20 INJECTION, SOLUTION INTRAVENOUS CONTINUOUS
Status: DISCONTINUED | OUTPATIENT
Start: 2025-05-20 | End: 2025-05-21 | Stop reason: HOSPADM

## 2025-05-20 RX ORDER — GABAPENTIN 100 MG/1
100 CAPSULE ORAL 2 TIMES DAILY
Status: DISCONTINUED | OUTPATIENT
Start: 2025-05-20 | End: 2025-05-21 | Stop reason: HOSPADM

## 2025-05-20 RX ORDER — CALCIUM CARBONATE 200(500)MG
500 TABLET,CHEWABLE ORAL 3 TIMES DAILY
Status: DISCONTINUED | OUTPATIENT
Start: 2025-05-20 | End: 2025-05-21 | Stop reason: HOSPADM

## 2025-05-20 RX ORDER — FUROSEMIDE 10 MG/ML
20 INJECTION INTRAMUSCULAR; INTRAVENOUS ONCE
Status: COMPLETED | OUTPATIENT
Start: 2025-05-20 | End: 2025-05-20

## 2025-05-20 RX ORDER — MULTIVIT-MIN/IRON FUM/FOLIC AC 7.5 MG-4
1 TABLET ORAL DAILY
Status: DISCONTINUED | OUTPATIENT
Start: 2025-05-20 | End: 2025-05-21 | Stop reason: HOSPADM

## 2025-05-20 RX ORDER — FERROUS SULFATE 325(65) MG
65 TABLET ORAL 3 TIMES DAILY
Status: DISCONTINUED | OUTPATIENT
Start: 2025-05-20 | End: 2025-05-21 | Stop reason: HOSPADM

## 2025-05-20 RX ADMIN — MAGNESIUM SULFATE HEPTAHYDRATE 2 G: 2 INJECTION, SOLUTION INTRAVENOUS at 09:35

## 2025-05-20 RX ADMIN — Medication 500 MG: at 16:03

## 2025-05-20 RX ADMIN — Medication 1 TABLET: at 12:50

## 2025-05-20 RX ADMIN — METOPROLOL SUCCINATE 25 MG: 25 TABLET, EXTENDED RELEASE ORAL at 21:11

## 2025-05-20 RX ADMIN — FERROUS SULFATE TAB 325 MG (65 MG ELEMENTAL FE) 1 TABLET: 325 (65 FE) TAB at 21:10

## 2025-05-20 RX ADMIN — PIPERACILLIN SODIUM AND TAZOBACTAM SODIUM 4.5 G: 4; .5 INJECTION, SOLUTION INTRAVENOUS at 16:03

## 2025-05-20 RX ADMIN — PIPERACILLIN SODIUM AND TAZOBACTAM SODIUM 4.5 G: 4; .5 INJECTION, SOLUTION INTRAVENOUS at 08:03

## 2025-05-20 RX ADMIN — Medication 500 MG: at 21:11

## 2025-05-20 RX ADMIN — Medication 500 MG: at 12:50

## 2025-05-20 RX ADMIN — FERROUS SULFATE TAB 325 MG (65 MG ELEMENTAL FE) 1 TABLET: 325 (65 FE) TAB at 09:34

## 2025-05-20 RX ADMIN — SODIUM CHLORIDE, SODIUM LACTATE, POTASSIUM CHLORIDE, AND CALCIUM CHLORIDE 20 ML: .6; .31; .03; .02 INJECTION, SOLUTION INTRAVENOUS at 13:39

## 2025-05-20 RX ADMIN — FLUTICASONE PROPIONATE 2 SPRAY: 50 SPRAY, METERED NASAL at 08:03

## 2025-05-20 RX ADMIN — FUROSEMIDE 20 MG: 10 INJECTION, SOLUTION INTRAMUSCULAR; INTRAVENOUS at 09:34

## 2025-05-20 RX ADMIN — METHOCARBAMOL 500 MG: 1000 INJECTION, SOLUTION INTRAMUSCULAR; INTRAVENOUS at 02:09

## 2025-05-20 RX ADMIN — ACETAMINOPHEN 650 MG: 325 TABLET, FILM COATED ORAL at 18:24

## 2025-05-20 RX ADMIN — GABAPENTIN 100 MG: 100 CAPSULE ORAL at 21:10

## 2025-05-20 RX ADMIN — CALCIUM CARBONATE 1 TABLET: 500 TABLET, CHEWABLE ORAL at 21:10

## 2025-05-20 RX ADMIN — CALCIUM CARBONATE 1 TABLET: 500 TABLET, CHEWABLE ORAL at 09:34

## 2025-05-20 RX ADMIN — POLYETHYLENE GLYCOL 3350 17 G: 17 POWDER, FOR SOLUTION ORAL at 12:50

## 2025-05-20 RX ADMIN — GABAPENTIN 100 MG: 250 SOLUTION ORAL at 08:03

## 2025-05-20 RX ADMIN — PIPERACILLIN SODIUM AND TAZOBACTAM SODIUM 4.5 G: 4; .5 INJECTION, SOLUTION INTRAVENOUS at 02:11

## 2025-05-20 RX ADMIN — CYCLOBENZAPRINE HYDROCHLORIDE 5 MG: 5 TABLET, FILM COATED ORAL at 21:10

## 2025-05-20 RX ADMIN — METHOCARBAMOL 500 MG: 1000 INJECTION, SOLUTION INTRAMUSCULAR; INTRAVENOUS at 09:45

## 2025-05-20 RX ADMIN — CYCLOBENZAPRINE HYDROCHLORIDE 5 MG: 5 TABLET, FILM COATED ORAL at 12:51

## 2025-05-20 RX ADMIN — CYCLOBENZAPRINE HYDROCHLORIDE 5 MG: 5 TABLET, FILM COATED ORAL at 16:03

## 2025-05-20 RX ADMIN — DEXTROSE MONOHYDRATE, SODIUM CHLORIDE, AND POTASSIUM CHLORIDE 100 ML/HR: 50; 1.49; 4.5 INJECTION, SOLUTION INTRAVENOUS at 08:08

## 2025-05-20 RX ADMIN — ONDANSETRON 4 MG: 2 INJECTION, SOLUTION INTRAMUSCULAR; INTRAVENOUS at 05:42

## 2025-05-20 RX ADMIN — FERROUS SULFATE TAB 325 MG (65 MG ELEMENTAL FE) 1 TABLET: 325 (65 FE) TAB at 16:03

## 2025-05-20 RX ADMIN — ACETAMINOPHEN 650 MG: 650 SOLUTION ORAL at 09:34

## 2025-05-20 RX ADMIN — ACETAMINOPHEN 650 MG: 650 SOLUTION ORAL at 04:11

## 2025-05-20 RX ADMIN — PIPERACILLIN SODIUM AND TAZOBACTAM SODIUM 4.5 G: 4; .5 INJECTION, SOLUTION INTRAVENOUS at 21:06

## 2025-05-20 RX ADMIN — ACETAMINOPHEN 650 MG: 325 TABLET, FILM COATED ORAL at 23:05

## 2025-05-20 RX ADMIN — CALCIUM CARBONATE 1 TABLET: 500 TABLET, CHEWABLE ORAL at 16:03

## 2025-05-20 ASSESSMENT — COGNITIVE AND FUNCTIONAL STATUS - GENERAL
MOVING TO AND FROM BED TO CHAIR: A LITTLE
CLIMB 3 TO 5 STEPS WITH RAILING: A LITTLE
DAILY ACTIVITIY SCORE: 21
MOBILITY SCORE: 19
DRESSING REGULAR UPPER BODY CLOTHING: A LITTLE
DAILY ACTIVITIY SCORE: 21
TOILETING: A LITTLE
WALKING IN HOSPITAL ROOM: A LITTLE
WALKING IN HOSPITAL ROOM: A LITTLE
STANDING UP FROM CHAIR USING ARMS: A LITTLE
STANDING UP FROM CHAIR USING ARMS: A LITTLE
TOILETING: A LITTLE
DRESSING REGULAR UPPER BODY CLOTHING: A LITTLE
TURNING FROM BACK TO SIDE WHILE IN FLAT BAD: A LITTLE
DRESSING REGULAR LOWER BODY CLOTHING: A LITTLE
MOVING TO AND FROM BED TO CHAIR: A LITTLE
CLIMB 3 TO 5 STEPS WITH RAILING: A LITTLE
DRESSING REGULAR LOWER BODY CLOTHING: A LITTLE
TURNING FROM BACK TO SIDE WHILE IN FLAT BAD: A LITTLE
MOBILITY SCORE: 19

## 2025-05-20 ASSESSMENT — PAIN SCALES - GENERAL
PAINLEVEL_OUTOF10: 2
PAINLEVEL_OUTOF10: 0 - NO PAIN

## 2025-05-20 ASSESSMENT — PAIN - FUNCTIONAL ASSESSMENT: PAIN_FUNCTIONAL_ASSESSMENT: 0-10

## 2025-05-20 NOTE — NURSING NOTE
Met with patient and wife to provide colostomy care education. Ho reports feeling somewhat better, ready to start to learn but not up to much yet.  His wife is eager to learn as well.    Together we reviewed the basic anatomy, function and healing of a colostomy as well as basic care - emptying pouch, access to supplies, etc.  They each gave return demo of emptying pouch and did well. They had many great questions which were answered.  They do have a friend who has had an ostomy for years who has offered her help as needed, a comfort for Ho and Yohana.  They will have Home Health care RN as well.  They want to think about [employer] prior to us signing them up so information has been provided re: this support program.  Feeling exhausted after review of all the above Ho asked if he could wait until tomorrow to be shown how to change pouch and this will be fine - will notify oncoming Ostomy RN of this.     The stoma is pale pink, 1 1/4 inch diameter, budded and slightly edematous, pouching system fully intact. Stool liquid and dark brown.  [employer] 45 mm CeraPlus wafer in use and works well for patient.  Will obtain starter supplies for patient.    [employer] ostomy supplies:  -- 44 mm CeraPlus wafer - # [id number]  -- 44 mm pouch - # [id number]  -- [employer] adhesive remover wipes - # [id number]

## 2025-05-20 NOTE — PROGRESS NOTES
Diego Morales is a 70 y.o. male on day 8 of admission presenting with Intestinal perforation (Multi).    Subjective   Interval History: no fever, no new complaints        Review of Systems    Objective   Range of Vitals (last 24 hours)  Heart Rate:  [65-82]   Temp:  [36.3 °C (97.3 °F)-37 °C (98.6 °F)]   Resp:  [16-17]   BP: (142-160)/(74-85)   SpO2:  [95 %-96 %]   Daily Weight  05/12/25 : 67.7 kg (149 lb 4.8 oz)    Body mass index is 20.83 kg/m².    Physical Exam  Constitutional:       Appearance: Normal appearance.   HENT:      Head: Normocephalic and atraumatic.      Mouth/Throat:      Mouth: Mucous membranes are moist.      Pharynx: Oropharynx is clear.   Eyes:      Pupils: Pupils are equal, round, and reactive to light.   Cardiovascular:      Rate and Rhythm: Normal rate and regular rhythm.      Heart sounds: Normal heart sounds.   Pulmonary:      Effort: Pulmonary effort is normal.      Breath sounds: Normal breath sounds.   Abdominal:      General: There is distension.      Palpations: Abdomen is soft.      Tenderness: There is abdominal tenderness.      Comments: Ostomy, dressing   Musculoskeletal:      Cervical back: Normal range of motion.   Neurological:      Mental Status: He is alert.         Antibiotics  piperacillin-tazobactam - 4.5 gram/100 mL    Relevant Results  Labs  Results from last 72 hours   Lab Units 05/20/25  0746 05/19/25  1300 05/19/25  0642   WBC AUTO x10*3/uL 9.1 9.5 9.1   HEMOGLOBIN g/dL 9.2* 8.6* 8.1*   HEMATOCRIT % 26.0* 24.4* 23.6*   PLATELETS AUTO x10*3/uL 235 210 210     Results from last 72 hours   Lab Units 05/20/25  0746 05/19/25  0642 05/18/25  0627   SODIUM mmol/L 131* 137 137   POTASSIUM mmol/L 3.7 3.5 3.8   CHLORIDE mmol/L 101 106 105   CO2 mmol/L 22 23 24   BUN mg/dL 11 21 31*   CREATININE mg/dL 0.78 0.91 1.45*   GLUCOSE mg/dL 114* 117* 131*   CALCIUM mg/dL 7.4* 7.4* 7.2*   ANION GAP mmol/L 12 12 12   EGFR mL/min/1.73m*2 >90 90 52*   PHOSPHORUS mg/dL 2.1* 1.8*  --       Results from last 72 hours   Lab Units 05/20/25  0746 05/19/25  0642 05/18/25  0627   ALK PHOS U/L 173* 175* 104   BILIRUBIN TOTAL mg/dL 1.2 1.3* 0.8   BILIRUBIN DIRECT mg/dL  --   --  0.3   PROTEIN TOTAL g/dL 5.0* 4.9* 4.6*   ALT U/L 32 32 14   AST U/L 26 36 16   ALBUMIN g/dL 2.8* 2.7* 2.4*     Estimated Creatinine Clearance: 83.2 mL/min (by C-G formula based on SCr of 0.78 mg/dL).  CRP   Date Value Ref Range Status   06/18/2022 0.17 mg/dL Final     Comment:     REF VALUE  < 1.00       Microbiology  Reviewed  Imaging  Reviewed        Assessment/Plan   Abnormal CT of the chest, seems to be stable compared to old CT, likely old granulomatous disease, most likely fungal or atypical mycobacteria, MTB is unlikely, T spot negative  Sigmoid diverticulitis, the repeat CT was reviewed, sp surgery, the culture is negative     Recommendations :  Continue Zosyn  Increase the activity  Discussed with the medical team     I spent minutes in the professional and overall care of this patient.          Umm Vega MD

## 2025-05-20 NOTE — CARE PLAN
The clinical goals for the shift include pt will ambulate without pain or injury during this shift    Problem: Pain - Adult  Goal: Verbalizes/displays adequate comfort level or baseline comfort level  Outcome: Progressing     Problem: Safety - Adult  Goal: Free from fall injury  Outcome: Progressing     Problem: Chronic Conditions and Co-morbidities  Goal: Patient's chronic conditions and co-morbidity symptoms are monitored and maintained or improved  Outcome: Progressing     Problem: Nutrition  Goal: Nutrient intake appropriate for maintaining nutritional needs  Outcome: Progressing

## 2025-05-20 NOTE — CARE PLAN
The patient's goals for the shift include      The clinical goals for the shift include pt will ambulate several times throughout the day.

## 2025-05-20 NOTE — PROGRESS NOTES
"General/Trauma Surgery Daily Progress Note    Subjective   Feels improvement today in overall energy. Has been ambulatory. Colostomy functioning. Denies pain. Had one episode of spit up that consisted of phlegm. Denies nausea, bloating or chills.       Objective   Last Recorded Vitals:  Blood pressure 150/78, pulse 65, temperature 37 °C (98.6 °F), temperature source Temporal, resp. rate 16, height 1.803 m (5' 10.98\"), weight 67.7 kg (149 lb 4.8 oz), SpO2 95%.    Intake/Output last 3 Shifts:  I/O last 3 completed shifts:  In: 3648.6 (53.9 mL/kg) [P.O.:340; I.V.:1483.3 (21.9 mL/kg); NG/GT:120; IV Piggyback:1705.3]  Out: 4830 (71.3 mL/kg) [Urine:4015 (1.6 mL/kg/hr); Emesis/NG output:125; Drains:90; Stool:600]  Weight: 67.7 kg     Pain Score:  0-10 (Numeric) Pain Score: 0 - No pain     Physical Exam:  Constitutional: No acute distress, sitting up in bed.  Neuro: Alert, oriented. Follows commands.   Eyes: EOMI. No scleral icterus. Conjunctiva pink.  ENT: MMM.   Heart: Regular rate.  Respiratory: No increased work of breathing or audible wheeze.  Abdomen: Soft, nondistended. Appropriately tender. Incisions clean, dry, intact. Colostomy with liquid stool and gas. KATY dark sanguinous, low output. Removed.   MSK: Moves all extremities.  Vascular: Palpable pulses throughout, no pitting edema.  Skin: No rashes.   Psychological: Appropriate mood and behavior.            Relevant Results  Laboratory Results:  CBC:   Lab Results   Component Value Date    WBC 9.1 05/20/2025    RBC 2.82 (L) 05/20/2025    HGB 9.2 (L) 05/20/2025    HCT 26.0 (L) 05/20/2025     05/20/2025       RFP:   Lab Results   Component Value Date     (L) 05/20/2025    K 3.7 05/20/2025     05/20/2025    CO2 22 05/20/2025    BUN 11 05/20/2025    CREATININE 0.78 05/20/2025    CALCIUM 7.4 (L) 05/20/2025    MG 1.84 05/20/2025    PHOS 2.1 (L) 05/20/2025        LFTs:   Lab Results   Component Value Date    PROT 5.0 (L) 05/20/2025    ALBUMIN 2.8 (L) " 05/20/2025    BILITOT 1.2 05/20/2025    BILIDIR 0.3 05/18/2025    ALKPHOS 173 (H) 05/20/2025    AST 26 05/20/2025    ALT 32 05/20/2025         Imaging:  No results found.    Cardiology, Vascular, and Other Imaging  No other imaging results found for the past 2 days           Assessment/Plan   This is a 70 y.o. male now POD #5 from open Surinder's colectomy for perforated sigmoid diverticulitis.     Over the weekend, he experienced increased lethargy and H&H downtrended, lowest being 6.0/18.2 and received 2 units pRBC. Now improving.     Drain removed today. Will advance to regular diet.        Plan:   -- Regular diet  -- Continue IVF 20cc/hr  -- Ambulate  -- Daily CBC, RFP, Mg with repletions  -- Leave drain site covered with gauze, can change if saturated.   -- Ostomy education        Seen and discussed with Dr. Canales who is in agreement with plan. Please secure chat with questions.     Cindy Baldwin PA-C      I have seen and reviewed the patient with the PA.  I agree with their note as written above.  Any changes or corrections have been made in the body of the note.    Anoop Canales MD  General Surgery  Office: [phone]  Fax:     [phone]  12:59 PM   05/20/25

## 2025-05-20 NOTE — PROGRESS NOTES
05/20/25 1524   Discharge Planning   Living Arrangements Spouse/significant other   Support Systems Spouse/significant other   Assistance Needed patient is A&Ox3, independent in ADL's, no AD, no DME, drives PCP- Ramon Gould MD   Type of Residence Private residence   Number of Stairs to Enter Residence 3   Number of Stairs Within Residence 20   Do you have animals or pets at home? Yes   Type of Animals or Pets 1 dog- Max   Who is requesting discharge planning? Provider   Home or Post Acute Services None   Expected Discharge Disposition Home H  ([facility] home care services for ostomy care/education)   Does the patient need discharge transport arranged? No   Patient Choice   Provider Choice list and CMS website (https://medicare.gov/care-compare#search) for post-acute Quality and Resource Measure Data were provided and reviewed with: Patient;Family   Patient / Family choosing to utilize agency / facility established prior to hospitalization No   Stroke Family Assessment   Stroke Family Assessment Needed No   Intensity of Service   Intensity of Service 0-30 min     5/20/2025 1523: Spoke to patient and spouse at bedside. Decline need for Healthy at Home program at this time. Call to Dr. Gould office to see if he would be willing to follow for [facility] orders and attempt to schedule appointment.

## 2025-05-20 NOTE — PROGRESS NOTES
"Diego Morales is a 70 y.o. male on day 8 of admission presenting with Intestinal perforation (Multi).    Subjective     POD 5  Interval History:  - hgb stable  - NG is out  - started CLD    Diego reports feeling well and is happy to have bis NG out   He reports improved leg swelling after IV lasix yesterday afternoon     Objective     Physical Exam  Constitutional:       General: He is not in acute distress.     Appearance: He is ill-appearing.   HENT:      Mouth/Throat:      Mouth: Mucous membranes are moist.   Eyes:      Conjunctiva/sclera: Conjunctivae normal.   Cardiovascular:      Rate and Rhythm: Normal rate and regular rhythm.      Heart sounds: Murmur heard.   Pulmonary:      Effort: Pulmonary effort is normal.      Breath sounds: No wheezing or rhonchi.   Abdominal:      General: Bowel sounds are normal. There is no distension.      Palpations: Abdomen is soft.      Tenderness: There is abdominal tenderness.      Comments: KATY drain with serosanguinous output  Colostomy with moderate amount of dark brown/black output   Musculoskeletal:      Right lower leg: No edema.      Left lower leg: No edema.   Skin:     General: Skin is warm.      Capillary Refill: Capillary refill takes less than 2 seconds.   Neurological:      Mental Status: He is alert and oriented to person, place, and time.   Psychiatric:         Mood and Affect: Mood normal.         Behavior: Behavior normal.         Thought Content: Thought content normal.         Judgment: Judgment normal.       Last Recorded Vitals  Blood pressure 139/80, pulse 75, temperature 36.8 °C (98.2 °F), temperature source Temporal, resp. rate 18, height 1.803 m (5' 10.98\"), weight 67.7 kg (149 lb 4.8 oz), SpO2 96%.  Intake/Output last 3 Shifts:  I/O last 3 completed shifts:  In: 3648.6 (53.9 mL/kg) [P.O.:340; I.V.:1483.3 (21.9 mL/kg); NG/GT:120; IV Piggyback:1705.3]  Out: 4830 (71.3 mL/kg) [Urine:4015 (1.6 mL/kg/hr); Emesis/NG output:125; Drains:90; " Stool:600]  Weight: 67.7 kg     Relevant Results    Results for orders placed or performed during the hospital encounter of 05/12/25 (from the past 24 hours)   CBC   Result Value Ref Range    WBC 9.1 4.4 - 11.3 x10*3/uL    nRBC 0.3 (H) 0.0 - 0.0 /100 WBCs    RBC 2.82 (L) 4.50 - 5.90 x10*6/uL    Hemoglobin 9.2 (L) 13.5 - 17.5 g/dL    Hematocrit 26.0 (L) 41.0 - 52.0 %    MCV 92 80 - 100 fL    MCH 32.6 26.0 - 34.0 pg    MCHC 35.4 32.0 - 36.0 g/dL    RDW 14.7 (H) 11.5 - 14.5 %    Platelets 235 150 - 450 x10*3/uL   Magnesium   Result Value Ref Range    Magnesium 1.84 1.60 - 2.40 mg/dL   Comprehensive Metabolic Panel   Result Value Ref Range    Glucose 114 (H) 74 - 99 mg/dL    Sodium 131 (L) 136 - 145 mmol/L    Potassium 3.7 3.5 - 5.3 mmol/L    Chloride 101 98 - 107 mmol/L    Bicarbonate 22 21 - 32 mmol/L    Anion Gap 12 10 - 20 mmol/L    Urea Nitrogen 11 6 - 23 mg/dL    Creatinine 0.78 0.50 - 1.30 mg/dL    eGFR >90 >60 mL/min/1.73m*2    Calcium 7.4 (L) 8.6 - 10.3 mg/dL    Albumin 2.8 (L) 3.4 - 5.0 g/dL    Alkaline Phosphatase 173 (H) 33 - 136 U/L    Total Protein 5.0 (L) 6.4 - 8.2 g/dL    AST 26 9 - 39 U/L    Bilirubin, Total 1.2 0.0 - 1.2 mg/dL    ALT 32 10 - 52 U/L   Phosphorus   Result Value Ref Range    Phosphorus 2.1 (L) 2.5 - 4.9 mg/dL     No results found.      Scheduled medications  Scheduled Medications[1]  Continuous medications  Continuous Medications[2]  PRN medications  PRN Medications[3]                   Assessment & Plan  Intestinal perforation (Multi)      Diverticulitis of large intestine with perforation without abscess or bleeding    Diego DOMINGUEZ Morales is a 70 y.o. male with a past medical history of cardiac arrest during cardiac catheterization in 2016 secondary to ventricular arrhythmia/V-fib requiring cardioversion (normal coronary arteries), heart murmur, and angina pectoris who was admitted to the hospital for suspected small intestinal perforation and acute diverticulitis. Pt is s/p sigmoid  colectomy on 5/15 with initial concern for bleeding requiring PRBCs, now stable.      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 There are regions of small bowel with wall thickening and fluid including the indeterminate region with adjacent non dependent free air which is indeterminate for location  Repeat CT on 5/13 shows no changes from above CT  of perforation. There is also findings suggestive of acute sigmoidal diverticulitis.  - now s/p sigmoid colectomy on 5/15 monitoring colostomy output; NG removed 5/19  - surgical pathology and cultures pending no NGTD no organisms seen   - Continue Zosyn  - KATY removed today 5/20  - IVF at 20cc/hr per ACS  - Regular Diet  - Zofran for nausea PRN  - potassium phosphate tablets given to keep K> 4 and phosphate >2 and Mg >2     Volume Overload  HTN  Hyponatremia  - BP's have been elevated as high as 150-160s/70-80s; likely secondary to fluid overload in the setting of new leg edema, modestly improved after lasix 20mg IV yesterday afternoon 5/19  - Na 131 this AM  - mIVF reduced to 20cc/hr ACS  - D51/2NS switched to LR  - monitor     Concerns for acute blood loss anemia [resolved]   - 5/20 AM CBC noting hemoglobin stable at 9.2 < 8.6 < 8.1  - NGT drain has been declining in amount of output since surgery; removed by ACS 5/19/25  - CTAP ruled out an active bleed  - Heparin and aspirin held    ESTELITA 2/2 to decreased urine output vs toradol use [resolved]  - 5/20 Cr 0.78 < 0.91 < 1.45 < 1.96    CAD with hx of VF with LHC   - continuing metoprolol 25mg nightly per home regimen  - holding home aspirin; plans for restarting per ACS  - on telemetry       Bilateral lung reticulonodular opacity  - Right greater than left based on CT scan which is similar to prior imaging from 6/23/2016  - Patient is asymptomatic continues to be remain on RA  -  T spot negative TB excluded  - ID following  - IS being used, throat lozenges used   - Nasal saline spray given   - Chest PT ordered  - mucinex ordered    L corneal irritation  - dexamethasone ophthalmic solution drops      DVT prophylaxis: SCD  Dispo: monitor clinically, continue pain control     Patient discussed with Dr. Shalom Lawrence, DO  PGY-2 Family Medicine         [1] acetaminophen, 650 mg, oral, q6h  [Held by provider] aspirin, 81 mg, oral, Daily  calcium carbonate, 500 mg of calcium carbonate, oral, TID  cyclobenzaprine, 5 mg, oral, TID  dexAMETHasone, 2 drop, Left Eye, q6h LOU  ferrous sulfate, 65 mg of elemental iron, oral, TID  fluticasone, 2 spray, Each Nostril, Daily  gabapentin, 100 mg, oral, BID  [Held by provider] heparin, 5,000 Units, subcutaneous, q8h  metoprolol succinate XL, 25 mg, oral, Nightly  multivitamin with minerals, 1 tablet, oral, Daily  piperacillin-tazobactam, 4.5 g, intravenous, q6h  polyethylene glycol, 17 g, oral, Daily  potassium phosphate (monobasic), 500 mg, oral, 4x daily     [2] lactated Ringer's, 20 mL     [3] PRN medications: benzocaine-menthol, guaiFENesin, melatonin, naloxone, [DISCONTINUED] ondansetron **OR** ondansetron, oxyCODONE, sodium chloride, traMADol

## 2025-05-21 ENCOUNTER — DOCUMENTATION (OUTPATIENT)
Dept: HOME HEALTH SERVICES | Facility: HOME HEALTH | Age: 70
End: 2025-05-21
Payer: MEDICARE

## 2025-05-21 ENCOUNTER — HOME HEALTH ADMISSION (OUTPATIENT)
Dept: HOME HEALTH SERVICES | Facility: HOME HEALTH | Age: 70
End: 2025-05-21
Payer: MEDICARE

## 2025-05-21 VITALS
TEMPERATURE: 98.4 F | SYSTOLIC BLOOD PRESSURE: 128 MMHG | BODY MASS INDEX: 20.9 KG/M2 | HEART RATE: 77 BPM | HEIGHT: 71 IN | DIASTOLIC BLOOD PRESSURE: 66 MMHG | RESPIRATION RATE: 16 BRPM | OXYGEN SATURATION: 96 % | WEIGHT: 149.3 LBS

## 2025-05-21 PROBLEM — K57.20 DIVERTICULITIS OF LARGE INTESTINE WITH PERFORATION WITHOUT ABSCESS OR BLEEDING: Status: RESOLVED | Noted: 2025-05-12 | Resolved: 2025-05-21

## 2025-05-21 PROBLEM — K63.1: Status: RESOLVED | Noted: 2025-05-12 | Resolved: 2025-05-21

## 2025-05-21 LAB
ACID FAST STN SPEC: NORMAL
ALBUMIN SERPL BCP-MCNC: 2.9 G/DL (ref 3.4–5)
ALP SERPL-CCNC: 160 U/L (ref 33–136)
ALT SERPL W P-5'-P-CCNC: 33 U/L (ref 10–52)
ANION GAP SERPL CALC-SCNC: 14 MMOL/L (ref 10–20)
AST SERPL W P-5'-P-CCNC: 26 U/L (ref 9–39)
BILIRUB SERPL-MCNC: 1.2 MG/DL (ref 0–1.2)
BUN SERPL-MCNC: 14 MG/DL (ref 6–23)
CALCIUM SERPL-MCNC: 7.7 MG/DL (ref 8.6–10.3)
CHLORIDE SERPL-SCNC: 99 MMOL/L (ref 98–107)
CO2 SERPL-SCNC: 23 MMOL/L (ref 21–32)
CREAT SERPL-MCNC: 0.88 MG/DL (ref 0.5–1.3)
EGFRCR SERPLBLD CKD-EPI 2021: >90 ML/MIN/1.73M*2
ERYTHROCYTE [DISTWIDTH] IN BLOOD BY AUTOMATED COUNT: 14.6 % (ref 11.5–14.5)
FUNGUS SPEC CULT: NORMAL
FUNGUS SPEC FUNGUS STN: NORMAL
GLUCOSE SERPL-MCNC: 95 MG/DL (ref 74–99)
HCT VFR BLD AUTO: 26.6 % (ref 41–52)
HGB BLD-MCNC: 9.3 G/DL (ref 13.5–17.5)
MAGNESIUM SERPL-MCNC: 2.13 MG/DL (ref 1.6–2.4)
MCH RBC QN AUTO: 32.4 PG (ref 26–34)
MCHC RBC AUTO-ENTMCNC: 35 G/DL (ref 32–36)
MCV RBC AUTO: 93 FL (ref 80–100)
MYCOBACTERIUM SPEC CULT: NORMAL
NRBC BLD-RTO: 0 /100 WBCS (ref 0–0)
PHOSPHATE SERPL-MCNC: 2.9 MG/DL (ref 2.5–4.9)
PLATELET # BLD AUTO: 260 X10*3/UL (ref 150–450)
POTASSIUM SERPL-SCNC: 3.8 MMOL/L (ref 3.5–5.3)
PROT SERPL-MCNC: 5 G/DL (ref 6.4–8.2)
RBC # BLD AUTO: 2.87 X10*6/UL (ref 4.5–5.9)
SODIUM SERPL-SCNC: 132 MMOL/L (ref 136–145)
WBC # BLD AUTO: 8.6 X10*3/UL (ref 4.4–11.3)

## 2025-05-21 PROCEDURE — 2500000004 HC RX 250 GENERAL PHARMACY W/ HCPCS (ALT 636 FOR OP/ED): Performed by: SURGERY

## 2025-05-21 PROCEDURE — 2500000004 HC RX 250 GENERAL PHARMACY W/ HCPCS (ALT 636 FOR OP/ED): Mod: JZ | Performed by: INTERNAL MEDICINE

## 2025-05-21 PROCEDURE — 84100 ASSAY OF PHOSPHORUS: CPT

## 2025-05-21 PROCEDURE — 2500000001 HC RX 250 WO HCPCS SELF ADMINISTERED DRUGS (ALT 637 FOR MEDICARE OP)

## 2025-05-21 PROCEDURE — 2500000001 HC RX 250 WO HCPCS SELF ADMINISTERED DRUGS (ALT 637 FOR MEDICARE OP): Performed by: SURGERY

## 2025-05-21 PROCEDURE — 36415 COLL VENOUS BLD VENIPUNCTURE: CPT | Performed by: INTERNAL MEDICINE

## 2025-05-21 PROCEDURE — 83735 ASSAY OF MAGNESIUM: CPT | Performed by: INTERNAL MEDICINE

## 2025-05-21 PROCEDURE — 99238 HOSP IP/OBS DSCHRG MGMT 30/<: CPT

## 2025-05-21 PROCEDURE — 80053 COMPREHEN METABOLIC PANEL: CPT | Performed by: STUDENT IN AN ORGANIZED HEALTH CARE EDUCATION/TRAINING PROGRAM

## 2025-05-21 PROCEDURE — 85027 COMPLETE CBC AUTOMATED: CPT | Performed by: INTERNAL MEDICINE

## 2025-05-21 RX ORDER — AMOXICILLIN AND CLAVULANATE POTASSIUM 875; 125 MG/1; MG/1
1 TABLET, FILM COATED ORAL 2 TIMES DAILY
Qty: 14 TABLET | Refills: 0 | Status: SHIPPED | OUTPATIENT
Start: 2025-05-21 | End: 2025-05-28

## 2025-05-21 RX ORDER — POLYETHYLENE GLYCOL 3350 17 G/17G
17 POWDER, FOR SOLUTION ORAL DAILY
Qty: 30 PACKET | Refills: 0 | Status: SHIPPED | OUTPATIENT
Start: 2025-05-22 | End: 2025-06-21

## 2025-05-21 RX ORDER — ACETAMINOPHEN 325 MG/1
650 TABLET ORAL EVERY 6 HOURS
Qty: 240 TABLET | Refills: 0 | Status: SHIPPED | OUTPATIENT
Start: 2025-05-21 | End: 2025-06-20

## 2025-05-21 RX ORDER — FERROUS SULFATE 325(65) MG
65 TABLET ORAL 3 TIMES DAILY
Qty: 90 TABLET | Refills: 0 | Status: SHIPPED | OUTPATIENT
Start: 2025-05-21 | End: 2025-06-20

## 2025-05-21 RX ORDER — OXYCODONE HYDROCHLORIDE 5 MG/1
5 TABLET ORAL EVERY 4 HOURS PRN
Qty: 15 TABLET | Refills: 0 | Status: SHIPPED | OUTPATIENT
Start: 2025-05-21 | End: 2025-05-24

## 2025-05-21 RX ORDER — GABAPENTIN 100 MG/1
100 CAPSULE ORAL 2 TIMES DAILY
Qty: 60 CAPSULE | Refills: 0 | Status: SHIPPED | OUTPATIENT
Start: 2025-05-21 | End: 2025-06-20

## 2025-05-21 RX ORDER — CYCLOBENZAPRINE HCL 5 MG
5 TABLET ORAL 3 TIMES DAILY
Qty: 42 TABLET | Refills: 0 | Status: SHIPPED | OUTPATIENT
Start: 2025-05-21 | End: 2025-06-04

## 2025-05-21 RX ORDER — MULTIVIT-MIN/IRON FUM/FOLIC AC 7.5 MG-4
1 TABLET ORAL DAILY
Qty: 30 TABLET | Refills: 11 | Status: SHIPPED | OUTPATIENT
Start: 2025-05-22

## 2025-05-21 RX ADMIN — Medication 1 TABLET: at 08:15

## 2025-05-21 RX ADMIN — ACETAMINOPHEN 650 MG: 325 TABLET, FILM COATED ORAL at 05:57

## 2025-05-21 RX ADMIN — PIPERACILLIN SODIUM AND TAZOBACTAM SODIUM 4.5 G: 4; .5 INJECTION, SOLUTION INTRAVENOUS at 09:32

## 2025-05-21 RX ADMIN — PIPERACILLIN SODIUM AND TAZOBACTAM SODIUM 4.5 G: 4; .5 INJECTION, SOLUTION INTRAVENOUS at 02:00

## 2025-05-21 RX ADMIN — FLUTICASONE PROPIONATE 2 SPRAY: 50 SPRAY, METERED NASAL at 08:15

## 2025-05-21 RX ADMIN — BENZOCAINE AND MENTHOL 1 LOZENGE: 15; 3.6 LOZENGE ORAL at 03:56

## 2025-05-21 RX ADMIN — FERROUS SULFATE TAB 325 MG (65 MG ELEMENTAL FE) 1 TABLET: 325 (65 FE) TAB at 08:15

## 2025-05-21 RX ADMIN — POLYETHYLENE GLYCOL 3350 17 G: 17 POWDER, FOR SOLUTION ORAL at 08:15

## 2025-05-21 RX ADMIN — CYCLOBENZAPRINE HYDROCHLORIDE 5 MG: 5 TABLET, FILM COATED ORAL at 08:15

## 2025-05-21 RX ADMIN — GABAPENTIN 100 MG: 100 CAPSULE ORAL at 08:15

## 2025-05-21 RX ADMIN — CALCIUM CARBONATE 1 TABLET: 500 TABLET, CHEWABLE ORAL at 08:15

## 2025-05-21 RX ADMIN — Medication 500 MG: at 05:57

## 2025-05-21 ASSESSMENT — COGNITIVE AND FUNCTIONAL STATUS - GENERAL
STANDING UP FROM CHAIR USING ARMS: A LITTLE
MOVING TO AND FROM BED TO CHAIR: A LITTLE
DRESSING REGULAR UPPER BODY CLOTHING: A LITTLE
TOILETING: A LITTLE
DRESSING REGULAR LOWER BODY CLOTHING: A LITTLE
CLIMB 3 TO 5 STEPS WITH RAILING: A LITTLE
DAILY ACTIVITIY SCORE: 21
MOBILITY SCORE: 19
TURNING FROM BACK TO SIDE WHILE IN FLAT BAD: A LITTLE
WALKING IN HOSPITAL ROOM: A LITTLE

## 2025-05-21 ASSESSMENT — PAIN SCALES - GENERAL: PAINLEVEL_OUTOF10: 0 - NO PAIN

## 2025-05-21 NOTE — DISCHARGE SUMMARY
Discharge Diagnosis  Intestinal perforation (Multi)    Issues Requiring Follow-Up  Colostomy   hyponatremia    Test Results Pending At Discharge  Pending Labs       Order Current Status    Surgical Pathology Exam In process    AFB Culture/Smear Preliminary result    Fungal Culture/Smear Preliminary result            Hospital Course   Diego Morales is a 70 y.o. male with a past medical history of cardiac arrest during cardiac catheterization in 2016 secondary to ventricular arrhythmia/V-fib requiring cardioversion (normal coronary arteries), heart murmur, and angina pectoris who was admitted to the hospital for suspected small intestinal perforation and acute diverticulitis. He was found to have a small international perforation on CTAP with mild free are in the abdomen and pelvis. The small bowel had wall thickening and fluid with findings consistent with acute sigmoidal diverticulitis. Surgery was consulted and received a sigmoid colectomy and colostomy placement on 5/15. He was placed on zosyn and IVF with an NG tube for drainage. He developed acute blood loss anemia and received 2U of RBC. Electrolytes were maintained with supplementation. Zofran has been given PRN for nausea. Fungal cultures and Tspot returned negative after a concern returned from the CT scan for neticulonodular opacity. Patient remained asymptomatic while admitted. We held the aspirin and lovenox during the concerns for acute blood loss however the metoprolol 25mg was continued. NG tube was removed on 5/19 and began eating a regular diet and KATY drained removed on 5/20. Colostomy bag began producing stool without any blood. Patient was cleared for discharge by general surgery on 5/21 with instructions for close follow up with PCP for the hyponatremia that was noted 5/20 secondary to fluid overload by IVF and lack of regular diet. Pain was well controlled while admitted with tylenol and flexeril that was prescribed upon discharge along with  as needed oxycodone 5mg   He will require 1 week prescription of augmentin to complete his antibiotic course.   He will be sent home with home health care for ostomy site care and education    Pertinent Physical Exam At Time of Discharge  Physical Exam  Constitutional:       Appearance: Normal appearance.   HENT:      Head: Normocephalic and atraumatic.      Nose: No congestion or rhinorrhea.      Mouth/Throat:      Mouth: Mucous membranes are moist.   Eyes:      Conjunctiva/sclera: Conjunctivae normal.   Cardiovascular:      Rate and Rhythm: Normal rate and regular rhythm.      Pulses: Normal pulses.      Heart sounds: Murmur heard.   Pulmonary:      Effort: Pulmonary effort is normal.      Breath sounds: Normal breath sounds. No wheezing, rhonchi or rales.   Abdominal:      General: Abdomen is flat. There is no distension.      Palpations: Abdomen is soft.      Tenderness: There is no abdominal tenderness. There is no guarding or rebound.      Comments: Ostomy site without signs of infection, with appropriate stool output   Musculoskeletal:      Right lower leg: No edema.      Left lower leg: Edema present.   Skin:     General: Skin is warm.      Capillary Refill: Capillary refill takes less than 2 seconds.   Neurological:      Mental Status: He is alert and oriented to person, place, and time.   Psychiatric:         Mood and Affect: Mood normal.         Behavior: Behavior normal.         Thought Content: Thought content normal.         Judgment: Judgment normal.         Home Medications     Medication List      START taking these medications     acetaminophen 325 mg tablet; Commonly known as: Tylenol; Take 2 tablets   (650 mg) by mouth every 6 hours.   amoxicillin-clavulanate 875-125 mg tablet; Commonly known as: Augmentin;   Take 1 tablet by mouth 2 times a day for 7 days.   cyclobenzaprine 5 mg tablet; Commonly known as: Flexeril; Take 1 tablet   (5 mg) by mouth 3 times a day for 14 days.   ferrous sulfate 325  mg (65 mg elemental) tablet; Take 1 tablet by mouth   3 times a day.   gabapentin 100 mg capsule; Commonly known as: Neurontin; Take 1 capsule   (100 mg) by mouth 2 times a day.   multivitamin with minerals tablet; Take 1 tablet by mouth once daily.;   Start taking on: May 22, 2025   oxyCODONE 5 mg immediate release tablet; Commonly known as: Roxicodone;   Take 1 tablet (5 mg) by mouth every 4 hours if needed for severe pain (7 -   10) for up to 3 days.   polyethylene glycol 17 gram packet; Commonly known as: Glycolax,   Miralax; Take 17 g by mouth once daily.; Start taking on: May 22, 2025     CONTINUE taking these medications     metoprolol succinate XL 25 mg 24 hr tablet; Commonly known as: Toprol-XL     ASK your doctor about these medications     aspirin 81 mg EC tablet       Outpatient Follow-Up  No future appointments.    Mali Flores MD

## 2025-05-21 NOTE — HH CARE COORDINATION
Home Care received a Referral for Nursing. We have processed the referral for a Start of Care on 5/22-5/23/25.     If you have any questions or concerns, please feel free to contact us at [phone]. Follow the prompts, enter your five digit zip code, and you will be directed to your care team on [facility].

## 2025-05-21 NOTE — PROGRESS NOTES
Diego Morales is a 70 y.o. male on day 9 of admission presenting with Intestinal perforation (Multi).    Subjective   Interval History: no fever, no new complaints, tolerating oral intake       Review of Systems    Objective   Range of Vitals (last 24 hours)  Heart Rate:  [75-90]   Temp:  [36.4 °C (97.5 °F)-37 °C (98.6 °F)]   Resp:  [16-18]   BP: (111-139)/(66-80)   SpO2:  [95 %-98 %]   Daily Weight  05/12/25 : 67.7 kg (149 lb 4.8 oz)    Body mass index is 20.83 kg/m².    Physical Exam  Constitutional:       Appearance: Normal appearance.   HENT:      Head: Normocephalic and atraumatic.      Mouth/Throat:      Mouth: Mucous membranes are moist.      Pharynx: Oropharynx is clear.   Eyes:      Pupils: Pupils are equal, round, and reactive to light.   Cardiovascular:      Rate and Rhythm: Normal rate and regular rhythm.      Heart sounds: Normal heart sounds.   Pulmonary:      Effort: Pulmonary effort is normal.      Breath sounds: Normal breath sounds.   Abdominal:      General: There is distension.      Palpations: Abdomen is soft.      Tenderness: There is abdominal tenderness.      Comments: Ostomy, dressing   Musculoskeletal:      Cervical back: Normal range of motion.   Neurological:      Mental Status: He is alert.         Antibiotics  amoxicillin-clavulanate - 875-125 mg  piperacillin-tazobactam - 4.5 gram/100 mL    Relevant Results  Labs  Results from last 72 hours   Lab Units 05/21/25  0718 05/20/25  0746 05/19/25  1300   WBC AUTO x10*3/uL 8.6 9.1 9.5   HEMOGLOBIN g/dL 9.3* 9.2* 8.6*   HEMATOCRIT % 26.6* 26.0* 24.4*   PLATELETS AUTO x10*3/uL 260 235 210     Results from last 72 hours   Lab Units 05/21/25  0718 05/20/25  0746 05/19/25  0642   SODIUM mmol/L 132* 131* 137   POTASSIUM mmol/L 3.8 3.7 3.5   CHLORIDE mmol/L 99 101 106   CO2 mmol/L 23 22 23   BUN mg/dL 14 11 21   CREATININE mg/dL 0.88 0.78 0.91   GLUCOSE mg/dL 95 114* 117*   CALCIUM mg/dL 7.7* 7.4* 7.4*   ANION GAP mmol/L 14 12 12   EGFR  mL/min/1.73m*2 >90 >90 90   PHOSPHORUS mg/dL 2.9 2.1* 1.8*     Results from last 72 hours   Lab Units 05/21/25  0718 05/20/25  0746 05/19/25  0642   ALK PHOS U/L 160* 173* 175*   BILIRUBIN TOTAL mg/dL 1.2 1.2 1.3*   PROTEIN TOTAL g/dL 5.0* 5.0* 4.9*   ALT U/L 33 32 32   AST U/L 26 26 36   ALBUMIN g/dL 2.9* 2.8* 2.7*     Estimated Creatinine Clearance: 73.7 mL/min (by C-G formula based on SCr of 0.88 mg/dL).  CRP   Date Value Ref Range Status   06/18/2022 0.17 mg/dL Final     Comment:     REF VALUE  < 1.00       Microbiology  Reviewed  Imaging  Reviewed        Assessment/Plan   Abnormal CT of the chest, seems to be stable compared to old CT, likely old granulomatous disease, most likely fungal or atypical mycobacteria, MTB is unlikely, T spot negative  Sigmoid diverticulitis, the repeat CT was reviewed, sp surgery, the culture is negative     Recommendations :  Plan on oral Augmentin x 1 week with discharge   Discussed with the medical team     I spent minutes in the professional and overall care of this patient.          Umm Vega MD

## 2025-05-21 NOTE — CARE PLAN
The patient's goals for the shift include  patient will remain safe and comfortable throughout shift.    The clinical goals for the shift include patient will remain pain free throughout shift.      Problem: Pain - Adult  Goal: Verbalizes/displays adequate comfort level or baseline comfort level  Outcome: Progressing     Problem: Safety - Adult  Goal: Free from fall injury  Outcome: Progressing     Problem: Discharge Planning  Goal: Discharge to home or other facility with appropriate resources  Outcome: Progressing     Problem: Chronic Conditions and Co-morbidities  Goal: Patient's chronic conditions and co-morbidity symptoms are monitored and maintained or improved  Outcome: Progressing     Problem: Nutrition  Goal: Nutrient intake appropriate for maintaining nutritional needs  Outcome: Progressing     Problem: Pain  Goal: Takes deep breaths with improved pain control throughout the shift  Outcome: Progressing  Goal: Turns in bed with improved pain control throughout the shift  Outcome: Progressing  Goal: Walks with improved pain control throughout the shift  Outcome: Progressing  Goal: Performs ADL's with improved pain control throughout shift  Outcome: Progressing  Goal: Participates in PT with improved pain control throughout the shift  Outcome: Progressing  Goal: Free from opioid side effects throughout the shift  Outcome: Progressing  Goal: Free from acute confusion related to pain meds throughout the shift  Outcome: Progressing     Problem: Fall/Injury  Goal: Not fall by end of shift  Outcome: Progressing  Goal: Be free from injury by end of the shift  Outcome: Progressing  Goal: Verbalize understanding of personal risk factors for fall in the hospital  Outcome: Progressing  Goal: Verbalize understanding of risk factor reduction measures to prevent injury from fall in the home  Outcome: Progressing  Goal: Use assistive devices by end of the shift  Outcome: Progressing  Goal: Pace activities to prevent  fatigue by end of the shift  Outcome: Progressing

## 2025-05-21 NOTE — DISCHARGE INSTRUCTIONS
PATIENT INSTRUCTIONS  LEFT COLON RESECTION    FOLLOW-UP: Please call when you get home to schedule a follow up appointment in 1 week for staple removal. Call your physician immediately if you have any fevers greater than 103 degrees Fahrenheit, vomiting, increasing abdominal pain, little to no ostomy output, increased bloating, etc.     WOUND CARE INSTRUCTIONS: Your incisions are closed with staples that will be removed in 10-14 days from surgery date. You are allowed to shower, wash area with warm, soapy water using your hand or gently washcloth. Try to avoid ointments on the wound unless directed to do so. Do not swim or tub soak for 2 weeks. Change wick packing daily - remove packing, irrigate wounds with normal saline and repack with mesalt or plain packing strips. Cover with 4x4 gauze and ABD as needed.     OSTOMY INSTRUCTIONS: Change bag every 2-3 days or as needed if leaking. Empty bag when 1/3rd full.   [employer] ostomy supplies:  -- 44 mm CeraPlus wafer - # [id number]  -- 44 mm pouch - # [id number]  -- [employer] adhesive remover wipes - # [id number]     DIET: You are able to follow a regular diet as you tolerate. Incorporate high fiber foods to help keep colostomy output active and thickened if it is liquid. Aim to have pudding to mashed potato consistency. If you are having low output or firm stool, take miralax to soften the stool. Take protein supplements daily along with a multivitamin to help your recovery process.    DRAIN: Leave drain site covered with dressing x 2 days, then remove and leave open to air.    ACTIVITY:  You are encouraged to walk and engage in light activity for the next two weeks. You should not lift more than 10 pounds for 6 weeks after surgery as it could put you at increased risk for a post-operative hernia.  Stairs are fine to use. Do not engage in strenuous activity for 6 weeks.    MEDICATIONS: Take Tylenol and Ibuprofen (if approved by your provider) every 6 hours for the first few days after  surgery. Alternatively, you may alternate between these two medications every 3 hours. You will be given a short script for oxycodone that you can take for breakthrough pain if needed. You are also prescribed flexeril to take as needed to help with muscle cramps and a 5 day course of gabapentin to help cover nerve pain. You are prescribed *** to cover your abdominal infection.     QUESTIONS: Please feel free to call our office with any questions or concerns after you are discharged. Our office number is [phone].

## 2025-05-21 NOTE — PROGRESS NOTES
"General/Trauma Surgery Daily Progress Note    Subjective   Again feels well, denies abdominal pain. Tolerating regular diet. Colostomy functioning well. Denies nausea or vomiting. Denies bloating.       Objective   Last Recorded Vitals:  Blood pressure 128/66, pulse 77, temperature 36.9 °C (98.4 °F), temperature source Temporal, resp. rate 16, height 1.803 m (5' 10.98\"), weight 67.7 kg (149 lb 4.8 oz), SpO2 96%.    Intake/Output last 3 Shifts:  I/O last 3 completed shifts:  In: 1605 (23.7 mL/kg) [P.O.:1000; I.V.:5 (0.1 mL/kg); IV Piggyback:600]  Out: 3965 (58.5 mL/kg) [Urine:3270 (1.3 mL/kg/hr); Drains:45; Stool:650]  Weight: 67.7 kg     Pain Score:  0-10 (Numeric) Pain Score: 2     Physical Exam:  Constitutional: No acute distress, sitting up in bed.  Neuro: Alert, oriented. Follows commands.   Eyes: EOMI. No scleral icterus. Conjunctiva pink.  ENT: MMM.   Heart: Regular rate.  Respiratory: No increased work of breathing or audible wheeze.  Abdomen: Soft, nondistended. Appropriately tender. Incisions clean, dry, intact. KATY drain site c/d/I.   MSK: Moves all extremities.  Vascular: Palpable pulses throughout, no pitting edema.  Skin: No rashes.   Psychological: Appropriate mood and behavior.            Relevant Results  Laboratory Results:  CBC:   Lab Results   Component Value Date    WBC 8.6 05/21/2025    RBC 2.87 (L) 05/21/2025    HGB 9.3 (L) 05/21/2025    HCT 26.6 (L) 05/21/2025     05/21/2025       RFP:   Lab Results   Component Value Date     (L) 05/21/2025    K 3.8 05/21/2025    CL 99 05/21/2025    CO2 23 05/21/2025    BUN 14 05/21/2025    CREATININE 0.88 05/21/2025    CALCIUM 7.7 (L) 05/21/2025    MG 2.13 05/21/2025    PHOS 2.9 05/21/2025        LFTs:   Lab Results   Component Value Date    PROT 5.0 (L) 05/21/2025    ALBUMIN 2.9 (L) 05/21/2025    BILITOT 1.2 05/21/2025    ALKPHOS 160 (H) 05/21/2025    AST 26 05/21/2025    ALT 33 05/21/2025         Imaging:  No results found.    Cardiology, " Vascular, and Other Imaging  No other imaging results found for the past 2 days           Assessment/Plan   This is a 70 y.o. male now POD #6 from open Surinder's colectomy for perforated sigmoid diverticulitis.      Tolerating regular diet, colostomy functioning. H&H has remained stable and improving daily.         Plan:   -- Regular diet  -- Ambulate  -- Daily CBC, RFP, Mg with repletions as necessary  -- Leave drain site covered with gauze x 2 days, then remove and leave open to air  -- Ostomy education  -- OK for discharge from a surgical standpoint. Will follow up next week for staple removal  -- Recommend multimodal pain regimen at discharge, daily protein shakes, multivitamin, DC instructions updated         Seen and discussed with Dr. Canales who is in agreement with plan. Please secure chat with questions.     Cindy Baldwin PA-C

## 2025-05-21 NOTE — CARE PLAN
The patient's goals for the shift include      The clinical goals for the shift include Patient will have pain management, remain free from injury, and rest comfortably this shift

## 2025-05-21 NOTE — PROGRESS NOTES
05/21/25 1129   Discharge Planning   Living Arrangements Spouse/significant other   Support Systems Spouse/significant other   Assistance Needed patient is A&Ox3, independent in ADL's, no AD, no DME, drives PCP- Ramon Gould MD   Type of Residence Private residence   Number of Stairs to Enter Residence 3   Number of Stairs Within Residence 20   Do you have animals or pets at home? Yes   Type of Animals or Pets 1 dog- Max   Who is requesting discharge planning? Provider   Home or Post Acute Services None   Expected Discharge Disposition Home H  ([facility] home care services for ostomy care/education. Patient and spouse able to learn ostomy care and have good community supports. Spouse reports PCP follow up has been made.)   Patient Choice   Provider Choice list and CMS website (https://medicare.gov/care-compare#search) for post-acute Quality and Resource Measure Data were provided and reviewed with: Family;Patient   Patient / Family choosing to utilize agency / facility established prior to hospitalization No   Stroke Family Assessment   Stroke Family Assessment Needed No   Intensity of Service   Intensity of Service 0-30 min

## 2025-05-21 NOTE — NURSING NOTE
1043 Discharge instructions reviewed with pt. All questions and concerns addressed. IV(s) removed.

## 2025-05-22 ENCOUNTER — APPOINTMENT (OUTPATIENT)
Dept: RADIOLOGY | Facility: HOSPITAL | Age: 70
End: 2025-05-22
Payer: MEDICARE

## 2025-05-22 ENCOUNTER — HOSPITAL ENCOUNTER (EMERGENCY)
Facility: HOSPITAL | Age: 70
Discharge: HOME | End: 2025-05-22
Payer: MEDICARE

## 2025-05-22 ENCOUNTER — APPOINTMENT (OUTPATIENT)
Dept: CARDIOLOGY | Facility: HOSPITAL | Age: 70
End: 2025-05-22
Payer: MEDICARE

## 2025-05-22 VITALS
TEMPERATURE: 97.3 F | HEART RATE: 77 BPM | WEIGHT: 157 LBS | SYSTOLIC BLOOD PRESSURE: 130 MMHG | DIASTOLIC BLOOD PRESSURE: 71 MMHG | RESPIRATION RATE: 20 BRPM | BODY MASS INDEX: 21.98 KG/M2 | OXYGEN SATURATION: 97 % | HEIGHT: 71 IN

## 2025-05-22 DIAGNOSIS — M79.89 LEG SWELLING: Primary | ICD-10-CM

## 2025-05-22 LAB
ALBUMIN SERPL BCP-MCNC: 3.2 G/DL (ref 3.4–5)
ALP SERPL-CCNC: 156 U/L (ref 33–136)
ALT SERPL W P-5'-P-CCNC: 32 U/L (ref 10–52)
ANION GAP SERPL CALC-SCNC: 12 MMOL/L (ref 10–20)
AST SERPL W P-5'-P-CCNC: 23 U/L (ref 9–39)
BASOPHILS # BLD AUTO: 0.02 X10*3/UL (ref 0–0.1)
BASOPHILS NFR BLD AUTO: 0.2 %
BILIRUB SERPL-MCNC: 0.9 MG/DL (ref 0–1.2)
BNP SERPL-MCNC: 82 PG/ML (ref 0–99)
BUN SERPL-MCNC: 18 MG/DL (ref 6–23)
CALCIUM SERPL-MCNC: 7.9 MG/DL (ref 8.6–10.3)
CARDIAC TROPONIN I PNL SERPL HS: 12 NG/L (ref 0–20)
CHLORIDE SERPL-SCNC: 99 MMOL/L (ref 98–107)
CO2 SERPL-SCNC: 25 MMOL/L (ref 21–32)
CREAT SERPL-MCNC: 1.12 MG/DL (ref 0.5–1.3)
EGFRCR SERPLBLD CKD-EPI 2021: 70 ML/MIN/1.73M*2
EOSINOPHIL # BLD AUTO: 0.29 X10*3/UL (ref 0–0.4)
EOSINOPHIL NFR BLD AUTO: 2.8 %
ERYTHROCYTE [DISTWIDTH] IN BLOOD BY AUTOMATED COUNT: 15.4 % (ref 11.5–14.5)
GLUCOSE SERPL-MCNC: 103 MG/DL (ref 74–99)
HCT VFR BLD AUTO: 28.2 % (ref 41–52)
HGB BLD-MCNC: 9.7 G/DL (ref 13.5–17.5)
IMM GRANULOCYTES # BLD AUTO: 0.81 X10*3/UL (ref 0–0.5)
IMM GRANULOCYTES NFR BLD AUTO: 7.9 % (ref 0–0.9)
LYMPHOCYTES # BLD AUTO: 1.36 X10*3/UL (ref 0.8–3)
LYMPHOCYTES NFR BLD AUTO: 13.3 %
MAGNESIUM SERPL-MCNC: 2.18 MG/DL (ref 1.6–2.4)
MCH RBC QN AUTO: 32.3 PG (ref 26–34)
MCHC RBC AUTO-ENTMCNC: 34.4 G/DL (ref 32–36)
MCV RBC AUTO: 94 FL (ref 80–100)
MONOCYTES # BLD AUTO: 0.74 X10*3/UL (ref 0.05–0.8)
MONOCYTES NFR BLD AUTO: 7.3 %
NEUTROPHILS # BLD AUTO: 6.97 X10*3/UL (ref 1.6–5.5)
NEUTROPHILS NFR BLD AUTO: 68.5 %
NRBC BLD-RTO: 0 /100 WBCS (ref 0–0)
PLATELET # BLD AUTO: 309 X10*3/UL (ref 150–450)
POLYCHROMASIA BLD QL SMEAR: NORMAL
POTASSIUM SERPL-SCNC: 4.1 MMOL/L (ref 3.5–5.3)
PROT SERPL-MCNC: 5.7 G/DL (ref 6.4–8.2)
RBC # BLD AUTO: 3 X10*6/UL (ref 4.5–5.9)
RBC MORPH BLD: NORMAL
SODIUM SERPL-SCNC: 132 MMOL/L (ref 136–145)
WBC # BLD AUTO: 10.2 X10*3/UL (ref 4.4–11.3)

## 2025-05-22 PROCEDURE — 93005 ELECTROCARDIOGRAM TRACING: CPT

## 2025-05-22 PROCEDURE — 36415 COLL VENOUS BLD VENIPUNCTURE: CPT | Performed by: PHYSICIAN ASSISTANT

## 2025-05-22 PROCEDURE — 80053 COMPREHEN METABOLIC PANEL: CPT | Performed by: PHYSICIAN ASSISTANT

## 2025-05-22 PROCEDURE — 93971 EXTREMITY STUDY: CPT

## 2025-05-22 PROCEDURE — 71045 X-RAY EXAM CHEST 1 VIEW: CPT | Performed by: STUDENT IN AN ORGANIZED HEALTH CARE EDUCATION/TRAINING PROGRAM

## 2025-05-22 PROCEDURE — 71045 X-RAY EXAM CHEST 1 VIEW: CPT

## 2025-05-22 PROCEDURE — 83735 ASSAY OF MAGNESIUM: CPT | Performed by: PHYSICIAN ASSISTANT

## 2025-05-22 PROCEDURE — 85025 COMPLETE CBC W/AUTO DIFF WBC: CPT | Performed by: PHYSICIAN ASSISTANT

## 2025-05-22 PROCEDURE — 84484 ASSAY OF TROPONIN QUANT: CPT | Performed by: PHYSICIAN ASSISTANT

## 2025-05-22 PROCEDURE — 83880 ASSAY OF NATRIURETIC PEPTIDE: CPT | Performed by: PHYSICIAN ASSISTANT

## 2025-05-22 PROCEDURE — 99285 EMERGENCY DEPT VISIT HI MDM: CPT | Mod: 25

## 2025-05-22 ASSESSMENT — PAIN - FUNCTIONAL ASSESSMENT: PAIN_FUNCTIONAL_ASSESSMENT: 0-10

## 2025-05-22 ASSESSMENT — PAIN SCALES - GENERAL
PAINLEVEL_OUTOF10: 0 - NO PAIN

## 2025-05-22 ASSESSMENT — LIFESTYLE VARIABLES
HAVE YOU EVER FELT YOU SHOULD CUT DOWN ON YOUR DRINKING: NO
EVER FELT BAD OR GUILTY ABOUT YOUR DRINKING: NO
TOTAL SCORE: 0
HAVE PEOPLE ANNOYED YOU BY CRITICIZING YOUR DRINKING: NO
EVER HAD A DRINK FIRST THING IN THE MORNING TO STEADY YOUR NERVES TO GET RID OF A HANGOVER: NO

## 2025-05-22 ASSESSMENT — COLUMBIA-SUICIDE SEVERITY RATING SCALE - C-SSRS
2. HAVE YOU ACTUALLY HAD ANY THOUGHTS OF KILLING YOURSELF?: NO
1. IN THE PAST MONTH, HAVE YOU WISHED YOU WERE DEAD OR WISHED YOU COULD GO TO SLEEP AND NOT WAKE UP?: NO
6. HAVE YOU EVER DONE ANYTHING, STARTED TO DO ANYTHING, OR PREPARED TO DO ANYTHING TO END YOUR LIFE?: NO

## 2025-05-23 ENCOUNTER — HOME CARE VISIT (OUTPATIENT)
Dept: HOME HEALTH SERVICES | Facility: HOME HEALTH | Age: 70
End: 2025-05-23
Payer: MEDICARE

## 2025-05-23 VITALS
RESPIRATION RATE: 18 BRPM | TEMPERATURE: 97.5 F | DIASTOLIC BLOOD PRESSURE: 56 MMHG | HEIGHT: 71 IN | HEART RATE: 80 BPM | BODY MASS INDEX: 20.58 KG/M2 | SYSTOLIC BLOOD PRESSURE: 114 MMHG | OXYGEN SATURATION: 98 % | WEIGHT: 147 LBS

## 2025-05-23 PROCEDURE — G0299 HHS/HOSPICE OF RN EA 15 MIN: HCPCS | Mod: HHH

## 2025-05-23 PROCEDURE — 1090000001 HH PPS REVENUE CREDIT

## 2025-05-23 PROCEDURE — 0023 HH SOC

## 2025-05-23 PROCEDURE — 1090000002 HH PPS REVENUE DEBIT

## 2025-05-23 PROCEDURE — 169592 NO-PAY CLAIM PROCEDURE

## 2025-05-23 ASSESSMENT — ENCOUNTER SYMPTOMS
LOWEST PAIN SEVERITY IN PAST 24 HOURS: 2/10
PAIN: 1
HIGHEST PAIN SEVERITY IN PAST 24 HOURS: 8/10
PAIN SEVERITY GOAL: 0/10
PERSON REPORTING PAIN: PATIENT
SUBJECTIVE PAIN PROGRESSION: GRADUALLY IMPROVING
PAIN LOCATION: ABDOMEN

## 2025-05-23 ASSESSMENT — ACTIVITIES OF DAILY LIVING (ADL): ENTERING_EXITING_HOME: NEEDS ASSISTANCE

## 2025-05-23 NOTE — SIGNIFICANT EVENT
Follow Up Phone Call    Outgoing phone call    Spoke to: Diego Morales Relationship:spouse   Phone number: [phone]      Outcome: contacted patient/ family   Chief Complaint   Patient presents with    Abdominal Pain          Diagnosis:Not applicable    States he is feeling better. No further questions or concerns.

## 2025-05-23 NOTE — ED TRIAGE NOTES
Pt presented to the ED with c/o leg swelling. Pt was recently DC after having a bowel resection. Today he noticed bilateral leg swelling with it being worse on the left leg at +2 pitting edema. No pain in legs. Pt did receive fluids and 2 units of blood during admission. Pt denies any CP, SOB or changes in urination.

## 2025-05-23 NOTE — ED PROVIDER NOTES
HPI   Chief Complaint   Patient presents with    Leg Swelling       Is a 70-year-old male coming in for left lower extremity swelling.  He does report recent hospitalization and just being discharged yesterday after having partial bowel removal after a perforation.  He does have a colostomy bag.  He reports no discomfort to the leg.  He has not had any shortness of breath or chest pain.  Patient denies any fevers or chills.  He does report that he feels like he is improving.  When he was hospitalized he did receive Lasix but does not use this at home.  He does also report that he has chronic left lower extremity swelling that is worse on the left than the right.  Patient does believe that the swelling is worse bilaterally.  He did recently start walking more after surgery.      History provided by:  Patient          Patient History   Medical History[1]  Surgical History[2]  Family History[3]  Social History[4]    Physical Exam   ED Triage Vitals [05/22/25 2040]   Temperature Heart Rate Respirations BP   36.1 °C (97 °F) 82 18 139/82      Pulse Ox Temp Source Heart Rate Source Patient Position   100 % Temporal Monitor Sitting      BP Location FiO2 (%)     -- --       Physical Exam  Vitals and nursing note reviewed.   Constitutional:       General: He is not in acute distress.     Appearance: Normal appearance. He is not ill-appearing or toxic-appearing.   HENT:      Head: Normocephalic and atraumatic.      Nose: Nose normal.      Mouth/Throat:      Mouth: Mucous membranes are moist.      Pharynx: Oropharynx is clear.   Eyes:      Extraocular Movements: Extraocular movements intact.      Conjunctiva/sclera: Conjunctivae normal.      Pupils: Pupils are equal, round, and reactive to light.   Cardiovascular:      Rate and Rhythm: Normal rate and regular rhythm.      Pulses: Normal pulses.      Heart sounds: Normal heart sounds.   Pulmonary:      Effort: Pulmonary effort is normal. No respiratory distress.      Breath  sounds: Normal breath sounds.   Abdominal:      General: Abdomen is flat. Bowel sounds are normal. There is no distension.      Palpations: Abdomen is soft.      Tenderness: There is no abdominal tenderness.      Comments: Colostomy bag present to the left side of the abdomen with stool in the bag.   Musculoskeletal:         General: Normal range of motion.      Cervical back: Normal range of motion and neck supple.      Comments: Patient has left lower extremity swelling slightly worse compared to the right however nonpitting.  Neurovascular intact distally.   Skin:     General: Skin is warm and dry.      Coloration: Skin is not jaundiced or pale.      Findings: No bruising.   Neurological:      General: No focal deficit present.      Mental Status: He is alert and oriented to person, place, and time. Mental status is at baseline.   Psychiatric:         Mood and Affect: Mood normal.         Behavior: Behavior normal.           ED Course & MDM   ED Course as of 05/22/25 2203   Thu May 22, 2025   2105 EKG completed at 2101 shows on my interpretation normal sinus rhythm with a ventricular rate of 79 bpm.  No signs of STEMI.  Normal axis.  QTc 454 ms [RS]      ED Course User Index  [RS] Anton Hobbs PA-C         Diagnoses as of 05/22/25 2203   Leg swelling                 No data recorded     Srini Coma Scale Score: 15 (05/22/25 2041 : Radha Mccormick RN)                           Medical Decision Making  Summary:  Medical Decision Making:   Patient presented as described in HPI. Patient case including ROS, PE, and treatment and plan discussed with ED attending if attached as cosigner. Results from labs and or imaging included below if completed. Diego Morales  is a 70 y.o. coming in for Patient presents with:  Leg Swelling  .  Patient reports no chest pain or shortness of breath.  He was recently hospitalized.  Due to concerns of a potential DVT with not being on anticoagulation medication and the risk factor  of a recent hospitalization ultrasound will be completed of the lower extremity.  Due to his medical history including the recent surgery cardiac workup is also completed.  Patient has a normal BNP.  Normal cardiac enzymes.  Patient has a stable H&H.  Again he has no other symptoms such as chest pain or shortness of breath.  He reports no exertional dyspnea.  I do believe that this is likely peripheral edema due to the lack of ambulation recently.  He will be discharged and advised close follow-up with a PCP and he reports he has an appointment on Tuesday.  He will return if he develops any symptoms but attempt to keep the leg elevated and see if it improves his symptoms.  He will return to the emergency room if he does develop any chest pain, shortness of breath or any other symptoms such as fever.      Disposition is completed with shared decision making with the patient or guardian present with the patient. They were advised to follow up with PCP or recommended provider in 2-3 days for another evaluation and exam. I advised the patient to return or go to closest emergency room immediately if symptoms change, get worse, or new symptoms develop prior to follow up. I explained the plan and treatment course. Patient/guardian is in agreement with plan, treatment course, and follow up and state that they will comply.    Labs Reviewed  CBC WITH AUTO DIFFERENTIAL - Abnormal     WBC                           10.2                   nRBC                          0.0                    RBC                           3.00 (*)               Hemoglobin                    9.7 (*)                Hematocrit                    28.2 (*)               MCV                           94                     MCH                           32.3                   MCHC                          34.4                   RDW                           15.4 (*)               Platelets                     309                    Neutrophils %                  68.5                   Immature Granulocytes %, Automated   7.9 (*)                Lymphocytes %                 13.3                   Monocytes %                   7.3                    Eosinophils %                 2.8                    Basophils %                   0.2                    Neutrophils Absolute          6.97 (*)               Immature Granulocytes Absolute, Au*   0.81 (*)               Lymphocytes Absolute          1.36                   Monocytes Absolute            0.74                   Eosinophils Absolute          0.29                   Basophils Absolute            0.02                COMPREHENSIVE METABOLIC PANEL - Abnormal     Glucose                       103 (*)                Sodium                        132 (*)                Potassium                     4.1                    Chloride                      99                     Bicarbonate                   25                     Anion Gap                     12                     Urea Nitrogen                 18                     Creatinine                    1.12                   eGFR                          70                     Calcium                       7.9 (*)                Albumin                       3.2 (*)                Alkaline Phosphatase          156 (*)                Total Protein                 5.7 (*)                AST                           23                     Bilirubin, Total              0.9                    ALT                           32                  MAGNESIUM - Normal     Magnesium                     2.18                SERIAL TROPONIN-INITIAL - Normal     Troponin I, High Sensitivity   12                       Narrative: Less than 99th percentile of normal range cutoff-                Female and children under 18 years old <14 ng/L; Male <21 ng/L: Negative                Repeat testing should be performed if clinically indicated.                                 Female and  children under 18 years old 14-50 ng/L; Male 21-50 ng/L:                Consistent with possible cardiac damage and possible increased clinical                 risk. Serial measurements may help to assess extent of myocardial damage.                                 >50 ng/L: Consistent with cardiac damage, increased clinical risk and                myocardial infarction. Serial measurements may help assess extent of                 myocardial damage.                                  NOTE: Children less than 1 year old may have higher baseline troponin                 levels and results should be interpreted in conjunction with the overall                 clinical context.                                 NOTE: Troponin I testing is performed using a different                 testing methodology at [facility] than at other                 [facility]. Direct result comparisons should only                 be made within the same method.  B-TYPE NATRIURETIC PEPTIDE - Normal     BNP                           82                       Narrative:    <100 pg/mL - Heart failure unlikely                100-299 pg/mL - Intermediate probability of acute heart                                failure exacerbation. Correlate with clinical                                context and patient history.                  >=300 pg/mL - Heart Failure likely. Correlate with clinical                                context and patient history.                                BNP testing is performed using different testing methodology at [facility] than at other [facility]. Direct result comparisons should only be made within the same method.                   TROPONIN SERIES- (INITIAL, 1 HR)       Narrative: The following orders were created for panel order Troponin I Series, High Sensitivity (0, 1 HR).                Procedure                               Abnormality         Status                                    ---------                               -----------         ------                                   Troponin I, High Sensiti...[[id number]]  Normal              Final result                             Troponin, High Sensitivi...[[id number]]                                                                               Please view results for these tests on the individual orders.  MORPHOLOGY   Lower extremity venous duplex left   Final Result    Negative study.  No deep venous thrombosis of the  left lower    extremity.  Slow popliteal vein flow which can be seen the setting of    venous stasis.          MACRO:    None          Signed by: Adryan Collazo 5/22/2025 9:51 PM    Dictation workstation:   [id number]     XR chest 1 view   Final Result    1.  No evidence of acute cardiopulmonary process.                Signed by: Bjorn Mccray 5/22/2025 9:13 PM    Dictation workstation:   [id number]                            Tests/Medications/Escalations of Care considered but not given: As in [facility]    Patient care discussed with: N/A  Social Determinants affecting care: N/A    Final diagnosis and disposition as documented     ED Course as of 05/22/25 2305  ------------------------------------------------------------  Time: 05/22 2105  Comment: EKG completed at 2101 shows on my interpretation normal sinus rhythm with a ventricular rate of 79 bpm.  No signs of STEMI.  Normal axis.  QTc 454 ms  By: Anton Hobbs PA-C    ------------------------------------------------------------  Diagnoses as of 05/22/25 2305  Leg swelling       Shared decision making was completed and determined that patient will be discharged. I discussed the differential; results and discharge plan with the patient and/or family/friend/caregiver if present.  I emphasized the importance of follow-up with the physician I referred them to in the timeframe recommended.  I explained reasons for the patient to return to the Emergency Department.  They agreed that if they feel their condition is worsening or if they have any other concern they should call 911 immediately for further assistance. I gave the patient an opportunity to ask all questions they had and answered all of them accordingly. They understand return precautions and discharge instructions. The patient and/or family/friend/caregiver expressed understanding verbally and that they would comply.     Disposition: Discharge      This note has been transcribed using voice recognition and may contain grammatical errors, misplaced words, incorrect words, incorrect phrases or other errors.         Procedure  Procedures       [1]   Past Medical History:  Diagnosis Date    Allergic dermatitis 01/09/2022    Cardiac arrest with successful resuscitation 2016    During cardiac catheterization / V-fib requiring cardioversion (normal coronary arteries    Diverticulitis of large intestine with perforation 05/12/2025    Heart murmur 05/13/2016    Hyperlipidemia     Mitral valve prolapse    [2]   Past Surgical History:  Procedure Laterality Date    CARDIAC CATHETERIZATION  06/17/2016    Compa Dominguez MD Normal Coronary Arteries.    CARDIOVERSION  06/17/2016    Compa Dominguez MD    TONSILLECTOMY     [3] No family history on file.  [4]   Social History  Tobacco Use    Smoking status: Never    Smokeless tobacco: Never   Substance Use Topics    Alcohol use: Yes     Comment: social    Drug use: Never        Anton Hobbs PA-C  05/22/25 7511

## 2025-05-24 PROCEDURE — 1090000002 HH PPS REVENUE DEBIT

## 2025-05-24 PROCEDURE — 1090000001 HH PPS REVENUE CREDIT

## 2025-05-24 ASSESSMENT — ENCOUNTER SYMPTOMS
MUSCLE WEAKNESS: 1
OCCASIONAL FEELINGS OF UNSTEADINESS: 0
LOSS OF SENSATION IN FEET: 0
DEPRESSION: 0
APPETITE LEVEL: GOOD
ABDOMINAL PAIN: 1
FATIGUES EASILY: 1
CHANGE IN APPETITE: UNCHANGED
STOOL FREQUENCY: LESS THAN DAILY

## 2025-05-24 ASSESSMENT — ACTIVITIES OF DAILY LIVING (ADL)
OASIS_M1830: 03
AMBULATION ASSISTANCE: ONE PERSON
AMBULATION ASSISTANCE: 1

## 2025-05-25 ENCOUNTER — HOME CARE VISIT (OUTPATIENT)
Dept: HOME HEALTH SERVICES | Facility: HOME HEALTH | Age: 70
End: 2025-05-25
Payer: MEDICARE

## 2025-05-25 VITALS
OXYGEN SATURATION: 97 % | DIASTOLIC BLOOD PRESSURE: 70 MMHG | RESPIRATION RATE: 18 BRPM | HEART RATE: 92 BPM | SYSTOLIC BLOOD PRESSURE: 112 MMHG

## 2025-05-25 PROCEDURE — 1090000002 HH PPS REVENUE DEBIT

## 2025-05-25 PROCEDURE — G0151 HHCP-SERV OF PT,EA 15 MIN: HCPCS | Mod: HHH

## 2025-05-25 PROCEDURE — 1090000001 HH PPS REVENUE CREDIT

## 2025-05-25 ASSESSMENT — ENCOUNTER SYMPTOMS
PAIN LOCATION - PAIN QUALITY: ACHING
PAIN LOCATION - PAIN SEVERITY: 3/10
PERSON REPORTING PAIN: PATIENT
PAIN SEVERITY GOAL: 0/10
PAIN LOCATION - PAIN FREQUENCY: CONSTANT
PAIN LOCATION: ABDOMEN
PAIN LOCATION - RELIEVING FACTORS: MEDS, REST
PAIN: 1
PAIN LOCATION - PAIN DURATION: ACUTE
PAIN LOCATION - EXACERBATING FACTORS: ACTIVITY
LOWEST PAIN SEVERITY IN PAST 24 HOURS: 3/10
HIGHEST PAIN SEVERITY IN PAST 24 HOURS: 6/10
SUBJECTIVE PAIN PROGRESSION: WAXING AND WANING

## 2025-05-25 ASSESSMENT — ACTIVITIES OF DAILY LIVING (ADL)
AMBULATION_DISTANCE/DURATION_TOLERATED: 50 FT
AMBULATION ASSISTANCE ON FLAT SURFACES: 1

## 2025-05-26 PROCEDURE — 1090000001 HH PPS REVENUE CREDIT

## 2025-05-26 PROCEDURE — 1090000002 HH PPS REVENUE DEBIT

## 2025-05-27 LAB
FUNGUS SPEC CULT: NORMAL
FUNGUS SPEC FUNGUS STN: NORMAL

## 2025-05-27 PROCEDURE — 1090000002 HH PPS REVENUE DEBIT

## 2025-05-27 PROCEDURE — 1090000001 HH PPS REVENUE CREDIT

## 2025-05-28 ENCOUNTER — APPOINTMENT (OUTPATIENT)
Dept: SURGERY | Facility: CLINIC | Age: 70
End: 2025-05-28
Payer: MEDICARE

## 2025-05-28 DIAGNOSIS — K63.1: Primary | ICD-10-CM

## 2025-05-28 LAB
ACID FAST STN SPEC: NORMAL
MYCOBACTERIUM SPEC CULT: NORMAL

## 2025-05-28 PROCEDURE — 1111F DSCHRG MED/CURRENT MED MERGE: CPT | Performed by: SURGERY

## 2025-05-28 PROCEDURE — 1090000001 HH PPS REVENUE CREDIT

## 2025-05-28 PROCEDURE — 99024 POSTOP FOLLOW-UP VISIT: CPT | Performed by: SURGERY

## 2025-05-28 PROCEDURE — 1090000002 HH PPS REVENUE DEBIT

## 2025-05-28 NOTE — PROGRESS NOTES
General Surgery Colectomy Follow-Up Note    Referring Provider:   MD Shalom Coppola Brian W, DO      Chief Complaint:  Follow up from colectomy      History of Present Illness:  This is a 70 y.o. male who presents for a follow-up from a Guerrero's colectomy for perforated sigmoid diverticulitis.  He had an unexplained intra-abdominal hemorrhage on postoperative day #2, but resolved spontaneously.  He has been doing well since he was discharged.      Vitals:   There were no vitals filed for this visit.      Physical Exam:  General: No acute distress. Sitting up in bed.   Neuro: Alert and oriented ×3. Follows commands.  Head: Atraumatic  Eyes: Pupils equal reactive to light. Extraocular motions intact.  Ears: Hears normal speaking voice.  Mouth, Nose, Throat: Mucous membranes moist.  Normal dentition.  Neck: Supple. No appreciable masses.  Chest: No crepitus.  No appreciable scars.  Heart: Regular rate and rhythm.  Lung: Clear to auscultation bilaterally.  Vascular: No carotid bruits.  Palpable radial pulses bilaterally.  Abdomen: Soft. Nondistended. Nontender.  Well-healed laparoscopic port sites.  Musculoskeletal: Moves all extremities.  Normal range of motion.  Lymphatic: No palpable lymph nodes.  Skin: No rashes or lesions.  Psychological: Normal affect    Labs:  CBC:   Lab Results   Component Value Date    WBC 10.2 05/22/2025    RBC 3.00 (L) 05/22/2025    HGB 9.7 (L) 05/22/2025    HCT 28.2 (L) 05/22/2025     05/22/2025       RFP:   Lab Results   Component Value Date     (L) 05/22/2025    K 4.1 05/22/2025    CL 99 05/22/2025    CO2 25 05/22/2025    BUN 18 05/22/2025    CREATININE 1.12 05/22/2025    CALCIUM 7.9 (L) 05/22/2025    MG 2.18 05/22/2025    PHOS 2.9 05/21/2025        LFTs:   Lab Results   Component Value Date    PROT 5.7 (L) 05/22/2025    ALBUMIN 3.2 (L) 05/22/2025    BILITOT 0.9 05/22/2025    BILIDIR 0.3 05/18/2025    ALKPHOS 156 (H) 05/22/2025    AST 23 05/22/2025    ALT 32  05/22/2025            Imaging:   No new images.      Assessment:  This is a 70 y.o.-year-old male who presents for follow up of a Guerrero's colectomy for perforated sigmoid diverticulitis.  Overall, he is doing well.      Plan:  -- Advance diet to normal as tolerated.  -- OK to swim  -- Avoid lifting anything more than 10 pounds / exercising until at 6 weeks postoperative.  -- Follow-up in a couple of months to assess progress  --CBC and renal function panel today  --Colonoscopy prior to Guerrero's takedown  --Plan to reverse the colostomy in 6 months    Anoop Canales MD  General Surgery  Office: [phone]  Fax: [phone]  11:31 AM  05/28/25        Past Medical History:  Medical History[1]     Past Surgical History:  Surgical History[2]     Medications:  Current Outpatient Medications   Medication Instructions    acetaminophen (TYLENOL) 650 mg, oral, Every 6 hours    amoxicillin-clavulanate (Augmentin) 875-125 mg tablet 1 tablet, oral, 2 times daily    aspirin 81 mg EC tablet 1 tablet, oral, Daily    cyclobenzaprine (FLEXERIL) 5 mg, oral, 3 times daily    ferrous sulfate 325 mg (65 mg elemental) tablet 1 tablet, oral, 3 times daily    gabapentin (NEURONTIN) 100 mg, oral, 2 times daily    metoprolol succinate XL (TOPROL-XL) 25 mg, Daily    multivitamin with minerals tablet 1 tablet, oral, Daily    polyethylene glycol (GLYCOLAX, MIRALAX) 17 g, oral, Daily        Allergies:  RX Allergies[3]     Family History:  Family History[4]     Social History:  Social History[5]     Review of Systems:  A complete 12 point review of systems was performed and is negative except as noted in the history of present illness.          [1]   Past Medical History:  Diagnosis Date    Allergic dermatitis 01/09/2022    Cardiac arrest with successful resuscitation 2016    During cardiac catheterization / V-fib requiring cardioversion (normal coronary arteries    Diverticulitis of large intestine with perforation 05/12/2025    Heart  murmur 05/13/2016    Hyperlipidemia     Mitral valve prolapse    [2]   Past Surgical History:  Procedure Laterality Date    CARDIAC CATHETERIZATION  06/17/2016    Compa Dominguez MD Normal Coronary Arteries.    CARDIOVERSION  06/17/2016    Compa Dominguez MD    TONSILLECTOMY     [3]   Allergies  Allergen Reactions    Toradol [Ketorolac] Hallucinations   [4] No family history on file.  [5]   Social History  Socioeconomic History    Marital status:    Tobacco Use    Smoking status: Never    Smokeless tobacco: Never   Substance and Sexual Activity    Alcohol use: Yes     Comment: social    Drug use: Never     Social Drivers of Health     Financial Resource Strain: Low Risk  (5/12/2025)    Overall Financial Resource Strain (CARDIA)     Difficulty of Paying Living Expenses: Not very hard   Food Insecurity: No Food Insecurity (5/12/2025)    Hunger Vital Sign     Worried About Running Out of Food in the Last Year: Never true     Ran Out of Food in the Last Year: Never true   Transportation Needs: No Transportation Needs (5/23/2025)    OASIS : Transportation     Lack of Transportation (Medical): No     Lack of Transportation (Non-Medical): No     Patient Unable or Declines to Respond: No   Social Connections: Feeling Socially Integrated (5/23/2025)    OASIS : Social Isolation     Frequency of experiencing loneliness or isolation: Never   Intimate Partner Violence: Not At Risk (5/12/2025)    Humiliation, Afraid, Rape, and Kick questionnaire     Fear of Current or Ex-Partner: No     Emotionally Abused: No     Physically Abused: No     Sexually Abused: No   Housing Stability: Low Risk  (5/12/2025)    Housing Stability Vital Sign     Unable to Pay for Housing in the Last Year: No     Number of Times Moved in the Last Year: 0     Homeless in the Last Year: No

## 2025-05-29 LAB
ALBUMIN SERPL-MCNC: 3.5 G/DL (ref 3.6–5.1)
BUN SERPL-MCNC: 17 MG/DL (ref 7–25)
BUN/CREAT SERPL: ABNORMAL (CALC) (ref 6–22)
CALCIUM SERPL-MCNC: 8.5 MG/DL (ref 8.6–10.3)
CHLORIDE SERPL-SCNC: 98 MMOL/L (ref 98–110)
CO2 SERPL-SCNC: 29 MMOL/L (ref 20–32)
CREAT SERPL-MCNC: 0.77 MG/DL (ref 0.7–1.28)
EGFRCR SERPLBLD CKD-EPI 2021: 96 ML/MIN/1.73M2
ERYTHROCYTE [DISTWIDTH] IN BLOOD BY AUTOMATED COUNT: 14.2 % (ref 11–15)
GLUCOSE SERPL-MCNC: 84 MG/DL (ref 65–99)
HCT VFR BLD AUTO: 31.4 % (ref 38.5–50)
HGB BLD-MCNC: 10.4 G/DL (ref 13.2–17.1)
LABORATORY COMMENT REPORT: NORMAL
MCH RBC QN AUTO: 31.5 PG (ref 27–33)
MCHC RBC AUTO-ENTMCNC: 33.1 G/DL (ref 32–36)
MCV RBC AUTO: 95.2 FL (ref 80–100)
PATH REPORT.FINAL DX SPEC: NORMAL
PATH REPORT.GROSS SPEC: NORMAL
PATH REPORT.TOTAL CANCER: NORMAL
PHOSPHATE SERPL-MCNC: 4 MG/DL (ref 2.1–4.3)
PLATELET # BLD AUTO: 459 THOUSAND/UL (ref 140–400)
PMV BLD REES-ECKER: 8.9 FL (ref 7.5–12.5)
POTASSIUM SERPL-SCNC: 4.9 MMOL/L (ref 3.5–5.3)
RBC # BLD AUTO: 3.3 MILLION/UL (ref 4.2–5.8)
RESIDENT REVIEW: NORMAL
SODIUM SERPL-SCNC: 134 MMOL/L (ref 135–146)
WBC # BLD AUTO: 6 THOUSAND/UL (ref 3.8–10.8)

## 2025-05-29 PROCEDURE — 1090000002 HH PPS REVENUE DEBIT

## 2025-05-29 PROCEDURE — 1090000001 HH PPS REVENUE CREDIT

## 2025-05-30 ENCOUNTER — HOME CARE VISIT (OUTPATIENT)
Dept: HOME HEALTH SERVICES | Facility: HOME HEALTH | Age: 70
End: 2025-05-30
Payer: MEDICARE

## 2025-05-30 VITALS
HEART RATE: 92 BPM | SYSTOLIC BLOOD PRESSURE: 110 MMHG | DIASTOLIC BLOOD PRESSURE: 64 MMHG | RESPIRATION RATE: 18 BRPM | OXYGEN SATURATION: 96 % | TEMPERATURE: 98 F

## 2025-05-30 PROCEDURE — G0180 MD CERTIFICATION HHA PATIENT: HCPCS | Performed by: SURGERY

## 2025-05-30 PROCEDURE — G0299 HHS/HOSPICE OF RN EA 15 MIN: HCPCS | Mod: HHH

## 2025-05-30 PROCEDURE — 1090000002 HH PPS REVENUE DEBIT

## 2025-05-30 PROCEDURE — 1090000001 HH PPS REVENUE CREDIT

## 2025-05-30 ASSESSMENT — ENCOUNTER SYMPTOMS
MUSCLE WEAKNESS: 1
LOWEST PAIN SEVERITY IN PAST 24 HOURS: 0/10
LIMITED RANGE OF MOTION: 1
PAIN: 1
PAIN LOCATION: ABDOMEN
PAIN SEVERITY GOAL: 0/10
CHANGE IN APPETITE: UNCHANGED
APPETITE LEVEL: FAIR
PERSON REPORTING PAIN: PATIENT
HIGHEST PAIN SEVERITY IN PAST 24 HOURS: 7/10
ABDOMINAL PAIN: 1
SUBJECTIVE PAIN PROGRESSION: GRADUALLY IMPROVING
STOOL FREQUENCY: LESS THAN DAILY

## 2025-05-30 ASSESSMENT — ACTIVITIES OF DAILY LIVING (ADL)
PHYSICAL TRANSFERS ASSESSED: 1
CURRENT_FUNCTION: STAND BY ASSIST

## 2025-05-31 PROCEDURE — 1090000001 HH PPS REVENUE CREDIT

## 2025-05-31 PROCEDURE — 1090000002 HH PPS REVENUE DEBIT

## 2025-06-02 LAB
FUNGUS SPEC CULT: NORMAL
FUNGUS SPEC FUNGUS STN: NORMAL

## 2025-06-04 ENCOUNTER — HOSPITAL ENCOUNTER (INPATIENT)
Facility: HOSPITAL | Age: 70
End: 2025-06-04
Attending: STUDENT IN AN ORGANIZED HEALTH CARE EDUCATION/TRAINING PROGRAM | Admitting: INTERNAL MEDICINE
Payer: MEDICARE

## 2025-06-04 ENCOUNTER — APPOINTMENT (OUTPATIENT)
Dept: RADIOLOGY | Facility: HOSPITAL | Age: 70
End: 2025-06-04
Payer: MEDICARE

## 2025-06-04 ENCOUNTER — APPOINTMENT (OUTPATIENT)
Dept: CARDIOLOGY | Facility: HOSPITAL | Age: 70
End: 2025-06-04
Payer: MEDICARE

## 2025-06-04 DIAGNOSIS — R10.30 LOWER ABDOMINAL PAIN: Primary | ICD-10-CM

## 2025-06-04 DIAGNOSIS — S30.1XXA ABDOMINAL HEMATOMA: ICD-10-CM

## 2025-06-04 DIAGNOSIS — K56.609 SMALL BOWEL OBSTRUCTION (MULTI): ICD-10-CM

## 2025-06-04 DIAGNOSIS — T14.8XXA HEMATOMA: ICD-10-CM

## 2025-06-04 DIAGNOSIS — K63.1: ICD-10-CM

## 2025-06-04 DIAGNOSIS — D50.8 OTHER IRON DEFICIENCY ANEMIA: ICD-10-CM

## 2025-06-04 DIAGNOSIS — E44.0 MODERATE PROTEIN-CALORIE MALNUTRITION (MULTI): ICD-10-CM

## 2025-06-04 LAB
ACID FAST STN SPEC: NORMAL
ALBUMIN SERPL BCP-MCNC: 3.6 G/DL (ref 3.4–5)
ALP SERPL-CCNC: 165 U/L (ref 33–136)
ALT SERPL W P-5'-P-CCNC: 24 U/L (ref 10–52)
ANION GAP SERPL CALC-SCNC: 14 MMOL/L (ref 10–20)
APPEARANCE UR: CLEAR
AST SERPL W P-5'-P-CCNC: 26 U/L (ref 9–39)
ATRIAL RATE: 79 BPM
BASOPHILS # BLD AUTO: 0.02 X10*3/UL (ref 0–0.1)
BASOPHILS NFR BLD AUTO: 0.4 %
BILIRUB SERPL-MCNC: 0.9 MG/DL (ref 0–1.2)
BILIRUB UR STRIP.AUTO-MCNC: NEGATIVE MG/DL
BNP SERPL-MCNC: 46 PG/ML (ref 0–99)
BUN SERPL-MCNC: 20 MG/DL (ref 6–23)
CALCIUM SERPL-MCNC: 9.5 MG/DL (ref 8.6–10.3)
CARDIAC TROPONIN I PNL SERPL HS: 5 NG/L (ref 0–20)
CARDIAC TROPONIN I PNL SERPL HS: 5 NG/L (ref 0–20)
CHLORIDE SERPL-SCNC: 99 MMOL/L (ref 98–107)
CO2 SERPL-SCNC: 28 MMOL/L (ref 21–32)
COLOR UR: YELLOW
CREAT SERPL-MCNC: 0.84 MG/DL (ref 0.5–1.3)
EGFRCR SERPLBLD CKD-EPI 2021: >90 ML/MIN/1.73M*2
EOSINOPHIL # BLD AUTO: 0.1 X10*3/UL (ref 0–0.4)
EOSINOPHIL NFR BLD AUTO: 2.2 %
ERYTHROCYTE [DISTWIDTH] IN BLOOD BY AUTOMATED COUNT: 14.8 % (ref 11.5–14.5)
FLUAV RNA RESP QL NAA+PROBE: NOT DETECTED
FLUBV RNA RESP QL NAA+PROBE: NOT DETECTED
GLUCOSE SERPL-MCNC: 105 MG/DL (ref 74–99)
GLUCOSE UR STRIP.AUTO-MCNC: NORMAL MG/DL
HCT VFR BLD AUTO: 34.4 % (ref 41–52)
HGB BLD-MCNC: 11.4 G/DL (ref 13.5–17.5)
IMM GRANULOCYTES # BLD AUTO: 0.03 X10*3/UL (ref 0–0.5)
IMM GRANULOCYTES NFR BLD AUTO: 0.7 % (ref 0–0.9)
KETONES UR STRIP.AUTO-MCNC: NEGATIVE MG/DL
LACTATE SERPL-SCNC: 0.7 MMOL/L (ref 0.4–2)
LEUKOCYTE ESTERASE UR QL STRIP.AUTO: NEGATIVE
LIPASE SERPL-CCNC: 37 U/L (ref 9–82)
LYMPHOCYTES # BLD AUTO: 1.04 X10*3/UL (ref 0.8–3)
LYMPHOCYTES NFR BLD AUTO: 23 %
MAGNESIUM SERPL-MCNC: 1.98 MG/DL (ref 1.6–2.4)
MCH RBC QN AUTO: 30.6 PG (ref 26–34)
MCHC RBC AUTO-ENTMCNC: 33.1 G/DL (ref 32–36)
MCV RBC AUTO: 93 FL (ref 80–100)
MONOCYTES # BLD AUTO: 0.42 X10*3/UL (ref 0.05–0.8)
MONOCYTES NFR BLD AUTO: 9.3 %
MUCOUS THREADS #/AREA URNS AUTO: NORMAL /LPF
MYCOBACTERIUM SPEC CULT: NORMAL
NEUTROPHILS # BLD AUTO: 2.92 X10*3/UL (ref 1.6–5.5)
NEUTROPHILS NFR BLD AUTO: 64.4 %
NITRITE UR QL STRIP.AUTO: NEGATIVE
NRBC BLD-RTO: 0 /100 WBCS (ref 0–0)
P AXIS: 59 DEGREES
P OFFSET: 201 MS
P ONSET: 144 MS
PH UR STRIP.AUTO: 6.5 [PH]
PLATELET # BLD AUTO: 332 X10*3/UL (ref 150–450)
POTASSIUM SERPL-SCNC: 4 MMOL/L (ref 3.5–5.3)
PR INTERVAL: 148 MS
PROT SERPL-MCNC: 6.7 G/DL (ref 6.4–8.2)
PROT UR STRIP.AUTO-MCNC: ABNORMAL MG/DL
Q ONSET: 218 MS
QRS COUNT: 13 BEATS
QRS DURATION: 94 MS
QT INTERVAL: 396 MS
QTC CALCULATION(BAZETT): 454 MS
QTC FREDERICIA: 434 MS
R AXIS: 20 DEGREES
RBC # BLD AUTO: 3.72 X10*6/UL (ref 4.5–5.9)
RBC # UR STRIP.AUTO: NEGATIVE MG/DL
RBC #/AREA URNS AUTO: NORMAL /HPF
RSV RNA RESP QL NAA+PROBE: NOT DETECTED
SARS-COV-2 RNA RESP QL NAA+PROBE: NOT DETECTED
SODIUM SERPL-SCNC: 137 MMOL/L (ref 136–145)
SP GR UR STRIP.AUTO: >1.05
T AXIS: 15 DEGREES
T OFFSET: 416 MS
UROBILINOGEN UR STRIP.AUTO-MCNC: NORMAL MG/DL
VENTRICULAR RATE: 79 BPM
WBC # BLD AUTO: 4.5 X10*3/UL (ref 4.4–11.3)
WBC #/AREA URNS AUTO: NORMAL /HPF

## 2025-06-04 PROCEDURE — 74018 RADEX ABDOMEN 1 VIEW: CPT | Mod: FOREIGN READ | Performed by: RADIOLOGY

## 2025-06-04 PROCEDURE — 36415 COLL VENOUS BLD VENIPUNCTURE: CPT

## 2025-06-04 PROCEDURE — 80053 COMPREHEN METABOLIC PANEL: CPT

## 2025-06-04 PROCEDURE — 83605 ASSAY OF LACTIC ACID: CPT

## 2025-06-04 PROCEDURE — 83880 ASSAY OF NATRIURETIC PEPTIDE: CPT

## 2025-06-04 PROCEDURE — 84484 ASSAY OF TROPONIN QUANT: CPT

## 2025-06-04 PROCEDURE — 96375 TX/PRO/DX INJ NEW DRUG ADDON: CPT

## 2025-06-04 PROCEDURE — 2550000001 HC RX 255 CONTRASTS

## 2025-06-04 PROCEDURE — 2500000004 HC RX 250 GENERAL PHARMACY W/ HCPCS (ALT 636 FOR OP/ED)

## 2025-06-04 PROCEDURE — 99285 EMERGENCY DEPT VISIT HI MDM: CPT | Mod: 25 | Performed by: STUDENT IN AN ORGANIZED HEALTH CARE EDUCATION/TRAINING PROGRAM

## 2025-06-04 PROCEDURE — 74018 RADEX ABDOMEN 1 VIEW: CPT

## 2025-06-04 PROCEDURE — 83690 ASSAY OF LIPASE: CPT

## 2025-06-04 PROCEDURE — 71275 CT ANGIOGRAPHY CHEST: CPT

## 2025-06-04 PROCEDURE — 96374 THER/PROPH/DIAG INJ IV PUSH: CPT

## 2025-06-04 PROCEDURE — 85025 COMPLETE CBC W/AUTO DIFF WBC: CPT

## 2025-06-04 PROCEDURE — 2500000004 HC RX 250 GENERAL PHARMACY W/ HCPCS (ALT 636 FOR OP/ED): Performed by: INTERNAL MEDICINE

## 2025-06-04 PROCEDURE — 93005 ELECTROCARDIOGRAM TRACING: CPT

## 2025-06-04 PROCEDURE — 74177 CT ABD & PELVIS W/CONTRAST: CPT

## 2025-06-04 PROCEDURE — 83735 ASSAY OF MAGNESIUM: CPT

## 2025-06-04 PROCEDURE — 87637 SARSCOV2&INF A&B&RSV AMP PRB: CPT

## 2025-06-04 PROCEDURE — 81001 URINALYSIS AUTO W/SCOPE: CPT

## 2025-06-04 PROCEDURE — 0D9670Z DRAINAGE OF STOMACH WITH DRAINAGE DEVICE, VIA NATURAL OR ARTIFICIAL OPENING: ICD-10-PCS

## 2025-06-04 PROCEDURE — 99222 1ST HOSP IP/OBS MODERATE 55: CPT | Performed by: INTERNAL MEDICINE

## 2025-06-04 PROCEDURE — 1100000001 HC PRIVATE ROOM DAILY

## 2025-06-04 RX ORDER — MORPHINE SULFATE 2 MG/ML
2 INJECTION, SOLUTION INTRAMUSCULAR; INTRAVENOUS EVERY 4 HOURS PRN
Status: DISCONTINUED | OUTPATIENT
Start: 2025-06-04 | End: 2025-06-09

## 2025-06-04 RX ORDER — ONDANSETRON HYDROCHLORIDE 2 MG/ML
4 INJECTION, SOLUTION INTRAVENOUS ONCE
Status: COMPLETED | OUTPATIENT
Start: 2025-06-04 | End: 2025-06-04

## 2025-06-04 RX ORDER — ACETAMINOPHEN 160 MG/5ML
650 SOLUTION ORAL EVERY 4 HOURS PRN
Status: DISCONTINUED | OUTPATIENT
Start: 2025-06-04 | End: 2025-06-09

## 2025-06-04 RX ORDER — LIDOCAINE HYDROCHLORIDE 20 MG/ML
JELLY TOPICAL
Status: DISPENSED
Start: 2025-06-04 | End: 2025-06-05

## 2025-06-04 RX ORDER — ACETAMINOPHEN 325 MG/1
650 TABLET ORAL EVERY 4 HOURS PRN
Status: DISCONTINUED | OUTPATIENT
Start: 2025-06-04 | End: 2025-06-09

## 2025-06-04 RX ORDER — DEXTROSE MONOHYDRATE, SODIUM CHLORIDE, AND POTASSIUM CHLORIDE 50; 1.49; 9 G/1000ML; G/1000ML; G/1000ML
100 INJECTION, SOLUTION INTRAVENOUS CONTINUOUS
Status: DISCONTINUED | OUTPATIENT
Start: 2025-06-04 | End: 2025-06-07

## 2025-06-04 RX ORDER — LIDOCAINE HYDROCHLORIDE 20 MG/ML
1 JELLY TOPICAL ONCE
Status: DISCONTINUED | OUTPATIENT
Start: 2025-06-04 | End: 2025-06-09

## 2025-06-04 RX ORDER — MORPHINE SULFATE 4 MG/ML
4 INJECTION INTRAVENOUS ONCE
Status: COMPLETED | OUTPATIENT
Start: 2025-06-04 | End: 2025-06-04

## 2025-06-04 RX ORDER — ONDANSETRON HYDROCHLORIDE 2 MG/ML
4 INJECTION, SOLUTION INTRAVENOUS EVERY 8 HOURS PRN
Status: DISCONTINUED | OUTPATIENT
Start: 2025-06-04 | End: 2025-06-16

## 2025-06-04 RX ORDER — MORPHINE SULFATE 2 MG/ML
1 INJECTION, SOLUTION INTRAMUSCULAR; INTRAVENOUS EVERY 4 HOURS PRN
Status: DISCONTINUED | OUTPATIENT
Start: 2025-06-04 | End: 2025-06-09

## 2025-06-04 RX ORDER — TALC
3 POWDER (GRAM) TOPICAL NIGHTLY PRN
Status: DISCONTINUED | OUTPATIENT
Start: 2025-06-04 | End: 2025-07-02 | Stop reason: HOSPADM

## 2025-06-04 RX ORDER — ACETAMINOPHEN 650 MG/1
650 SUPPOSITORY RECTAL EVERY 4 HOURS PRN
Status: DISCONTINUED | OUTPATIENT
Start: 2025-06-04 | End: 2025-06-09

## 2025-06-04 RX ORDER — ONDANSETRON 4 MG/1
4 TABLET, FILM COATED ORAL EVERY 8 HOURS PRN
Status: DISCONTINUED | OUTPATIENT
Start: 2025-06-04 | End: 2025-06-16

## 2025-06-04 RX ADMIN — IOHEXOL 75 ML: 350 INJECTION, SOLUTION INTRAVENOUS at 18:41

## 2025-06-04 RX ADMIN — MORPHINE SULFATE 4 MG: 4 INJECTION INTRAVENOUS at 18:06

## 2025-06-04 RX ADMIN — POTASSIUM CHLORIDE, DEXTROSE MONOHYDRATE AND SODIUM CHLORIDE 100 ML/HR: 150; 5; 900 INJECTION, SOLUTION INTRAVENOUS at 22:55

## 2025-06-04 RX ADMIN — ONDANSETRON 4 MG: 2 INJECTION, SOLUTION INTRAMUSCULAR; INTRAVENOUS at 18:06

## 2025-06-04 RX ADMIN — SODIUM CHLORIDE 1000 ML: 9 INJECTION, SOLUTION INTRAVENOUS at 18:06

## 2025-06-04 SDOH — ECONOMIC STABILITY: INCOME INSECURITY: IN THE PAST 12 MONTHS HAS THE ELECTRIC, GAS, OIL, OR WATER COMPANY THREATENED TO SHUT OFF SERVICES IN YOUR HOME?: NO

## 2025-06-04 SDOH — SOCIAL STABILITY: SOCIAL INSECURITY: WITHIN THE LAST YEAR, HAVE YOU BEEN HUMILIATED OR EMOTIONALLY ABUSED IN OTHER WAYS BY YOUR PARTNER OR EX-PARTNER?: NO

## 2025-06-04 SDOH — SOCIAL STABILITY: SOCIAL INSECURITY: ARE YOU OR HAVE YOU BEEN THREATENED OR ABUSED PHYSICALLY, EMOTIONALLY, OR SEXUALLY BY ANYONE?: NO

## 2025-06-04 SDOH — ECONOMIC STABILITY: TRANSPORTATION INSECURITY: IN THE PAST 12 MONTHS, HAS LACK OF TRANSPORTATION KEPT YOU FROM MEDICAL APPOINTMENTS OR FROM GETTING MEDICATIONS?: NO

## 2025-06-04 SDOH — HEALTH STABILITY: MENTAL HEALTH
DO YOU FEEL STRESS - TENSE, RESTLESS, NERVOUS, OR ANXIOUS, OR UNABLE TO SLEEP AT NIGHT BECAUSE YOUR MIND IS TROUBLED ALL THE TIME - THESE DAYS?: NOT AT ALL

## 2025-06-04 SDOH — HEALTH STABILITY: MENTAL HEALTH: HOW OFTEN DO YOU HAVE SIX OR MORE DRINKS ON ONE OCCASION?: NEVER

## 2025-06-04 SDOH — HEALTH STABILITY: MENTAL HEALTH: HOW OFTEN DO YOU HAVE A DRINK CONTAINING ALCOHOL?: NEVER

## 2025-06-04 SDOH — ECONOMIC STABILITY: FOOD INSECURITY: WITHIN THE PAST 12 MONTHS, THE FOOD YOU BOUGHT JUST DIDN'T LAST AND YOU DIDN'T HAVE MONEY TO GET MORE.: NEVER TRUE

## 2025-06-04 SDOH — ECONOMIC STABILITY: FOOD INSECURITY: HOW HARD IS IT FOR YOU TO PAY FOR THE VERY BASICS LIKE FOOD, HOUSING, MEDICAL CARE, AND HEATING?: NOT VERY HARD

## 2025-06-04 SDOH — ECONOMIC STABILITY: HOUSING INSECURITY: AT ANY TIME IN THE PAST 12 MONTHS, WERE YOU HOMELESS OR LIVING IN A SHELTER (INCLUDING NOW)?: NO

## 2025-06-04 SDOH — HEALTH STABILITY: MENTAL HEALTH: HOW MANY DRINKS CONTAINING ALCOHOL DO YOU HAVE ON A TYPICAL DAY WHEN YOU ARE DRINKING?: PATIENT DOES NOT DRINK

## 2025-06-04 SDOH — ECONOMIC STABILITY: FOOD INSECURITY: WITHIN THE PAST 12 MONTHS, YOU WORRIED THAT YOUR FOOD WOULD RUN OUT BEFORE YOU GOT THE MONEY TO BUY MORE.: NEVER TRUE

## 2025-06-04 SDOH — SOCIAL STABILITY: SOCIAL INSECURITY: DOES ANYONE TRY TO KEEP YOU FROM HAVING/CONTACTING OTHER FRIENDS OR DOING THINGS OUTSIDE YOUR HOME?: NO

## 2025-06-04 SDOH — SOCIAL STABILITY: SOCIAL INSECURITY: DO YOU FEEL ANYONE HAS EXPLOITED OR TAKEN ADVANTAGE OF YOU FINANCIALLY OR OF YOUR PERSONAL PROPERTY?: NO

## 2025-06-04 SDOH — SOCIAL STABILITY: SOCIAL INSECURITY: ABUSE: ADULT

## 2025-06-04 SDOH — ECONOMIC STABILITY: HOUSING INSECURITY: IN THE LAST 12 MONTHS, WAS THERE A TIME WHEN YOU WERE NOT ABLE TO PAY THE MORTGAGE OR RENT ON TIME?: NO

## 2025-06-04 SDOH — SOCIAL STABILITY: SOCIAL INSECURITY: HAS ANYONE EVER THREATENED TO HURT YOUR FAMILY OR YOUR PETS?: NO

## 2025-06-04 SDOH — ECONOMIC STABILITY: HOUSING INSECURITY: IN THE PAST 12 MONTHS, HOW MANY TIMES HAVE YOU MOVED WHERE YOU WERE LIVING?: 0

## 2025-06-04 SDOH — SOCIAL STABILITY: SOCIAL INSECURITY: WITHIN THE LAST YEAR, HAVE YOU BEEN AFRAID OF YOUR PARTNER OR EX-PARTNER?: NO

## 2025-06-04 SDOH — SOCIAL STABILITY: SOCIAL INSECURITY: ARE THERE ANY APPARENT SIGNS OF INJURIES/BEHAVIORS THAT COULD BE RELATED TO ABUSE/NEGLECT?: NO

## 2025-06-04 SDOH — SOCIAL STABILITY: SOCIAL INSECURITY: DO YOU FEEL UNSAFE GOING BACK TO THE PLACE WHERE YOU ARE LIVING?: NO

## 2025-06-04 SDOH — SOCIAL STABILITY: SOCIAL INSECURITY: WERE YOU ABLE TO COMPLETE ALL THE BEHAVIORAL HEALTH SCREENINGS?: YES

## 2025-06-04 SDOH — SOCIAL STABILITY: SOCIAL INSECURITY: HAVE YOU HAD ANY THOUGHTS OF HARMING ANYONE ELSE?: NO

## 2025-06-04 SDOH — SOCIAL STABILITY: SOCIAL INSECURITY: HAVE YOU HAD THOUGHTS OF HARMING ANYONE ELSE?: NO

## 2025-06-04 ASSESSMENT — PAIN DESCRIPTION - ONSET: ONSET: ONGOING

## 2025-06-04 ASSESSMENT — COGNITIVE AND FUNCTIONAL STATUS - GENERAL
DAILY ACTIVITIY SCORE: 22
MOVING TO AND FROM BED TO CHAIR: A LITTLE
MOBILITY SCORE: 18
HELP NEEDED FOR BATHING: A LITTLE
MOVING FROM LYING ON BACK TO SITTING ON SIDE OF FLAT BED WITH BEDRAILS: A LITTLE
DRESSING REGULAR LOWER BODY CLOTHING: A LITTLE
WALKING IN HOSPITAL ROOM: A LITTLE
STANDING UP FROM CHAIR USING ARMS: A LITTLE
TURNING FROM BACK TO SIDE WHILE IN FLAT BAD: A LITTLE
PATIENT BASELINE BEDBOUND: NO
CLIMB 3 TO 5 STEPS WITH RAILING: A LITTLE

## 2025-06-04 ASSESSMENT — ACTIVITIES OF DAILY LIVING (ADL)
DRESSING YOURSELF: INDEPENDENT
WALKS IN HOME: INDEPENDENT
GROOMING: INDEPENDENT
BATHING: INDEPENDENT
HEARING - LEFT EAR: FUNCTIONAL
JUDGMENT_ADEQUATE_SAFELY_COMPLETE_DAILY_ACTIVITIES: YES
HEARING - RIGHT EAR: FUNCTIONAL
TOILETING: INDEPENDENT
ASSISTIVE_DEVICE: EYEGLASSES
ADEQUATE_TO_COMPLETE_ADL: YES
LACK_OF_TRANSPORTATION: NO
FEEDING YOURSELF: INDEPENDENT
PATIENT'S MEMORY ADEQUATE TO SAFELY COMPLETE DAILY ACTIVITIES?: YES
LACK_OF_TRANSPORTATION: NO

## 2025-06-04 ASSESSMENT — PAIN DESCRIPTION - DESCRIPTORS: DESCRIPTORS: ACHING

## 2025-06-04 ASSESSMENT — LIFESTYLE VARIABLES
SKIP TO QUESTIONS 9-10: 1
HAVE YOU EVER FELT YOU SHOULD CUT DOWN ON YOUR DRINKING: NO
AUDIT-C TOTAL SCORE: 0
HOW MANY STANDARD DRINKS CONTAINING ALCOHOL DO YOU HAVE ON A TYPICAL DAY: 1 OR 2
HOW OFTEN DO YOU HAVE A DRINK CONTAINING ALCOHOL: MONTHLY OR LESS
HOW OFTEN DO YOU HAVE 6 OR MORE DRINKS ON ONE OCCASION: NEVER
AUDIT-C TOTAL SCORE: 1
HAVE PEOPLE ANNOYED YOU BY CRITICIZING YOUR DRINKING: NO
EVER FELT BAD OR GUILTY ABOUT YOUR DRINKING: NO
EVER HAD A DRINK FIRST THING IN THE MORNING TO STEADY YOUR NERVES TO GET RID OF A HANGOVER: NO
SKIP TO QUESTIONS 9-10: 1
AUDIT-C TOTAL SCORE: 1
TOTAL SCORE: 0

## 2025-06-04 ASSESSMENT — PAIN SCALES - GENERAL
PAINLEVEL_OUTOF10: 4
PAINLEVEL_OUTOF10: 0 - NO PAIN
PAINLEVEL_OUTOF10: 0 - NO PAIN

## 2025-06-04 ASSESSMENT — PATIENT HEALTH QUESTIONNAIRE - PHQ9
SUM OF ALL RESPONSES TO PHQ9 QUESTIONS 1 & 2: 0
2. FEELING DOWN, DEPRESSED OR HOPELESS: NOT AT ALL
1. LITTLE INTEREST OR PLEASURE IN DOING THINGS: NOT AT ALL

## 2025-06-04 ASSESSMENT — PAIN - FUNCTIONAL ASSESSMENT
PAIN_FUNCTIONAL_ASSESSMENT: 0-10
PAIN_FUNCTIONAL_ASSESSMENT: 0-10

## 2025-06-04 ASSESSMENT — PAIN DESCRIPTION - LOCATION: LOCATION: ABDOMEN

## 2025-06-04 ASSESSMENT — PAIN DESCRIPTION - FREQUENCY: FREQUENCY: CONSTANT/CONTINUOUS

## 2025-06-04 NOTE — ED PROVIDER NOTES
HPI   Chief Complaint   Patient presents with    Abdominal Pain       Patient is a 70-year-old male presenting to the ED for abdominal pain that worsened today.  Patient states he had surgery with Dr. Canales on May 15 bowel resection and has colostomy bag.  Patient states today he had no output from his bag which is abnormal for him.  Patient states he is having bloating and lower abdominal pain.  Patient has no appetite and has had decreased p.o. intake today.  Patient states he is having chills denies any fever.  Patient endorses some dysuria.  Patient denies any other history of abdominal surgeries.  Patient states he has not passed gas today.  Patient states he feels like fluid is filling all the way up to his neck.  Patient does endorse shortness of breath worse in the last couple days.  Denies any chest pain.  Patient has no history of blood clots is not on any blood thinners.  Patient denies any tobacco or street drug abuse.  Patient drinks 1 beer daily.              Patient History   Medical History[1]  Surgical History[2]  Family History[3]  Social History[4]    Physical Exam   ED Triage Vitals [06/04/25 1735]   Temperature Heart Rate Respirations BP   36.2 °C (97.2 °F) (!) 104 16 149/85      Pulse Ox Temp Source Heart Rate Source Patient Position   94 % Temporal Monitor Lying      BP Location FiO2 (%)     Right arm --       Physical Exam  Cardiovascular:      Rate and Rhythm: Regular rhythm. Tachycardia present.      Heart sounds: Normal heart sounds.   Pulmonary:      Effort: Pulmonary effort is normal.      Breath sounds: No wheezing, rhonchi or rales.   Abdominal:      General: There is distension.      Palpations: Abdomen is soft.      Tenderness: There is abdominal tenderness in the right lower quadrant. There is no right CVA tenderness, left CVA tenderness, guarding or rebound.   Neurological:      Mental Status: He is alert.           ED Course & MDM   ED Course as of 06/04/25 2021 Wed Jun 04, 2025    1809 EKG performed at 1753 normal sinus rhythm normal axis no acute signs of ischemia ventricular at 95 bpm []   1944 7.3 cm x 13 cm x 8.6 cm solid-appearing mass extending the pelvis  into the left lower abdominal quadrant.  This was present on the  previous study, however, is better delineated with the addition of  intravenous contrast.  Differential diagnosis include solid mass,  large abdominal/pelvic hematoma given the recent history of surgery.    4.  Distal small bowel obstruction.  The degree of small bowel  distention is greater than that identified on the previous study.  The  zone of transition is not visualized but appears to be within the  distal ileum.  The terminal ileum is of normal caliber.  Signed by Royce Heller DO   [HD]      ED Course User Index  [] Jodi Wan PA-C  [HD] Cindy Loza DO         Diagnoses as of 06/04/25 2021   Lower abdominal pain   Small bowel obstruction (Multi)   Hematoma                 No data recorded                                 Medical Decision Making  Medical Decision Making:  Patient presented as described in HPI. Patient case including ROS, PE, and treatment and plan discussed with ED attending if attached as cosigner. Due to patients presentation orders completed include as documented.  Patient presents to the ED for abdominal pain decreased output and colostomy bag and shortness of breath.  Patient is not passing gas.  Patient given fluids and morphine.  Pending labs and imaging.  Labs are unremarkable.  Imaging shows no PE there is a 7.3 x 13 cm x 8.6 cm solid-appearing mass extending the pelvis into the left lower abdominal quadrant this  was present on previous study however was better delineated in addition of IV contrast.  Differential diagnosis to include solid mass large abdominal pelvic hematoma given the recent history of surgery.  Distal bowel obstruction.  I reached out to Dr. Centeno surgery on-call who recommends an NG tube and  to admit to the medicine.  I spoke with the hospitalist who accepts the patient.  Patient remained stable pending admission.          Patient care discussed with: N/A  Social Determinants affecting care: N/A    Final diagnosis and disposition as below.  See CI    Hospitalize. I discussed the differential; results and admit plan with the patient and/or family/friend/caregiver if present.  I emphasized the importance of hospitalization need for re-evaluation/continued monitoring/interventions.. They agreed that if they feel their condition is worsening or if they have any other concern they should alert staff immediately for further assistance. I gave the patient an opportunity to ask all questions they had and answered all of them accordingly. The patient and/or family/friend/caregiver expressed understanding verbally and that they would comply.       Disposition:  admit    Hospitalize. Discussed findings and treatment done here in ED with admitting physician. It would be a risk to discharge the patient in their condition due to possibility of deterioration in their condition and the need for urgent interventions.    This note has been transcribed using voice recognition and may contain grammatical errors, misplaced words, incorrect words, incorrect phrases or other errors.        Labs Reviewed   CBC WITH AUTO DIFFERENTIAL - Abnormal       Result Value    WBC 4.5      nRBC 0.0      RBC 3.72 (*)     Hemoglobin 11.4 (*)     Hematocrit 34.4 (*)     MCV 93      MCH 30.6      MCHC 33.1      RDW 14.8 (*)     Platelets 332      Neutrophils % 64.4      Immature Granulocytes %, Automated 0.7      Lymphocytes % 23.0      Monocytes % 9.3      Eosinophils % 2.2      Basophils % 0.4      Neutrophils Absolute 2.92      Immature Granulocytes Absolute, Automated 0.03      Lymphocytes Absolute 1.04      Monocytes Absolute 0.42      Eosinophils Absolute 0.10      Basophils Absolute 0.02     COMPREHENSIVE METABOLIC PANEL - Abnormal     Glucose 105 (*)     Sodium 137      Potassium 4.0      Chloride 99      Bicarbonate 28      Anion Gap 14      Urea Nitrogen 20      Creatinine 0.84      eGFR >90      Calcium 9.5      Albumin 3.6      Alkaline Phosphatase 165 (*)     Total Protein 6.7      AST 26      Bilirubin, Total 0.9      ALT 24     URINALYSIS WITH REFLEX CULTURE AND MICROSCOPIC - Abnormal    Color, Urine Yellow      Appearance, Urine Clear      Specific Gravity, Urine >1.050 (*)     pH, Urine 6.5      Protein, Urine 30 (1+) (*)     Glucose, Urine Normal      Blood, Urine NEGATIVE      Ketones, Urine NEGATIVE      Bilirubin, Urine NEGATIVE      Urobilinogen, Urine Normal      Nitrite, Urine NEGATIVE      Leukocyte Esterase, Urine NEGATIVE     MAGNESIUM - Normal    Magnesium 1.98     LIPASE - Normal    Lipase 37      Narrative:     Venipuncture immediately after or during the administration of Metamizole may lead to falsely low results. Testing should be performed immediately prior to Metamizole dosing.   LACTATE - Normal    Lactate 0.7      Narrative:     Venipuncture immediately after or during the administration of Metamizole may lead to falsely low results. Testing should be performed immediately prior to Metamizole dosing.   B-TYPE NATRIURETIC PEPTIDE - Normal    BNP 46      Narrative:        <100 pg/mL - Heart failure unlikely  100-299 pg/mL - Intermediate probability of acute heart                  failure exacerbation. Correlate with clinical                  context and patient history.    >=300 pg/mL - Heart Failure likely. Correlate with clinical                  context and patient history.    BNP testing is performed using different testing methodology at [facility] than at other [facility]. Direct result comparisons should only be made within the same method.      SARS-COV-2, INFLUENZA A/B AND RSV PCR - Normal    Coronavirus 2019, PCR Not Detected      Flu A Result Not Detected      Flu B Result Not Detected       RSV PCR Not Detected      Narrative:     This assay is an FDA-cleared, in vitro diagnostic nucleic acid amplification test for the qualitative detection and differentiation of SARS CoV-2/ Influenza A/B/ RSV from nasopharyngeal specimens collected from individuals with signs and symptoms of respiratory tract infections, and has been validated for use at [facility]. Negative results do not preclude COVID-19/ Influenza A/B/ RSV infections and should not be used as the sole basis for diagnosis, treatment, or other management decisions. Testing for SARS CoV-2 is recommended only for patients who meet current clinical and/or epidemiological criteria defined by federal, state, or local public health directives.   SERIAL TROPONIN-INITIAL - Normal    Troponin I, High Sensitivity 5      Narrative:     Less than 99th percentile of normal range cutoff-  Female and children under 18 years old <14 ng/L; Male <21 ng/L: Negative  Repeat testing should be performed if clinically indicated.     Female and children under 18 years old 14-50 ng/L; Male 21-50 ng/L:  Consistent with possible cardiac damage and possible increased clinical   risk. Serial measurements may help to assess extent of myocardial damage.     >50 ng/L: Consistent with cardiac damage, increased clinical risk and  myocardial infarction. Serial measurements may help assess extent of   myocardial damage.      NOTE: Children less than 1 year old may have higher baseline troponin   levels and results should be interpreted in conjunction with the overall   clinical context.     NOTE: Troponin I testing is performed using a different   testing methodology at [facility] than at other   [facility]. Direct result comparisons should only   be made within the same method.   SERIAL TROPONIN, 1 HOUR - Normal    Troponin I, High Sensitivity 5      Narrative:     Less than 99th percentile of normal range cutoff-  Female and children  under 18 years old <14 ng/L; Male <21 ng/L: Negative  Repeat testing should be performed if clinically indicated.     Female and children under 18 years old 14-50 ng/L; Male 21-50 ng/L:  Consistent with possible cardiac damage and possible increased clinical   risk. Serial measurements may help to assess extent of myocardial damage.     >50 ng/L: Consistent with cardiac damage, increased clinical risk and  myocardial infarction. Serial measurements may help assess extent of   myocardial damage.      NOTE: Children less than 1 year old may have higher baseline troponin   levels and results should be interpreted in conjunction with the overall   clinical context.     NOTE: Troponin I testing is performed using a different   testing methodology at [facility] than at other   [facility]. Direct result comparisons should only   be made within the same method.   TROPONIN SERIES- (INITIAL, 1 HR)    Narrative:     The following orders were created for panel order Troponin I Series, High Sensitivity (0, 1 HR).  Procedure                               Abnormality         Status                     ---------                               -----------         ------                     Troponin I, High Sensiti...[[id number]]  Normal              Final result               Troponin, High Sensitivi...[[id number]]  Normal              Final result                 Please view results for these tests on the individual orders.   URINALYSIS WITH REFLEX CULTURE AND MICROSCOPIC    Narrative:     The following orders were created for panel order Urinalysis with Reflex Culture and Microscopic.  Procedure                               Abnormality         Status                     ---------                               -----------         ------                     Urinalysis with Reflex C...[[id number]]  Abnormal            Final result               Extra Urine Gray Tube[[id number]]                            In process                    Please view results for these tests on the individual orders.   EXTRA URINE GRAY TUBE   URINALYSIS MICROSCOPIC WITH REFLEX CULTURE    WBC, Urine NONE      RBC, Urine 3-5      Mucus, Urine 2+        CT angio chest for pulmonary embolism   Final Result   Addendum (preliminary) 1 of 1   Addendum:   NOTIFICATION:  The CT pulmonary arteriogram, abdomen and pelvis   results of the study were discussed with, and acknowledged by Dr. Cindy Arias, by telephone on 6/4/2025 at 7:46 PM       Signed by Royce Heller DO      Final   1.  No evidence of pulmonary arterial embolism.   2.  Small areas of subsegmental atelectasis right upper lobe.   3.  7.3 cm x 13 cm x 8.6 cm solid-appearing mass extending the pelvis   into the left lower abdominal quadrant.  This was present on the   previous study, however, is better delineated with the addition of   intravenous contrast.  Differential diagnosis include solid mass,   large abdominal/pelvic hematoma given the recent history of surgery.     4.  Distal small bowel obstruction.  The degree of small bowel   distention is greater than that identified on the previous study.  The   zone of transition is not visualized but appears to be within the   distal ileum.  The terminal ileum is of normal caliber.   Signed by Royce Heller DO      CT abdomen pelvis w IV contrast   Final Result   Addendum (preliminary) 1 of 1   Addendum:   NOTIFICATION:  The CT pulmonary arteriogram, abdomen and pelvis   results of the study were discussed with, and acknowledged by Dr. Cindy Arias, by telephone on 6/4/2025 at 7:46 PM       Signed by Royce Heller DO      Final   1.  No evidence of pulmonary arterial embolism.   2.  Small areas of subsegmental atelectasis right upper lobe.   3.  7.3 cm x 13 cm x 8.6 cm solid-appearing mass extending the pelvis   into the left lower abdominal quadrant.  This was present on the   previous study, however, is better delineated with the  addition of   intravenous contrast.  Differential diagnosis include solid mass,   large abdominal/pelvic hematoma given the recent history of surgery.     4.  Distal small bowel obstruction.  The degree of small bowel   distention is greater than that identified on the previous study.  The   zone of transition is not visualized but appears to be within the   distal ileum.  The terminal ileum is of normal caliber.   Signed by Royce Heller,            Procedure  Procedures       [1]   Past Medical History:  Diagnosis Date    Allergic dermatitis 01/09/2022    Cardiac arrest with successful resuscitation 2016    During cardiac catheterization / V-fib requiring cardioversion (normal coronary arteries    Diverticulitis of large intestine with perforation 05/12/2025    Heart murmur 05/13/2016    Hyperlipidemia     Mitral valve prolapse    [2]   Past Surgical History:  Procedure Laterality Date    CARDIAC CATHETERIZATION  06/17/2016    Compa Dominguez MD Normal Coronary Arteries.    CARDIOVERSION  06/17/2016    Compa Dominguez MD    TONSILLECTOMY     [3] No family history on file.  [4]   Social History  Tobacco Use    Smoking status: Never    Smokeless tobacco: Never   Substance Use Topics    Alcohol use: Yes     Comment: social    Drug use: Never        Jodi Wan PA-C  06/04/25 2024

## 2025-06-04 NOTE — ED TRIAGE NOTES
Pt arrived through triage with c/o abdominal pain. Pt had intestine removal 2 weeks ago on May 15th by Dr Canales due to a hole in the intestines. Since surgery he feels like he has fluids filling him all the way up to his neck. He states he has no appetite so he has not been eating or drinking much. He states chills that come and go but denies fevers. No N/V. Pt does have a colostomy bad with decreased output. He states recent painful urination and congestion with a runny nose. Pt ambulated to room on his own without issue.

## 2025-06-05 ENCOUNTER — APPOINTMENT (OUTPATIENT)
Dept: RADIOLOGY | Facility: HOSPITAL | Age: 70
End: 2025-06-05
Payer: MEDICARE

## 2025-06-05 LAB
ANION GAP SERPL CALC-SCNC: 11 MMOL/L (ref 10–20)
ATRIAL RATE: 95 BPM
BUN SERPL-MCNC: 14 MG/DL (ref 6–23)
CALCIUM SERPL-MCNC: 8.5 MG/DL (ref 8.6–10.3)
CHLORIDE SERPL-SCNC: 104 MMOL/L (ref 98–107)
CO2 SERPL-SCNC: 27 MMOL/L (ref 21–32)
CREAT SERPL-MCNC: 0.7 MG/DL (ref 0.5–1.3)
EGFRCR SERPLBLD CKD-EPI 2021: >90 ML/MIN/1.73M*2
ERYTHROCYTE [DISTWIDTH] IN BLOOD BY AUTOMATED COUNT: 14.9 % (ref 11.5–14.5)
GLUCOSE SERPL-MCNC: 116 MG/DL (ref 74–99)
HCT VFR BLD AUTO: 32.7 % (ref 41–52)
HGB BLD-MCNC: 10.9 G/DL (ref 13.5–17.5)
HOLD SPECIMEN: NORMAL
MCH RBC QN AUTO: 31.4 PG (ref 26–34)
MCHC RBC AUTO-ENTMCNC: 33.3 G/DL (ref 32–36)
MCV RBC AUTO: 94 FL (ref 80–100)
NRBC BLD-RTO: 0 /100 WBCS (ref 0–0)
P AXIS: 59 DEGREES
P OFFSET: 204 MS
P ONSET: 141 MS
PLATELET # BLD AUTO: 260 X10*3/UL (ref 150–450)
POTASSIUM SERPL-SCNC: 4.4 MMOL/L (ref 3.5–5.3)
PR INTERVAL: 152 MS
Q ONSET: 217 MS
QRS COUNT: 16 BEATS
QRS DURATION: 96 MS
QT INTERVAL: 356 MS
QTC CALCULATION(BAZETT): 447 MS
QTC FREDERICIA: 414 MS
R AXIS: 37 DEGREES
RBC # BLD AUTO: 3.47 X10*6/UL (ref 4.5–5.9)
SODIUM SERPL-SCNC: 138 MMOL/L (ref 136–145)
T AXIS: 17 DEGREES
T OFFSET: 395 MS
VENTRICULAR RATE: 95 BPM
WBC # BLD AUTO: 3.6 X10*3/UL (ref 4.4–11.3)

## 2025-06-05 PROCEDURE — C1769 GUIDE WIRE: HCPCS

## 2025-06-05 PROCEDURE — 49405 IMAGE CATH FLUID COLXN VISC: CPT

## 2025-06-05 PROCEDURE — 9420000001 HC RT PATIENT EDUCATION 5 MIN

## 2025-06-05 PROCEDURE — 80048 BASIC METABOLIC PNL TOTAL CA: CPT | Performed by: INTERNAL MEDICINE

## 2025-06-05 PROCEDURE — 85027 COMPLETE CBC AUTOMATED: CPT | Performed by: INTERNAL MEDICINE

## 2025-06-05 PROCEDURE — 2720000007 HC OR 272 NO HCPCS

## 2025-06-05 PROCEDURE — 87070 CULTURE OTHR SPECIMN AEROBIC: CPT | Mod: GEALAB | Performed by: SURGERY

## 2025-06-05 PROCEDURE — 99233 SBSQ HOSP IP/OBS HIGH 50: CPT | Performed by: FAMILY MEDICINE

## 2025-06-05 PROCEDURE — C1729 CATH, DRAINAGE: HCPCS

## 2025-06-05 PROCEDURE — 2500000004 HC RX 250 GENERAL PHARMACY W/ HCPCS (ALT 636 FOR OP/ED): Performed by: RADIOLOGY

## 2025-06-05 PROCEDURE — 99223 1ST HOSP IP/OBS HIGH 75: CPT | Performed by: NURSE PRACTITIONER

## 2025-06-05 PROCEDURE — 2500000004 HC RX 250 GENERAL PHARMACY W/ HCPCS (ALT 636 FOR OP/ED): Performed by: INTERNAL MEDICINE

## 2025-06-05 PROCEDURE — 36415 COLL VENOUS BLD VENIPUNCTURE: CPT | Performed by: INTERNAL MEDICINE

## 2025-06-05 PROCEDURE — 1100000001 HC PRIVATE ROOM DAILY

## 2025-06-05 PROCEDURE — 2500000001 HC RX 250 WO HCPCS SELF ADMINISTERED DRUGS (ALT 637 FOR MEDICARE OP): Performed by: INTERNAL MEDICINE

## 2025-06-05 RX ORDER — METOPROLOL TARTRATE 1 MG/ML
5 INJECTION, SOLUTION INTRAVENOUS EVERY 6 HOURS
Status: DISCONTINUED | OUTPATIENT
Start: 2025-06-05 | End: 2025-06-11

## 2025-06-05 RX ORDER — CYCLOBENZAPRINE HCL 5 MG
5 TABLET ORAL 3 TIMES DAILY
Status: DISCONTINUED | OUTPATIENT
Start: 2025-06-05 | End: 2025-06-09

## 2025-06-05 RX ORDER — LIDOCAINE HYDROCHLORIDE 20 MG/ML
1.25 SOLUTION OROPHARYNGEAL AS NEEDED
Status: DISCONTINUED | OUTPATIENT
Start: 2025-06-05 | End: 2025-06-16

## 2025-06-05 RX ORDER — ASPIRIN 81 MG/1
81 TABLET ORAL DAILY
Status: DISCONTINUED | OUTPATIENT
Start: 2025-06-05 | End: 2025-07-02 | Stop reason: HOSPADM

## 2025-06-05 RX ORDER — POLYETHYLENE GLYCOL 3350 17 G/17G
17 POWDER, FOR SOLUTION ORAL DAILY
Status: DISCONTINUED | OUTPATIENT
Start: 2025-06-05 | End: 2025-07-01

## 2025-06-05 RX ORDER — METOPROLOL SUCCINATE 25 MG/1
25 TABLET, EXTENDED RELEASE ORAL DAILY
Status: DISCONTINUED | OUTPATIENT
Start: 2025-06-05 | End: 2025-06-11

## 2025-06-05 RX ORDER — FERROUS SULFATE 325(65) MG
65 TABLET ORAL 2 TIMES DAILY
Status: DISCONTINUED | OUTPATIENT
Start: 2025-06-05 | End: 2025-06-09

## 2025-06-05 RX ORDER — ACETAMINOPHEN 325 MG/1
650 TABLET ORAL EVERY 6 HOURS
Status: DISCONTINUED | OUTPATIENT
Start: 2025-06-05 | End: 2025-06-09

## 2025-06-05 RX ORDER — GABAPENTIN 100 MG/1
100 CAPSULE ORAL 2 TIMES DAILY
Status: DISCONTINUED | OUTPATIENT
Start: 2025-06-05 | End: 2025-06-09

## 2025-06-05 RX ORDER — FENTANYL CITRATE 50 UG/ML
INJECTION, SOLUTION INTRAMUSCULAR; INTRAVENOUS
Status: COMPLETED | OUTPATIENT
Start: 2025-06-05 | End: 2025-06-05

## 2025-06-05 RX ADMIN — FENTANYL CITRATE 100 MCG: 50 INJECTION, SOLUTION INTRAMUSCULAR; INTRAVENOUS at 11:43

## 2025-06-05 RX ADMIN — POTASSIUM CHLORIDE, DEXTROSE MONOHYDRATE AND SODIUM CHLORIDE 100 ML/HR: 150; 5; 900 INJECTION, SOLUTION INTRAVENOUS at 09:03

## 2025-06-05 RX ADMIN — METOPROLOL TARTRATE 5 MG: 5 INJECTION INTRAVENOUS at 17:35

## 2025-06-05 RX ADMIN — METOPROLOL TARTRATE 5 MG: 5 INJECTION INTRAVENOUS at 12:23

## 2025-06-05 RX ADMIN — MORPHINE SULFATE 2 MG: 2 INJECTION, SOLUTION INTRAMUSCULAR; INTRAVENOUS at 05:43

## 2025-06-05 RX ADMIN — METOPROLOL TARTRATE 5 MG: 5 INJECTION INTRAVENOUS at 23:35

## 2025-06-05 RX ADMIN — POTASSIUM CHLORIDE, DEXTROSE MONOHYDRATE AND SODIUM CHLORIDE 100 ML/HR: 150; 5; 900 INJECTION, SOLUTION INTRAVENOUS at 19:50

## 2025-06-05 RX ADMIN — METOPROLOL TARTRATE 5 MG: 5 INJECTION INTRAVENOUS at 00:18

## 2025-06-05 RX ADMIN — METOPROLOL TARTRATE 5 MG: 5 INJECTION INTRAVENOUS at 05:35

## 2025-06-05 ASSESSMENT — ENCOUNTER SYMPTOMS
CHEST TIGHTNESS: 0
CONSTIPATION: 1
NAUSEA: 1
DYSPHORIC MOOD: 1
LIGHT-HEADEDNESS: 1
VOMITING: 0
DIARRHEA: 0
DIZZINESS: 1
DIFFICULTY URINATING: 0
SHORTNESS OF BREATH: 1
FATIGUE: 1
CHILLS: 1
WEAKNESS: 1
APPETITE CHANGE: 1
PALPITATIONS: 0
ACTIVITY CHANGE: 1
ABDOMINAL PAIN: 1
COUGH: 0
ABDOMINAL DISTENTION: 1
WHEEZING: 0

## 2025-06-05 ASSESSMENT — PAIN - FUNCTIONAL ASSESSMENT
PAIN_FUNCTIONAL_ASSESSMENT: 0-10

## 2025-06-05 ASSESSMENT — COGNITIVE AND FUNCTIONAL STATUS - GENERAL
TURNING FROM BACK TO SIDE WHILE IN FLAT BAD: A LITTLE
MOBILITY SCORE: 18
MOVING FROM LYING ON BACK TO SITTING ON SIDE OF FLAT BED WITH BEDRAILS: A LITTLE
WALKING IN HOSPITAL ROOM: A LITTLE
MOBILITY SCORE: 18
DAILY ACTIVITIY SCORE: 22
CLIMB 3 TO 5 STEPS WITH RAILING: A LITTLE
STANDING UP FROM CHAIR USING ARMS: A LITTLE
DAILY ACTIVITIY SCORE: 22
CLIMB 3 TO 5 STEPS WITH RAILING: A LITTLE
DRESSING REGULAR LOWER BODY CLOTHING: A LITTLE
TURNING FROM BACK TO SIDE WHILE IN FLAT BAD: A LITTLE
HELP NEEDED FOR BATHING: A LITTLE
MOVING FROM LYING ON BACK TO SITTING ON SIDE OF FLAT BED WITH BEDRAILS: A LITTLE
HELP NEEDED FOR BATHING: A LITTLE
DRESSING REGULAR LOWER BODY CLOTHING: A LITTLE
MOVING TO AND FROM BED TO CHAIR: A LITTLE
MOVING TO AND FROM BED TO CHAIR: A LITTLE
WALKING IN HOSPITAL ROOM: A LITTLE
STANDING UP FROM CHAIR USING ARMS: A LITTLE

## 2025-06-05 ASSESSMENT — PAIN DESCRIPTION - ORIENTATION: ORIENTATION: RIGHT

## 2025-06-05 ASSESSMENT — PAIN SCALES - GENERAL
PAINLEVEL_OUTOF10: 3
PAINLEVEL_OUTOF10: 5 - MODERATE PAIN
PAINLEVEL_OUTOF10: 10 - WORST POSSIBLE PAIN
PAINLEVEL_OUTOF10: 5 - MODERATE PAIN

## 2025-06-05 ASSESSMENT — PAIN DESCRIPTION - LOCATION: LOCATION: NOSE

## 2025-06-05 NOTE — CONSULTS
Reason For Consult  SBO    History Of Present Illness  Diego Morales is a 70 y.o. male presenting with SBO.     Pt had colectomy with colostomy creation with Dr. Canales 5/15/25. He hospital stay at that time was complicated by intra-abdominal hemorrhage post op day 2 but this resolved spontaneously though he did require 2 units RBC that admission. He was doing well at home, advanced diet to regular foods last week, was using miralax daily and ambulating without diff.   His stools were liquid last week and he was concerned that it was too much so he stopped miralax 6 days ago.   Within a few days he began to have inc abd discomfort, early satiety, nausea, decreased stool output.  Sx worsened yesterday so he came to [facility] for evaluation.   Surgery was consulted for concerns of SBO.     Today pt feels fatigued and weak. C/o light headedness at times.   C/o some SOB sx. C/o abd pain, distention, nausea.     No smoking.   No alcohol.   No drug use.       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    Surgical History  He has a past surgical history that includes Tonsillectomy; Cardiac catheterization (06/17/2016); and Cardioversion (06/17/2016).     Social History  He reports that he has never smoked. He has never used smokeless tobacco. He reports current alcohol use. He reports that he does not use drugs.    Family History  Family History[1]     Allergies  Toradol [ketorolac]    Review of Systems  Review of Systems   Constitutional:  Positive for activity change, appetite change, chills and fatigue.   Respiratory:  Positive for shortness of breath. Negative for cough, chest tightness and wheezing.    Cardiovascular:  Negative for chest pain, palpitations and leg swelling.    Gastrointestinal:  Positive for abdominal distention, abdominal pain, constipation and nausea. Negative for diarrhea and vomiting.   Genitourinary:  Negative for difficulty urinating.   Skin:  Positive for pallor.   Neurological:  Positive for dizziness, weakness and light-headedness.   Psychiatric/Behavioral:  Positive for dysphoric mood.           Physical Exam  Physical Exam  Vitals reviewed.   Constitutional:       General: He is not in acute distress.     Appearance: Normal appearance. He is not ill-appearing, toxic-appearing or diaphoretic.      Comments: thin   HENT:      Head: Normocephalic and atraumatic.      Nose:      Comments: NG in place     Mouth/Throat:      Mouth: Mucous membranes are dry.   Eyes:      General: No scleral icterus.        Right eye: No discharge.         Left eye: No discharge.      Conjunctiva/sclera: Conjunctivae normal.   Cardiovascular:      Rate and Rhythm: Normal rate and regular rhythm.      Pulses: Normal pulses.      Heart sounds: Murmur (3/6 throughout, loudest at apex) heard.      No friction rub. No gallop.   Pulmonary:      Effort: Pulmonary effort is normal. No respiratory distress.      Breath sounds: Normal breath sounds. No stridor. No wheezing, rhonchi or rales.   Chest:      Chest wall: No tenderness.   Abdominal:      General: Bowel sounds are normal. There is distension.      Palpations: Abdomen is soft.      Tenderness: There is abdominal tenderness (generalized).      Comments: Left abd with colostomy, stoma is red, moist. Minimal brown liquid stool in bag.   Musculoskeletal:      Right lower leg: No edema.      Left lower leg: No edema.   Skin:     General: Skin is warm and dry.   Neurological:      Mental Status: He is alert and oriented to person, place, and time.   Psychiatric:         Attention and Perception: Attention and perception normal.         Mood and Affect: Mood is depressed.         Speech: Speech is delayed.         Behavior: Behavior is  "slowed. Behavior is cooperative.         Thought Content: Thought content normal.         Judgment: Judgment normal.            Last Recorded Vitals  Blood pressure 137/85, pulse 85, temperature 36.5 °C (97.7 °F), temperature source Temporal, resp. rate 17, height 1.803 m (5' 11\"), weight 64.1 kg (141 lb 5 oz), SpO2 96%.    Relevant Results    XR abdomen 1 view  Result Date: 6/4/2025  STUDY: Abdomen Radiographs;  06/04/2025 10:30 PM INDICATION: Status post nasogastric tube. COMPARISON: XR abdomen 05/17/2025.  CT abdomen/pelvis 06/04/2025. ACCESSION NUMBER(S): [id number] ORDERING CLINICIAN: TECHNIQUE:  One view(s) of the abdomen. FINDINGS:  Nasogastric tube shows its tip and side-port in the gastric lumen. There are dilated loops of gas distended small bowel in the midline upper abdomen..  There are no convincing calculi or abnormal calcifications.  No focal osseous abnormalities.      Line/tubes/catheters:  Nasogastric tube tip and side-port are in the gastric lumen. Signed by Lv Jorge MD    CT angio chest for pulmonary embolism  Addendum Date: 6/4/2025  Addendum: NOTIFICATION:  The CT pulmonary arteriogram, abdomen and pelvis results of the study were discussed with, and acknowledged by Dr. Cindy Arias, by telephone on 6/4/2025 at 7:46 PM  Signed by Royce Heller DO    Result Date: 6/4/2025  STUDY: CT Angiogram of the Chest, CT Abdomen and Pelvis with IV Contrast; 06/04/2025 6:43 PM INDICATION: Shortness of breath, tachycardia.  Abdominal distension.   Recent abdominal surgery with colostomy, hyperactive bowel sounds, no output to colostomy bag. COMPARISON: XR chest 05/22/2025.  XR abdomen 05/17/2025.  CT abdomen/pelvis 05/18/2025, 05/13/2025. ACCESSION NUMBER(S): [id number], [id number] ORDERING CLINICIAN: ARUN MCLAUGHLIN TECHNIQUE:  CTA of the chest was performed following rapid injection of intravenous contrast.  Images are reviewed and processed at a workstation according to the CT angiogram " protocol with 3-D and/or MIP post processing imaging generated.  CT of the abdomen and pelvis was performed with intravenous contrast.  Omnipaque 350:75 mL was administered intravenously; positive oral contrast was given. Automated mA/kV exposure control was utilized and patient examination was performed in strict accordance with principles of ALARA. FINDINGS:  CTA CHEST: There is no airspace disease.  There is prominence of the pulmonary vasculature.  No pleural effusion or pneumothorax is present.  There are small areas of subsegmental atelectasis within the right upper lobe.  No intraluminal abnormalities are present within the trachea or mainstem bronchi. There are no hilar or mediastinal masses.  No pericardial effusion is present.  There is normal caliber of the thoracic aorta.  There are no filling defects within the pulmonary arteries. CT ABDOMEN AND PELVIS: Liver, spleen and pancreas have a normal appearance.  Gallbladder is nondistended.  There is no biliary or pancreatic ductal dilatation. There is fluid-filled, distended stomach.  No gastric wall thickening is present.  Duodenum is nondistended. There are no adrenal abnormalities.  Kidneys have a normal appearance with no hydronephrosis.  Ureters and urinary bladder are nondistended.  Prostate gland is enlarged.  The colon is nondistended.  There are multiple dilated small bowel loops involving the jejunum and proximal ileum.  The distal ileal loops are of normal caliber. There is a 7.3 cm length by 13 cm AP by 8.6 cm mass extending from the pelvis into the left lower abdomen.  The mass measures 63 HU.  There is no evidence of abdominal or pelvic ascites.  A left lower quadrant colostomy is present.  There is no evidence of free intraperitoneal gas. There is normal vertebral body height within the thoracic and lumbar spines.  There are no abnormalities of the sacrum, bony pelvis or proximal femurs.    1.  No evidence of pulmonary arterial embolism. 2.   Small areas of subsegmental atelectasis right upper lobe. 3.  7.3 cm x 13 cm x 8.6 cm solid-appearing mass extending the pelvis into the left lower abdominal quadrant.  This was present on the previous study, however, is better delineated with the addition of intravenous contrast.  Differential diagnosis include solid mass, large abdominal/pelvic hematoma given the recent history of surgery.  4.  Distal small bowel obstruction.  The degree of small bowel distention is greater than that identified on the previous study.  The zone of transition is not visualized but appears to be within the distal ileum.  The terminal ileum is of normal caliber. Signed by Royce Heller DO    CT abdomen pelvis w IV contrast  Addendum Date: 6/4/2025  Addendum: NOTIFICATION:  The CT pulmonary arteriogram, abdomen and pelvis results of the study were discussed with, and acknowledged by Dr. Cindy Arias, by telephone on 6/4/2025 at 7:46 PM  Signed by Royce Heller DO    Result Date: 6/4/2025  STUDY: CT Angiogram of the Chest, CT Abdomen and Pelvis with IV Contrast; 06/04/2025 6:43 PM INDICATION: Shortness of breath, tachycardia.  Abdominal distension.   Recent abdominal surgery with colostomy, hyperactive bowel sounds, no output to colostomy bag. COMPARISON: XR chest 05/22/2025.  XR abdomen 05/17/2025.  CT abdomen/pelvis 05/18/2025, 05/13/2025. ACCESSION NUMBER(S): [id number], [id number] ORDERING CLINICIAN: ARUN MCLAUGHLIN TECHNIQUE:  CTA of the chest was performed following rapid injection of intravenous contrast.  Images are reviewed and processed at a workstation according to the CT angiogram protocol with 3-D and/or MIP post processing imaging generated.  CT of the abdomen and pelvis was performed with intravenous contrast.  Omnipaque 350:75 mL was administered intravenously; positive oral contrast was given. Automated mA/kV exposure control was utilized and patient examination was performed in strict accordance with  principles of ALARA. FINDINGS:  CTA CHEST: There is no airspace disease.  There is prominence of the pulmonary vasculature.  No pleural effusion or pneumothorax is present.  There are small areas of subsegmental atelectasis within the right upper lobe.  No intraluminal abnormalities are present within the trachea or mainstem bronchi. There are no hilar or mediastinal masses.  No pericardial effusion is present.  There is normal caliber of the thoracic aorta.  There are no filling defects within the pulmonary arteries. CT ABDOMEN AND PELVIS: Liver, spleen and pancreas have a normal appearance.  Gallbladder is nondistended.  There is no biliary or pancreatic ductal dilatation. There is fluid-filled, distended stomach.  No gastric wall thickening is present.  Duodenum is nondistended. There are no adrenal abnormalities.  Kidneys have a normal appearance with no hydronephrosis.  Ureters and urinary bladder are nondistended.  Prostate gland is enlarged.  The colon is nondistended.  There are multiple dilated small bowel loops involving the jejunum and proximal ileum.  The distal ileal loops are of normal caliber. There is a 7.3 cm length by 13 cm AP by 8.6 cm mass extending from the pelvis into the left lower abdomen.  The mass measures 63 HU.  There is no evidence of abdominal or pelvic ascites.  A left lower quadrant colostomy is present.  There is no evidence of free intraperitoneal gas. There is normal vertebral body height within the thoracic and lumbar spines.  There are no abnormalities of the sacrum, bony pelvis or proximal femurs.    1.  No evidence of pulmonary arterial embolism. 2.  Small areas of subsegmental atelectasis right upper lobe. 3.  7.3 cm x 13 cm x 8.6 cm solid-appearing mass extending the pelvis into the left lower abdominal quadrant.  This was present on the previous study, however, is better delineated with the addition of intravenous contrast.  Differential diagnosis include solid mass, large  abdominal/pelvic hematoma given the recent history of surgery.  4.  Distal small bowel obstruction.  The degree of small bowel distention is greater than that identified on the previous study.  The zone of transition is not visualized but appears to be within the distal ileum.  The terminal ileum is of normal caliber. Signed by Royce Heller DO    ECG 12 lead  Result Date: 6/4/2025  Normal sinus rhythm Normal ECG When compared with ECG of 13-MAY-2025 12:28, (unconfirmed) No significant change was found See ED provider note for full interpretation and clinical correlation Confirmed by Erin Grant ([id number]) on 6/4/2025 12:54:49 PM    Lower extremity venous duplex left  Result Date: 5/22/2025  Interpreted By:  Adryan Collazo, STUDY: [facility] LOWER EXTREMITY VENOUS DUPLEX LEFT  5/22/2025 9:40 pm   INDICATION: 69 y/o   M with  Signs/Symptoms:LLE swelling. LMP:  Unknown.       COMPARISON: None.   ACCESSION NUMBER(S): [id number]   ORDERING CLINICIAN: VIRGINIA SHAFER   TECHNIQUE: Routine ultrasound of the  left lower extremity was performed with duplex Doppler (color and spectral) evaluation.   Static images were obtained for remote interpretation.   FINDINGS: THIGH VEINS:  The common femoral, femoral, popliteal, proximal medial saphenous, and deep femoral veins are patent and free of thrombus. The veins are normally compressible.  They demonstrate normal phasic flow and augmentation response.   There is slow flow seen in the left popliteal vein which can indicate some component of venous stasis   CALF VEINS:  The paired peroneal and posterior tibial calf veins are patent.       Negative study.  No deep venous thrombosis of the  left lower extremity.  Slow popliteal vein flow which can be seen the setting of venous stasis.   MACRO: None   Signed by: Adryan Collazo 5/22/2025 9:51 PM Dictation workstation:   [id number]    XR chest 1 view  Result Date: 5/22/2025  Interpreted By:  Bjorn Mccray, STUDY: XR CHEST 1  VIEW;  5/22/2025 9:07 pm   INDICATION: Signs/Symptoms:sob.   COMPARISON: Chest radiograph 05/12/2025.   ACCESSION NUMBER(S): [id number]   ORDERING CLINICIAN: VIRGINIA SHAFER   FINDINGS:   CARDIOMEDIASTINAL SILHOUETTE: Cardiomediastinal silhouette is normal in size and configuration.   LUNGS/PLEURA: There are no consolidations.There are no pleural effusions. There is no demonstrated pneumothorax     BONES: No evidence of acute osseous abnormality.       1.  No evidence of acute cardiopulmonary process.     Signed by: Bjorn Mccray 5/22/2025 9:13 PM Dictation workstation:   [id number]    CT abdomen pelvis w IV contrast  Result Date: 5/19/2025  Interpreted By:  Inés Hung, STUDY: CT ABDOMEN PELVIS W IV CONTRAST;  5/18/2025 9:00 am   INDICATION: Signs/Symptoms:concern for bleed.     COMPARISON: CT abdomen pelvis with contrast dated 05/13/2025.   ACCESSION NUMBER(S): [id number]   ORDERING CLINICIAN: EDNA GREENFIELD   TECHNIQUE: CT of the abdomen and pelvis was performed after administration of intravenous contrast. Standard contiguous axial images were obtained at 3 mm slice thickness through the abdomen and pelvis. Coronal and sagittal reconstructions at 3 mm slice thickness were performed.  75 ML of Omnipaque 350 was administered intravenously without immediate complication.   FINDINGS: LOWER CHEST: There are bandlike airspace opacities in the dependent region of bilateral lower lobes, likely favored to represent atelectasis. Otherwise lungs are clear. Heart is mildly enlarged. No pericardial or pleural effusions. Nasogastric tube extends through distal thoracic esophagus included in the field of view.   ABDOMEN:   LIVER: Liver demonstrates mild hypoattenuation, which can be seen with mild hepatic steatosis. However no focal hepatic mass lesion is noted.   BILE DUCTS: The intrahepatic and extrahepatic ducts are not dilated.   GALLBLADDER: Gallbladder is moderately distended and does not demonstrate calcified stones  in the lumen.   PANCREAS: There is a small 6 x 6.5 mm fat containing lesion in the neck of pancreas demonstrating macroscopic fat. This demonstrates contiguity with the main pancreatic duct. However there is no significant pancreatic ductal dilatation or pancreatic atrophy.   SPLEEN: The spleen is normal in size.   ADRENAL GLANDS: Bilateral adrenal glands appear normal.   KIDNEYS AND URETERS: Bilateral kidneys enhance symmetrically. No hydronephrosis.   PELVIS:   BLADDER: Urinary bladder is moderately distended and demonstrates small amount of air in the non dependent region, likely favored to be related to recent catheterization. Otherwise urinary bladder is unremarkable.   REPRODUCTIVE ORGANS: Prostate gland is mildly enlarged and demonstrates median lobe hypertrophy indenting on the base of the bladder.   BOWEL/RETROPERITONEUM/LYMPH NODES/ABDOMINAL WALL: Nasogastric tube extends through the distal thoracic esophagus with its tip located in the gastric body. Otherwise stomach is not well distended limiting evaluation. Postsurgical changes of partial sigmoid colectomy with colostomy in the left lower quadrant are noted. There is diffuse distention of small-bowel loops demonstrating fluid-filled lumen with few scattered areas of air-fluid levels. However no significant transition point is noted. The small bowel is distended to approximately 3.7 cm in the largest width. The degree of distention is predominantly seen in the distal jejunal and ileal loops. Appendix is not distinctly visualized. There is a moderate-sized hyperdense fluid collection in the lower pelvis measuring approximately 14 x 14 x 7 cm in CC, AP and transverse dimensions respectively. This measures up to 67 Hounsfield units and is suggestive of postoperative hematoma. There is a surgical drain extending through the hematoma with its tip located in the left mid hemiabdomen. There is also small amount of perihepatic and perisplenic fluid collection  measuring up to 35 Hounsfield units and is likely favored to represent extension of the evolving pelvic hematoma. There is interval improvement in intraperitoneal free air compared to immediate prior CT examination dated 05/13/2025. However there is small amount of subcutaneous emphysema in the anterior abdominal wall soft tissues with midline abdominal wall incision and overlying staples, suggestive of recent postsurgical status. There is also diffuse body wall edema, predominantly involving the lower abdomen and pelvis. This is new compared to prior CT examination and is likely favored to be reactive/due to fluid overload. Small fat containing right-sided inguinal hernia is noted.   No evidence of enlarged lymphadenopathy in the abdomen and pelvis.   VESSELS: Abdominal aorta and IVC appears grossly unremarkable.     BONES: No suspicious osseous lesions.       1.  Interval postsurgical changes of partial sigmoid colectomy with colostomy in the left lower quadrant. Findings concerning for hematoma in the lower pelvis, predominantly located in the rectovesical pouch extending superiorly to the perihepatic and perisplenic region. The largest pocket measures up to 14 x 14 x 7 cm in CC, AP and transverse dimensions respectively the surgical drain extending through the hematoma. Please note that evaluation for active contrast extravasation is limited due to single phase contrast examination. Recommend correlation with trending hemoglobin and hematocrit levels. A CT angiogram with arterial and delayed phases can be obtained for definitive evaluation if clinically warranted. 2. Diffusely distended fluid-filled small bowel loops throughout the abdomen without definite CT evidence of transition point. This could be related to postoperative ileus, however recommend correlation with patient's symptomatology for bowel obstruction. Serial close interval follow-up radiographs can be obtained as clinically warranted. 3. Incidental  note of a 6.5 x 6 mm well-circumscribed lesion in the pancreatic neck demonstrating macroscopic fat with possible contiguity with the main pancreatic duct. This is most likely favored to represent a lipoma versus an interdigitating fat. No definite evidence of pancreatic ductal dilatation or pancreatic parenchymal atrophy. An MRI can be obtained for further evaluation as clinically warranted. 4. Interval evidence of body wall edema, likely favored to be reactive versus fluid overload. Recommend clinical correlation. 5. Small amount of gas in the non dependent region of the urinary bladder, likely favored to be related to recent catheterization. However recommend correlation for emphysematous cystitis. 6. Mild prostatomegaly. Recommend correlation with PSA levels.   MACRO: Critical Finding:  See findings. Notification was initiated on 5/19/2025 at 9:50 am by Dr. Inés Hung.  (**-OCF-**) Instructions:     Signed by: Inés Hung 5/19/2025 9:50 AM Dictation workstation:   [id number]    XR abdomen 1 view  Result Date: 5/17/2025  Interpreted By:  Marissa Weeks, STUDY: XR ABDOMEN 1 VIEW;  5/17/2025 6:13 am   INDICATION: Signs/Symptoms:Abdominal pain, confirm NG tube placement.   COMPARISON: Radiographs 05/15/2025.   ACCESSION NUMBER(S): [id number]   ORDERING CLINICIAN: LATASHA RILEY   FINDINGS: Tip of NG tube in the proximal stomach and sidehole below the diaphragm. Left lower quadrant ostomy and surgical drain. A few loops of dilated small bowel again noted measuring up to 5.1 cm, previously 4.5 cm in the midabdomen. No obvious hemoperitoneum visualized.       Satisfactory NG tube placement. Worsening ileus versus small-bowel obstruction.   MACRO None   Signed by: Marissa Weeks 5/17/2025 7:04 AM Dictation workstation:   [id number]    ECG 12 Lead  Result Date: 5/16/2025  Normal sinus rhythm Normal ECG When compared with ECG of 23-JUN-2016 06:47, Premature ventricular complexes are no longer Present T  wave inversion now evident in Inferior leads    XR abdomen 1 view  Result Date: 5/15/2025  Interpreted By:  Quin Reich, STUDY: XR ABDOMEN 1 VIEW;  5/15/2025 8:42 pm   INDICATION: Signs/Symptoms:Confim NGT placement.   COMPARISON: None.   ACCESSION NUMBER(S): [id number]   ORDERING CLINICIAN: LATASHA RILEY   FINDINGS: Enteric tube terminates in the left upper quadrant with side hole at or just below the GE junction. Consider slight advancement. Multiple overlying leads are present.   The mid abdomen and pelvis are excluded from the field of view thereby limiting evaluation. There are multiple gaseous distended dilated loops of bowel centrally in the visualized abdomen. Cutaneous staples noted. Findings may relate to postoperative ileus with developing obstruction not excluded.   Bibasilar atelectasis.   Osseous structures demonstrate no acute bony changes.       1. Enteric tube terminates in the left upper quadrant with side hole at or just below the GE junction. Consider slight advancement. 2. There are multiple gaseous distended dilated loops of bowel centrally in the visualized upper abdomen. Cutaneous staples noted. Findings may relate to postoperative ileus with developing obstruction not excluded. Attention on continued radiographic follow-up is advised.   MACRO: None   Signed by: Quin Reich 5/15/2025 8:57 PM Dictation workstation:   [id number]    CT abdomen pelvis w IV contrast  Result Date: 5/14/2025  Interpreted By:  Finkelstein, Evan, STUDY: CT ABDOMEN PELVIS W IV CONTRAST;  5/13/2025 10:06 pm   INDICATION: Signs/Symptoms:Abd pain.     COMPARISON: CT chest abdomen pelvis 05/12/2025   ACCESSION NUMBER(S): [id number]   ORDERING CLINICIAN: EDNA GREENFIELD   TECHNIQUE: Axial CT images of the abdomen and pelvis with coronal and sagittal reconstructed images obtained after intravenous administration of 75 mL Omnipaque 350   FINDINGS: LOWER CHEST: Mild bibasilar atelectasis.   ABDOMEN:   LIVER: Normal  attenuation and contour. BILE DUCTS: Normal caliber. GALLBLADDER: No calcified gallstones. No wall thickening. PORTAL VEIN: Patent SPLEEN: Unremarkable. PANCREAS: Unremarkable. ADRENALS: Unremarkable. KIDNEYS, URETERS and URINARY BLADDER: Symmetric renal enhancement. No hydronephrosis or perinephric fluid collection. The bladder is not fully distended, which limits evaluation. There is adjacent stranding. REPRODUCTIVE ORGANS: No pelvic masses.   ABDOMINAL WALL: Within normal limits. PERITONEUM: Scattered foci of free air throughout the abdomen and pelvis.   BOWEL: Dilated loops of proximal small bowel with tapering to more normal caliber in the lower pelvis. There are scattered colonic diverticula with wall thickening of the sigmoid colon and adjacent inflammatory stranding. There is wall thickening involving small bowel in the lower pelvis where there is adjacent inflammatory stranding as well as foci of extraluminal air. Nondilated appendix.   VESSELS: No aortic aneurysm. RETROPERITONEUM: No pathologically enlarged retroperitoneal lymph nodes.   BONES: No acute osseous abnormality.       Redemonstrated scattered foci of free air throughout the abdomen and pelvis most compatible with a perforated viscus. Inflammatory stranding is most pronounced in the lower pelvis surrounding loops of small bowel with wall thickening as well as the sigmoid colon where there are several associated diverticula. Findings suggest diverticulitis, which may represent a site of perforation in the setting of free air. Perforation of adjacent small bowel, however, is not entirely excluded.   Dilated loops of proximal small bowel with tapering to more normal caliber in the lower pelvis. Dilated loops of bowel are new compared to prior imaging and may represent a reactive ileus versus developing obstruction.     MACRO: None.   Signed by: Evan Finkelstein 5/14/2025 12:27 AM Dictation workstation:   [id number]    CT chest abdomen pelvis w IV  contrast  Result Date: 5/12/2025  Interpreted By:  Elena Gallardo, STUDY: CT CHEST ABDOMEN PELVIS W IV CONTRAST;  5/12/2025 7:57 pm   INDICATION: Signs/Symptoms:acute abd pain, cxr showing free air in the intraperitoneal right hemidiaphragm.   COMPARISON: 06/23/2016   ACCESSION NUMBER(S): [id number]   ORDERING CLINICIAN: LORRIE MOORE   TECHNIQUE: Axial CT images of the chest, abdomen and pelvis with coronal and sagittal reconstructed images obtained after intravenous administration of contrast.   FINDINGS: CHEST:   VESSELS: Aorta is normal caliber. Mild aortic calcifications. Coronary arteries are grossly unremarkable. HEART: Normal size. No significant pericardial effusion. MEDIASTINUM AND AIDE: No pathologically enlarged thoracic lymph nodes.  Shotty subcentimeter lymph nodes measuring up to 9 mm. No pneumomediastinum. The esophagus appears within normal limits. LUNG, PLEURA, LARGE AIRWAYS: Redemonstrated biapical pleuroparenchymal thickening as well as reticulonodular opacities in the upper lobes, greater on the right, similar to prior imaging. Lungs are hyperinflated. No sizable pleural effusion or pneumothorax. CHEST WALL AND LOWER NECK: Within normal limits. No acute osseous abnormality.   ABDOMEN:   BONES: No acute osseous abnormality. Multilevel degenerative changes. ABDOMINAL WALL: Within normal limits.   LIVER: Diffusely decreased attenuation of the liver parenchyma. BILE DUCTS: Normal caliber. GALLBLADDER: No calcified gallstones. No wall thickening. PANCREAS: Within normal limits. SPLEEN: Within normal limits. ADRENALS: Within normal limits. KIDNEYS AND URETERS: Symmetric renal enhancement. No hydronephrosis or perinephric fluid collection.  No hydroureter.   VESSELS: Atherosclerotic calcifications without aneurysmal dilatation seen. RETROPERITONEUM: Within normal limits.   PELVIS:   REPRODUCTIVE ORGANS: The prostate is mildly enlarged up to 5.7 cm. BLADDER: Within normal limits.   BOWEL: Th. Fluid  contents are present within the proximal colon. There is diffuse wall thickening of the sigmoid colon with diverticulosis and pericolonic stranding. E stomach is grossly unremarkable. There are few fluid filled dilated loops of small bowel with possible mild wall thickening. There is a indeterminate locule of air also noted in the pelvis adjacent to small bowel. Normal appendix. PERITONEUM: Trace pelvic ascites and mild intraperitoneal free air corresponding with the same day radiograph. No discrete fluid collection.       Reticulonodular opacities may right greater than left upper lobe similar to prior imaging from 06/23/2016 suggestive of chronic etiology. Mycobacterial infection again not excluded.   Mild free air noted within the abdomen and pelvis in keeping with hollow visceral perforation. There are regions of small bowel with wall thickening and fluid including the indeterminate region with adjacent non dependent free air which is indeterminate for location of perforation. There is also findings suggestive of acute sigmoidal diverticulitis. Surgical recommended.   Prostatomegaly. Correlation with PSA is suggested.   Additional findings as detailed.     MACRO: Elena Gallardo discussed the significance and urgency of this critical finding by telephone with  LORRIE MOORE on 5/12/2025 at 8:54 pm.  (**-RCF-**) Findings:  See findings.   Signed by: Elena Gallardo 5/12/2025 8:59 PM Dictation workstation:   [id number]    XR chest 2 views  Result Date: 5/12/2025  Interpreted By:  Laron Baez, STUDY: XR CHEST 2 VIEWS;  5/12/2025 7:22 pm   INDICATION: Signs/Symptoms:diffuse abd pain,.     COMPARISON: None.   ACCESSION NUMBER(S): [id number]   ORDERING CLINICIAN: LORRIE MOORE   FINDINGS:         CARDIOMEDIASTINAL SILHOUETTE: Cardiomediastinal silhouette is normal in size and configuration.   LUNGS: Lungs lungs are hyperinflated but clear. No consolidation seen.   ABDOMEN: There is free intraperitoneal air  underlying the right hemidiaphragm..   BONES: No acute osseous changes.       1.  Free intraperitoneal air under the right hemidiaphragm. In the absence of recent of ancient CT is advised for complete evaluation 2. Emphysematous changes in the lungs without acute abnormality       MACRO: None   Signed by: Laron Baez 5/12/2025 7:28 PM Dictation workstation:   [id number]      Scheduled medications  Scheduled Medications[2]  Continuous medications  Continuous Medications[3]  PRN medications  PRN Medications[4]  Results for orders placed or performed during the hospital encounter of 06/04/25 (from the past 24 hours)   CBC and Auto Differential   Result Value Ref Range    WBC 4.5 4.4 - 11.3 x10*3/uL    nRBC 0.0 0.0 - 0.0 /100 WBCs    RBC 3.72 (L) 4.50 - 5.90 x10*6/uL    Hemoglobin 11.4 (L) 13.5 - 17.5 g/dL    Hematocrit 34.4 (L) 41.0 - 52.0 %    MCV 93 80 - 100 fL    MCH 30.6 26.0 - 34.0 pg    MCHC 33.1 32.0 - 36.0 g/dL    RDW 14.8 (H) 11.5 - 14.5 %    Platelets 332 150 - 450 x10*3/uL    Neutrophils % 64.4 40.0 - 80.0 %    Immature Granulocytes %, Automated 0.7 0.0 - 0.9 %    Lymphocytes % 23.0 13.0 - 44.0 %    Monocytes % 9.3 2.0 - 10.0 %    Eosinophils % 2.2 0.0 - 6.0 %    Basophils % 0.4 0.0 - 2.0 %    Neutrophils Absolute 2.92 1.60 - 5.50 x10*3/uL    Immature Granulocytes Absolute, Automated 0.03 0.00 - 0.50 x10*3/uL    Lymphocytes Absolute 1.04 0.80 - 3.00 x10*3/uL    Monocytes Absolute 0.42 0.05 - 0.80 x10*3/uL    Eosinophils Absolute 0.10 0.00 - 0.40 x10*3/uL    Basophils Absolute 0.02 0.00 - 0.10 x10*3/uL   Comprehensive metabolic panel   Result Value Ref Range    Glucose 105 (H) 74 - 99 mg/dL    Sodium 137 136 - 145 mmol/L    Potassium 4.0 3.5 - 5.3 mmol/L    Chloride 99 98 - 107 mmol/L    Bicarbonate 28 21 - 32 mmol/L    Anion Gap 14 10 - 20 mmol/L    Urea Nitrogen 20 6 - 23 mg/dL    Creatinine 0.84 0.50 - 1.30 mg/dL    eGFR >90 >60 mL/min/1.73m*2    Calcium 9.5 8.6 - 10.3 mg/dL    Albumin 3.6 3.4 - 5.0  g/dL    Alkaline Phosphatase 165 (H) 33 - 136 U/L    Total Protein 6.7 6.4 - 8.2 g/dL    AST 26 9 - 39 U/L    Bilirubin, Total 0.9 0.0 - 1.2 mg/dL    ALT 24 10 - 52 U/L   Magnesium   Result Value Ref Range    Magnesium 1.98 1.60 - 2.40 mg/dL   Lipase   Result Value Ref Range    Lipase 37 9 - 82 U/L   Lactate   Result Value Ref Range    Lactate 0.7 0.4 - 2.0 mmol/L   B-Type Natriuretic Peptide   Result Value Ref Range    BNP 46 0 - 99 pg/mL   Sars-CoV-2, Influenza A/B and RSV PCR   Result Value Ref Range    Coronavirus 2019, PCR Not Detected Not Detected    Flu A Result Not Detected Not Detected    Flu B Result Not Detected Not Detected    RSV PCR Not Detected Not Detected   Troponin I, High Sensitivity, Initial   Result Value Ref Range    Troponin I, High Sensitivity 5 0 - 20 ng/L   Troponin, High Sensitivity, 1 Hour   Result Value Ref Range    Troponin I, High Sensitivity 5 0 - 20 ng/L   Urinalysis with Reflex Culture and Microscopic   Result Value Ref Range    Color, Urine Yellow Light-Yellow, Yellow, Dark-Yellow    Appearance, Urine Clear Clear    Specific Gravity, Urine >1.050 (N) 1.005 - 1.035    pH, Urine 6.5 5.0, 5.5, 6.0, 6.5, 7.0, 7.5, 8.0    Protein, Urine 30 (1+) (A) NEGATIVE, 10 (TRACE), 20 (TRACE) mg/dL    Glucose, Urine Normal Normal mg/dL    Blood, Urine NEGATIVE NEGATIVE mg/dL    Ketones, Urine NEGATIVE NEGATIVE mg/dL    Bilirubin, Urine NEGATIVE NEGATIVE mg/dL    Urobilinogen, Urine Normal Normal mg/dL    Nitrite, Urine NEGATIVE NEGATIVE    Leukocyte Esterase, Urine NEGATIVE NEGATIVE   Extra Urine Gray Tube   Result Value Ref Range    Extra Tube Hold for add-ons.    Urinalysis Microscopic   Result Value Ref Range    WBC, Urine NONE 1-5, NONE /HPF    RBC, Urine 3-5 NONE, 1-2, 3-5 /HPF    Mucus, Urine 2+ Reference range not established. /LPF   CBC   Result Value Ref Range    WBC 3.6 (L) 4.4 - 11.3 x10*3/uL    nRBC 0.0 0.0 - 0.0 /100 WBCs    RBC 3.47 (L) 4.50 - 5.90 x10*6/uL    Hemoglobin 10.9 (L) 13.5  "- 17.5 g/dL    Hematocrit 32.7 (L) 41.0 - 52.0 %    MCV 94 80 - 100 fL    MCH 31.4 26.0 - 34.0 pg    MCHC 33.3 32.0 - 36.0 g/dL    RDW 14.9 (H) 11.5 - 14.5 %    Platelets 260 150 - 450 x10*3/uL   Basic metabolic panel   Result Value Ref Range    Glucose 116 (H) 74 - 99 mg/dL    Sodium 138 136 - 145 mmol/L    Potassium 4.4 3.5 - 5.3 mmol/L    Chloride 104 98 - 107 mmol/L    Bicarbonate 27 21 - 32 mmol/L    Anion Gap 11 10 - 20 mmol/L    Urea Nitrogen 14 6 - 23 mg/dL    Creatinine 0.70 0.50 - 1.30 mg/dL    eGFR >90 >60 mL/min/1.73m*2    Calcium 8.5 (L) 8.6 - 10.3 mg/dL        Assessment/Plan     Pt is a 70 year old man admitted with SBO and is s/p recent colectomy with colostomy on 5/15/25.  - labs reviewed. Leukopenia, anemia. Kidney function WNL.  - imaging reviewed. CT abd pelvis with \"7.3 cm x 13 cm x 8.6 cm solid-appearing mass extending the pelvis into the left lower abdominal quadrant\" and this likely represents hematoma from post op bleeding last month after surgery. \"Distal small bowel obstruction.  The degree of small bowel distention is greater than that identified on the previous study.  The zone of transition is not visualized but appears to be within the distal ileum.\"  - NPO.  - IVF while NPO.  - maintain NG to LIWS.  - zofran 4 mg q 6 hours PRN nausea  - avoid narcotic pain medicine as able.  - recommend miralax daily.  - enc ambulation.  - maintain NG to LIWS.     - No acute surgical intervention at this time. Recommend medical management at this time. Will cont to follow pt.    - remainder of care per primary.    Plan of care discussed with Dr. Maloney who is in agreement with plan of care.       Sylvia Guerrero, APRN-CNP         [1] No family history on file.  [2] lidocaine, 1 Application, urethral, Once  metoprolol, 5 mg, intravenous, q6h    [3] potassium chloride-D5-0.9%NaCl, 100 mL/hr, Last Rate: 100 mL/hr (06/05/25 0904)    [4] PRN medications: acetaminophen **OR** acetaminophen **OR** " acetaminophen, melatonin, morphine, morphine, ondansetron **OR** ondansetron

## 2025-06-05 NOTE — CARE PLAN
The patient's goals for the shift include  pt will use call light to maintain safety throughout shift.    The clinical goals for the shift include Pt will remain HDs throughout shift      Problem: Pain - Adult  Goal: Verbalizes/displays adequate comfort level or baseline comfort level  Outcome: Progressing     Problem: Safety - Adult  Goal: Free from fall injury  Outcome: Progressing     Problem: Discharge Planning  Goal: Discharge to home or other facility with appropriate resources  Outcome: Progressing     Problem: Chronic Conditions and Co-morbidities  Goal: Patient's chronic conditions and co-morbidity symptoms are monitored and maintained or improved  Outcome: Progressing     Problem: Nutrition  Goal: Nutrient intake appropriate for maintaining nutritional needs  Outcome: Progressing     Problem: Pain  Goal: Takes deep breaths with improved pain control throughout the shift  Outcome: Progressing  Goal: Turns in bed with improved pain control throughout the shift  Outcome: Progressing  Goal: Walks with improved pain control throughout the shift  Outcome: Progressing  Goal: Performs ADL's with improved pain control throughout shift  Outcome: Progressing  Goal: Participates in PT with improved pain control throughout the shift  Outcome: Progressing  Goal: Free from opioid side effects throughout the shift  Outcome: Progressing  Goal: Free from acute confusion related to pain meds throughout the shift  Outcome: Progressing     Problem: Fall/Injury  Goal: Not fall by end of shift  Outcome: Progressing  Goal: Be free from injury by end of the shift  Outcome: Progressing  Goal: Verbalize understanding of personal risk factors for fall in the hospital  Outcome: Progressing  Goal: Verbalize understanding of risk factor reduction measures to prevent injury from fall in the home  Outcome: Progressing  Goal: Use assistive devices by end of the shift  Outcome: Progressing  Goal: Pace activities to prevent fatigue by end of  the shift  Outcome: Progressing

## 2025-06-05 NOTE — ASSESSMENT & PLAN NOTE
Diego Morales is a 70 y.o. male with a past medical history of diverticulitis of large intestine with perforation status post bowel resection on May 15 with colostomy bag,, mitral valve prolapse, hyperlipidemia, cardiac arrest, who was admitted to the hospital for small bowel obstruction with abdominal mass versus hematoma.      Small bowel obstruction  - Patient status post recent bowel resection with colostomy bag  - Now present with small bowel obstruction and concern for abdominal mass versus hematoma  -  General Surgery recommended NG tube and monitor  - Will give morphine as needed for abdominal pain  - Zofran as needed for nausea vomiting    Abdominal mass versus hematoma  - CT scan showed a 7.3 cm x 13 cm x 8.6 cm solid-appearing mass extending the pelvis into the left lower abdominal quadrant.  This was present on the previous study, however, is better delineated with the addition of intravenous contrast.  Differential diagnosis include solid mass, large abdominal/pelvic hematoma given the recent history of surgery.  -Hemoglobin appears stable at this time.  Will repeat in the morning  - We will use SCDs for DVT prophylaxis for now and avoid NSAIDs and blood thinners.  -Patient is afebrile with no leukocytosis  - Continue pain management  - Follow-up with general surgery    Seepage through the anus  - Follow-up with general surgery    Subsegmental atelectasis in the right upper lobe  - Incentive spirometry

## 2025-06-05 NOTE — PROGRESS NOTES
Diego Morales is a 70 y.o. male on day 1 of admission presenting with Lower abdominal pain.      Subjective   Patient seen after abdominal drain placed with dark material in tubing, reports some discomfort at insertion point but denies any overall abdominal pain or nausea.    Objective     Last Recorded Vitals  /78   Pulse 97   Temp 36.5 °C (97.7 °F) (Temporal)   Resp 18   Wt 64.1 kg (141 lb 5 oz)   SpO2 91%   Intake/Output last 3 Shifts:    Intake/Output Summary (Last 24 hours) at 6/5/2025 1545  Last data filed at 6/5/2025 1242  Gross per 24 hour   Intake 2414.33 ml   Output 600 ml   Net 1814.33 ml       Admission Weight  Weight: 65.8 kg (145 lb) (06/04/25 1735)    Daily Weight  06/04/25 : 64.1 kg (141 lb 5 oz)    Image Results  US guided abscess fluid collection drainage  Narrative: Interpreted By:  Chintan Tomlinson,   STUDY:  US GUIDED ABSCESS FLUID COLLECTION DRAINAGE;  6/5/2025 12:11 pm      INDICATION:  Signs/Symptoms:Possible drain abdominal hematoma.      COMPARISON:  CT scan from 06/04/2025      ACCESSION NUMBER(S):  [id number]      ORDERING CLINICIAN:  ANIBAL LANGE      TECHNIQUE:  Ultrasound guided aspiration and drainage of right ventral abdominal  wall fluid collection.      FINDINGS:  The procedure and the benefits, risks and potential complications in  addition to alternatives of the procedure were discussed with the  patient by Dr. Tomlinson and signed informed consent was obtained.          Limited ultrasound images were obtained through the pelvis. The  images demonstrated  a heterogeneous fluid collection within the  central aspect of the pelvis abutting the urinary bladder. The area  of the overlying ventral abdominal wall was prepped in the usual  sterile manner. 1% lidocaine was used for local anesthesia at the  planned skin insertion site. 100 mcg of IV fentanyl was given for  pain.      Under direct ultrasound guidance, a 8 Frisian straight catheter was  inserted into the fluid  collection. After confirmation of position of  the catheter within the collection, the stylet was removed, the  pigtail fixed and the catheter secured to the skin. The catheter was  then set to gravity drainage. 10 mL of dark hemorrhagic hematoma was  collected.      Postprocedure images demonstrated no evidence of hemorrhage.      Patient tolerated the procedure without immediate complication. Fluid  was sent to the laboratory for further analysis.      Impression: Successful  aspiration and drain placement into the pelvic collection  as above.          Signed by: Chintan Tomlinson 6/5/2025 1:13 PM  Dictation workstation:   [id number]  ECG 12 lead  Normal sinus rhythm  Normal ECG  When compared with ECG of 22-MAY-2025 21:01,  No significant change was found      Physical Exam  Constitutional: alert and oriented x 3, awake, cooperative, chronically ill-appearing  Skin: warm and dry, abdominal incision well approximated without signs of infection  Head/Neck: Normocephalic, atraumatic  Eyes: clear sclera  ENMT: mucous membranes moist  Cardio: Regular rate and rhythm, no murmur  Resp: CTA bilaterally, good respiratory effort  Gastrointestinal: Distended, diffuse tenderness, no bowel sounds; ostomy bag in place  Musculoskeletal: ROM intact, no joint swelling  Extremities: No edema, cyanosis, or clubbing  Neuro: alert and oriented x 3  Psychological: Appropriate mood and behavior    Relevant Results  Scheduled medications  Scheduled Medications[1]  Continuous medications  Continuous Medications[2]  PRN medications  PRN Medications[3]    Results for orders placed or performed during the hospital encounter of 06/04/25 (from the past 24 hours)   ECG 12 lead   Result Value Ref Range    Ventricular Rate 95 BPM    Atrial Rate 95 BPM    NV Interval 152 ms    QRS Duration 96 ms    QT Interval 356 ms    QTC Calculation(Bazett) 447 ms    P Axis 59 degrees    R Axis 37 degrees    T Axis 17 degrees    QRS Count 16 beats    Q Onset 217  ms    P Onset 141 ms    P Offset 204 ms    T Offset 395 ms    QTC Fredericia 414 ms   CBC and Auto Differential   Result Value Ref Range    WBC 4.5 4.4 - 11.3 x10*3/uL    nRBC 0.0 0.0 - 0.0 /100 WBCs    RBC 3.72 (L) 4.50 - 5.90 x10*6/uL    Hemoglobin 11.4 (L) 13.5 - 17.5 g/dL    Hematocrit 34.4 (L) 41.0 - 52.0 %    MCV 93 80 - 100 fL    MCH 30.6 26.0 - 34.0 pg    MCHC 33.1 32.0 - 36.0 g/dL    RDW 14.8 (H) 11.5 - 14.5 %    Platelets 332 150 - 450 x10*3/uL    Neutrophils % 64.4 40.0 - 80.0 %    Immature Granulocytes %, Automated 0.7 0.0 - 0.9 %    Lymphocytes % 23.0 13.0 - 44.0 %    Monocytes % 9.3 2.0 - 10.0 %    Eosinophils % 2.2 0.0 - 6.0 %    Basophils % 0.4 0.0 - 2.0 %    Neutrophils Absolute 2.92 1.60 - 5.50 x10*3/uL    Immature Granulocytes Absolute, Automated 0.03 0.00 - 0.50 x10*3/uL    Lymphocytes Absolute 1.04 0.80 - 3.00 x10*3/uL    Monocytes Absolute 0.42 0.05 - 0.80 x10*3/uL    Eosinophils Absolute 0.10 0.00 - 0.40 x10*3/uL    Basophils Absolute 0.02 0.00 - 0.10 x10*3/uL   Comprehensive metabolic panel   Result Value Ref Range    Glucose 105 (H) 74 - 99 mg/dL    Sodium 137 136 - 145 mmol/L    Potassium 4.0 3.5 - 5.3 mmol/L    Chloride 99 98 - 107 mmol/L    Bicarbonate 28 21 - 32 mmol/L    Anion Gap 14 10 - 20 mmol/L    Urea Nitrogen 20 6 - 23 mg/dL    Creatinine 0.84 0.50 - 1.30 mg/dL    eGFR >90 >60 mL/min/1.73m*2    Calcium 9.5 8.6 - 10.3 mg/dL    Albumin 3.6 3.4 - 5.0 g/dL    Alkaline Phosphatase 165 (H) 33 - 136 U/L    Total Protein 6.7 6.4 - 8.2 g/dL    AST 26 9 - 39 U/L    Bilirubin, Total 0.9 0.0 - 1.2 mg/dL    ALT 24 10 - 52 U/L   Magnesium   Result Value Ref Range    Magnesium 1.98 1.60 - 2.40 mg/dL   Lipase   Result Value Ref Range    Lipase 37 9 - 82 U/L   Lactate   Result Value Ref Range    Lactate 0.7 0.4 - 2.0 mmol/L   B-Type Natriuretic Peptide   Result Value Ref Range    BNP 46 0 - 99 pg/mL   Sars-CoV-2, Influenza A/B and RSV PCR   Result Value Ref Range    Coronavirus 2019, PCR Not  Detected Not Detected    Flu A Result Not Detected Not Detected    Flu B Result Not Detected Not Detected    RSV PCR Not Detected Not Detected   Troponin I, High Sensitivity, Initial   Result Value Ref Range    Troponin I, High Sensitivity 5 0 - 20 ng/L   Troponin, High Sensitivity, 1 Hour   Result Value Ref Range    Troponin I, High Sensitivity 5 0 - 20 ng/L   Urinalysis with Reflex Culture and Microscopic   Result Value Ref Range    Color, Urine Yellow Light-Yellow, Yellow, Dark-Yellow    Appearance, Urine Clear Clear    Specific Gravity, Urine >1.050 (N) 1.005 - 1.035    pH, Urine 6.5 5.0, 5.5, 6.0, 6.5, 7.0, 7.5, 8.0    Protein, Urine 30 (1+) (A) NEGATIVE, 10 (TRACE), 20 (TRACE) mg/dL    Glucose, Urine Normal Normal mg/dL    Blood, Urine NEGATIVE NEGATIVE mg/dL    Ketones, Urine NEGATIVE NEGATIVE mg/dL    Bilirubin, Urine NEGATIVE NEGATIVE mg/dL    Urobilinogen, Urine Normal Normal mg/dL    Nitrite, Urine NEGATIVE NEGATIVE    Leukocyte Esterase, Urine NEGATIVE NEGATIVE   Extra Urine Gray Tube   Result Value Ref Range    Extra Tube Hold for add-ons.    Urinalysis Microscopic   Result Value Ref Range    WBC, Urine NONE 1-5, NONE /HPF    RBC, Urine 3-5 NONE, 1-2, 3-5 /HPF    Mucus, Urine 2+ Reference range not established. /LPF   CBC   Result Value Ref Range    WBC 3.6 (L) 4.4 - 11.3 x10*3/uL    nRBC 0.0 0.0 - 0.0 /100 WBCs    RBC 3.47 (L) 4.50 - 5.90 x10*6/uL    Hemoglobin 10.9 (L) 13.5 - 17.5 g/dL    Hematocrit 32.7 (L) 41.0 - 52.0 %    MCV 94 80 - 100 fL    MCH 31.4 26.0 - 34.0 pg    MCHC 33.3 32.0 - 36.0 g/dL    RDW 14.9 (H) 11.5 - 14.5 %    Platelets 260 150 - 450 x10*3/uL   Basic metabolic panel   Result Value Ref Range    Glucose 116 (H) 74 - 99 mg/dL    Sodium 138 136 - 145 mmol/L    Potassium 4.4 3.5 - 5.3 mmol/L    Chloride 104 98 - 107 mmol/L    Bicarbonate 27 21 - 32 mmol/L    Anion Gap 11 10 - 20 mmol/L    Urea Nitrogen 14 6 - 23 mg/dL    Creatinine 0.70 0.50 - 1.30 mg/dL    eGFR >90 >60 mL/min/1.73m*2     Calcium 8.5 (L) 8.6 - 10.3 mg/dL     US guided abscess fluid collection drainage  Result Date: 6/5/2025  Interpreted By:  Chintan Tomlinson, STUDY: US GUIDED ABSCESS FLUID COLLECTION DRAINAGE;  6/5/2025 12:11 pm   INDICATION: Signs/Symptoms:Possible drain abdominal hematoma.   COMPARISON: CT scan from 06/04/2025   ACCESSION NUMBER(S): [id number]   ORDERING CLINICIAN: ANIBAL LANGE   TECHNIQUE: Ultrasound guided aspiration and drainage of right ventral abdominal wall fluid collection.   FINDINGS: The procedure and the benefits, risks and potential complications in addition to alternatives of the procedure were discussed with the patient by Dr. Tomlinson and signed informed consent was obtained.     Limited ultrasound images were obtained through the pelvis. The images demonstrated  a heterogeneous fluid collection within the central aspect of the pelvis abutting the urinary bladder. The area of the overlying ventral abdominal wall was prepped in the usual sterile manner. 1% lidocaine was used for local anesthesia at the planned skin insertion site. 100 mcg of IV fentanyl was given for pain.   Under direct ultrasound guidance, a 8 Syriac straight catheter was inserted into the fluid collection. After confirmation of position of the catheter within the collection, the stylet was removed, the pigtail fixed and the catheter secured to the skin. The catheter was then set to gravity drainage. 10 mL of dark hemorrhagic hematoma was collected.   Postprocedure images demonstrated no evidence of hemorrhage.   Patient tolerated the procedure without immediate complication. Fluid was sent to the laboratory for further analysis.       Successful  aspiration and drain placement into the pelvic collection as above.     Signed by: Chintan Tomlinson 6/5/2025 1:13 PM Dictation workstation:   [id number]    ECG 12 lead  Result Date: 6/5/2025  Normal sinus rhythm Normal ECG When compared with ECG of 22-MAY-2025 21:01, No significant change  was found    XR abdomen 1 view  Result Date: 6/4/2025  STUDY: Abdomen Radiographs;  06/04/2025 10:30 PM INDICATION: Status post nasogastric tube. COMPARISON: XR abdomen 05/17/2025.  CT abdomen/pelvis 06/04/2025. ACCESSION NUMBER(S): [id number] ORDERING CLINICIAN: TECHNIQUE:  One view(s) of the abdomen. FINDINGS:  Nasogastric tube shows its tip and side-port in the gastric lumen. There are dilated loops of gas distended small bowel in the midline upper abdomen..  There are no convincing calculi or abnormal calcifications.  No focal osseous abnormalities.      Line/tubes/catheters:  Nasogastric tube tip and side-port are in the gastric lumen. Signed by Lv Jorge MD    CT angio chest for pulmonary embolism  Addendum Date: 6/4/2025  Addendum: NOTIFICATION:  The CT pulmonary arteriogram, abdomen and pelvis results of the study were discussed with, and acknowledged by Dr. Cindy Arias, by telephone on 6/4/2025 at 7:46 PM  Signed by Royce Heller DO    Result Date: 6/4/2025  STUDY: CT Angiogram of the Chest, CT Abdomen and Pelvis with IV Contrast; 06/04/2025 6:43 PM INDICATION: Shortness of breath, tachycardia.  Abdominal distension.   Recent abdominal surgery with colostomy, hyperactive bowel sounds, no output to colostomy bag. COMPARISON: XR chest 05/22/2025.  XR abdomen 05/17/2025.  CT abdomen/pelvis 05/18/2025, 05/13/2025. ACCESSION NUMBER(S): [id number], [id number] ORDERING CLINICIAN: ARUN MCLAUGHLIN TECHNIQUE:  CTA of the chest was performed following rapid injection of intravenous contrast.  Images are reviewed and processed at a workstation according to the CT angiogram protocol with 3-D and/or MIP post processing imaging generated.  CT of the abdomen and pelvis was performed with intravenous contrast.  Omnipaque 350:75 mL was administered intravenously; positive oral contrast was given. Automated mA/kV exposure control was utilized and patient examination was performed in strict accordance with  principles of ALARA. FINDINGS:  CTA CHEST: There is no airspace disease.  There is prominence of the pulmonary vasculature.  No pleural effusion or pneumothorax is present.  There are small areas of subsegmental atelectasis within the right upper lobe.  No intraluminal abnormalities are present within the trachea or mainstem bronchi. There are no hilar or mediastinal masses.  No pericardial effusion is present.  There is normal caliber of the thoracic aorta.  There are no filling defects within the pulmonary arteries. CT ABDOMEN AND PELVIS: Liver, spleen and pancreas have a normal appearance.  Gallbladder is nondistended.  There is no biliary or pancreatic ductal dilatation. There is fluid-filled, distended stomach.  No gastric wall thickening is present.  Duodenum is nondistended. There are no adrenal abnormalities.  Kidneys have a normal appearance with no hydronephrosis.  Ureters and urinary bladder are nondistended.  Prostate gland is enlarged.  The colon is nondistended.  There are multiple dilated small bowel loops involving the jejunum and proximal ileum.  The distal ileal loops are of normal caliber. There is a 7.3 cm length by 13 cm AP by 8.6 cm mass extending from the pelvis into the left lower abdomen.  The mass measures 63 HU.  There is no evidence of abdominal or pelvic ascites.  A left lower quadrant colostomy is present.  There is no evidence of free intraperitoneal gas. There is normal vertebral body height within the thoracic and lumbar spines.  There are no abnormalities of the sacrum, bony pelvis or proximal femurs.    1.  No evidence of pulmonary arterial embolism. 2.  Small areas of subsegmental atelectasis right upper lobe. 3.  7.3 cm x 13 cm x 8.6 cm solid-appearing mass extending the pelvis into the left lower abdominal quadrant.  This was present on the previous study, however, is better delineated with the addition of intravenous contrast.  Differential diagnosis include solid mass, large  abdominal/pelvic hematoma given the recent history of surgery.  4.  Distal small bowel obstruction.  The degree of small bowel distention is greater than that identified on the previous study.  The zone of transition is not visualized but appears to be within the distal ileum.  The terminal ileum is of normal caliber. Signed by Royce Heller DO    CT abdomen pelvis w IV contrast  Addendum Date: 6/4/2025  Addendum: NOTIFICATION:  The CT pulmonary arteriogram, abdomen and pelvis results of the study were discussed with, and acknowledged by Dr. Cindy Arias, by telephone on 6/4/2025 at 7:46 PM  Signed by Royce Heller DO    Result Date: 6/4/2025  STUDY: CT Angiogram of the Chest, CT Abdomen and Pelvis with IV Contrast; 06/04/2025 6:43 PM INDICATION: Shortness of breath, tachycardia.  Abdominal distension.   Recent abdominal surgery with colostomy, hyperactive bowel sounds, no output to colostomy bag. COMPARISON: XR chest 05/22/2025.  XR abdomen 05/17/2025.  CT abdomen/pelvis 05/18/2025, 05/13/2025. ACCESSION NUMBER(S): [id number], [id number] ORDERING CLINICIAN: ARUN MCLAUGHLIN TECHNIQUE:  CTA of the chest was performed following rapid injection of intravenous contrast.  Images are reviewed and processed at a workstation according to the CT angiogram protocol with 3-D and/or MIP post processing imaging generated.  CT of the abdomen and pelvis was performed with intravenous contrast.  Omnipaque 350:75 mL was administered intravenously; positive oral contrast was given. Automated mA/kV exposure control was utilized and patient examination was performed in strict accordance with principles of ALARA. FINDINGS:  CTA CHEST: There is no airspace disease.  There is prominence of the pulmonary vasculature.  No pleural effusion or pneumothorax is present.  There are small areas of subsegmental atelectasis within the right upper lobe.  No intraluminal abnormalities are present within the trachea or mainstem  bronchi. There are no hilar or mediastinal masses.  No pericardial effusion is present.  There is normal caliber of the thoracic aorta.  There are no filling defects within the pulmonary arteries. CT ABDOMEN AND PELVIS: Liver, spleen and pancreas have a normal appearance.  Gallbladder is nondistended.  There is no biliary or pancreatic ductal dilatation. There is fluid-filled, distended stomach.  No gastric wall thickening is present.  Duodenum is nondistended. There are no adrenal abnormalities.  Kidneys have a normal appearance with no hydronephrosis.  Ureters and urinary bladder are nondistended.  Prostate gland is enlarged.  The colon is nondistended.  There are multiple dilated small bowel loops involving the jejunum and proximal ileum.  The distal ileal loops are of normal caliber. There is a 7.3 cm length by 13 cm AP by 8.6 cm mass extending from the pelvis into the left lower abdomen.  The mass measures 63 HU.  There is no evidence of abdominal or pelvic ascites.  A left lower quadrant colostomy is present.  There is no evidence of free intraperitoneal gas. There is normal vertebral body height within the thoracic and lumbar spines.  There are no abnormalities of the sacrum, bony pelvis or proximal femurs.    1.  No evidence of pulmonary arterial embolism. 2.  Small areas of subsegmental atelectasis right upper lobe. 3.  7.3 cm x 13 cm x 8.6 cm solid-appearing mass extending the pelvis into the left lower abdominal quadrant.  This was present on the previous study, however, is better delineated with the addition of intravenous contrast.  Differential diagnosis include solid mass, large abdominal/pelvic hematoma given the recent history of surgery.  4.  Distal small bowel obstruction.  The degree of small bowel distention is greater than that identified on the previous study.  The zone of transition is not visualized but appears to be within the distal ileum.  The terminal ileum is of normal caliber. Signed  by Royce Heller DO        Assessment & Plan    69yo CM with PMH of mitral valve prolapse, cardiac arrest during cath in 2016 due to vfib needing cardioversion, and recent diverticulitis with perforation s/p resection and colostomy bag on 5/15/25 that presented to the ED with abdominal pain and was admitted for SBO.     SBO  - evidenced on admitting imaging  - general surgery consulted, rec maintain NG tube to LIWS  - continue zofran PRN for symptoms  - keep NPO  - continue D5NS with Kcl for now  - continue to monitor    Abdominal mass vs hematoma  - CT showed 7.3cm x 13cm x 8.6cm abnormality in mass vs fluid collection  - surgery feels this might be contributing to obstruction  - IR drain placed with dark hemorrhagic hematoma collected and sent for culture  - monitor output closely for now    Recent diverticulitis with perforation  - s/p colectomy with colostomy on 5/15/25  - ostomy bag with minimal output in setting of above    Mitral valve prolapse, Hx of arrest  - converted metoprolol from PO to IV while NPO    DVT Proph: SCDs only    Dispo: Patient requires close inpatient monitoring in setting of SBO and hematoma requiring surgical evaluation, no plans for discharge at this time.    Lisa Ansari DO           [1] [Held by provider] acetaminophen, 650 mg, oral, q6h  [Held by provider] aspirin, 81 mg, oral, Daily  [Held by provider] cyclobenzaprine, 5 mg, oral, TID  [Held by provider] ferrous sulfate, 65 mg of elemental iron, oral, BID  [Held by provider] gabapentin, 100 mg, oral, BID  lidocaine, 1 Application, urethral, Once  [Held by provider] metoprolol succinate XL, 25 mg, oral, Daily  metoprolol, 5 mg, intravenous, q6h  [Held by provider] polyethylene glycol, 17 g, oral, Daily  [2] potassium chloride-D5-0.9%NaCl, 100 mL/hr, Last Rate: 100 mL/hr (06/05/25 1233)  [3] PRN medications: acetaminophen **OR** acetaminophen **OR** acetaminophen, melatonin, morphine, morphine, ondansetron **OR**  ondansetron, phenoL

## 2025-06-05 NOTE — PROGRESS NOTES
Pharmacy Medication History Review    Diego Morales is a 70 y.o. male admitted for Lower abdominal pain. Pharmacy reviewed the patient's [id number] medications and allergies for accuracy.    The list below reflectives the updated PTA list. Please review each medication in order reconciliation for additional clarification and justification.  Prior to Admission Medications   Prescriptions Last Dose Informant Patient Reported? Taking?   acetaminophen (Tylenol) 325 mg tablet 6/4/2025 Spouse/Significant Other No Yes   Sig: Take 2 tablets (650 mg) by mouth every 6 hours.   amoxicillin-clavulanate (Augmentin) 875-125 mg tablet Not Taking  No No   Sig: Take 1 tablet by mouth 2 times a day for 7 days.   Patient not taking: Reported on 6/5/2025   aspirin 81 mg EC tablet Past Week Spouse/Significant Other Yes Yes   Sig: Take 1 tablet (81 mg) by mouth once daily.   cyclobenzaprine (Flexeril) 5 mg tablet Past Week Spouse/Significant Other No Yes   Sig: Take 1 tablet (5 mg) by mouth 3 times a day for 14 days.   ferrous sulfate 325 mg (65 mg elemental) tablet Past Week Spouse/Significant Other No Yes   Sig: Take 1 tablet by mouth 3 times a day.   gabapentin (Neurontin) 100 mg capsule Past Week Spouse/Significant Other No Yes   Sig: Take 1 capsule (100 mg) by mouth 2 times a day.   metoprolol succinate XL (Toprol-XL) 25 mg 24 hr tablet Past Week Spouse/Significant Other Yes Yes   Sig: Take 1 tablet (25 mg) by mouth once daily.   multivitamin with minerals tablet Past Week Spouse/Significant Other No Yes   Sig: Take 1 tablet by mouth once daily.   polyethylene glycol (Glycolax, Miralax) 17 gram packet 6/4/2025 Spouse/Significant Other No Yes   Sig: Take 17 g by mouth once daily.      Facility-Administered Medications: None            The list below reflectives the updated allergy list. Please review each documented allergy for additional clarification and justification.  Allergies  Reviewed by Carola Sargent RN on  6/5/2025        Severity Reactions Comments    Toradol [ketorolac] Medium Hallucinations             Below are additional concerns with the patient's PTA list.    VIRGINIA ADDISON

## 2025-06-05 NOTE — PROGRESS NOTES
06/05/25 1120   Discharge Planning   Living Arrangements Spouse/significant other   Support Systems Spouse/significant other   Assistance Needed A&Ox4, independent with ADLs, no DME, room air at baseline. No BIPAP/CPAP. [facility] Dr[facility] Ramon Gould   Type of Residence Private residence   Number of Stairs to Enter Residence 3   Number of Stairs Within Residence 20   Do you have animals or pets at home? Yes   Type of Animals or Pets 1 dog - [place]   Home or Post Acute Services None   Expected Discharge Disposition Home   Does the patient need discharge transport arranged? No   Stroke Family Assessment   Stroke Family Assessment Needed No   Intensity of Service   Intensity of Service 0-30 min

## 2025-06-05 NOTE — H&P
History Of Present Illness  Diego Morales is a 70 y.o. male with a past medical history of diverticulitis of large intestine with perforation status post bowel resection on May 15 with colostomy bag,, mitral valve prolapse, hyperlipidemia, cardiac arrest during cardiac catheterization in 2016 secondary to ventricular arrhythmia/V-fib requiring cardioversion (normal coronaries), who was admitted to the hospital for small bowel obstruction with abdominal mass versus hematoma.  Patient states that he did not get more sleep last night because he did not feel comfortable.  This morning he started to have increased lower abdominal pain and bloating.  He did not eat much.  He felt nauseated but did not throw up.  He also did not have much output through the ostomy.  He reported edema in his legs.  He reported no fever or chills.  Denies headache, dizziness, or chest pain.  Denies any significant cough.  He states that he was having some trouble urinating which has resolved.  He states that he was having some trouble breathing which has improved.  Patient also reports that he has been seeing some mucus seepage through his anus.     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    Surgical History  He has a past surgical history that includes Tonsillectomy; Cardiac catheterization (06/17/2016); and Cardioversion (06/17/2016).     Social History  He reports that he has never smoked. He has never used smokeless tobacco. He reports current alcohol use. He reports that he does not use drugs.    Family History  Family History[1]     Allergies  Toradol [ketorolac]    Medications  Scheduled medications  Scheduled Medications[2]  Continuous medications  Continuous Medications[3]  PRN medications  PRN  Medications[4]    Review of systems: 10-point review of systems is negative.     Physical Exam  Constitutional: alert and oriented x 3, awake, cooperative, no acute distress  Skin: warm and dry  Head/Neck: Normocephalic, atraumatic  Eyes: clear sclera  ENMT: mucous membranes moist  Cardio: Regular rate and rhythm,+ murmur  Resp: CTA bilaterally, good respiratory effort  Gastrointestinal: Soft, mildly distended, postop Steri-Strips are in place and skin is well-approximated with no drainage or erythema.  There was no significant tenderness on palpation.  Ostomy bag with no stool in it.  Musculoskeletal: ROM intact, no joint swelling  Extremities: Trace ankle edema  Neuro: lert and oriented x 3, sensation is intact.  Patient moves all limbs against resistance.  Psychological: Appropriate mood and behavior     Last Recorded Vitals  BP (!) 149/92   Pulse (!) 104   Temp 36.2 °C (97.2 °F) (Temporal)   Resp 16   Wt 65.8 kg (145 lb)   SpO2 96%     Relevant Results  Results for orders placed or performed during the hospital encounter of 06/04/25 (from the past 24 hours)   CBC and Auto Differential   Result Value Ref Range    WBC 4.5 4.4 - 11.3 x10*3/uL    nRBC 0.0 0.0 - 0.0 /100 WBCs    RBC 3.72 (L) 4.50 - 5.90 x10*6/uL    Hemoglobin 11.4 (L) 13.5 - 17.5 g/dL    Hematocrit 34.4 (L) 41.0 - 52.0 %    MCV 93 80 - 100 fL    MCH 30.6 26.0 - 34.0 pg    MCHC 33.1 32.0 - 36.0 g/dL    RDW 14.8 (H) 11.5 - 14.5 %    Platelets 332 150 - 450 x10*3/uL    Neutrophils % 64.4 40.0 - 80.0 %    Immature Granulocytes %, Automated 0.7 0.0 - 0.9 %    Lymphocytes % 23.0 13.0 - 44.0 %    Monocytes % 9.3 2.0 - 10.0 %    Eosinophils % 2.2 0.0 - 6.0 %    Basophils % 0.4 0.0 - 2.0 %    Neutrophils Absolute 2.92 1.60 - 5.50 x10*3/uL    Immature Granulocytes Absolute, Automated 0.03 0.00 - 0.50 x10*3/uL    Lymphocytes Absolute 1.04 0.80 - 3.00 x10*3/uL    Monocytes Absolute 0.42 0.05 - 0.80 x10*3/uL    Eosinophils Absolute 0.10 0.00 - 0.40 x10*3/uL     Basophils Absolute 0.02 0.00 - 0.10 x10*3/uL   Comprehensive metabolic panel   Result Value Ref Range    Glucose 105 (H) 74 - 99 mg/dL    Sodium 137 136 - 145 mmol/L    Potassium 4.0 3.5 - 5.3 mmol/L    Chloride 99 98 - 107 mmol/L    Bicarbonate 28 21 - 32 mmol/L    Anion Gap 14 10 - 20 mmol/L    Urea Nitrogen 20 6 - 23 mg/dL    Creatinine 0.84 0.50 - 1.30 mg/dL    eGFR >90 >60 mL/min/1.73m*2    Calcium 9.5 8.6 - 10.3 mg/dL    Albumin 3.6 3.4 - 5.0 g/dL    Alkaline Phosphatase 165 (H) 33 - 136 U/L    Total Protein 6.7 6.4 - 8.2 g/dL    AST 26 9 - 39 U/L    Bilirubin, Total 0.9 0.0 - 1.2 mg/dL    ALT 24 10 - 52 U/L   Magnesium   Result Value Ref Range    Magnesium 1.98 1.60 - 2.40 mg/dL   Lipase   Result Value Ref Range    Lipase 37 9 - 82 U/L   Lactate   Result Value Ref Range    Lactate 0.7 0.4 - 2.0 mmol/L   B-Type Natriuretic Peptide   Result Value Ref Range    BNP 46 0 - 99 pg/mL   Sars-CoV-2, Influenza A/B and RSV PCR   Result Value Ref Range    Coronavirus 2019, PCR Not Detected Not Detected    Flu A Result Not Detected Not Detected    Flu B Result Not Detected Not Detected    RSV PCR Not Detected Not Detected   Troponin I, High Sensitivity, Initial   Result Value Ref Range    Troponin I, High Sensitivity 5 0 - 20 ng/L   Troponin, High Sensitivity, 1 Hour   Result Value Ref Range    Troponin I, High Sensitivity 5 0 - 20 ng/L   Urinalysis with Reflex Culture and Microscopic   Result Value Ref Range    Color, Urine Yellow Light-Yellow, Yellow, Dark-Yellow    Appearance, Urine Clear Clear    Specific Gravity, Urine >1.050 (N) 1.005 - 1.035    pH, Urine 6.5 5.0, 5.5, 6.0, 6.5, 7.0, 7.5, 8.0    Protein, Urine 30 (1+) (A) NEGATIVE, 10 (TRACE), 20 (TRACE) mg/dL    Glucose, Urine Normal Normal mg/dL    Blood, Urine NEGATIVE NEGATIVE mg/dL    Ketones, Urine NEGATIVE NEGATIVE mg/dL    Bilirubin, Urine NEGATIVE NEGATIVE mg/dL    Urobilinogen, Urine Normal Normal mg/dL    Nitrite, Urine NEGATIVE NEGATIVE    Leukocyte  Esterase, Urine NEGATIVE NEGATIVE   Urinalysis Microscopic   Result Value Ref Range    WBC, Urine NONE 1-5, NONE /HPF    RBC, Urine 3-5 NONE, 1-2, 3-5 /HPF    Mucus, Urine 2+ Reference range not established. /LPF     Imaging  CT angio chest for pulmonary embolism  Addendum Date: 6/4/2025  Addendum: NOTIFICATION:  The CT pulmonary arteriogram, abdomen and pelvis results of the study were discussed with, and acknowledged by Dr. Cindy Arias, by telephone on 6/4/2025 at 7:46 PM  Signed by Royce Heller DO    Result Date: 6/4/2025  1.  No evidence of pulmonary arterial embolism. 2.  Small areas of subsegmental atelectasis right upper lobe. 3.  7.3 cm x 13 cm x 8.6 cm solid-appearing mass extending the pelvis into the left lower abdominal quadrant.  This was present on the previous study, however, is better delineated with the addition of intravenous contrast.  Differential diagnosis include solid mass, large abdominal/pelvic hematoma given the recent history of surgery.  4.  Distal small bowel obstruction.  The degree of small bowel distention is greater than that identified on the previous study.  The zone of transition is not visualized but appears to be within the distal ileum.  The terminal ileum is of normal caliber. Signed by Royce Heller DO    CT abdomen pelvis w IV contrast  Addendum Date: 6/4/2025  Addendum: NOTIFICATION:  The CT pulmonary arteriogram, abdomen and pelvis results of the study were discussed with, and acknowledged by Dr. Cindy Arias, by telephone on 6/4/2025 at 7:46 PM  Signed by Royce Heller DO    Result Date: 6/4/2025  1.  No evidence of pulmonary arterial embolism. 2.  Small areas of subsegmental atelectasis right upper lobe. 3.  7.3 cm x 13 cm x 8.6 cm solid-appearing mass extending the pelvis into the left lower abdominal quadrant.  This was present on the previous study, however, is better delineated with the addition of intravenous contrast.  Differential diagnosis include  solid mass, large abdominal/pelvic hematoma given the recent history of surgery.  4.  Distal small bowel obstruction.  The degree of small bowel distention is greater than that identified on the previous study.  The zone of transition is not visualized but appears to be within the distal ileum.  The terminal ileum is of normal caliber. Signed by Royce Heller,       Cardiology, Vascular, and Other Imaging  No other imaging results found for the past 7 days       Assessment/Plan   Assessment & Plan  Lower abdominal pain    Small bowel obstruction (Multi)    Abdominal hematoma    Diego Morales is a 70 y.o. male with a past medical history of diverticulitis of large intestine with perforation status post bowel resection on May 15 with colostomy bag,, mitral valve prolapse, hyperlipidemia, cardiac arrest, who was admitted to the hospital for small bowel obstruction with abdominal mass versus hematoma.      Small bowel obstruction  - Patient status post recent bowel resection with colostomy bag  - Now present with small bowel obstruction and concern for abdominal mass versus hematoma  -  General Surgery recommended NG tube and monitor  - Will give morphine as needed for abdominal pain  - Zofran as needed for nausea vomiting    Abdominal mass versus hematoma  - CT scan showed a 7.3 cm x 13 cm x 8.6 cm solid-appearing mass extending the pelvis into the left lower abdominal quadrant.  This was present on the previous study, however, is better delineated with the addition of intravenous contrast.  Differential diagnosis include solid mass, large abdominal/pelvic hematoma given the recent history of surgery.  -Hemoglobin appears stable at this time.  Will repeat in the morning  - We will use SCDs for DVT prophylaxis for now and avoid NSAIDs and blood thinners.  -Patient is afebrile with no leukocytosis  - Continue pain management  - Follow-up with general surgery    Seepage through the anus  - Follow-up with  general surgery    Subsegmental atelectasis in the right upper lobe  - Incentive spirometry    DVT prophylaxis  - SCDs    CODE STATUS: Full code    Disposition: Patient with small bowel obstruction and abdominal mass versus hematoma will benefit from inpatient management.         Josie Berman MD         [1] No family history on file.  [2] lidocaine, 1 Application, urethral, Once  [3]    [4]

## 2025-06-06 ENCOUNTER — HOME CARE VISIT (OUTPATIENT)
Dept: HOME HEALTH SERVICES | Facility: HOME HEALTH | Age: 70
End: 2025-06-06
Payer: MEDICARE

## 2025-06-06 LAB
ALBUMIN SERPL BCP-MCNC: 3.4 G/DL (ref 3.4–5)
ALP SERPL-CCNC: 136 U/L (ref 33–136)
ALT SERPL W P-5'-P-CCNC: 17 U/L (ref 10–52)
ANION GAP SERPL CALC-SCNC: 12 MMOL/L (ref 10–20)
AST SERPL W P-5'-P-CCNC: 19 U/L (ref 9–39)
BILIRUB SERPL-MCNC: 0.8 MG/DL (ref 0–1.2)
BUN SERPL-MCNC: 9 MG/DL (ref 6–23)
CALCIUM SERPL-MCNC: 8.5 MG/DL (ref 8.6–10.3)
CHLORIDE SERPL-SCNC: 102 MMOL/L (ref 98–107)
CO2 SERPL-SCNC: 28 MMOL/L (ref 21–32)
CREAT SERPL-MCNC: 0.68 MG/DL (ref 0.5–1.3)
EGFRCR SERPLBLD CKD-EPI 2021: >90 ML/MIN/1.73M*2
ERYTHROCYTE [DISTWIDTH] IN BLOOD BY AUTOMATED COUNT: 14.6 % (ref 11.5–14.5)
GLUCOSE SERPL-MCNC: 120 MG/DL (ref 74–99)
HCT VFR BLD AUTO: 34.7 % (ref 41–52)
HGB BLD-MCNC: 11.3 G/DL (ref 13.5–17.5)
MCH RBC QN AUTO: 31 PG (ref 26–34)
MCHC RBC AUTO-ENTMCNC: 32.6 G/DL (ref 32–36)
MCV RBC AUTO: 95 FL (ref 80–100)
NRBC BLD-RTO: 0 /100 WBCS (ref 0–0)
PLATELET # BLD AUTO: 274 X10*3/UL (ref 150–450)
POTASSIUM SERPL-SCNC: 4.6 MMOL/L (ref 3.5–5.3)
PROT SERPL-MCNC: 6.3 G/DL (ref 6.4–8.2)
RBC # BLD AUTO: 3.64 X10*6/UL (ref 4.5–5.9)
SODIUM SERPL-SCNC: 137 MMOL/L (ref 136–145)
WBC # BLD AUTO: 3.8 X10*3/UL (ref 4.4–11.3)

## 2025-06-06 PROCEDURE — 99232 SBSQ HOSP IP/OBS MODERATE 35: CPT | Performed by: FAMILY MEDICINE

## 2025-06-06 PROCEDURE — 80053 COMPREHEN METABOLIC PANEL: CPT | Performed by: FAMILY MEDICINE

## 2025-06-06 PROCEDURE — 1100000001 HC PRIVATE ROOM DAILY

## 2025-06-06 PROCEDURE — 2500000005 HC RX 250 GENERAL PHARMACY W/O HCPCS: Performed by: FAMILY MEDICINE

## 2025-06-06 PROCEDURE — 99232 SBSQ HOSP IP/OBS MODERATE 35: CPT | Performed by: NURSE PRACTITIONER

## 2025-06-06 PROCEDURE — 2500000004 HC RX 250 GENERAL PHARMACY W/ HCPCS (ALT 636 FOR OP/ED): Performed by: INTERNAL MEDICINE

## 2025-06-06 PROCEDURE — 36415 COLL VENOUS BLD VENIPUNCTURE: CPT | Performed by: FAMILY MEDICINE

## 2025-06-06 PROCEDURE — 85027 COMPLETE CBC AUTOMATED: CPT | Performed by: FAMILY MEDICINE

## 2025-06-06 RX ADMIN — POTASSIUM CHLORIDE, DEXTROSE MONOHYDRATE AND SODIUM CHLORIDE 100 ML/HR: 150; 5; 900 INJECTION, SOLUTION INTRAVENOUS at 05:36

## 2025-06-06 RX ADMIN — POTASSIUM CHLORIDE, DEXTROSE MONOHYDRATE AND SODIUM CHLORIDE 100 ML/HR: 150; 5; 900 INJECTION, SOLUTION INTRAVENOUS at 15:19

## 2025-06-06 RX ADMIN — METOPROLOL TARTRATE 5 MG: 5 INJECTION INTRAVENOUS at 23:37

## 2025-06-06 RX ADMIN — LIDOCAINE HYDROCHLORIDE 1.25 ML: 20 SOLUTION ORAL; TOPICAL at 08:23

## 2025-06-06 RX ADMIN — METOPROLOL TARTRATE 5 MG: 5 INJECTION INTRAVENOUS at 18:34

## 2025-06-06 RX ADMIN — METOPROLOL TARTRATE 5 MG: 5 INJECTION INTRAVENOUS at 12:48

## 2025-06-06 RX ADMIN — METOPROLOL TARTRATE 5 MG: 5 INJECTION INTRAVENOUS at 05:36

## 2025-06-06 ASSESSMENT — COGNITIVE AND FUNCTIONAL STATUS - GENERAL
TURNING FROM BACK TO SIDE WHILE IN FLAT BAD: A LITTLE
DAILY ACTIVITIY SCORE: 22
HELP NEEDED FOR BATHING: A LITTLE
STANDING UP FROM CHAIR USING ARMS: A LITTLE
MOVING FROM LYING ON BACK TO SITTING ON SIDE OF FLAT BED WITH BEDRAILS: A LITTLE
DAILY ACTIVITIY SCORE: 22
MOVING TO AND FROM BED TO CHAIR: A LITTLE
WALKING IN HOSPITAL ROOM: A LITTLE
TURNING FROM BACK TO SIDE WHILE IN FLAT BAD: A LITTLE
CLIMB 3 TO 5 STEPS WITH RAILING: A LITTLE
MOVING FROM LYING ON BACK TO SITTING ON SIDE OF FLAT BED WITH BEDRAILS: A LITTLE
MOBILITY SCORE: 18
DRESSING REGULAR LOWER BODY CLOTHING: A LITTLE
MOVING TO AND FROM BED TO CHAIR: A LITTLE
DRESSING REGULAR LOWER BODY CLOTHING: A LITTLE
MOBILITY SCORE: 18
HELP NEEDED FOR BATHING: A LITTLE
STANDING UP FROM CHAIR USING ARMS: A LITTLE
WALKING IN HOSPITAL ROOM: A LITTLE
CLIMB 3 TO 5 STEPS WITH RAILING: A LITTLE

## 2025-06-06 ASSESSMENT — PAIN SCALES - GENERAL
PAINLEVEL_OUTOF10: 3
PAINLEVEL_OUTOF10: 0 - NO PAIN

## 2025-06-06 ASSESSMENT — PAIN - FUNCTIONAL ASSESSMENT
PAIN_FUNCTIONAL_ASSESSMENT: 0-10
PAIN_FUNCTIONAL_ASSESSMENT: 0-10

## 2025-06-06 NOTE — CONSULTS
"Nutrition Initial Assessment:   Nutrition Assessment    Reason for Assessment: Admission nursing screening (MST=2; Unintentional weight loss, Poor PO intake)    Patient is a 70 y.o. male presenting with SBO 2/2 post-op hematoma.     NGT placed in ED (6/4) for decompression.    IR consulted (6/5) for pelvic hematoma draining w/ drain placement.     Pt remains NPO while awaiting ROBF.     PMH: Diverticulitis of large intestine w/ perforation s/p bowel resection (5/15/25) with colostomy creation, Mitral valve prolapse, Hyperlipidemia, Cardiac arrest during cardiac catheterization (2016) secondary to ventricular arrhythmia/V-fib requiring cardioversion     Nutrition History:  Food and Nutrient History: Visit made, pt resting in bed w/ NGT in place & wife at bedside. Pt did not speak throughout interview (likely 2/2 NGT being uncomfortable) so wife provided history. She reports pt consumes x3 meals/day at home but that they're small in size. Reports he never really finishes his meals but this is consistent with how he was eating even prior to his colostomy placement. Pt drinks an 8oz [employer] protein shake daily as their daughter sells [employer] products. Willing to trial Ensure pending diet advancment.  Vitamin/Herbal Supplement Use: MVI, Iron  Food Allergy:  (None)     Anthropometrics:  Height: 180.3 cm (5' 11\")   Weight: 64.1 kg (141 lb 5 oz)   BMI (Calculated): 19.72  IBW/kg (Dietitian Calculated): 78.2 kg  Percent of IBW: 82 %    Weight History:   Wt Readings from Last 50 Encounters:   06/04/25 64.1 kg (141 lb 5 oz) --> Admit wt   05/27/25 67.1 kg (148 lb)   05/12/25 67.7 kg (149 lb 4.8 oz)     Weight Change %:  Weight History / % Weight Change: 5% loss in <1 month  Significant Weight Loss: Yes  Interpretation of Weight Loss: 5% in 1 month    Nutrition Focused Physical Exam Findings:  Subcutaneous Fat Loss:   Orbital Fat Pads: Well nourished (slightly bulging fat pads)  Buccal Fat Pads: Mild-Moderate (flat cheeks, " minimal bounce)  Triceps: Mild-Moderate (less than ample fat tissue)  Muscle Wasting:  Temporalis: Mild-Moderate (slight depression)  Pectoralis (Clavicular Region): Mild-Moderate (some protrusion of clavicle)  Deltoid/Trapezius: Mild-Moderate (slight protrusion of acromion process)  Interosseous: Mild-Moderate (slightly depressed area between thumb and forefinger)  Edema:  Edema: none  Physical Findings:  Skin: Positive (Abdominal incision)  Digestive System Findings: Abdominal bloating, Nausea (Minimal colostomy output)  Mouth Findings:  (None)    Nutrition Significant Labs:  BMP Trend:   Results from last 7 days   Lab Units 06/06/25  0646 06/05/25  0629 06/04/25  1804   GLUCOSE mg/dL 120* 116* 105*   CALCIUM mg/dL 8.5* 8.5* 9.5   SODIUM mmol/L 137 138 137   POTASSIUM mmol/L 4.6 4.4 4.0   CO2 mmol/L 28 27 28   CHLORIDE mmol/L 102 104 99   BUN mg/dL 9 14 20   CREATININE mg/dL 0.68 0.70 0.84      Nutrition Specific Medications:  Scheduled medications  Scheduled Medications[1]  Continuous medications  Continuous Medications[2]  PRN medications  PRN Medications[3]    I/O:   +Colostomy: 15mL out x 24hrs    Dietary Orders (From admission, onward)       Start     Ordered    06/04/25 2251  May Participate in Room Service  ( ROOM SERVICE MAY PARTICIPATE)  Once        Question:  .  Answer:  Yes    06/04/25 2250 06/04/25 2239  NPO Diet; Effective now  Diet effective now         06/04/25 2238             Estimated Needs:   Total Energy Estimated Needs in 24 hours (kCal):  (2376-7333)  Method for Estimating Needs: 30-33 kcals/kg x CBW (64.1 kg)  Total Protein Estimated Needs in 24 Hours (g):  (75+)  Method for Estimating 24 Hour Protein Needs: ~1.2 g/kg x IBW  Total Fluid Estimated Needs in 24 Hours (mL):  (1085-9357)  Method for Estimating 24 Hour Fluid Needs: 1mL/kcal or per team        Nutrition Diagnosis   Malnutrition Diagnosis  Patient has Malnutrition Diagnosis: Yes  Diagnosis Status: New  Malnutrition Diagnosis:  Severe malnutrition related to acute disease or injury  Related to: recent diverticular perforation s/p SBR with colostomy creation -- now c/b SBO secondary to post-op hematoma  As Evidenced by: meeting <75% EENs for the past x 1 month, significant 5% weight loss in <1 month, & moderate muscle wasting/fat loss      Nutrition Interventions/Recommendations      Nutrition Recommendations:  Continue NPO & advance diet as deemed medically feasible  Start Ensure Clear w/ meals once diet advanced to CLD      If no ROBF in the next 24-48hrs, consider initiation of PPN:  Prior to starting PPN, check RFP + Mg daily - replete electrolytes as needed.    Start PPN of Clinimix E 4.25% Amino Acids, 5% Dextrose @ 40mL/hr  After 24 hours, advance to goal rate of 83mL/hr  Do not advance if major shift in electrolytes or blood sugars occur  Include MVI + trace elements  Provide lipids @ 20.8mL/hr x 12 hours overnight (250mL total)    PPN monitoring:  Daily weights  RFP + Mg daily; replete lytes PRN  LFTs + TGs weekly  Accuchecks q6h    Provides: 1178 kcals, 85g protein, 100g dextrose, 42% kcal from fat (meeting 62% kcal & 100% protein needs)    If PPN started, recommend 100mg thiamine IV x 10 days      Nutrition Monitoring and Evaluation   Food/Nutrient Related History Monitoring  Monitoring and Evaluation Plan:  (Diet advancement)    Time Spent (min): 60 minutes            [1] [Held by provider] acetaminophen, 650 mg, oral, q6h  [Held by provider] aspirin, 81 mg, oral, Daily  [Held by provider] cyclobenzaprine, 5 mg, oral, TID  [Held by provider] ferrous sulfate, 65 mg of elemental iron, oral, BID  [Held by provider] gabapentin, 100 mg, oral, BID  lidocaine, 1 Application, urethral, Once  [Held by provider] metoprolol succinate XL, 25 mg, oral, Daily  metoprolol, 5 mg, intravenous, q6h  [Held by provider] polyethylene glycol, 17 g, oral, Daily  [2] potassium chloride-D5-0.9%NaCl, 100 mL/hr, Last Rate: 100 mL/hr (06/06/25 5666)  [3]  PRN medications: acetaminophen **OR** acetaminophen **OR** acetaminophen, lidocaine, melatonin, morphine, morphine, ondansetron **OR** ondansetron, phenoL

## 2025-06-06 NOTE — PROGRESS NOTES
Diego Morales is a 70 y.o. male on day 2 of admission presenting with Lower abdominal pain.    Subjective   Pt is post procedure day 1 from IR draining of pelvic hematoma and placement of drain.       Pt admitted with SBO s/p colectomy and colostomy creation with Dr. Canales 5/15 and post procedure drain placement in pelvic abdominal area with IR 6/5/25.    Pt slept a little last night.   Still with fatigue.   No dizziness. Was able to get up to chair and walk some yesterday.   No CP, SOB the same.   No abd pain and no nausea. This is improved.   No new output in colostomy bag.   Urinating well, no diff.     Objective     Physical Exam  Vitals reviewed.   Constitutional:       General: He is not in acute distress.     Appearance: He is ill-appearing. He is not toxic-appearing or diaphoretic.      Comments: thin   HENT:      Head: Normocephalic and atraumatic.      Nose:      Comments: NG in place and with green output.     Mouth/Throat:      Mouth: Mucous membranes are dry.   Eyes:      General: No scleral icterus.        Right eye: No discharge.         Left eye: No discharge.      Conjunctiva/sclera: Conjunctivae normal.   Cardiovascular:      Rate and Rhythm: Normal rate and regular rhythm.      Pulses: Normal pulses.      Heart sounds: Normal heart sounds. No murmur heard.     No friction rub. No gallop.   Pulmonary:      Effort: Pulmonary effort is normal. No respiratory distress.      Breath sounds: Normal breath sounds. No stridor. No wheezing, rhonchi or rales.   Chest:      Chest wall: No tenderness.   Abdominal:      General: There is distension.      Palpations: Abdomen is soft.      Tenderness: There is abdominal tenderness.      Comments: Right lower abd with IR drain in place, dark red output.  Left abd with colostomy. Stoma beefy red and moist. No new output in bag.  BS hypoactive x 4 q.   Musculoskeletal:      Right lower leg: No edema.      Left lower leg: No edema.   Skin:     General: Skin is  "warm and dry.   Neurological:      Mental Status: He is alert and oriented to person, place, and time.      Motor: Weakness (generalized) present.   Psychiatric:         Attention and Perception: Attention and perception normal.         Mood and Affect: Mood is depressed.         Behavior: Behavior is slowed.         Thought Content: Thought content normal.         Cognition and Memory: Cognition and memory normal.         Judgment: Judgment normal.         Last Recorded Vitals  Blood pressure 145/90, pulse 93, temperature 36.5 °C (97.7 °F), temperature source Temporal, resp. rate 20, height 1.803 m (5' 11\"), weight 64.1 kg (141 lb 5 oz), SpO2 95%.  Intake/Output last 3 Shifts:  I/O last 3 completed shifts:  In: 2811 (43.9 mL/kg) [I.V.:51 (0.8 mL/kg); IV Piggyback:2760]  Out: 600 (9.4 mL/kg) [Urine:600 (0.3 mL/kg/hr)]  Weight: 64.1 kg     Relevant Results    US guided abscess fluid collection drainage  Result Date: 6/5/2025  Interpreted By:  Chintan Tomlinson, STUDY: US GUIDED ABSCESS FLUID COLLECTION DRAINAGE;  6/5/2025 12:11 pm   INDICATION: Signs/Symptoms:Possible drain abdominal hematoma.   COMPARISON: CT scan from 06/04/2025   ACCESSION NUMBER(S): [id number]   ORDERING CLINICIAN: ANIBAL LANGE   TECHNIQUE: Ultrasound guided aspiration and drainage of right ventral abdominal wall fluid collection.   FINDINGS: The procedure and the benefits, risks and potential complications in addition to alternatives of the procedure were discussed with the patient by Dr. Tomlinson and signed informed consent was obtained.     Limited ultrasound images were obtained through the pelvis. The images demonstrated  a heterogeneous fluid collection within the central aspect of the pelvis abutting the urinary bladder. The area of the overlying ventral abdominal wall was prepped in the usual sterile manner. 1% lidocaine was used for local anesthesia at the planned skin insertion site. 100 mcg of IV fentanyl was given for pain.   Under " direct ultrasound guidance, a 8 Brazilian straight catheter was inserted into the fluid collection. After confirmation of position of the catheter within the collection, the stylet was removed, the pigtail fixed and the catheter secured to the skin. The catheter was then set to gravity drainage. 10 mL of dark hemorrhagic hematoma was collected.   Postprocedure images demonstrated no evidence of hemorrhage.   Patient tolerated the procedure without immediate complication. Fluid was sent to the laboratory for further analysis.       Successful  aspiration and drain placement into the pelvic collection as above.     Signed by: Chintan Tomlinson 6/5/2025 1:13 PM Dictation workstation:   [id number]    ECG 12 lead  Result Date: 6/5/2025  Normal sinus rhythm Normal ECG When compared with ECG of 22-MAY-2025 21:01, No significant change was found    XR abdomen 1 view  Result Date: 6/4/2025  STUDY: Abdomen Radiographs;  06/04/2025 10:30 PM INDICATION: Status post nasogastric tube. COMPARISON: XR abdomen 05/17/2025.  CT abdomen/pelvis 06/04/2025. ACCESSION NUMBER(S): [id number] ORDERING CLINICIAN: TECHNIQUE:  One view(s) of the abdomen. FINDINGS:  Nasogastric tube shows its tip and side-port in the gastric lumen. There are dilated loops of gas distended small bowel in the midline upper abdomen..  There are no convincing calculi or abnormal calcifications.  No focal osseous abnormalities.      Line/tubes/catheters:  Nasogastric tube tip and side-port are in the gastric lumen. Signed by Lv Jorge MD    CT angio chest for pulmonary embolism  Addendum Date: 6/4/2025  Addendum: NOTIFICATION:  The CT pulmonary arteriogram, abdomen and pelvis results of the study were discussed with, and acknowledged by Dr. Cindy Arias, by telephone on 6/4/2025 at 7:46 PM  Signed by Royce Heller DO    Result Date: 6/4/2025  STUDY: CT Angiogram of the Chest, CT Abdomen and Pelvis with IV Contrast; 06/04/2025 6:43 PM INDICATION: Shortness of  breath, tachycardia.  Abdominal distension.   Recent abdominal surgery with colostomy, hyperactive bowel sounds, no output to colostomy bag. COMPARISON: XR chest 05/22/2025.  XR abdomen 05/17/2025.  CT abdomen/pelvis 05/18/2025, 05/13/2025. ACCESSION NUMBER(S): [id number], [id number] ORDERING CLINICIAN: ARUN MCLAUGHLIN TECHNIQUE:  CTA of the chest was performed following rapid injection of intravenous contrast.  Images are reviewed and processed at a workstation according to the CT angiogram protocol with 3-D and/or MIP post processing imaging generated.  CT of the abdomen and pelvis was performed with intravenous contrast.  Omnipaque 350:75 mL was administered intravenously; positive oral contrast was given. Automated mA/kV exposure control was utilized and patient examination was performed in strict accordance with principles of ALARA. FINDINGS:  CTA CHEST: There is no airspace disease.  There is prominence of the pulmonary vasculature.  No pleural effusion or pneumothorax is present.  There are small areas of subsegmental atelectasis within the right upper lobe.  No intraluminal abnormalities are present within the trachea or mainstem bronchi. There are no hilar or mediastinal masses.  No pericardial effusion is present.  There is normal caliber of the thoracic aorta.  There are no filling defects within the pulmonary arteries. CT ABDOMEN AND PELVIS: Liver, spleen and pancreas have a normal appearance.  Gallbladder is nondistended.  There is no biliary or pancreatic ductal dilatation. There is fluid-filled, distended stomach.  No gastric wall thickening is present.  Duodenum is nondistended. There are no adrenal abnormalities.  Kidneys have a normal appearance with no hydronephrosis.  Ureters and urinary bladder are nondistended.  Prostate gland is enlarged.  The colon is nondistended.  There are multiple dilated small bowel loops involving the jejunum and proximal ileum.  The distal ileal loops are of  normal caliber. There is a 7.3 cm length by 13 cm AP by 8.6 cm mass extending from the pelvis into the left lower abdomen.  The mass measures 63 HU.  There is no evidence of abdominal or pelvic ascites.  A left lower quadrant colostomy is present.  There is no evidence of free intraperitoneal gas. There is normal vertebral body height within the thoracic and lumbar spines.  There are no abnormalities of the sacrum, bony pelvis or proximal femurs.    1.  No evidence of pulmonary arterial embolism. 2.  Small areas of subsegmental atelectasis right upper lobe. 3.  7.3 cm x 13 cm x 8.6 cm solid-appearing mass extending the pelvis into the left lower abdominal quadrant.  This was present on the previous study, however, is better delineated with the addition of intravenous contrast.  Differential diagnosis include solid mass, large abdominal/pelvic hematoma given the recent history of surgery.  4.  Distal small bowel obstruction.  The degree of small bowel distention is greater than that identified on the previous study.  The zone of transition is not visualized but appears to be within the distal ileum.  The terminal ileum is of normal caliber. Signed by Royce Heller DO    CT abdomen pelvis w IV contrast  Addendum Date: 6/4/2025  Addendum: NOTIFICATION:  The CT pulmonary arteriogram, abdomen and pelvis results of the study were discussed with, and acknowledged by Dr. Cindy Arais, by telephone on 6/4/2025 at 7:46 PM  Signed by Royce Heller DO    Result Date: 6/4/2025  STUDY: CT Angiogram of the Chest, CT Abdomen and Pelvis with IV Contrast; 06/04/2025 6:43 PM INDICATION: Shortness of breath, tachycardia.  Abdominal distension.   Recent abdominal surgery with colostomy, hyperactive bowel sounds, no output to colostomy bag. COMPARISON: XR chest 05/22/2025.  XR abdomen 05/17/2025.  CT abdomen/pelvis 05/18/2025, 05/13/2025. ACCESSION NUMBER(S): [id number], [id number] ORDERING CLINICIAN: ARUN MCLAUGHLIN  TECHNIQUE:  CTA of the chest was performed following rapid injection of intravenous contrast.  Images are reviewed and processed at a workstation according to the CT angiogram protocol with 3-D and/or MIP post processing imaging generated.  CT of the abdomen and pelvis was performed with intravenous contrast.  Omnipaque 350:75 mL was administered intravenously; positive oral contrast was given. Automated mA/kV exposure control was utilized and patient examination was performed in strict accordance with principles of ALARA. FINDINGS:  CTA CHEST: There is no airspace disease.  There is prominence of the pulmonary vasculature.  No pleural effusion or pneumothorax is present.  There are small areas of subsegmental atelectasis within the right upper lobe.  No intraluminal abnormalities are present within the trachea or mainstem bronchi. There are no hilar or mediastinal masses.  No pericardial effusion is present.  There is normal caliber of the thoracic aorta.  There are no filling defects within the pulmonary arteries. CT ABDOMEN AND PELVIS: Liver, spleen and pancreas have a normal appearance.  Gallbladder is nondistended.  There is no biliary or pancreatic ductal dilatation. There is fluid-filled, distended stomach.  No gastric wall thickening is present.  Duodenum is nondistended. There are no adrenal abnormalities.  Kidneys have a normal appearance with no hydronephrosis.  Ureters and urinary bladder are nondistended.  Prostate gland is enlarged.  The colon is nondistended.  There are multiple dilated small bowel loops involving the jejunum and proximal ileum.  The distal ileal loops are of normal caliber. There is a 7.3 cm length by 13 cm AP by 8.6 cm mass extending from the pelvis into the left lower abdomen.  The mass measures 63 HU.  There is no evidence of abdominal or pelvic ascites.  A left lower quadrant colostomy is present.  There is no evidence of free intraperitoneal gas. There is normal vertebral body  height within the thoracic and lumbar spines.  There are no abnormalities of the sacrum, bony pelvis or proximal femurs.    1.  No evidence of pulmonary arterial embolism. 2.  Small areas of subsegmental atelectasis right upper lobe. 3.  7.3 cm x 13 cm x 8.6 cm solid-appearing mass extending the pelvis into the left lower abdominal quadrant.  This was present on the previous study, however, is better delineated with the addition of intravenous contrast.  Differential diagnosis include solid mass, large abdominal/pelvic hematoma given the recent history of surgery.  4.  Distal small bowel obstruction.  The degree of small bowel distention is greater than that identified on the previous study.  The zone of transition is not visualized but appears to be within the distal ileum.  The terminal ileum is of normal caliber. Signed by Royce Heller DO    ECG 12 lead  Result Date: 6/4/2025  Normal sinus rhythm Normal ECG When compared with ECG of 13-MAY-2025 12:28, (unconfirmed) No significant change was found See ED provider note for full interpretation and clinical correlation Confirmed by Erin Grant ([id number]) on 6/4/2025 12:54:49 PM    Lower extremity venous duplex left  Result Date: 5/22/2025  Interpreted By:  Adryan Collazo, STUDY: [facility] LOWER EXTREMITY VENOUS DUPLEX LEFT  5/22/2025 9:40 pm   INDICATION: 71 y/o   M with  Signs/Symptoms:LLE swelling. LMP:  Unknown.       COMPARISON: None.   ACCESSION NUMBER(S): [id number]   ORDERING CLINICIAN: VIRGINIA SHAFER   TECHNIQUE: Routine ultrasound of the  left lower extremity was performed with duplex Doppler (color and spectral) evaluation.   Static images were obtained for remote interpretation.   FINDINGS: THIGH VEINS:  The common femoral, femoral, popliteal, proximal medial saphenous, and deep femoral veins are patent and free of thrombus. The veins are normally compressible.  They demonstrate normal phasic flow and augmentation response.   There is slow flow  seen in the left popliteal vein which can indicate some component of venous stasis   CALF VEINS:  The paired peroneal and posterior tibial calf veins are patent.       Negative study.  No deep venous thrombosis of the  left lower extremity.  Slow popliteal vein flow which can be seen the setting of venous stasis.   MACRO: None   Signed by: Adryan Collazo 5/22/2025 9:51 PM Dictation workstation:   [id number]    XR chest 1 view  Result Date: 5/22/2025  Interpreted By:  Bjorn Mccray, STUDY: XR CHEST 1 VIEW;  5/22/2025 9:07 pm   INDICATION: Signs/Symptoms:sob.   COMPARISON: Chest radiograph 05/12/2025.   ACCESSION NUMBER(S): [id number]   ORDERING CLINICIAN: VIRGINIA SHAFER   FINDINGS:   CARDIOMEDIASTINAL SILHOUETTE: Cardiomediastinal silhouette is normal in size and configuration.   LUNGS/PLEURA: There are no consolidations.There are no pleural effusions. There is no demonstrated pneumothorax     BONES: No evidence of acute osseous abnormality.       1.  No evidence of acute cardiopulmonary process.     Signed by: Bjorn Mccray 5/22/2025 9:13 PM Dictation workstation:   [id number]    CT abdomen pelvis w IV contrast  Result Date: 5/19/2025  Interpreted By:  Inés Hung, STUDY: CT ABDOMEN PELVIS W IV CONTRAST;  5/18/2025 9:00 am   INDICATION: Signs/Symptoms:concern for bleed.     COMPARISON: CT abdomen pelvis with contrast dated 05/13/2025.   ACCESSION NUMBER(S): [id number]   ORDERING CLINICIAN: EDNA GREENFIELD   TECHNIQUE: CT of the abdomen and pelvis was performed after administration of intravenous contrast. Standard contiguous axial images were obtained at 3 mm slice thickness through the abdomen and pelvis. Coronal and sagittal reconstructions at 3 mm slice thickness were performed.  75 ML of Omnipaque 350 was administered intravenously without immediate complication.   FINDINGS: LOWER CHEST: There are bandlike airspace opacities in the dependent region of bilateral lower lobes, likely favored to represent  atelectasis. Otherwise lungs are clear. Heart is mildly enlarged. No pericardial or pleural effusions. Nasogastric tube extends through distal thoracic esophagus included in the field of view.   ABDOMEN:   LIVER: Liver demonstrates mild hypoattenuation, which can be seen with mild hepatic steatosis. However no focal hepatic mass lesion is noted.   BILE DUCTS: The intrahepatic and extrahepatic ducts are not dilated.   GALLBLADDER: Gallbladder is moderately distended and does not demonstrate calcified stones in the lumen.   PANCREAS: There is a small 6 x 6.5 mm fat containing lesion in the neck of pancreas demonstrating macroscopic fat. This demonstrates contiguity with the main pancreatic duct. However there is no significant pancreatic ductal dilatation or pancreatic atrophy.   SPLEEN: The spleen is normal in size.   ADRENAL GLANDS: Bilateral adrenal glands appear normal.   KIDNEYS AND URETERS: Bilateral kidneys enhance symmetrically. No hydronephrosis.   PELVIS:   BLADDER: Urinary bladder is moderately distended and demonstrates small amount of air in the non dependent region, likely favored to be related to recent catheterization. Otherwise urinary bladder is unremarkable.   REPRODUCTIVE ORGANS: Prostate gland is mildly enlarged and demonstrates median lobe hypertrophy indenting on the base of the bladder.   BOWEL/RETROPERITONEUM/LYMPH NODES/ABDOMINAL WALL: Nasogastric tube extends through the distal thoracic esophagus with its tip located in the gastric body. Otherwise stomach is not well distended limiting evaluation. Postsurgical changes of partial sigmoid colectomy with colostomy in the left lower quadrant are noted. There is diffuse distention of small-bowel loops demonstrating fluid-filled lumen with few scattered areas of air-fluid levels. However no significant transition point is noted. The small bowel is distended to approximately 3.7 cm in the largest width. The degree of distention is predominantly  seen in the distal jejunal and ileal loops. Appendix is not distinctly visualized. There is a moderate-sized hyperdense fluid collection in the lower pelvis measuring approximately 14 x 14 x 7 cm in CC, AP and transverse dimensions respectively. This measures up to 67 Hounsfield units and is suggestive of postoperative hematoma. There is a surgical drain extending through the hematoma with its tip located in the left mid hemiabdomen. There is also small amount of perihepatic and perisplenic fluid collection measuring up to 35 Hounsfield units and is likely favored to represent extension of the evolving pelvic hematoma. There is interval improvement in intraperitoneal free air compared to immediate prior CT examination dated 05/13/2025. However there is small amount of subcutaneous emphysema in the anterior abdominal wall soft tissues with midline abdominal wall incision and overlying staples, suggestive of recent postsurgical status. There is also diffuse body wall edema, predominantly involving the lower abdomen and pelvis. This is new compared to prior CT examination and is likely favored to be reactive/due to fluid overload. Small fat containing right-sided inguinal hernia is noted.   No evidence of enlarged lymphadenopathy in the abdomen and pelvis.   VESSELS: Abdominal aorta and IVC appears grossly unremarkable.     BONES: No suspicious osseous lesions.       1.  Interval postsurgical changes of partial sigmoid colectomy with colostomy in the left lower quadrant. Findings concerning for hematoma in the lower pelvis, predominantly located in the rectovesical pouch extending superiorly to the perihepatic and perisplenic region. The largest pocket measures up to 14 x 14 x 7 cm in CC, AP and transverse dimensions respectively the surgical drain extending through the hematoma. Please note that evaluation for active contrast extravasation is limited due to single phase contrast examination. Recommend correlation with  trending hemoglobin and hematocrit levels. A CT angiogram with arterial and delayed phases can be obtained for definitive evaluation if clinically warranted. 2. Diffusely distended fluid-filled small bowel loops throughout the abdomen without definite CT evidence of transition point. This could be related to postoperative ileus, however recommend correlation with patient's symptomatology for bowel obstruction. Serial close interval follow-up radiographs can be obtained as clinically warranted. 3. Incidental note of a 6.5 x 6 mm well-circumscribed lesion in the pancreatic neck demonstrating macroscopic fat with possible contiguity with the main pancreatic duct. This is most likely favored to represent a lipoma versus an interdigitating fat. No definite evidence of pancreatic ductal dilatation or pancreatic parenchymal atrophy. An MRI can be obtained for further evaluation as clinically warranted. 4. Interval evidence of body wall edema, likely favored to be reactive versus fluid overload. Recommend clinical correlation. 5. Small amount of gas in the non dependent region of the urinary bladder, likely favored to be related to recent catheterization. However recommend correlation for emphysematous cystitis. 6. Mild prostatomegaly. Recommend correlation with PSA levels.   MACRO: Critical Finding:  See findings. Notification was initiated on 5/19/2025 at 9:50 am by Dr. Inés Hung.  (**-OCF-**) Instructions:     Signed by: Inés Hung 5/19/2025 9:50 AM Dictation workstation:   [id number]    XR abdomen 1 view  Result Date: 5/17/2025  Interpreted By:  Marissa Weeks, STUDY: XR ABDOMEN 1 VIEW;  5/17/2025 6:13 am   INDICATION: Signs/Symptoms:Abdominal pain, confirm NG tube placement.   COMPARISON: Radiographs 05/15/2025.   ACCESSION NUMBER(S): [id number]   ORDERING CLINICIAN: LATASHA RILEY   FINDINGS: Tip of NG tube in the proximal stomach and sidehole below the diaphragm. Left lower quadrant ostomy and  surgical drain. A few loops of dilated small bowel again noted measuring up to 5.1 cm, previously 4.5 cm in the midabdomen. No obvious hemoperitoneum visualized.       Satisfactory NG tube placement. Worsening ileus versus small-bowel obstruction.   MACRO None   Signed by: Marissa Weeks 5/17/2025 7:04 AM Dictation workstation:   [id number]    ECG 12 Lead  Result Date: 5/16/2025  Normal sinus rhythm Normal ECG When compared with ECG of 23-JUN-2016 06:47, Premature ventricular complexes are no longer Present T wave inversion now evident in Inferior leads    XR abdomen 1 view  Result Date: 5/15/2025  Interpreted By:  Quin Reich, STUDY: XR ABDOMEN 1 VIEW;  5/15/2025 8:42 pm   INDICATION: Signs/Symptoms:Confim NGT placement.   COMPARISON: None.   ACCESSION NUMBER(S): [id number]   ORDERING CLINICIAN: LATASHA RILEY   FINDINGS: Enteric tube terminates in the left upper quadrant with side hole at or just below the GE junction. Consider slight advancement. Multiple overlying leads are present.   The mid abdomen and pelvis are excluded from the field of view thereby limiting evaluation. There are multiple gaseous distended dilated loops of bowel centrally in the visualized abdomen. Cutaneous staples noted. Findings may relate to postoperative ileus with developing obstruction not excluded.   Bibasilar atelectasis.   Osseous structures demonstrate no acute bony changes.       1. Enteric tube terminates in the left upper quadrant with side hole at or just below the GE junction. Consider slight advancement. 2. There are multiple gaseous distended dilated loops of bowel centrally in the visualized upper abdomen. Cutaneous staples noted. Findings may relate to postoperative ileus with developing obstruction not excluded. Attention on continued radiographic follow-up is advised.   MACRO: None   Signed by: Quin Reich 5/15/2025 8:57 PM Dictation workstation:   [id number]    CT abdomen pelvis w IV contrast  Result Date:  5/14/2025  Interpreted By:  Finkelstein, Evan, STUDY: CT ABDOMEN PELVIS W IV CONTRAST;  5/13/2025 10:06 pm   INDICATION: Signs/Symptoms:Abd pain.     COMPARISON: CT chest abdomen pelvis 05/12/2025   ACCESSION NUMBER(S): [id number]   ORDERING CLINICIAN: EDNA GREENFIELD   TECHNIQUE: Axial CT images of the abdomen and pelvis with coronal and sagittal reconstructed images obtained after intravenous administration of 75 mL Omnipaque 350   FINDINGS: LOWER CHEST: Mild bibasilar atelectasis.   ABDOMEN:   LIVER: Normal attenuation and contour. BILE DUCTS: Normal caliber. GALLBLADDER: No calcified gallstones. No wall thickening. PORTAL VEIN: Patent SPLEEN: Unremarkable. PANCREAS: Unremarkable. ADRENALS: Unremarkable. KIDNEYS, URETERS and URINARY BLADDER: Symmetric renal enhancement. No hydronephrosis or perinephric fluid collection. The bladder is not fully distended, which limits evaluation. There is adjacent stranding. REPRODUCTIVE ORGANS: No pelvic masses.   ABDOMINAL WALL: Within normal limits. PERITONEUM: Scattered foci of free air throughout the abdomen and pelvis.   BOWEL: Dilated loops of proximal small bowel with tapering to more normal caliber in the lower pelvis. There are scattered colonic diverticula with wall thickening of the sigmoid colon and adjacent inflammatory stranding. There is wall thickening involving small bowel in the lower pelvis where there is adjacent inflammatory stranding as well as foci of extraluminal air. Nondilated appendix.   VESSELS: No aortic aneurysm. RETROPERITONEUM: No pathologically enlarged retroperitoneal lymph nodes.   BONES: No acute osseous abnormality.       Redemonstrated scattered foci of free air throughout the abdomen and pelvis most compatible with a perforated viscus. Inflammatory stranding is most pronounced in the lower pelvis surrounding loops of small bowel with wall thickening as well as the sigmoid colon where there are several associated diverticula. Findings suggest  diverticulitis, which may represent a site of perforation in the setting of free air. Perforation of adjacent small bowel, however, is not entirely excluded.   Dilated loops of proximal small bowel with tapering to more normal caliber in the lower pelvis. Dilated loops of bowel are new compared to prior imaging and may represent a reactive ileus versus developing obstruction.     MACRO: None.   Signed by: Evan Finkelstein 5/14/2025 12:27 AM Dictation workstation:   [id number]    CT chest abdomen pelvis w IV contrast  Result Date: 5/12/2025  Interpreted By:  Elena Gallardo, STUDY: CT CHEST ABDOMEN PELVIS W IV CONTRAST;  5/12/2025 7:57 pm   INDICATION: Signs/Symptoms:acute abd pain, cxr showing free air in the intraperitoneal right hemidiaphragm.   COMPARISON: 06/23/2016   ACCESSION NUMBER(S): [id number]   ORDERING CLINICIAN: LORRIE MOORE   TECHNIQUE: Axial CT images of the chest, abdomen and pelvis with coronal and sagittal reconstructed images obtained after intravenous administration of contrast.   FINDINGS: CHEST:   VESSELS: Aorta is normal caliber. Mild aortic calcifications. Coronary arteries are grossly unremarkable. HEART: Normal size. No significant pericardial effusion. MEDIASTINUM AND AIDE: No pathologically enlarged thoracic lymph nodes.  Shotty subcentimeter lymph nodes measuring up to 9 mm. No pneumomediastinum. The esophagus appears within normal limits. LUNG, PLEURA, LARGE AIRWAYS: Redemonstrated biapical pleuroparenchymal thickening as well as reticulonodular opacities in the upper lobes, greater on the right, similar to prior imaging. Lungs are hyperinflated. No sizable pleural effusion or pneumothorax. CHEST WALL AND LOWER NECK: Within normal limits. No acute osseous abnormality.   ABDOMEN:   BONES: No acute osseous abnormality. Multilevel degenerative changes. ABDOMINAL WALL: Within normal limits.   LIVER: Diffusely decreased attenuation of the liver parenchyma. BILE DUCTS: Normal caliber.  GALLBLADDER: No calcified gallstones. No wall thickening. PANCREAS: Within normal limits. SPLEEN: Within normal limits. ADRENALS: Within normal limits. KIDNEYS AND URETERS: Symmetric renal enhancement. No hydronephrosis or perinephric fluid collection.  No hydroureter.   VESSELS: Atherosclerotic calcifications without aneurysmal dilatation seen. RETROPERITONEUM: Within normal limits.   PELVIS:   REPRODUCTIVE ORGANS: The prostate is mildly enlarged up to 5.7 cm. BLADDER: Within normal limits.   BOWEL: Th. Fluid contents are present within the proximal colon. There is diffuse wall thickening of the sigmoid colon with diverticulosis and pericolonic stranding. E stomach is grossly unremarkable. There are few fluid filled dilated loops of small bowel with possible mild wall thickening. There is a indeterminate locule of air also noted in the pelvis adjacent to small bowel. Normal appendix. PERITONEUM: Trace pelvic ascites and mild intraperitoneal free air corresponding with the same day radiograph. No discrete fluid collection.       Reticulonodular opacities may right greater than left upper lobe similar to prior imaging from 06/23/2016 suggestive of chronic etiology. Mycobacterial infection again not excluded.   Mild free air noted within the abdomen and pelvis in keeping with hollow visceral perforation. There are regions of small bowel with wall thickening and fluid including the indeterminate region with adjacent non dependent free air which is indeterminate for location of perforation. There is also findings suggestive of acute sigmoidal diverticulitis. Surgical recommended.   Prostatomegaly. Correlation with PSA is suggested.   Additional findings as detailed.     MACRO: Elena Gallardo discussed the significance and urgency of this critical finding by telephone with  LORRIE MOORE on 5/12/2025 at 8:54 pm.  (**-RCF-**) Findings:  See findings.   Signed by: Elena Gallardo 5/12/2025 8:59 PM Dictation workstation:    [id number]    XR chest 2 views  Result Date: 5/12/2025  Interpreted By:  Laron Baez, STUDY: XR CHEST 2 VIEWS;  5/12/2025 7:22 pm   INDICATION: Signs/Symptoms:diffuse abd pain,.     COMPARISON: None.   ACCESSION NUMBER(S): [id number]   ORDERING CLINICIAN: LORRIE MOORE   FINDINGS:         CARDIOMEDIASTINAL SILHOUETTE: Cardiomediastinal silhouette is normal in size and configuration.   LUNGS: Lungs lungs are hyperinflated but clear. No consolidation seen.   ABDOMEN: There is free intraperitoneal air underlying the right hemidiaphragm..   BONES: No acute osseous changes.       1.  Free intraperitoneal air under the right hemidiaphragm. In the absence of recent of ancient CT is advised for complete evaluation 2. Emphysematous changes in the lungs without acute abnormality       MACRO: None   Signed by: Laron Baez 5/12/2025 7:28 PM Dictation workstation:   [id number]      Scheduled medications  Scheduled Medications[1]  Continuous medications  Continuous Medications[2]  PRN medications  PRN Medications[3]  Results for orders placed or performed during the hospital encounter of 06/04/25 (from the past 24 hours)   Sterile Fluid Culture/Smear    Specimen: Aspirate; Fluid   Result Value Ref Range    Gram Stain (2+) Few Polymorphonuclear leukocytes     Gram Stain No organisms seen                             Assessment & Plan  Lower abdominal pain    Small bowel obstruction (Multi)    Abdominal hematoma    Pt is a 70 year old man admitted with SBO and is s/p recent colectomy with colostomy on 5/15/25 and post procedure day 1 from IR placing drain in lower abd to drain hematoma.  - labs reviewed. Kidney function stable. Cbc stable. IR aspirate fluid C+S in process. Cont to monitor labs.   - imaging reviewed.  - NPO.  - IVF while NPO.  - maintain NG to LIWS.  - NG with 300 ml output in 24 hours. Cont to monitor output.  - zofran 4 mg q 6 hours PRN nausea  - avoid narcotic pain medicine as able.  - recommend  miralax daily.  - enc ambulation.  - Drain with 2 ml output since placed yesterday. Cont to monitor output.  - IS 10 x an hour  - SCDs     - No acute surgical intervention at this time. Recommend medical management at this time. Will cont to follow pt.     - remainder of care per primary.      I spent 35 minutes in the professional and overall care of this patient.    Plan of care discussed with Dr. Haider irving ois in agreement with plan of care.       Sylvia Guerrero, APRN-CNP           [1] [Held by provider] acetaminophen, 650 mg, oral, q6h  [Held by provider] aspirin, 81 mg, oral, Daily  [Held by provider] cyclobenzaprine, 5 mg, oral, TID  [Held by provider] ferrous sulfate, 65 mg of elemental iron, oral, BID  [Held by provider] gabapentin, 100 mg, oral, BID  lidocaine, 1 Application, urethral, Once  [Held by provider] metoprolol succinate XL, 25 mg, oral, Daily  metoprolol, 5 mg, intravenous, q6h  [Held by provider] polyethylene glycol, 17 g, oral, Daily    [2] potassium chloride-D5-0.9%NaCl, 100 mL/hr, Last Rate: 100 mL/hr (06/06/25 0536)    [3] PRN medications: acetaminophen **OR** acetaminophen **OR** acetaminophen, lidocaine, melatonin, morphine, morphine, ondansetron **OR** ondansetron, phenoL

## 2025-06-06 NOTE — PROGRESS NOTES
06/06/25 1138   Discharge Planning   Living Arrangements Spouse/significant other   Support Systems Spouse/significant other   Assistance Needed A&Ox4, independent with ADLs, no DME, room air at baseline. No BIPAP/CPAP. Active with [facility](SN). PCP Dr. Ramon Gould   Type of Residence Private residence   Number of Stairs to Enter Residence 3   Number of Stairs Within Residence 20   Do you have animals or pets at home? Yes   Type of Animals or Pets 1 dog - [employer]   Home or Post Acute Services In home services   Type of Home Care Services Home nursing visits   Expected Discharge Disposition Home Health  (resumed [facility](SN), will need internal referral placed)   Does the patient need discharge transport arranged? No   Patient Choice   Provider Choice list and CMS website (https://medicare.gov/care-compare#search) for post-acute Quality and Resource Measure Data were provided and reviewed with: Patient   Patient / Family choosing to utilize agency / facility established prior to hospitalization Yes

## 2025-06-06 NOTE — CARE PLAN
The patient's goals for the shift include  pt will have decreased abdominal discomfort    The clinical goals for the shift include Patient will remain free from injury, and rest comfortably this shift      Problem: Pain - Adult  Goal: Verbalizes/displays adequate comfort level or baseline comfort level  Outcome: Progressing     Problem: Safety - Adult  Goal: Free from fall injury  Outcome: Progressing     Problem: Discharge Planning  Goal: Discharge to home or other facility with appropriate resources  Outcome: Progressing     Problem: Chronic Conditions and Co-morbidities  Goal: Patient's chronic conditions and co-morbidity symptoms are monitored and maintained or improved  Outcome: Progressing     Problem: Nutrition  Goal: Nutrient intake appropriate for maintaining nutritional needs  Outcome: Progressing     Problem: Pain  Goal: Takes deep breaths with improved pain control throughout the shift  Outcome: Progressing  Goal: Turns in bed with improved pain control throughout the shift  Outcome: Progressing  Goal: Walks with improved pain control throughout the shift  Outcome: Progressing  Goal: Performs ADL's with improved pain control throughout shift  Outcome: Progressing  Goal: Participates in PT with improved pain control throughout the shift  Outcome: Progressing  Goal: Free from opioid side effects throughout the shift  Outcome: Progressing  Goal: Free from acute confusion related to pain meds throughout the shift  Outcome: Progressing     Problem: Fall/Injury  Goal: Not fall by end of shift  Outcome: Progressing  Goal: Be free from injury by end of the shift  Outcome: Progressing  Goal: Verbalize understanding of personal risk factors for fall in the hospital  Outcome: Progressing  Goal: Verbalize understanding of risk factor reduction measures to prevent injury from fall in the home  Outcome: Progressing  Goal: Use assistive devices by end of the shift  Outcome: Progressing  Goal: Pace activities to prevent  fatigue by end of the shift  Outcome: Progressing

## 2025-06-06 NOTE — PROGRESS NOTES
Diego Morales is a 70 y.o. male on day 2 of admission presenting with Lower abdominal pain.      Subjective   Patient reports improvement of abdominal pain and bloating. He states that he has had a lot of NG output but has not noticed a lot in the abdominal drain. He dose have gas in the ostomy bag but no stool output.     Objective     Last Recorded Vitals  BP (!) 168/96 (BP Location: Left arm)   Pulse 94   Temp 36.5 °C (97.7 °F) (Temporal)   Resp 20   Wt 64.1 kg (141 lb 5 oz)   SpO2 94%   Intake/Output last 3 Shifts:    Intake/Output Summary (Last 24 hours) at 6/6/2025 1618  Last data filed at 6/6/2025 1500  Gross per 24 hour   Intake 2570 ml   Output 2137 ml   Net 433 ml       Admission Weight  Weight: 65.8 kg (145 lb) (06/04/25 1735)    Daily Weight  06/04/25 : 64.1 kg (141 lb 5 oz)      Physical Exam  Constitutional: alert and oriented x 3, awake, cooperative, chronically ill-appearing  Skin: warm and dry, abdominal incision well approximated without signs of infection  Head/Neck: Normocephalic, atraumatic  Eyes: clear sclera  ENMT: mucous membranes dry; NG tube in place with ~500cc dark green output per my exam at 11am  Cardio: Regular rate and rhythm, no murmur  Resp: CTA bilaterally, good respiratory effort  Gastrointestinal: Distended, diffuse tenderness, no bowel sounds; ostomy bag in place  Musculoskeletal: ROM intact, no joint swelling  Extremities: No edema, cyanosis, or clubbing  Neuro: alert and oriented x 3  Psychological: Appropriate mood and behavior    Relevant Results  Scheduled medications  Scheduled Medications[1]  Continuous medications  Continuous Medications[2]  PRN medications  PRN Medications[3]    Results for orders placed or performed during the hospital encounter of 06/04/25 (from the past 24 hours)   CBC   Result Value Ref Range    WBC 3.8 (L) 4.4 - 11.3 x10*3/uL    nRBC 0.0 0.0 - 0.0 /100 WBCs    RBC 3.64 (L) 4.50 - 5.90 x10*6/uL    Hemoglobin 11.3 (L) 13.5 - 17.5 g/dL     Hematocrit 34.7 (L) 41.0 - 52.0 %    MCV 95 80 - 100 fL    MCH 31.0 26.0 - 34.0 pg    MCHC 32.6 32.0 - 36.0 g/dL    RDW 14.6 (H) 11.5 - 14.5 %    Platelets 274 150 - 450 x10*3/uL   Comprehensive Metabolic Panel   Result Value Ref Range    Glucose 120 (H) 74 - 99 mg/dL    Sodium 137 136 - 145 mmol/L    Potassium 4.6 3.5 - 5.3 mmol/L    Chloride 102 98 - 107 mmol/L    Bicarbonate 28 21 - 32 mmol/L    Anion Gap 12 10 - 20 mmol/L    Urea Nitrogen 9 6 - 23 mg/dL    Creatinine 0.68 0.50 - 1.30 mg/dL    eGFR >90 >60 mL/min/1.73m*2    Calcium 8.5 (L) 8.6 - 10.3 mg/dL    Albumin 3.4 3.4 - 5.0 g/dL    Alkaline Phosphatase 136 33 - 136 U/L    Total Protein 6.3 (L) 6.4 - 8.2 g/dL    AST 19 9 - 39 U/L    Bilirubin, Total 0.8 0.0 - 1.2 mg/dL    ALT 17 10 - 52 U/L     US guided abscess fluid collection drainage  Result Date: 6/5/2025  Interpreted By:  Chintan Tomlinson, STUDY: US GUIDED ABSCESS FLUID COLLECTION DRAINAGE;  6/5/2025 12:11 pm   INDICATION: Signs/Symptoms:Possible drain abdominal hematoma.   COMPARISON: CT scan from 06/04/2025   ACCESSION NUMBER(S): [id number]   ORDERING CLINICIAN: ANIBAL LANGE   TECHNIQUE: Ultrasound guided aspiration and drainage of right ventral abdominal wall fluid collection.   FINDINGS: The procedure and the benefits, risks and potential complications in addition to alternatives of the procedure were discussed with the patient by Dr. Tomlinson and signed informed consent was obtained.     Limited ultrasound images were obtained through the pelvis. The images demonstrated  a heterogeneous fluid collection within the central aspect of the pelvis abutting the urinary bladder. The area of the overlying ventral abdominal wall was prepped in the usual sterile manner. 1% lidocaine was used for local anesthesia at the planned skin insertion site. 100 mcg of IV fentanyl was given for pain.   Under direct ultrasound guidance, a 8 Occitan straight catheter was inserted into the fluid collection. After  confirmation of position of the catheter within the collection, the stylet was removed, the pigtail fixed and the catheter secured to the skin. The catheter was then set to gravity drainage. 10 mL of dark hemorrhagic hematoma was collected.   Postprocedure images demonstrated no evidence of hemorrhage.   Patient tolerated the procedure without immediate complication. Fluid was sent to the laboratory for further analysis.       Successful  aspiration and drain placement into the pelvic collection as above.     Signed by: Chintan Tomlinson 6/5/2025 1:13 PM Dictation workstation:   [id number]    ECG 12 lead  Result Date: 6/5/2025  Normal sinus rhythm Normal ECG When compared with ECG of 22-MAY-2025 21:01, No significant change was found    XR abdomen 1 view  Result Date: 6/4/2025  STUDY: Abdomen Radiographs;  06/04/2025 10:30 PM INDICATION: Status post nasogastric tube. COMPARISON: XR abdomen 05/17/2025.  CT abdomen/pelvis 06/04/2025. ACCESSION NUMBER(S): [id number] ORDERING CLINICIAN: TECHNIQUE:  One view(s) of the abdomen. FINDINGS:  Nasogastric tube shows its tip and side-port in the gastric lumen. There are dilated loops of gas distended small bowel in the midline upper abdomen..  There are no convincing calculi or abnormal calcifications.  No focal osseous abnormalities.      Line/tubes/catheters:  Nasogastric tube tip and side-port are in the gastric lumen. Signed by Lv Jorge MD    CT angio chest for pulmonary embolism  Addendum Date: 6/4/2025  Addendum: NOTIFICATION:  The CT pulmonary arteriogram, abdomen and pelvis results of the study were discussed with, and acknowledged by Dr. Cindy Arias, by telephone on 6/4/2025 at 7:46 PM  Signed by Royce Heller DO    Result Date: 6/4/2025  STUDY: CT Angiogram of the Chest, CT Abdomen and Pelvis with IV Contrast; 06/04/2025 6:43 PM INDICATION: Shortness of breath, tachycardia.  Abdominal distension.   Recent abdominal surgery with colostomy, hyperactive bowel  sounds, no output to colostomy bag. COMPARISON: XR chest 05/22/2025.  XR abdomen 05/17/2025.  CT abdomen/pelvis 05/18/2025, 05/13/2025. ACCESSION NUMBER(S): [id number], [id number] ORDERING CLINICIAN: ARUN MCLAUGHLIN TECHNIQUE:  CTA of the chest was performed following rapid injection of intravenous contrast.  Images are reviewed and processed at a workstation according to the CT angiogram protocol with 3-D and/or MIP post processing imaging generated.  CT of the abdomen and pelvis was performed with intravenous contrast.  Omnipaque 350:75 mL was administered intravenously; positive oral contrast was given. Automated mA/kV exposure control was utilized and patient examination was performed in strict accordance with principles of ALARA. FINDINGS:  CTA CHEST: There is no airspace disease.  There is prominence of the pulmonary vasculature.  No pleural effusion or pneumothorax is present.  There are small areas of subsegmental atelectasis within the right upper lobe.  No intraluminal abnormalities are present within the trachea or mainstem bronchi. There are no hilar or mediastinal masses.  No pericardial effusion is present.  There is normal caliber of the thoracic aorta.  There are no filling defects within the pulmonary arteries. CT ABDOMEN AND PELVIS: Liver, spleen and pancreas have a normal appearance.  Gallbladder is nondistended.  There is no biliary or pancreatic ductal dilatation. There is fluid-filled, distended stomach.  No gastric wall thickening is present.  Duodenum is nondistended. There are no adrenal abnormalities.  Kidneys have a normal appearance with no hydronephrosis.  Ureters and urinary bladder are nondistended.  Prostate gland is enlarged.  The colon is nondistended.  There are multiple dilated small bowel loops involving the jejunum and proximal ileum.  The distal ileal loops are of normal caliber. There is a 7.3 cm length by 13 cm AP by 8.6 cm mass extending from the pelvis into the left  lower abdomen.  The mass measures 63 HU.  There is no evidence of abdominal or pelvic ascites.  A left lower quadrant colostomy is present.  There is no evidence of free intraperitoneal gas. There is normal vertebral body height within the thoracic and lumbar spines.  There are no abnormalities of the sacrum, bony pelvis or proximal femurs.    1.  No evidence of pulmonary arterial embolism. 2.  Small areas of subsegmental atelectasis right upper lobe. 3.  7.3 cm x 13 cm x 8.6 cm solid-appearing mass extending the pelvis into the left lower abdominal quadrant.  This was present on the previous study, however, is better delineated with the addition of intravenous contrast.  Differential diagnosis include solid mass, large abdominal/pelvic hematoma given the recent history of surgery.  4.  Distal small bowel obstruction.  The degree of small bowel distention is greater than that identified on the previous study.  The zone of transition is not visualized but appears to be within the distal ileum.  The terminal ileum is of normal caliber. Signed by Royce Heller DO    CT abdomen pelvis w IV contrast  Addendum Date: 6/4/2025  Addendum: NOTIFICATION:  The CT pulmonary arteriogram, abdomen and pelvis results of the study were discussed with, and acknowledged by Dr. Cindy Arias, by telephone on 6/4/2025 at 7:46 PM  Signed by Royce Heller DO    Result Date: 6/4/2025  STUDY: CT Angiogram of the Chest, CT Abdomen and Pelvis with IV Contrast; 06/04/2025 6:43 PM INDICATION: Shortness of breath, tachycardia.  Abdominal distension.   Recent abdominal surgery with colostomy, hyperactive bowel sounds, no output to colostomy bag. COMPARISON: XR chest 05/22/2025.  XR abdomen 05/17/2025.  CT abdomen/pelvis 05/18/2025, 05/13/2025. ACCESSION NUMBER(S): [id number], [id number] ORDERING CLINICIAN: ARUN MCLAUGHLIN TECHNIQUE:  CTA of the chest was performed following rapid injection of intravenous contrast.  Images are  reviewed and processed at a workstation according to the CT angiogram protocol with 3-D and/or MIP post processing imaging generated.  CT of the abdomen and pelvis was performed with intravenous contrast.  Omnipaque 350:75 mL was administered intravenously; positive oral contrast was given. Automated mA/kV exposure control was utilized and patient examination was performed in strict accordance with principles of ALARA. FINDINGS:  CTA CHEST: There is no airspace disease.  There is prominence of the pulmonary vasculature.  No pleural effusion or pneumothorax is present.  There are small areas of subsegmental atelectasis within the right upper lobe.  No intraluminal abnormalities are present within the trachea or mainstem bronchi. There are no hilar or mediastinal masses.  No pericardial effusion is present.  There is normal caliber of the thoracic aorta.  There are no filling defects within the pulmonary arteries. CT ABDOMEN AND PELVIS: Liver, spleen and pancreas have a normal appearance.  Gallbladder is nondistended.  There is no biliary or pancreatic ductal dilatation. There is fluid-filled, distended stomach.  No gastric wall thickening is present.  Duodenum is nondistended. There are no adrenal abnormalities.  Kidneys have a normal appearance with no hydronephrosis.  Ureters and urinary bladder are nondistended.  Prostate gland is enlarged.  The colon is nondistended.  There are multiple dilated small bowel loops involving the jejunum and proximal ileum.  The distal ileal loops are of normal caliber. There is a 7.3 cm length by 13 cm AP by 8.6 cm mass extending from the pelvis into the left lower abdomen.  The mass measures 63 HU.  There is no evidence of abdominal or pelvic ascites.  A left lower quadrant colostomy is present.  There is no evidence of free intraperitoneal gas. There is normal vertebral body height within the thoracic and lumbar spines.  There are no abnormalities of the sacrum, bony pelvis or  proximal femurs.    1.  No evidence of pulmonary arterial embolism. 2.  Small areas of subsegmental atelectasis right upper lobe. 3.  7.3 cm x 13 cm x 8.6 cm solid-appearing mass extending the pelvis into the left lower abdominal quadrant.  This was present on the previous study, however, is better delineated with the addition of intravenous contrast.  Differential diagnosis include solid mass, large abdominal/pelvic hematoma given the recent history of surgery.  4.  Distal small bowel obstruction.  The degree of small bowel distention is greater than that identified on the previous study.  The zone of transition is not visualized but appears to be within the distal ileum.  The terminal ileum is of normal caliber. Signed by Royce Heller, DO        Assessment & Plan    69yo CM with PMH of mitral valve prolapse, cardiac arrest during cath in 2016 due to vfib needing cardioversion, and recent diverticulitis with perforation s/p resection and colostomy bag on 5/15/25 that presented to the ED with abdominal pain and was admitted for SBO.     SBO  - evidenced on admitting imaging  - general surgery following, rec maintain NG tube to LIWS  - continue zofran PRN for symptoms  - keep NPO  - continue D5NS with Kcl for now  - continue to monitor    Abdominal mass vs hematoma  - CT showed 7.3cm x 13cm x 8.6cm abnormality in mass vs fluid collection  - surgery feels this might be contributing to obstruction  - IR drain placed with dark hemorrhagic hematoma collected and sent for culture  - monitor output closely for now    Recent diverticulitis with perforation  - s/p colectomy with colostomy on 5/15/25  - ostomy bag with minimal output in setting of above    Mitral valve prolapse, Hx of arrest  - converted metoprolol from PO to IV while NPO    DVT Proph: SCDs only    Dispo: Patient requires close inpatient monitoring in setting of SBO and hematoma requiring surgical evaluation, no plans for discharge at this  time.    Lisa Ansari DO           [1] [Held by provider] acetaminophen, 650 mg, oral, q6h  [Held by provider] aspirin, 81 mg, oral, Daily  [Held by provider] cyclobenzaprine, 5 mg, oral, TID  [Held by provider] ferrous sulfate, 65 mg of elemental iron, oral, BID  [Held by provider] gabapentin, 100 mg, oral, BID  lidocaine, 1 Application, urethral, Once  [Held by provider] metoprolol succinate XL, 25 mg, oral, Daily  metoprolol, 5 mg, intravenous, q6h  [Held by provider] polyethylene glycol, 17 g, oral, Daily     [2] potassium chloride-D5-0.9%NaCl, 100 mL/hr, Last Rate: 100 mL/hr (06/06/25 9313)     [3] PRN medications: acetaminophen **OR** acetaminophen **OR** acetaminophen, lidocaine, melatonin, morphine, morphine, ondansetron **OR** ondansetron, phenoL

## 2025-06-06 NOTE — PROGRESS NOTES
"Diego Morales is a 70 y.o. male on day 2 of admission presenting with Lower abdominal pain.    Subjective   Patient states he feels more gurgling and rumbling going on in his abdomen.       Objective lots of bilious drainage from the NG tube.    Physical Exam abdomen is still distended and slightly tympanitic but very soft and he has no tenderness.  Ostomy bag has some residue and gas.  Last Recorded Vitals  Blood pressure 155/87, pulse 92, temperature 36.1 °C (97 °F), temperature source Temporal, resp. rate 20, height 1.803 m (5' 11\"), weight 64.1 kg (141 lb 5 oz), SpO2 100%.  Intake/Output last 3 Shifts:  I/O last 3 completed shifts:  In: 2841 (44.3 mL/kg) [I.V.:81 (1.3 mL/kg); IV Piggyback:2760]  Out: 1567 (24.4 mL/kg) [Urine:1250 (0.5 mL/kg/hr); Emesis/NG output:300; Drains:2; Stool:15]  Weight: 64.1 kg     Relevant Results                              Assessment & Plan  Lower abdominal pain    Small bowel obstruction (Multi)    Abdominal hematoma    Recommend continuing NG tube decompression until he has a little more output from the ostomy once that happens I believe we can clamp the NG tube for a few hours just to make sure he is no nausea or vomiting and then likely remove the NG tube and start clears.  I am hopeful that that will happen sometime over the weekend.  If he does not seem to make any progress then we could consider giving 250 cc of Gastrografin down the NG tube and some serial KUBs to follow the progress.      I spent 10 minutes in the professional and overall care of this patient.      Morenita Maloney MD    "

## 2025-06-07 LAB
ALBUMIN SERPL BCP-MCNC: 3.5 G/DL (ref 3.4–5)
ALP SERPL-CCNC: 128 U/L (ref 33–136)
ALT SERPL W P-5'-P-CCNC: 17 U/L (ref 10–52)
ANION GAP SERPL CALC-SCNC: 11 MMOL/L (ref 10–20)
AST SERPL W P-5'-P-CCNC: 28 U/L (ref 9–39)
BILIRUB SERPL-MCNC: 0.9 MG/DL (ref 0–1.2)
BUN SERPL-MCNC: 8 MG/DL (ref 6–23)
CALCIUM SERPL-MCNC: 8.5 MG/DL (ref 8.6–10.3)
CHLORIDE SERPL-SCNC: 103 MMOL/L (ref 98–107)
CO2 SERPL-SCNC: 26 MMOL/L (ref 21–32)
CREAT SERPL-MCNC: 0.62 MG/DL (ref 0.5–1.3)
EGFRCR SERPLBLD CKD-EPI 2021: >90 ML/MIN/1.73M*2
ERYTHROCYTE [DISTWIDTH] IN BLOOD BY AUTOMATED COUNT: 14.4 % (ref 11.5–14.5)
GLUCOSE SERPL-MCNC: 117 MG/DL (ref 74–99)
HCT VFR BLD AUTO: 33.7 % (ref 41–52)
HGB BLD-MCNC: 11.1 G/DL (ref 13.5–17.5)
MCH RBC QN AUTO: 30.7 PG (ref 26–34)
MCHC RBC AUTO-ENTMCNC: 32.9 G/DL (ref 32–36)
MCV RBC AUTO: 93 FL (ref 80–100)
NRBC BLD-RTO: 0 /100 WBCS (ref 0–0)
PLATELET # BLD AUTO: 265 X10*3/UL (ref 150–450)
POTASSIUM SERPL-SCNC: 4.1 MMOL/L (ref 3.5–5.3)
PROT SERPL-MCNC: 6.3 G/DL (ref 6.4–8.2)
RBC # BLD AUTO: 3.61 X10*6/UL (ref 4.5–5.9)
SODIUM SERPL-SCNC: 136 MMOL/L (ref 136–145)
WBC # BLD AUTO: 4.1 X10*3/UL (ref 4.4–11.3)

## 2025-06-07 PROCEDURE — 1100000001 HC PRIVATE ROOM DAILY

## 2025-06-07 PROCEDURE — 85027 COMPLETE CBC AUTOMATED: CPT | Performed by: FAMILY MEDICINE

## 2025-06-07 PROCEDURE — 2500000004 HC RX 250 GENERAL PHARMACY W/ HCPCS (ALT 636 FOR OP/ED): Performed by: FAMILY MEDICINE

## 2025-06-07 PROCEDURE — 2500000004 HC RX 250 GENERAL PHARMACY W/ HCPCS (ALT 636 FOR OP/ED): Performed by: INTERNAL MEDICINE

## 2025-06-07 PROCEDURE — 99233 SBSQ HOSP IP/OBS HIGH 50: CPT | Performed by: FAMILY MEDICINE

## 2025-06-07 PROCEDURE — 80053 COMPREHEN METABOLIC PANEL: CPT | Performed by: FAMILY MEDICINE

## 2025-06-07 PROCEDURE — 99024 POSTOP FOLLOW-UP VISIT: CPT | Performed by: SURGERY

## 2025-06-07 PROCEDURE — 36415 COLL VENOUS BLD VENIPUNCTURE: CPT | Performed by: FAMILY MEDICINE

## 2025-06-07 RX ORDER — DEXTROSE MONOHYDRATE AND SODIUM CHLORIDE 5; .9 G/100ML; G/100ML
100 INJECTION, SOLUTION INTRAVENOUS CONTINUOUS
Status: ACTIVE | OUTPATIENT
Start: 2025-06-07 | End: 2025-06-08

## 2025-06-07 RX ADMIN — DEXTROSE AND SODIUM CHLORIDE 100 ML/HR: 5; .9 INJECTION, SOLUTION INTRAVENOUS at 01:37

## 2025-06-07 RX ADMIN — DEXTROSE AND SODIUM CHLORIDE 100 ML/HR: 5; .9 INJECTION, SOLUTION INTRAVENOUS at 18:25

## 2025-06-07 RX ADMIN — METOPROLOL TARTRATE 5 MG: 5 INJECTION INTRAVENOUS at 18:40

## 2025-06-07 RX ADMIN — METOPROLOL TARTRATE 5 MG: 5 INJECTION INTRAVENOUS at 12:30

## 2025-06-07 RX ADMIN — METOPROLOL TARTRATE 5 MG: 5 INJECTION INTRAVENOUS at 05:34

## 2025-06-07 ASSESSMENT — COGNITIVE AND FUNCTIONAL STATUS - GENERAL
MOVING TO AND FROM BED TO CHAIR: A LITTLE
MOBILITY SCORE: 18
MOVING FROM LYING ON BACK TO SITTING ON SIDE OF FLAT BED WITH BEDRAILS: A LITTLE
STANDING UP FROM CHAIR USING ARMS: A LITTLE
TURNING FROM BACK TO SIDE WHILE IN FLAT BAD: A LITTLE
CLIMB 3 TO 5 STEPS WITH RAILING: A LITTLE
DRESSING REGULAR LOWER BODY CLOTHING: A LITTLE
DAILY ACTIVITIY SCORE: 22
HELP NEEDED FOR BATHING: A LITTLE
WALKING IN HOSPITAL ROOM: A LITTLE

## 2025-06-07 ASSESSMENT — PAIN SCALES - GENERAL: PAINLEVEL_OUTOF10: 0 - NO PAIN

## 2025-06-07 NOTE — CARE PLAN
The patient's goals for the shift include      The clinical goals for the shift include Patient will remain free from injury, and rest comfortably this shift    Over the shift, the patient did not make progress toward the following goals. Barriers to progression include stool in colostomy, NG removed. Recommendations to address these barriers include SBO needs to be resolved.

## 2025-06-07 NOTE — PROGRESS NOTES
Diego Morales is a 70 y.o. male on day 3 of admission presenting with Lower abdominal pain.      Subjective   Patient reports fatigue but overall no abdominal pain, N/V, and no output from ostomy. Wife at bedside asked for NG tube to be removed but educated that it is necessary with high output.     Objective     Last Recorded Vitals  BP (!) 151/93 (BP Location: Left arm, Patient Position: Lying)   Pulse 89   Temp 36.3 °C (97.3 °F) (Temporal)   Resp 18   Wt 64.1 kg (141 lb 5 oz)   SpO2 97%   Intake/Output last 3 Shifts:    Intake/Output Summary (Last 24 hours) at 6/7/2025 1606  Last data filed at 6/7/2025 1558  Gross per 24 hour   Intake 2241.67 ml   Output 2455 ml   Net -213.33 ml       Admission Weight  Weight: 65.8 kg (145 lb) (06/04/25 1735)    Daily Weight  06/04/25 : 64.1 kg (141 lb 5 oz)      Physical Exam  Constitutional: alert and oriented x 3, awake, cooperative, chronically ill-appearing  Skin: warm and dry, abdominal incision well approximated without signs of infection  ENMT: mucous membranes dry; NG tube in place with dark green output  Cardio: Regular rate and rhythm, no murmur  Resp: CTA bilaterally, good respiratory effort  Gastrointestinal: Distended, diffuse tenderness improved, no bowel sounds; ostomy bag in place but no gas/output  Musculoskeletal: generalized wekness  Neuro: alert and oriented x 3  Psychological: Appropriate mood and behavior    Relevant Results  Scheduled medications  Scheduled Medications[1]  Continuous medications  Continuous Medications[2]  PRN medications  PRN Medications[3]    Results for orders placed or performed during the hospital encounter of 06/04/25 (from the past 24 hours)   CBC   Result Value Ref Range    WBC 4.1 (L) 4.4 - 11.3 x10*3/uL    nRBC 0.0 0.0 - 0.0 /100 WBCs    RBC 3.61 (L) 4.50 - 5.90 x10*6/uL    Hemoglobin 11.1 (L) 13.5 - 17.5 g/dL    Hematocrit 33.7 (L) 41.0 - 52.0 %    MCV 93 80 - 100 fL    MCH 30.7 26.0 - 34.0 pg    MCHC 32.9 32.0 - 36.0  g/dL    RDW 14.4 11.5 - 14.5 %    Platelets 265 150 - 450 x10*3/uL   Comprehensive Metabolic Panel   Result Value Ref Range    Glucose 117 (H) 74 - 99 mg/dL    Sodium 136 136 - 145 mmol/L    Potassium 4.1 3.5 - 5.3 mmol/L    Chloride 103 98 - 107 mmol/L    Bicarbonate 26 21 - 32 mmol/L    Anion Gap 11 10 - 20 mmol/L    Urea Nitrogen 8 6 - 23 mg/dL    Creatinine 0.62 0.50 - 1.30 mg/dL    eGFR >90 >60 mL/min/1.73m*2    Calcium 8.5 (L) 8.6 - 10.3 mg/dL    Albumin 3.5 3.4 - 5.0 g/dL    Alkaline Phosphatase 128 33 - 136 U/L    Total Protein 6.3 (L) 6.4 - 8.2 g/dL    AST 28 9 - 39 U/L    Bilirubin, Total 0.9 0.0 - 1.2 mg/dL    ALT 17 10 - 52 U/L     No results found.        Assessment & Plan    71yo CM with PMH of mitral valve prolapse, cardiac arrest during cath in 2016 due to vfib needing cardioversion, and recent diverticulitis with perforation s/p resection and colostomy bag on 5/15/25 that presented to the ED with abdominal pain and was admitted for SBO.     SBO  - evidenced on admitting imaging  - general surgery following, rec maintain NG tube to LIWS  - continue zofran PRN for symptoms  - keep NPO  - continue D5NS with Kcl for now  - continue to monitor    Abdominal hematoma  - CT showed 7.3cm x 13cm x 8.6cm abnormality in mass vs fluid collection  - surgery feels this might be contributing to obstruction  - IR drain placed with dark hemorrhagic hematoma collected and sent for culture  - cultures NGTD  - monitor output closely for now    Recent diverticulitis with perforation  - s/p colectomy with colostomy on 5/15/25  - ostomy bag with minimal output in setting of above    Mitral valve prolapse, Hx of arrest  - converted metoprolol from PO to IV while NPO    DVT Proph: SCDs only    Dispo: Patient requires close inpatient monitoring in setting of SBO and hematoma requiring surgical evaluation, no plans for discharge at this time.    Lisa Ansari DO           [1] [Held by provider] acetaminophen, 650 mg,  oral, q6h  [Held by provider] aspirin, 81 mg, oral, Daily  [Held by provider] cyclobenzaprine, 5 mg, oral, TID  [Held by provider] ferrous sulfate, 65 mg of elemental iron, oral, BID  [Held by provider] gabapentin, 100 mg, oral, BID  lidocaine, 1 Application, urethral, Once  [Held by provider] metoprolol succinate XL, 25 mg, oral, Daily  metoprolol, 5 mg, intravenous, q6h  [Held by provider] polyethylene glycol, 17 g, oral, Daily     [2] D5 % and 0.9 % sodium chloride, 100 mL/hr, Last Rate: 100 mL/hr (06/07/25 1333)     [3] PRN medications: acetaminophen **OR** acetaminophen **OR** acetaminophen, lidocaine, melatonin, morphine, morphine, ondansetron **OR** ondansetron, phenoL

## 2025-06-07 NOTE — PROGRESS NOTES
"GENERAL SURGERY PROGRESS NOTE    PATIENT NAME: Diego Morales  MRN: [id number]  DATE: 6/7/2025    SUMMARY OF CURRENT STATUS  - continued small bowel obstruction, likely secondary to pressure from intra-abdominal hematoma  - drain in hematoma with low output - 105 ml last 24 hours  - no flatus or stool from colostomy - he is ambulating a lot, tolerating NG tube clamping during walks, but still high output NG (1350 x 24 hours)  - denies any major abdominal pain, nausea, SOB, chest pain, leg pain    TODAY'S ASSESSMENT:  - New complications over the past 24 hours: No  - No concerns overnight  - Pain controlled: Yes  - DVT prophylaxis:  Yes -  SCDs  - Diet is currently: npo  - Nausea:  No  - Emesis:  No  - Pt has sat in the chair / ambulated    ON EXAMINATION:  BP (!) 151/93 (BP Location: Left arm, Patient Position: Lying)   Pulse 92   Temp 36.3 °C (97.3 °F) (Temporal)   Resp 18   Ht 1.803 m (5' 11\")   Wt 64.1 kg (141 lb 5 oz)   SpO2 96%   BMI 19.71 kg/m²   APPEARANCE: NAD, looks well.  ABDOMEN:  Soft, no tenderness, mildly distended  WOUND(S):  Incisions Clean / Dry / Intact, colostomy pink and viable    INS/OUTS:    Intake/Output Summary (Last 24 hours) at 6/7/2025 1435  Last data filed at 6/7/2025 1333  Gross per 24 hour   Intake 2211.67 ml   Output 2305 ml   Net -93.33 ml         BLOOD WORK:  Results for orders placed or performed during the hospital encounter of 06/04/25 (from the past 24 hours)   CBC   Result Value Ref Range    WBC 4.1 (L) 4.4 - 11.3 x10*3/uL    nRBC 0.0 0.0 - 0.0 /100 WBCs    RBC 3.61 (L) 4.50 - 5.90 x10*6/uL    Hemoglobin 11.1 (L) 13.5 - 17.5 g/dL    Hematocrit 33.7 (L) 41.0 - 52.0 %    MCV 93 80 - 100 fL    MCH 30.7 26.0 - 34.0 pg    MCHC 32.9 32.0 - 36.0 g/dL    RDW 14.4 11.5 - 14.5 %    Platelets 265 150 - 450 x10*3/uL   Comprehensive Metabolic Panel   Result Value Ref Range    Glucose 117 (H) 74 - 99 mg/dL    Sodium 136 136 - 145 mmol/L    Potassium 4.1 3.5 - 5.3 mmol/L    Chloride 103 " 98 - 107 mmol/L    Bicarbonate 26 21 - 32 mmol/L    Anion Gap 11 10 - 20 mmol/L    Urea Nitrogen 8 6 - 23 mg/dL    Creatinine 0.62 0.50 - 1.30 mg/dL    eGFR >90 >60 mL/min/1.73m*2    Calcium 8.5 (L) 8.6 - 10.3 mg/dL    Albumin 3.5 3.4 - 5.0 g/dL    Alkaline Phosphatase 128 33 - 136 U/L    Total Protein 6.3 (L) 6.4 - 8.2 g/dL    AST 28 9 - 39 U/L    Bilirubin, Total 0.9 0.0 - 1.2 mg/dL    ALT 17 10 - 52 U/L       IMPRESSION:  - Pt is progressing slowly with SBO likely secondary to hematoma pushing on small bowel  - No new issues/concerns    PLAN:  - npo diet  - continue flushing drain in hematoma  - continue ng tube drainage - ok to clamp ng for ambulation  - Multimodal pain regimen: prn tylenol, PRN morphine  - Encourage Ambulation / use of incentive spirometry  - VTE prophylaxis - Continue with SCDs   - Disposition - await return of bowel function    Sandro Duran MD  Bariatric and Minimally Invasive General Surgery

## 2025-06-08 VITALS
DIASTOLIC BLOOD PRESSURE: 88 MMHG | RESPIRATION RATE: 16 BRPM | SYSTOLIC BLOOD PRESSURE: 140 MMHG | TEMPERATURE: 97.9 F | OXYGEN SATURATION: 95 % | HEIGHT: 71 IN | WEIGHT: 141.31 LBS | HEART RATE: 81 BPM | BODY MASS INDEX: 19.78 KG/M2

## 2025-06-08 LAB
ALBUMIN SERPL BCP-MCNC: 3.2 G/DL (ref 3.4–5)
ALP SERPL-CCNC: 124 U/L (ref 33–136)
ALT SERPL W P-5'-P-CCNC: 14 U/L (ref 10–52)
ANION GAP SERPL CALC-SCNC: 11 MMOL/L (ref 10–20)
AST SERPL W P-5'-P-CCNC: 17 U/L (ref 9–39)
BACTERIA FLD CULT: NORMAL
BILIRUB SERPL-MCNC: 0.8 MG/DL (ref 0–1.2)
BUN SERPL-MCNC: 8 MG/DL (ref 6–23)
CALCIUM SERPL-MCNC: 8.2 MG/DL (ref 8.6–10.3)
CHLORIDE SERPL-SCNC: 102 MMOL/L (ref 98–107)
CO2 SERPL-SCNC: 27 MMOL/L (ref 21–32)
CREAT SERPL-MCNC: 0.65 MG/DL (ref 0.5–1.3)
EGFRCR SERPLBLD CKD-EPI 2021: >90 ML/MIN/1.73M*2
ERYTHROCYTE [DISTWIDTH] IN BLOOD BY AUTOMATED COUNT: 14.2 % (ref 11.5–14.5)
GLUCOSE SERPL-MCNC: 114 MG/DL (ref 74–99)
GRAM STN SPEC: NORMAL
GRAM STN SPEC: NORMAL
HCT VFR BLD AUTO: 33 % (ref 41–52)
HGB BLD-MCNC: 10.8 G/DL (ref 13.5–17.5)
MCH RBC QN AUTO: 30.3 PG (ref 26–34)
MCHC RBC AUTO-ENTMCNC: 32.7 G/DL (ref 32–36)
MCV RBC AUTO: 92 FL (ref 80–100)
NRBC BLD-RTO: 0 /100 WBCS (ref 0–0)
PLATELET # BLD AUTO: 255 X10*3/UL (ref 150–450)
POTASSIUM SERPL-SCNC: 3.4 MMOL/L (ref 3.5–5.3)
PROT SERPL-MCNC: 5.9 G/DL (ref 6.4–8.2)
RBC # BLD AUTO: 3.57 X10*6/UL (ref 4.5–5.9)
SODIUM SERPL-SCNC: 137 MMOL/L (ref 136–145)
WBC # BLD AUTO: 4.4 X10*3/UL (ref 4.4–11.3)

## 2025-06-08 PROCEDURE — 2500000004 HC RX 250 GENERAL PHARMACY W/ HCPCS (ALT 636 FOR OP/ED): Performed by: FAMILY MEDICINE

## 2025-06-08 PROCEDURE — 99232 SBSQ HOSP IP/OBS MODERATE 35: CPT | Performed by: FAMILY MEDICINE

## 2025-06-08 PROCEDURE — 80053 COMPREHEN METABOLIC PANEL: CPT | Performed by: FAMILY MEDICINE

## 2025-06-08 PROCEDURE — 36415 COLL VENOUS BLD VENIPUNCTURE: CPT | Performed by: FAMILY MEDICINE

## 2025-06-08 PROCEDURE — 99024 POSTOP FOLLOW-UP VISIT: CPT | Performed by: SURGERY

## 2025-06-08 PROCEDURE — 2500000004 HC RX 250 GENERAL PHARMACY W/ HCPCS (ALT 636 FOR OP/ED): Performed by: INTERNAL MEDICINE

## 2025-06-08 PROCEDURE — 1100000001 HC PRIVATE ROOM DAILY

## 2025-06-08 PROCEDURE — 85027 COMPLETE CBC AUTOMATED: CPT | Performed by: FAMILY MEDICINE

## 2025-06-08 RX ORDER — POTASSIUM CHLORIDE 14.9 MG/ML
20 INJECTION INTRAVENOUS
Status: COMPLETED | OUTPATIENT
Start: 2025-06-08 | End: 2025-06-08

## 2025-06-08 RX ORDER — PANTOPRAZOLE SODIUM 40 MG/10ML
40 INJECTION, POWDER, LYOPHILIZED, FOR SOLUTION INTRAVENOUS 2 TIMES DAILY
Status: DISCONTINUED | OUTPATIENT
Start: 2025-06-08 | End: 2025-06-11

## 2025-06-08 RX ORDER — DEXTROSE MONOHYDRATE AND SODIUM CHLORIDE 5; .9 G/100ML; G/100ML
100 INJECTION, SOLUTION INTRAVENOUS CONTINUOUS
Status: DISCONTINUED | OUTPATIENT
Start: 2025-06-08 | End: 2025-06-09

## 2025-06-08 RX ADMIN — METOPROLOL TARTRATE 5 MG: 5 INJECTION INTRAVENOUS at 07:39

## 2025-06-08 RX ADMIN — POTASSIUM CHLORIDE 20 MEQ: 14.9 INJECTION, SOLUTION INTRAVENOUS at 15:54

## 2025-06-08 RX ADMIN — DEXTROSE AND SODIUM CHLORIDE 100 ML/HR: 5; .9 INJECTION, SOLUTION INTRAVENOUS at 15:10

## 2025-06-08 RX ADMIN — METOPROLOL TARTRATE 5 MG: 5 INJECTION INTRAVENOUS at 00:24

## 2025-06-08 RX ADMIN — POTASSIUM CHLORIDE 20 MEQ: 14.9 INJECTION, SOLUTION INTRAVENOUS at 13:11

## 2025-06-08 RX ADMIN — METOPROLOL TARTRATE 5 MG: 5 INJECTION INTRAVENOUS at 13:11

## 2025-06-08 RX ADMIN — PANTOPRAZOLE SODIUM 40 MG: 40 INJECTION, POWDER, FOR SOLUTION INTRAVENOUS at 15:55

## 2025-06-08 RX ADMIN — METOPROLOL TARTRATE 5 MG: 5 INJECTION INTRAVENOUS at 18:41

## 2025-06-08 ASSESSMENT — COGNITIVE AND FUNCTIONAL STATUS - GENERAL
TURNING FROM BACK TO SIDE WHILE IN FLAT BAD: A LITTLE
WALKING IN HOSPITAL ROOM: A LITTLE
DAILY ACTIVITIY SCORE: 20
MOBILITY SCORE: 17
TOILETING: A LITTLE
HELP NEEDED FOR BATHING: A LITTLE
MOBILITY SCORE: 18
TOILETING: A LITTLE
DAILY ACTIVITIY SCORE: 20
DRESSING REGULAR UPPER BODY CLOTHING: A LITTLE
WALKING IN HOSPITAL ROOM: A LITTLE
MOVING FROM LYING ON BACK TO SITTING ON SIDE OF FLAT BED WITH BEDRAILS: A LITTLE
DAILY ACTIVITIY SCORE: 20
CLIMB 3 TO 5 STEPS WITH RAILING: A LITTLE
DRESSING REGULAR LOWER BODY CLOTHING: A LITTLE
WALKING IN HOSPITAL ROOM: A LITTLE
HELP NEEDED FOR BATHING: A LITTLE
MOVING TO AND FROM BED TO CHAIR: A LITTLE
DRESSING REGULAR UPPER BODY CLOTHING: A LITTLE
TOILETING: A LITTLE
CLIMB 3 TO 5 STEPS WITH RAILING: A LOT
DRESSING REGULAR LOWER BODY CLOTHING: A LITTLE
MOVING FROM LYING ON BACK TO SITTING ON SIDE OF FLAT BED WITH BEDRAILS: A LITTLE
TURNING FROM BACK TO SIDE WHILE IN FLAT BAD: A LITTLE
STANDING UP FROM CHAIR USING ARMS: A LITTLE
MOBILITY SCORE: 17
MOVING TO AND FROM BED TO CHAIR: A LITTLE
DRESSING REGULAR LOWER BODY CLOTHING: A LITTLE
CLIMB 3 TO 5 STEPS WITH RAILING: A LOT
DRESSING REGULAR UPPER BODY CLOTHING: A LITTLE
STANDING UP FROM CHAIR USING ARMS: A LITTLE
TURNING FROM BACK TO SIDE WHILE IN FLAT BAD: A LITTLE
MOVING TO AND FROM BED TO CHAIR: A LITTLE
STANDING UP FROM CHAIR USING ARMS: A LITTLE
MOVING FROM LYING ON BACK TO SITTING ON SIDE OF FLAT BED WITH BEDRAILS: A LITTLE
HELP NEEDED FOR BATHING: A LITTLE

## 2025-06-08 ASSESSMENT — PAIN SCALES - GENERAL
PAINLEVEL_OUTOF10: 0 - NO PAIN

## 2025-06-08 NOTE — PROGRESS NOTES
"GENERAL SURGERY PROGRESS NOTE    PATIENT NAME: Diego Morales  MRN: [id number]  DATE: 6/8/2025    SUMMARY OF CURRENT STATUS  - continued small bowel obstruction, likely secondary to pressure from intra-abdominal hematoma  - drain in hematoma with minimal output  - no flatus or stool from colostomy - he is ambulating a lot, tolerating NG tube clamping during walks, NG tube output is lower (850 ml/ 24 hours)  - denies any major abdominal pain, nausea, SOB, chest pain, leg pain    TODAY'S ASSESSMENT:  - New complications over the past 24 hours: No  - No concerns overnight  - Pain controlled: Yes  - DVT prophylaxis:  Yes -  SCDs  - Diet is currently: Npo - ice chips and popsicles  - Nausea:  No  - Emesis:  No  - Pt has sat in the chair / ambulated    ON EXAMINATION:  /83   Pulse 91   Temp 36.6 °C (97.9 °F) (Temporal)   Resp 19   Ht 1.803 m (5' 11\")   Wt 64.1 kg (141 lb 5 oz)   SpO2 98%   BMI 19.71 kg/m²   APPEARANCE: NAD, looks well.  ABDOMEN:  Soft, no tenderness, mildly distended  WOUND(S):  Incisions Clean / Dry / Intact, colostomy pink and viable    INS/OUTS:    Intake/Output Summary (Last 24 hours) at 6/8/2025 1439  Last data filed at 6/8/2025 0856  Gross per 24 hour   Intake 1076.67 ml   Output 2215 ml   Net -1138.33 ml         BLOOD WORK:  Results for orders placed or performed during the hospital encounter of 06/04/25 (from the past 24 hours)   CBC   Result Value Ref Range    WBC 4.4 4.4 - 11.3 x10*3/uL    nRBC 0.0 0.0 - 0.0 /100 WBCs    RBC 3.57 (L) 4.50 - 5.90 x10*6/uL    Hemoglobin 10.8 (L) 13.5 - 17.5 g/dL    Hematocrit 33.0 (L) 41.0 - 52.0 %    MCV 92 80 - 100 fL    MCH 30.3 26.0 - 34.0 pg    MCHC 32.7 32.0 - 36.0 g/dL    RDW 14.2 11.5 - 14.5 %    Platelets 255 150 - 450 x10*3/uL   Comprehensive Metabolic Panel   Result Value Ref Range    Glucose 114 (H) 74 - 99 mg/dL    Sodium 137 136 - 145 mmol/L    Potassium 3.4 (L) 3.5 - 5.3 mmol/L    Chloride 102 98 - 107 mmol/L    Bicarbonate 27 21 - 32 " mmol/L    Anion Gap 11 10 - 20 mmol/L    Urea Nitrogen 8 6 - 23 mg/dL    Creatinine 0.65 0.50 - 1.30 mg/dL    eGFR >90 >60 mL/min/1.73m*2    Calcium 8.2 (L) 8.6 - 10.3 mg/dL    Albumin 3.2 (L) 3.4 - 5.0 g/dL    Alkaline Phosphatase 124 33 - 136 U/L    Total Protein 5.9 (L) 6.4 - 8.2 g/dL    AST 17 9 - 39 U/L    Bilirubin, Total 0.8 0.0 - 1.2 mg/dL    ALT 14 10 - 52 U/L       IMPRESSION:  - Pt is progressing slowly with SBO likely secondary to hematoma pushing on small bowel  - No new issues/concerns    PLAN:  - npo diet  - continue flushing drain in hematoma - would consider removing drain  - continue ng tube drainage - ok to clamp ng for ambulation  - Multimodal pain regimen: prn tylenol, PRN morphine  - Encourage Ambulation / use of incentive spirometry  - VTE prophylaxis - Continue with SCDs   - Disposition - await return of bowel function - Dr Canales is back tomorrow to resume management    Sandro Duran MD  Bariatric and Minimally Invasive General Surgery

## 2025-06-08 NOTE — PROGRESS NOTES
Diego Morales is a 70 y.o. male on day 4 of admission presenting with Lower abdominal pain.      Subjective   No changes. Aide dumped NG tube output so unclear if output is accurate from overnight.    Objective     Last Recorded Vitals  /83   Pulse 91   Temp 36.6 °C (97.9 °F) (Temporal)   Resp 19   Wt 64.1 kg (141 lb 5 oz)   SpO2 98%   Intake/Output last 3 Shifts:    Intake/Output Summary (Last 24 hours) at 6/8/2025 1409  Last data filed at 6/8/2025 0856  Gross per 24 hour   Intake 1076.67 ml   Output 2215 ml   Net -1138.33 ml       Admission Weight  Weight: 65.8 kg (145 lb) (06/04/25 1735)    Daily Weight  06/04/25 : 64.1 kg (141 lb 5 oz)      Physical Exam  Constitutional: alert and oriented x 3, awake, cooperative, chronically ill-appearing  Skin: warm and dry, abdominal incision well approximated without signs of infection  ENMT: mucous membranes dry; NG tube in place with dark brown output  Cardio: Regular rate and rhythm, no murmur  Resp: CTA bilaterally, good respiratory effort  Gastrointestinal: Distended, diffuse tenderness improved, no bowel sounds; ostomy bag in place but no gas/output  Musculoskeletal: generalized wekness  Neuro: alert and oriented x 3  Psychological: Seems depressed about situation    Relevant Results  Scheduled medications  Scheduled Medications[1]  Continuous medications  Continuous Medications[2]  PRN medications  PRN Medications[3]    Results for orders placed or performed during the hospital encounter of 06/04/25 (from the past 24 hours)   CBC   Result Value Ref Range    WBC 4.4 4.4 - 11.3 x10*3/uL    nRBC 0.0 0.0 - 0.0 /100 WBCs    RBC 3.57 (L) 4.50 - 5.90 x10*6/uL    Hemoglobin 10.8 (L) 13.5 - 17.5 g/dL    Hematocrit 33.0 (L) 41.0 - 52.0 %    MCV 92 80 - 100 fL    MCH 30.3 26.0 - 34.0 pg    MCHC 32.7 32.0 - 36.0 g/dL    RDW 14.2 11.5 - 14.5 %    Platelets 255 150 - 450 x10*3/uL   Comprehensive Metabolic Panel   Result Value Ref Range    Glucose 114 (H) 74 - 99 mg/dL     Sodium 137 136 - 145 mmol/L    Potassium 3.4 (L) 3.5 - 5.3 mmol/L    Chloride 102 98 - 107 mmol/L    Bicarbonate 27 21 - 32 mmol/L    Anion Gap 11 10 - 20 mmol/L    Urea Nitrogen 8 6 - 23 mg/dL    Creatinine 0.65 0.50 - 1.30 mg/dL    eGFR >90 >60 mL/min/1.73m*2    Calcium 8.2 (L) 8.6 - 10.3 mg/dL    Albumin 3.2 (L) 3.4 - 5.0 g/dL    Alkaline Phosphatase 124 33 - 136 U/L    Total Protein 5.9 (L) 6.4 - 8.2 g/dL    AST 17 9 - 39 U/L    Bilirubin, Total 0.8 0.0 - 1.2 mg/dL    ALT 14 10 - 52 U/L     No results found.        Assessment & Plan    71yo CM with PMH of mitral valve prolapse, cardiac arrest during cath in 2016 due to vfib needing cardioversion, and recent diverticulitis with perforation s/p resection and colostomy bag on 5/15/25 that presented to the ED with abdominal pain and was admitted for SBO.     SBO  - evidenced on admitting imaging  - general surgery following, rec maintain NG tube to LIWS  - continue zofran PRN for symptoms  - keep NPO  - continue D5NS with Kcl for now  - added PPIV IV BID due to change in NG output  - continue to monitor    Abdominal hematoma  - CT showed 7.3cm x 13cm x 8.6cm abnormality in mass vs fluid collection  - surgery feels this might be contributing to obstruction  - IR drain placed with dark hemorrhagic hematoma collected and sent for culture  - cultures NGTD  - monitor output closely but limited    Recent diverticulitis with perforation  - s/p colectomy with colostomy on 5/15/25  - ostomy bag with minimal output in setting of above    Mitral valve prolapse, Hx of arrest  - converted metoprolol from PO to IV while NPO    DVT Proph: SCDs only    Dispo: Patient requires close inpatient monitoring in setting of SBO and hematoma requiring surgical evaluation, no plans for discharge at this time.    Lisa Ansari DO           [1] [Held by provider] acetaminophen, 650 mg, oral, q6h  [Held by provider] aspirin, 81 mg, oral, Daily  [Held by provider] cyclobenzaprine, 5 mg,  oral, TID  [Held by provider] ferrous sulfate, 65 mg of elemental iron, oral, BID  [Held by provider] gabapentin, 100 mg, oral, BID  lidocaine, 1 Application, urethral, Once  [Held by provider] metoprolol succinate XL, 25 mg, oral, Daily  metoprolol, 5 mg, intravenous, q6h  [Held by provider] polyethylene glycol, 17 g, oral, Daily  potassium chloride, 20 mEq, intravenous, q2h     [2] D5 % and 0.9 % sodium chloride, 100 mL/hr, Last Rate: 100 mL/hr (06/08/25 0056)  D5 % and 0.9 % sodium chloride, 100 mL/hr     [3] PRN medications: acetaminophen **OR** acetaminophen **OR** acetaminophen, lidocaine, melatonin, morphine, morphine, ondansetron **OR** ondansetron, phenoL

## 2025-06-08 NOTE — CARE PLAN
The patient's goals for the shift include      The clinical goals for the shift include patient will have output in ostomy        Problem: Nutrition  Goal: Nutrient intake appropriate for maintaining nutritional needs  Outcome: Not Progressing

## 2025-06-09 LAB
ALBUMIN SERPL BCP-MCNC: 3.3 G/DL (ref 3.4–5)
ALP SERPL-CCNC: 125 U/L (ref 33–136)
ALT SERPL W P-5'-P-CCNC: 13 U/L (ref 10–52)
ANION GAP SERPL CALC-SCNC: 11 MMOL/L (ref 10–20)
AST SERPL W P-5'-P-CCNC: 15 U/L (ref 9–39)
BILIRUB SERPL-MCNC: 0.9 MG/DL (ref 0–1.2)
BUN SERPL-MCNC: 8 MG/DL (ref 6–23)
CALCIUM SERPL-MCNC: 8.3 MG/DL (ref 8.6–10.3)
CHLORIDE SERPL-SCNC: 102 MMOL/L (ref 98–107)
CO2 SERPL-SCNC: 27 MMOL/L (ref 21–32)
CREAT SERPL-MCNC: 0.71 MG/DL (ref 0.5–1.3)
EGFRCR SERPLBLD CKD-EPI 2021: >90 ML/MIN/1.73M*2
ERYTHROCYTE [DISTWIDTH] IN BLOOD BY AUTOMATED COUNT: 14 % (ref 11.5–14.5)
GLUCOSE SERPL-MCNC: 118 MG/DL (ref 74–99)
HCT VFR BLD AUTO: 33.9 % (ref 41–52)
HGB BLD-MCNC: 11.1 G/DL (ref 13.5–17.5)
MCH RBC QN AUTO: 30.4 PG (ref 26–34)
MCHC RBC AUTO-ENTMCNC: 32.7 G/DL (ref 32–36)
MCV RBC AUTO: 93 FL (ref 80–100)
NRBC BLD-RTO: 0 /100 WBCS (ref 0–0)
PLATELET # BLD AUTO: 280 X10*3/UL (ref 150–450)
POTASSIUM SERPL-SCNC: 3.6 MMOL/L (ref 3.5–5.3)
PROT SERPL-MCNC: 6.1 G/DL (ref 6.4–8.2)
RBC # BLD AUTO: 3.65 X10*6/UL (ref 4.5–5.9)
SODIUM SERPL-SCNC: 136 MMOL/L (ref 136–145)
WBC # BLD AUTO: 5.3 X10*3/UL (ref 4.4–11.3)

## 2025-06-09 PROCEDURE — 2500000004 HC RX 250 GENERAL PHARMACY W/ HCPCS (ALT 636 FOR OP/ED): Performed by: INTERNAL MEDICINE

## 2025-06-09 PROCEDURE — 2500000004 HC RX 250 GENERAL PHARMACY W/ HCPCS (ALT 636 FOR OP/ED): Performed by: FAMILY MEDICINE

## 2025-06-09 PROCEDURE — 85027 COMPLETE CBC AUTOMATED: CPT | Performed by: FAMILY MEDICINE

## 2025-06-09 PROCEDURE — 1100000001 HC PRIVATE ROOM DAILY

## 2025-06-09 PROCEDURE — 2500000004 HC RX 250 GENERAL PHARMACY W/ HCPCS (ALT 636 FOR OP/ED): Performed by: PHYSICIAN ASSISTANT

## 2025-06-09 PROCEDURE — 80053 COMPREHEN METABOLIC PANEL: CPT | Performed by: FAMILY MEDICINE

## 2025-06-09 PROCEDURE — 36415 COLL VENOUS BLD VENIPUNCTURE: CPT | Performed by: FAMILY MEDICINE

## 2025-06-09 PROCEDURE — 99232 SBSQ HOSP IP/OBS MODERATE 35: CPT | Performed by: FAMILY MEDICINE

## 2025-06-09 RX ORDER — CYCLOBENZAPRINE HCL 5 MG
5 TABLET ORAL 2 TIMES DAILY
Status: DISCONTINUED | OUTPATIENT
Start: 2025-06-10 | End: 2025-06-14

## 2025-06-09 RX ORDER — ACETAMINOPHEN 160 MG/5ML
650 SOLUTION ORAL EVERY 6 HOURS
Status: DISCONTINUED | OUTPATIENT
Start: 2025-06-09 | End: 2025-06-14

## 2025-06-09 RX ORDER — KETOROLAC TROMETHAMINE 15 MG/ML
15 INJECTION, SOLUTION INTRAMUSCULAR; INTRAVENOUS EVERY 6 HOURS
Status: DISPENSED | OUTPATIENT
Start: 2025-06-09 | End: 2025-06-11

## 2025-06-09 RX ORDER — DEXTROSE MONOHYDRATE, SODIUM CHLORIDE, AND POTASSIUM CHLORIDE 50; 1.49; 4.5 G/1000ML; G/1000ML; G/1000ML
20 INJECTION, SOLUTION INTRAVENOUS CONTINUOUS
Status: DISCONTINUED | OUTPATIENT
Start: 2025-06-09 | End: 2025-06-12

## 2025-06-09 RX ADMIN — METOPROLOL TARTRATE 5 MG: 5 INJECTION INTRAVENOUS at 00:07

## 2025-06-09 RX ADMIN — PANTOPRAZOLE SODIUM 40 MG: 40 INJECTION, POWDER, FOR SOLUTION INTRAVENOUS at 20:13

## 2025-06-09 RX ADMIN — DEXTROSE, SODIUM CHLORIDE, AND POTASSIUM CHLORIDE 100 ML/HR: 5; .45; .15 INJECTION INTRAVENOUS at 20:16

## 2025-06-09 RX ADMIN — METOPROLOL TARTRATE 5 MG: 5 INJECTION INTRAVENOUS at 06:03

## 2025-06-09 RX ADMIN — METOPROLOL TARTRATE 5 MG: 5 INJECTION INTRAVENOUS at 13:42

## 2025-06-09 RX ADMIN — METOPROLOL TARTRATE 5 MG: 5 INJECTION INTRAVENOUS at 18:11

## 2025-06-09 RX ADMIN — DEXTROSE AND SODIUM CHLORIDE 100 ML/HR: 5; .9 INJECTION, SOLUTION INTRAVENOUS at 04:06

## 2025-06-09 RX ADMIN — PANTOPRAZOLE SODIUM 40 MG: 40 INJECTION, POWDER, FOR SOLUTION INTRAVENOUS at 08:01

## 2025-06-09 RX ADMIN — KETOROLAC TROMETHAMINE 15 MG: 15 INJECTION, SOLUTION INTRAMUSCULAR; INTRAVENOUS at 20:13

## 2025-06-09 ASSESSMENT — COGNITIVE AND FUNCTIONAL STATUS - GENERAL
HELP NEEDED FOR BATHING: A LITTLE
MOVING FROM LYING ON BACK TO SITTING ON SIDE OF FLAT BED WITH BEDRAILS: A LITTLE
CLIMB 3 TO 5 STEPS WITH RAILING: A LITTLE
STANDING UP FROM CHAIR USING ARMS: A LITTLE
STANDING UP FROM CHAIR USING ARMS: A LITTLE
WALKING IN HOSPITAL ROOM: A LITTLE
DAILY ACTIVITIY SCORE: 20
DAILY ACTIVITIY SCORE: 20
TOILETING: A LITTLE
MOVING TO AND FROM BED TO CHAIR: A LITTLE
TURNING FROM BACK TO SIDE WHILE IN FLAT BAD: A LITTLE
MOVING TO AND FROM BED TO CHAIR: A LITTLE
MOBILITY SCORE: 18
DRESSING REGULAR LOWER BODY CLOTHING: A LITTLE
DRESSING REGULAR UPPER BODY CLOTHING: A LITTLE
DRESSING REGULAR UPPER BODY CLOTHING: A LITTLE
MOVING FROM LYING ON BACK TO SITTING ON SIDE OF FLAT BED WITH BEDRAILS: A LITTLE
TURNING FROM BACK TO SIDE WHILE IN FLAT BAD: A LITTLE
HELP NEEDED FOR BATHING: A LITTLE
TOILETING: A LITTLE
DRESSING REGULAR LOWER BODY CLOTHING: A LITTLE
MOBILITY SCORE: 18
WALKING IN HOSPITAL ROOM: A LITTLE
CLIMB 3 TO 5 STEPS WITH RAILING: A LITTLE

## 2025-06-09 ASSESSMENT — ACTIVITIES OF DAILY LIVING (ADL): LACK_OF_TRANSPORTATION: NO

## 2025-06-09 ASSESSMENT — PAIN SCALES - GENERAL
PAINLEVEL_OUTOF10: 0 - NO PAIN
PAINLEVEL_OUTOF10: 1

## 2025-06-09 NOTE — PROGRESS NOTES
Diego Morales is a 70 y.o. male on day 5 of admission presenting with Lower abdominal pain.      Subjective   No changes overnight.     Objective     Last Recorded Vitals  /86 (BP Location: Left arm, Patient Position: Lying)   Pulse 84   Temp 36.7 °C (98.1 °F) (Temporal)   Resp 16   Wt 64.1 kg (141 lb 5 oz)   SpO2 95%   Intake/Output last 3 Shifts:    Intake/Output Summary (Last 24 hours) at 6/9/2025 1816  Last data filed at 6/9/2025 1800  Gross per 24 hour   Intake 2375 ml   Output 2170 ml   Net 205 ml       Admission Weight  Weight: 65.8 kg (145 lb) (06/04/25 1735)    Daily Weight  06/04/25 : 64.1 kg (141 lb 5 oz)      Physical Exam  Constitutional: alert and oriented x 3, awake, cooperative, chronically ill-appearing  Skin: warm and dry, abdominal incision well approximated without signs of infection  ENMT: mucous membranes dry; NG tube in place with dark brown output  Cardio: Regular rate and rhythm, no murmur  Resp: CTA bilaterally, good respiratory effort  Gastrointestinal: Distended, diffuse tenderness improved, no bowel sounds; ostomy bag in place but no gas/output  Musculoskeletal: generalized wekness  Neuro: alert and oriented x 3  Psychological: Seems depressed about situation    Relevant Results  Scheduled medications  Scheduled Medications[1]  Continuous medications  Continuous Medications[2]  PRN medications  PRN Medications[3]    Results for orders placed or performed during the hospital encounter of 06/04/25 (from the past 24 hours)   CBC   Result Value Ref Range    WBC 5.3 4.4 - 11.3 x10*3/uL    nRBC 0.0 0.0 - 0.0 /100 WBCs    RBC 3.65 (L) 4.50 - 5.90 x10*6/uL    Hemoglobin 11.1 (L) 13.5 - 17.5 g/dL    Hematocrit 33.9 (L) 41.0 - 52.0 %    MCV 93 80 - 100 fL    MCH 30.4 26.0 - 34.0 pg    MCHC 32.7 32.0 - 36.0 g/dL    RDW 14.0 11.5 - 14.5 %    Platelets 280 150 - 450 x10*3/uL   Comprehensive Metabolic Panel   Result Value Ref Range    Glucose 118 (H) 74 - 99 mg/dL    Sodium 136 136 - 145  mmol/L    Potassium 3.6 3.5 - 5.3 mmol/L    Chloride 102 98 - 107 mmol/L    Bicarbonate 27 21 - 32 mmol/L    Anion Gap 11 10 - 20 mmol/L    Urea Nitrogen 8 6 - 23 mg/dL    Creatinine 0.71 0.50 - 1.30 mg/dL    eGFR >90 >60 mL/min/1.73m*2    Calcium 8.3 (L) 8.6 - 10.3 mg/dL    Albumin 3.3 (L) 3.4 - 5.0 g/dL    Alkaline Phosphatase 125 33 - 136 U/L    Total Protein 6.1 (L) 6.4 - 8.2 g/dL    AST 15 9 - 39 U/L    Bilirubin, Total 0.9 0.0 - 1.2 mg/dL    ALT 13 10 - 52 U/L     No results found.        Assessment & Plan    69yo CM with PMH of mitral valve prolapse, cardiac arrest during cath in 2016 due to vfib needing cardioversion, and recent diverticulitis with perforation s/p resection and colostomy bag on 5/15/25 that presented to the ED with abdominal pain and was admitted for SBO.     SBO, no improvements  - evidenced on admitting imaging  - general surgery following, rec maintain NG tube to LIWS  - continue zofran PRN for symptoms  - keep NPO  - continue D5NS with Kcl for now  - continue PPI IV BID   - per surgery, will attempt gastrografin enema through colostomy tomorrow to determine further surgical needs  - continue to monitor    Abdominal hematoma  - CT showed 7.3cm x 13cm x 8.6cm abnormality in mass vs fluid collection  - surgery feels this might be contributing to obstruction  - IR drain placed with dark hemorrhagic hematoma collected but minimal output  - cultures NGTD  - monitor output closely but limited    Recent diverticulitis with perforation  - s/p colectomy with colostomy on 5/15/25  - ostomy bag with minimal output in setting of above    Mitral valve prolapse, Hx of arrest  - converted metoprolol from PO to IV while NPO    DVT Proph: SCDs only    Dispo: Patient requires close inpatient monitoring in setting of SBO and hematoma requiring surgical evaluation, no plans for discharge at this time.    Lisa Ansari DO           [1] acetaminophen, 650 mg, nasogastric tube, q6h  aspirin, 81 mg, oral,  Daily  [Held by provider] cyclobenzaprine, 5 mg, oral, BID  ketorolac, 15 mg, intravenous, q6h  [Held by provider] metoprolol succinate XL, 25 mg, oral, Daily  metoprolol, 5 mg, intravenous, q6h  pantoprazole, 40 mg, intravenous, BID  [Held by provider] polyethylene glycol, 17 g, oral, Daily     [2] potassium iljqfuh-U2-4.45%NaCl, 100 mL/hr     [3] PRN medications: HYDROmorphone, HYDROmorphone, lidocaine, melatonin, ondansetron **OR** ondansetron, phenoL

## 2025-06-09 NOTE — PROGRESS NOTES
"General/Trauma Surgery Daily Progress Note    Subjective   Doing well. NG in place with bilious output. Percutaneous drain with dark, red liquified hematoma. Pain is present, diffuse but mild.         Objective   Last Recorded Vitals:  Blood pressure (!) 161/96, pulse 88, temperature 37 °C (98.6 °F), temperature source Temporal, resp. rate 17, height 1.803 m (5' 11\"), weight 64.1 kg (141 lb 5 oz), SpO2 96%.    Intake/Output last 3 Shifts:  I/O last 3 completed shifts:  In: 2280 (35.6 mL/kg) [P.O.:60; I.V.:1980 (30.9 mL/kg); NG/GT:40; IV Piggyback:200]  Out: 2915 (45.5 mL/kg) [Urine:1900 (0.8 mL/kg/hr); Emesis/NG output:950; Drains:65]  Weight: 64.1 kg     Pain Score:  0-10 (Numeric) Pain Score: 0 - No pain       Physical Exam:  Constitutional: No acute distress, sitting up in bed.  Neuro: Alert, oriented. Follows commands.   Eyes: EOMI. No scleral icterus. Conjunctiva pink.  ENT: NG bilious.   Heart: Regular rate.  Respiratory: No increased work of breathing or audible wheeze.  Abdomen: Soft, distended. Percutaneous drain with liquefied hematoma.  MSK: Moves all extremities.  Vascular: Palpable pulses throughout, no pitting edema.  Skin: No rashes.   Psychological: Appropriate mood and behavior.          Relevant Results  Laboratory Results:  CBC:   Lab Results   Component Value Date    WBC 5.3 06/09/2025    RBC 3.65 (L) 06/09/2025    HGB 11.1 (L) 06/09/2025    HCT 33.9 (L) 06/09/2025     06/09/2025       RFP:   Lab Results   Component Value Date     06/09/2025    K 3.6 06/09/2025     06/09/2025    CO2 27 06/09/2025    BUN 8 06/09/2025    CREATININE 0.71 06/09/2025    CALCIUM 8.3 (L) 06/09/2025        LFTs:   Lab Results   Component Value Date    PROT 6.1 (L) 06/09/2025    ALBUMIN 3.3 (L) 06/09/2025    BILITOT 0.9 06/09/2025    ALKPHOS 125 06/09/2025    AST 15 06/09/2025    ALT 13 06/09/2025         Imaging:  No results found.    Cardiology, Vascular, and Other Imaging  No other imaging results " found for the past 2 days           Assessment/Plan   This is a 70 y.o. male s/p Surinder for perforated diverticulitis 5/15/25.    Admitted 6/4/25 for possible ileus 2/2 intra-abdominal hematoma s/p NG decompression and percutaneous drain placement for hematoma    Doing well, however minimal function of colostomy since admission. NG continues to put out dark bilious fluid.      Plan:   -- IV fluid hydration 100ml/hr  -- Multimodal pain regimen - tylenol, toradol, prn dilaudid  -- NPO, sips with meds and ice chips ok  -- NG continuous wall suction. Flush 4 times daily. OK to clamp for PO meds and ambulation  -- Colostomy care  -- Plan for gastrografin enema through colostomy tomorrow            Seen and discussed with Dr. Canales who is in agreement with plan. Please secure chat with questions.    Cindy Baldwin PA-C

## 2025-06-09 NOTE — CARE PLAN
The patient's goals for the shift include      The clinical goals for the shift include pt will have increased output        Problem: Nutrition  Goal: Nutrient intake appropriate for maintaining nutritional needs  Outcome: Not Progressing

## 2025-06-09 NOTE — CARE PLAN
The patient's goals for the shift include  have less pain     The clinical goals for the shift include pt will have increased output

## 2025-06-09 NOTE — PROGRESS NOTES
06/09/25 1127   Discharge Planning   Living Arrangements Spouse/significant other   Support Systems Spouse/significant other   Assistance Needed Alert and oriented x 4, Independent with ADL's, Drives but not recently,  No DME, Room air at baseline. No BIPAP/CPAP. [facility]CP Dr. Ramon Gould   Type of Residence Private residence   Number of Stairs to Enter Residence 3   Number of Stairs Within Residence 20   Do you have animals or pets at home? Yes   Type of Animals or Pets 1 dog - Max   Who is requesting discharge planning? Provider   Home or Post Acute Services In home services   Type of Home Care Services Home nursing visits   Expected Discharge Disposition Home Health  (Upon discharge resume [facility] SN services.)   Does the patient need discharge transport arranged? No   Financial Resource Strain   How hard is it for you to pay for the very basics like food, housing, medical care, and heating? Not very   Housing Stability   In the last 12 months, was there a time when you were not able to pay the mortgage or rent on time? N   In the past 12 months, how many times have you moved where you were living? 0   At any time in the past 12 months, were you homeless or living in a shelter (including now)? N   Transportation Needs   In the past 12 months, has lack of transportation kept you from medical appointments or from getting medications? no   In the past 12 months, has lack of transportation kept you from meetings, work, or from getting things needed for daily living? No   Patient Choice   Provider Choice list and CMS website (https://medicare.gov/care-compare#search) for post-acute Quality and Resource Measure Data were provided and reviewed with: Patient;Family   Patient / Family choosing to utilize agency / facility established prior to hospitalization Yes   Stroke Family Assessment   Stroke Family Assessment Needed No   Intensity of Service   Intensity of Service 0-30 min

## 2025-06-10 ENCOUNTER — APPOINTMENT (OUTPATIENT)
Dept: RADIOLOGY | Facility: HOSPITAL | Age: 70
End: 2025-06-10
Payer: MEDICARE

## 2025-06-10 LAB
ALBUMIN SERPL BCP-MCNC: 3.1 G/DL (ref 3.4–5)
ANION GAP SERPL CALC-SCNC: 10 MMOL/L (ref 10–20)
BUN SERPL-MCNC: 9 MG/DL (ref 6–23)
CALCIUM SERPL-MCNC: 8.2 MG/DL (ref 8.6–10.3)
CHLORIDE SERPL-SCNC: 102 MMOL/L (ref 98–107)
CO2 SERPL-SCNC: 27 MMOL/L (ref 21–32)
CREAT SERPL-MCNC: 0.71 MG/DL (ref 0.5–1.3)
EGFRCR SERPLBLD CKD-EPI 2021: >90 ML/MIN/1.73M*2
ERYTHROCYTE [DISTWIDTH] IN BLOOD BY AUTOMATED COUNT: 14 % (ref 11.5–14.5)
GLUCOSE SERPL-MCNC: 102 MG/DL (ref 74–99)
HCT VFR BLD AUTO: 33.3 % (ref 41–52)
HGB BLD-MCNC: 11 G/DL (ref 13.5–17.5)
MAGNESIUM SERPL-MCNC: 1.79 MG/DL (ref 1.6–2.4)
MCH RBC QN AUTO: 30.5 PG (ref 26–34)
MCHC RBC AUTO-ENTMCNC: 33 G/DL (ref 32–36)
MCV RBC AUTO: 92 FL (ref 80–100)
NRBC BLD-RTO: 0 /100 WBCS (ref 0–0)
PHOSPHATE SERPL-MCNC: 3 MG/DL (ref 2.5–4.9)
PLATELET # BLD AUTO: 273 X10*3/UL (ref 150–450)
POTASSIUM SERPL-SCNC: 3.6 MMOL/L (ref 3.5–5.3)
RBC # BLD AUTO: 3.61 X10*6/UL (ref 4.5–5.9)
SODIUM SERPL-SCNC: 135 MMOL/L (ref 136–145)
WBC # BLD AUTO: 5 X10*3/UL (ref 4.4–11.3)

## 2025-06-10 PROCEDURE — 36569 INSJ PICC 5 YR+ W/O IMAGING: CPT

## 2025-06-10 PROCEDURE — 2500000004 HC RX 250 GENERAL PHARMACY W/ HCPCS (ALT 636 FOR OP/ED): Performed by: INTERNAL MEDICINE

## 2025-06-10 PROCEDURE — 85027 COMPLETE CBC AUTOMATED: CPT | Performed by: FAMILY MEDICINE

## 2025-06-10 PROCEDURE — 02HV33Z INSERTION OF INFUSION DEVICE INTO SUPERIOR VENA CAVA, PERCUTANEOUS APPROACH: ICD-10-PCS | Performed by: PHYSICIAN ASSISTANT

## 2025-06-10 PROCEDURE — 2550000001 HC RX 255 CONTRASTS: Performed by: STUDENT IN AN ORGANIZED HEALTH CARE EDUCATION/TRAINING PROGRAM

## 2025-06-10 PROCEDURE — 2500000005 HC RX 250 GENERAL PHARMACY W/O HCPCS: Performed by: PHYSICIAN ASSISTANT

## 2025-06-10 PROCEDURE — 2500000001 HC RX 250 WO HCPCS SELF ADMINISTERED DRUGS (ALT 637 FOR MEDICARE OP): Performed by: PHYSICIAN ASSISTANT

## 2025-06-10 PROCEDURE — 74270 X-RAY XM COLON 1CNTRST STD: CPT | Performed by: RADIOLOGY

## 2025-06-10 PROCEDURE — 99233 SBSQ HOSP IP/OBS HIGH 50: CPT | Performed by: STUDENT IN AN ORGANIZED HEALTH CARE EDUCATION/TRAINING PROGRAM

## 2025-06-10 PROCEDURE — 2500000004 HC RX 250 GENERAL PHARMACY W/ HCPCS (ALT 636 FOR OP/ED): Performed by: FAMILY MEDICINE

## 2025-06-10 PROCEDURE — 83735 ASSAY OF MAGNESIUM: CPT | Performed by: PHYSICIAN ASSISTANT

## 2025-06-10 PROCEDURE — 74270 X-RAY XM COLON 1CNTRST STD: CPT

## 2025-06-10 PROCEDURE — 80069 RENAL FUNCTION PANEL: CPT | Performed by: PHYSICIAN ASSISTANT

## 2025-06-10 PROCEDURE — 87081 CULTURE SCREEN ONLY: CPT | Mod: GEALAB | Performed by: PHYSICIAN ASSISTANT

## 2025-06-10 PROCEDURE — 1100000001 HC PRIVATE ROOM DAILY

## 2025-06-10 PROCEDURE — 36415 COLL VENOUS BLD VENIPUNCTURE: CPT | Performed by: PHYSICIAN ASSISTANT

## 2025-06-10 PROCEDURE — 36573 INSJ PICC RS&I 5 YR+: CPT

## 2025-06-10 PROCEDURE — 2500000004 HC RX 250 GENERAL PHARMACY W/ HCPCS (ALT 636 FOR OP/ED): Mod: JZ | Performed by: PHYSICIAN ASSISTANT

## 2025-06-10 RX ORDER — POTASSIUM CHLORIDE 14.9 MG/ML
20 INJECTION INTRAVENOUS
Status: COMPLETED | OUTPATIENT
Start: 2025-06-10 | End: 2025-06-10

## 2025-06-10 RX ORDER — DIATRIZOATE MEGLUMINE AND DIATRIZOATE SODIUM 660; 100 MG/ML; MG/ML
240 SOLUTION ORAL; RECTAL ONCE
Status: COMPLETED | OUTPATIENT
Start: 2025-06-10 | End: 2025-06-10

## 2025-06-10 RX ORDER — MAGNESIUM SULFATE HEPTAHYDRATE 40 MG/ML
2 INJECTION, SOLUTION INTRAVENOUS ONCE
Status: COMPLETED | OUTPATIENT
Start: 2025-06-10 | End: 2025-06-10

## 2025-06-10 RX ORDER — IPRATROPIUM BROMIDE AND ALBUTEROL SULFATE 2.5; .5 MG/3ML; MG/3ML
3 SOLUTION RESPIRATORY (INHALATION)
Status: DISCONTINUED | OUTPATIENT
Start: 2025-06-10 | End: 2025-06-10

## 2025-06-10 RX ORDER — LIDOCAINE HYDROCHLORIDE 10 MG/ML
5 INJECTION, SOLUTION EPIDURAL; INFILTRATION; INTRACAUDAL; PERINEURAL ONCE
Status: DISCONTINUED | OUTPATIENT
Start: 2025-06-10 | End: 2025-06-18

## 2025-06-10 RX ADMIN — MAGNESIUM SULFATE HEPTAHYDRATE 2 G: 2 INJECTION, SOLUTION INTRAVENOUS at 14:15

## 2025-06-10 RX ADMIN — METOPROLOL TARTRATE 5 MG: 5 INJECTION INTRAVENOUS at 00:18

## 2025-06-10 RX ADMIN — DEXTROSE, SODIUM CHLORIDE, AND POTASSIUM CHLORIDE 100 ML/HR: 5; .45; .15 INJECTION INTRAVENOUS at 21:03

## 2025-06-10 RX ADMIN — CYCLOBENZAPRINE HYDROCHLORIDE 5 MG: 5 TABLET, FILM COATED ORAL at 20:23

## 2025-06-10 RX ADMIN — METOPROLOL TARTRATE 5 MG: 5 INJECTION INTRAVENOUS at 18:44

## 2025-06-10 RX ADMIN — POTASSIUM CHLORIDE 20 MEQ: 14.9 INJECTION, SOLUTION INTRAVENOUS at 09:41

## 2025-06-10 RX ADMIN — CYCLOBENZAPRINE HYDROCHLORIDE 5 MG: 5 TABLET, FILM COATED ORAL at 11:14

## 2025-06-10 RX ADMIN — DEXTROSE, SODIUM CHLORIDE, AND POTASSIUM CHLORIDE 100 ML/HR: 5; .45; .15 INJECTION INTRAVENOUS at 03:39

## 2025-06-10 RX ADMIN — ASCORBIC ACID, VITAMIN A PALMITATE, CHOLECALCIFEROL, THIAMINE HYDROCHLORIDE, RIBOFLAVIN-5 PHOSPHATE SODIUM, PYRIDOXINE HYDROCHLORIDE, NIACINAMIDE, DEXPANTHENOL, ALPHA-TOCOPHEROL ACETATE, VITAMIN K1, FOLIC ACID, BIOTIN, CYANOCOBALAMIN: 200; 3300; 200; 6; 3.6; 6; 40; 15; 10; 150; 600; 60; 5 INJECTION, SOLUTION INTRAVENOUS at 20:59

## 2025-06-10 RX ADMIN — PANTOPRAZOLE SODIUM 40 MG: 40 INJECTION, POWDER, FOR SOLUTION INTRAVENOUS at 09:40

## 2025-06-10 RX ADMIN — POTASSIUM CHLORIDE 20 MEQ: 14.9 INJECTION, SOLUTION INTRAVENOUS at 14:15

## 2025-06-10 RX ADMIN — KETOROLAC TROMETHAMINE 15 MG: 15 INJECTION, SOLUTION INTRAMUSCULAR; INTRAVENOUS at 22:38

## 2025-06-10 RX ADMIN — KETOROLAC TROMETHAMINE 15 MG: 15 INJECTION, SOLUTION INTRAMUSCULAR; INTRAVENOUS at 03:02

## 2025-06-10 RX ADMIN — METOPROLOL TARTRATE 5 MG: 5 INJECTION INTRAVENOUS at 14:15

## 2025-06-10 RX ADMIN — DIATRIZOATE MEGLUMINE AND DIATRIZOATE SODIUM 240 ML: 660; 100 LIQUID ORAL; RECTAL at 09:31

## 2025-06-10 RX ADMIN — METOPROLOL TARTRATE 5 MG: 5 INJECTION INTRAVENOUS at 05:31

## 2025-06-10 RX ADMIN — ASPIRIN 81 MG: 81 TABLET, DELAYED RELEASE ORAL at 11:14

## 2025-06-10 RX ADMIN — KETOROLAC TROMETHAMINE 15 MG: 15 INJECTION, SOLUTION INTRAMUSCULAR; INTRAVENOUS at 09:40

## 2025-06-10 RX ADMIN — PANTOPRAZOLE SODIUM 40 MG: 40 INJECTION, POWDER, FOR SOLUTION INTRAVENOUS at 20:22

## 2025-06-10 RX ADMIN — KETOROLAC TROMETHAMINE 15 MG: 15 INJECTION, SOLUTION INTRAMUSCULAR; INTRAVENOUS at 16:29

## 2025-06-10 ASSESSMENT — COGNITIVE AND FUNCTIONAL STATUS - GENERAL
DRESSING REGULAR UPPER BODY CLOTHING: A LITTLE
WALKING IN HOSPITAL ROOM: A LITTLE
DRESSING REGULAR LOWER BODY CLOTHING: A LITTLE
DRESSING REGULAR UPPER BODY CLOTHING: A LITTLE
MOBILITY SCORE: 18
HELP NEEDED FOR BATHING: A LITTLE
MOVING TO AND FROM BED TO CHAIR: A LITTLE
DRESSING REGULAR LOWER BODY CLOTHING: A LITTLE
DAILY ACTIVITIY SCORE: 20
TURNING FROM BACK TO SIDE WHILE IN FLAT BAD: A LITTLE
TOILETING: A LITTLE
HELP NEEDED FOR BATHING: A LITTLE
TURNING FROM BACK TO SIDE WHILE IN FLAT BAD: A LITTLE
CLIMB 3 TO 5 STEPS WITH RAILING: A LITTLE
STANDING UP FROM CHAIR USING ARMS: A LITTLE
CLIMB 3 TO 5 STEPS WITH RAILING: A LITTLE
MOVING TO AND FROM BED TO CHAIR: A LITTLE
DAILY ACTIVITIY SCORE: 20
MOVING FROM LYING ON BACK TO SITTING ON SIDE OF FLAT BED WITH BEDRAILS: A LITTLE
TOILETING: A LITTLE
STANDING UP FROM CHAIR USING ARMS: A LITTLE
WALKING IN HOSPITAL ROOM: A LITTLE

## 2025-06-10 ASSESSMENT — PAIN SCALES - GENERAL
PAINLEVEL_OUTOF10: 1
PAINLEVEL_OUTOF10: 0 - NO PAIN

## 2025-06-10 NOTE — CARE PLAN
The patient's goals for the shift include  walk in hallway    The clinical goals for the shift include pt will have colostomy output

## 2025-06-10 NOTE — PROGRESS NOTES
Physical Therapy                   PT SCREEN    Patient Name: Diego Morales  MRN: [id number]  Department: [address]  Room: [address] [place], [place] [address]  Today's Date: 6/10/2025     Discipline: Physical Therapy    Missed Visit Reason: Cancel. Pt is a 71 yo M s/p Surinder for perforated diverticulitis on 5/15/25. Discussed with RN/PCNA, who report pt has been up ambulating the halls with FWW and his spouse multiple times per day. No mobility concerns present.     No skilled PT needs identified; will DC order.     Missed Time: Cancel    Comment: [phone]

## 2025-06-10 NOTE — PROGRESS NOTES
"Nutrition Follow Up Assessment:   Nutrition Assessment       Patient is a 70 y.o. male presenting with SBO 2/2 post-op hematoma. Pt s/p NGT in ED (6/4) for decompression.     IR consulted (6/5) for pelvic hematoma draining w/ drain placement.      NGT remains in place w/ bilious output. Gastrografin enema through colostomy today reveals contrast not passing into ileum.     Consult for PICC placement pending, plan to initiate TPN.     Nutrition History:  Food and Nutrient History: Pt remains NPO since admission x6 days ago. Plan for PICC placement w/ TPN. Recommendations provided below.  Food Allergy:  (None)     Anthropometrics:  Height: 180.3 cm (5' 11\")   Weight: 64.1 kg (141 lb 5 oz)   BMI (Calculated): 19.72  IBW/kg (Dietitian Calculated): 78.2 kg  Percent of IBW: 82 %    Weight History:   [06/04/25] 64.1 kg --> Admit wt    Weight Change %:  Weight History / % Weight Change: No updated weights in EMR since initial asseessment. Unable to assess for changes as a result.    Nutrition Focused Physical Exam Findings:  Defer: See RDN assessment (6/6/25)    Edema:  Edema: none    Physical Findings:  Skin: Positive (Abdominal surgical incision)  Digestive System Findings:  (c/f ileus)  Mouth Findings:  (None)    Nutrition Significant Labs:  BMP Trend:   Results from last 7 days   Lab Units 06/10/25  0558 06/09/25  0628 06/08/25  0556 06/07/25  0621   GLUCOSE mg/dL 102* 118* 114* 117*   CALCIUM mg/dL 8.2* 8.3* 8.2* 8.5*   SODIUM mmol/L 135* 136 137 136   POTASSIUM mmol/L 3.6 3.6 3.4* 4.1   CO2 mmol/L 27 27 27 26   CHLORIDE mmol/L 102 102 102 103   BUN mg/dL 9 8 8 8   CREATININE mg/dL 0.71 0.71 0.65 0.62      Nutrition Specific Medications:  Scheduled medications  Scheduled Medications[1]  Continuous medications  Continuous Medications[2]  PRN medications  PRN Medications[3]    I/O:   +Colostomy: 20mL out yesterday (6/9)    Dietary Orders (From admission, onward)       Start     Ordered    06/09/25 1509  NPO Diet Except: " Ice chips, Sips with meds; Effective now  Diet effective now        Question Answer Comment   Except: Ice chips    Except: Sips with meds        06/09/25 1508    06/04/25 2251  May Participate in Room Service  ( ROOM SERVICE MAY PARTICIPATE)  Once        Question:  .  Answer:  Yes    06/04/25 2250             Estimated Needs:   Total Energy Estimated Needs in 24 hours (kCal):  (1900+)  Method for Estimating Needs: ~30 kcals/kg x CBW (64.1 kg)  Total Protein Estimated Needs in 24 Hours (g):  (75-90)  Method for Estimating 24 Hour Protein Needs: 1.2-1.4 g/kg x CBW (64.1 kg)  Total Fluid Estimated Needs in 24 Hours (mL):  (1900+)  Method for Estimating 24 Hour Fluid Needs: 1mL/kcal or per team        Nutrition Diagnosis   Malnutrition Diagnosis  Patient has Malnutrition Diagnosis: Yes  Diagnosis Status: Active  Malnutrition Diagnosis: Severe malnutrition related to acute disease or injury  Related to: recent diverticular perforation s/p SBR with colostomy creation -- now c/b SBO secondary to post-op hematoma  As Evidenced by: meeting <75% EENs for the past x 1 month, significant 5% weight loss in <1 month, & moderate muscle wasting/fat loss      Nutrition Interventions/Recommendations      Nutrition Recommendations:  Continue NPO status as deemed medically necessary. Agree w/ plan for supplemental PN until diet able to be advanced.    Given prolonged NPO --> Start 100mg IV thiamine x 10 days      If PICC unable to be placed today, recommend PPN in the interim:  Prior to starting PPN, check RFP + Mg daily - replete electrolytes as needed.    Run PPN of Clinimix E 4.25% Amino Acids, 5% Dextrose @ 83mL/hr  Include MVI + trace elements  Provide lipids daily @ 20.8mL/hr x 12 hours overnight (250mL total)     PPN monitoring:  Daily weights  RFP + Mg daily; replete lytes PRN  LFTs + TGs weekly  Accuchecks q6h    Provides: 1178 kcals, 85g protein, 100g dextrose, 42% kcal from fat (meeting 62% kcal & 100% protein  needs)      Once PICC placed, recommend transition to TPN:  Start standard TPN 5% Amino Acids, 15% Dextrose @ 35mL/hr  After 24 hours, advance to goal rate of 70mL/hr  Do not advance if major shift in electrolytes or blood sugars occur  Include MVI + trace elements    Provide lipids daily @ 20.8mL/hr x 12hrs overnight (250mL total)    TPN monitoring:  Daily weights  RFP + Mg daily; replete lytes PRN  LFTs + TGs weekly  Accuchecks q6h    Provides: 1693 kcals, 84g protein, 252g dextrose, 30% kcal from fat (meeting 89% kcal & 100% protein needs)      Nutrition Interventions/Goals:   Parenteral Nutrition: Management of delivery rate of parenteral nutrition  Goal: Tolerate PN advancement to goal rate      Nutrition Monitoring and Evaluation   Food/Nutrient Related History Monitoring  Monitoring and Evaluation Plan: Enteral and parenteral nutrition intake determination  Enteral and Parenteral Nutrition Intake Determination: Parenteral nutrition intake - To meet > 75% estimated energy needs      Time Spent (min): 60 minutes            [1] acetaminophen, 650 mg, nasogastric tube, q6h  aspirin, 81 mg, oral, Daily  cyclobenzaprine, 5 mg, oral, BID  ketorolac, 15 mg, intravenous, q6h  lidocaine, 5 mL, infiltration, Once  magnesium sulfate, 2 g, intravenous, Once  [Held by provider] metoprolol succinate XL, 25 mg, oral, Daily  metoprolol, 5 mg, intravenous, q6h  pantoprazole, 40 mg, intravenous, BID  [Held by provider] polyethylene glycol, 17 g, oral, Daily  potassium chloride, 20 mEq, intravenous, q2h     [2] potassium hvyoxfo-K7-5.45%NaCl, 100 mL/hr, Last Rate: Stopped (06/10/25 0938)     [3] PRN medications: alteplase, HYDROmorphone, HYDROmorphone, lidocaine, melatonin, ondansetron **OR** ondansetron, phenoL

## 2025-06-10 NOTE — CARE PLAN
The patient's goals for the shift include      The clinical goals for the shift include pt will have colostomy output      Problem: Nutrition  Goal: Nutrient intake appropriate for maintaining nutritional needs  Outcome: Not Progressing

## 2025-06-10 NOTE — PROCEDURES
PICC Line Insertion Procedure Note    Procedure: Insertion of #5 fr PICC    Indications:  TPN    Procedure Details   Informed consent was obtained for the procedure, including sedation.  Risks of lung perforation, hemorrhage, and adverse drug reaction were discussed.     Maximum sterile technique was used including antiseptics, cap, gloves, gown, hand hygiene, mask, and sheet.    Sedation: No    #5fr PICC inserted to the L Basilic vein per hospital protocol.   Blood return:  yes    Findings:  Catheter inserted to 42 cm, with 1 cm. Exposed. Mid upper arm circumference is 25 cm.  There were no changes to vital signs. Catheter was flushed with 20 cc NS. Patient did tolerate procedure well.    Recommendations:  CXR ordered to verify placement.  PICC Brochure given to patient with teaching instruction.

## 2025-06-10 NOTE — PROGRESS NOTES
"General/Trauma Surgery Daily Progress Note    Subjective   Down to radiology this morning for gastrografin enema through colostomy. NG with bilious output, more clear/dark green this morning. Colostomy emptied this morning by nursing, liquid brown stool. Abdominal pain is minimal. Has been ambulatory around unit.       Objective   Last Recorded Vitals:  Blood pressure 139/85, pulse 79, temperature 36.5 °C (97.7 °F), temperature source Temporal, resp. rate 18, height 1.803 m (5' 11\"), weight 64.1 kg (141 lb 5 oz), SpO2 98%.    Intake/Output last 3 Shifts:  I/O last 3 completed shifts:  In: 2410 (37.6 mL/kg) [I.V.:1661.7 (25.9 mL/kg); NG/GT:60; IV Piggyback:688.3]  Out: 2740 (42.7 mL/kg) [Urine:1600 (0.7 mL/kg/hr); Emesis/NG output:1000; Drains:120; Stool:20]  Weight: 64.1 kg     Pain Score:  0-10 (Numeric) Pain Score: 1     Physical Exam:  Constitutional: No acute distress, sitting up in bed.  Neuro: Alert, oriented. Follows commands.   Eyes: EOMI. No scleral icterus. Conjunctiva pink.  ENT: NG dark green, clear bile.  Heart: Regular rate.  Respiratory: No increased work of breathing or audible wheeze.  Abdomen: Soft, Mildly distended. Appropriately tender. RLQ drain with liquefied hematoma.  MSK: Moves all extremities.  Vascular: Palpable pulses throughout, no pitting edema.  Skin: No rashes.   Psychological: Appropriate mood and behavior.            Relevant Results  Laboratory Results:  CBC:   Lab Results   Component Value Date    WBC 5.0 06/10/2025    RBC 3.61 (L) 06/10/2025    HGB 11.0 (L) 06/10/2025    HCT 33.3 (L) 06/10/2025     06/10/2025       RFP:   Lab Results   Component Value Date     (L) 06/10/2025    K 3.6 06/10/2025     06/10/2025    CO2 27 06/10/2025    BUN 9 06/10/2025    CREATININE 0.71 06/10/2025    CALCIUM 8.2 (L) 06/10/2025    MG 1.79 06/10/2025    PHOS 3.0 06/10/2025        LFTs:   Lab Results   Component Value Date    PROT 6.1 (L) 06/09/2025    ALBUMIN 3.1 (L) 06/10/2025    " BILITOT 0.9 06/09/2025    ALKPHOS 125 06/09/2025    AST 15 06/09/2025    ALT 13 06/09/2025         Imaging:  Imaging  FL enema single contrast water soluble  Result Date: 6/10/2025  Unremarkable appearance of the colon.   Dilated small bowel.   MACRO: None   Signed by: Beryl Brito 6/10/2025 9:45 AM Dictation workstation:   [id number]      Cardiology, Vascular, and Other Imaging  No other imaging results found for the past 2 days           Assessment/Plan   This is a 70 y.o. male s/p Surinder for perforated diverticulitis 5/15/25.     Admitted 6/4/25 for possible ileus 2/2 intra-abdominal hematoma s/p NG decompression and percutaneous drain placement for hematoma     Gastrografin enema through colostomy today reveals contrast not passing into ileum. Will plan for PICC placement and initiation of TPN.         Plan:   -- PICC ordered, plan to start TPN   -- IV fluid hydration 100ml/hr  -- Multimodal pain regimen - tylenol, toradol, prn dilaudid  -- NPO, sips with meds and ice chips ok  -- NG continuous wall suction. Flush 4 times daily. OK to clamp for PO meds and ambulation  -- Colostomy care          Seen and discussed with Dr. Canales who is in agreement with plan. Please secure chat with questions.     Cindy Baldwin PA-C

## 2025-06-10 NOTE — PROGRESS NOTES
"    [facility]  Department of Hospital Medicine    PROGRESS NOTE    Diego Morales is a 70 y.o. male on day 6 of admission presenting with Lower abdominal pain.    Subjective   Minimal abdominal pain.  No nausea.  Mood is better today.       Objective     Physical Exam:  Con: awake, alert; no distress;   Eyes: conjunctiva wnl; EOMI;   ENMT: hearing intact; MMM; NG tube  MSK: ROM wnl; digits wnl;   Resp: normal work of breathing; no cyanosis;   Neuro: moving all extremities; no abnormal movements  Psych: oriented to situation; affect wnl;     Last Recorded Vitals:  /85   Pulse 79   Temp 36.5 °C (97.7 °F) (Temporal)   Resp 18   Ht 1.803 m (5' 11\")   Wt 64.1 kg (141 lb 5 oz)   SpO2 98%   BMI 19.71 kg/m²      Scheduled medications:  Scheduled Medications[1]  Continuous medications:  Continuous Medications[2]  PRN medications:  PRN Medications[3]     Relevant Results:  Lab Results   Component Value Date    WBC 5.0 06/10/2025    HGB 11.0 (L) 06/10/2025    HCT 33.3 (L) 06/10/2025    MCV 92 06/10/2025     06/10/2025      Lab Results   Component Value Date    GLUCOSE 102 (H) 06/10/2025    CALCIUM 8.2 (L) 06/10/2025     (L) 06/10/2025    K 3.6 06/10/2025    CO2 27 06/10/2025     06/10/2025    BUN 9 06/10/2025    CREATININE 0.71 06/10/2025               Malnutrition Diagnosis Status: Active  Malnutrition Diagnosis: Severe malnutrition related to acute disease or injury  Related to: recent diverticular perforation s/p SBR with colostomy creation -- now c/b SBO secondary to post-op hematoma  As Evidenced by: meeting <75% EENs for the past x 1 month, significant 5% weight loss in <1 month, & moderate muscle wasting/fat loss  I agree with the dietitian's malnutrition diagnosis.          Assessment/Plan   Assessment & Plan    69yo CM with PMH of mitral valve prolapse, cardiac arrest during cath in 2016 due to vfib needing cardioversion, and recent diverticulitis " with perforation s/p resection and colostomy bag on 5/15/25 that presented to the ED with abdominal pain and was admitted for SBO.      SBO, no improvements  - evidenced on admitting imaging  - general surgery following, rec maintain NG tube to LIWS; Gastrografin study shows persistent small bowel dilation  - continue zofran PRN for symptoms  - keep NPO  - continue D5NS with Kcl for now  - continue PPI IV BID   - PICC ordered to start TPN given prolonged n.p.o. status  - continue to monitor     Abdominal hematoma  - CT showed 7.3cm x 13cm x 8.6cm abnormality in mass vs fluid collection  - surgery feels this might be contributing to obstruction  - IR drain placed with dark hemorrhagic hematoma collected but minimal output  - cultures NGTD  - monitor output closely but limited     Recent diverticulitis with perforation  - s/p colectomy with colostomy on 5/15/25  - ostomy bag with minimal output in setting of above     Mitral valve prolapse, Hx of arrest  - converted metoprolol from PO to IV while NPO     DVT Proph: SCDs only     Dispo: Anticipate continued prolonged inpatient admission due to persistent SBO.  On my review of CT images and documents from this and prior admission, had significant inflammation with feculent peritonitis and reactive ileus on initial presentation, surgery was technically complicated and he developed an intra-abdominal hematoma with some compression of the bowel, so a longer recovery is not entirely unexpected.           Leonard Mcconnell MD    Patient Name:  Diego Morales   MRN:   [id number]   Room/Bed:  132/132-A        [1] acetaminophen, 650 mg, nasogastric tube, q6h  aspirin, 81 mg, oral, Daily  cyclobenzaprine, 5 mg, oral, BID  ketorolac, 15 mg, intravenous, q6h  lidocaine, 5 mL, infiltration, Once  magnesium sulfate, 2 g, intravenous, Once  [Held by provider] metoprolol succinate XL, 25 mg, oral, Daily  metoprolol, 5 mg, intravenous, q6h  pantoprazole, 40 mg, intravenous, BID  [Held by  provider] polyethylene glycol, 17 g, oral, Daily    [2] Adult Clinimix Parenteral Nutrition Continuous, 83 mL/hr  potassium kawxome-H3-0.45%NaCl, 100 mL/hr, Last Rate: Stopped (06/10/25 0938)    [3] PRN medications: alteplase, HYDROmorphone, HYDROmorphone, lidocaine, melatonin, ondansetron **OR** ondansetron, phenoL

## 2025-06-10 NOTE — DOCUMENTATION CLARIFICATION NOTE
"    PATIENT:               LEO ANDRE  ACCT #:                  [id number]  MRN:                       [id number]  :                       1955  ADMIT DATE:       2025 5:35 PM  DISCH DATE:  RESPONDING PROVIDER #:        [id number]          PROVIDER RESPONSE TEXT:    Agree with dietician diagnosis of Severe Malnutrition on 25.    CDI QUERY TEXT:    Clarification        Instruction:    Based on your assessment of the patient and the clinical information, please provide the requested documentation by clicking on the appropriate radio button and enter any additional information if prompted.    Question: Is there a diagnosis for this patient's nutritional status    When answering this query, please exercise your independent professional judgment. The fact that a question is being asked, does not imply that any particular answer is desired or expected.    The patient's clinical indicators include:  Clinical Information: 70 yr old man with abdominal pain. Diagnoses include SBO and pelvic hematoma. Recent history of ruptured diverticulitis with ostomy.    Clinical Indicators:   Nutrition Consult: SANTOSH Dye  \"Malnutrition Diagnosis: Severe malnutrition related to acute disease or injury...Related to: recent diverticular perforation s/p SBR with colostomy creation -- now c/b SBO secondary to post-op hematoma...As Evidenced by: meeting less than 75(percent) EENs for the past x 1 month, significant 5(percent) weight loss in less than1 month, & moderate muscle wasting/fat loss\"    Ht: 180.3 cm (5ft 11in)     Wt: 64.1 kg (141 lb)     BMI(calculated): 19.72     IBW/kg (Dietitian Calculated): 78.2 kg     Percent of IBW: 82(percent)    Nutrition Recommendations:  -\"Continue NPO & advance diet as deemed medically feasible...Start Ensure Clear w/ meals once diet advanced to CLD\"  -\"If no ROBF in the next 24-48hrs, consider initiation of PPN: Clinimix E 4.25(percent) Amino Acids, 5(percent) Dextrose 40mL/hr...goal rate " "of 83mL/hr...Include MVI + trace elements...lipids 20.8mL/hr x 12 hours overnight (250mL total)...\"    Treatment: Per recommendations when medically feasible    Risk Factors: age, recent surgery(5/15), SBO  Options provided:  -- Agree with dietician diagnosis of Severe Malnutrition on 6/6/25.  -- Other - I will add my own diagnosis  -- Refer to Clinical Documentation Reviewer    Query created by: Sheela Qiu on 6/9/2025 3:02 PM      Electronically signed by:  RUSH DAVIS PA-C 6/10/2025 1:55 PM          "

## 2025-06-11 LAB
ACID FAST STN SPEC: NORMAL
ALBUMIN SERPL BCP-MCNC: 3 G/DL (ref 3.4–5)
ANION GAP SERPL CALC-SCNC: 9 MMOL/L (ref 10–20)
BUN SERPL-MCNC: 13 MG/DL (ref 6–23)
CALCIUM SERPL-MCNC: 7.8 MG/DL (ref 8.6–10.3)
CHLORIDE SERPL-SCNC: 101 MMOL/L (ref 98–107)
CO2 SERPL-SCNC: 25 MMOL/L (ref 21–32)
CREAT SERPL-MCNC: 0.61 MG/DL (ref 0.5–1.3)
EGFRCR SERPLBLD CKD-EPI 2021: >90 ML/MIN/1.73M*2
ERYTHROCYTE [DISTWIDTH] IN BLOOD BY AUTOMATED COUNT: 13.9 % (ref 11.5–14.5)
GLUCOSE SERPL-MCNC: 122 MG/DL (ref 74–99)
HCT VFR BLD AUTO: 32.6 % (ref 41–52)
HGB BLD-MCNC: 11 G/DL (ref 13.5–17.5)
MAGNESIUM SERPL-MCNC: 2.01 MG/DL (ref 1.6–2.4)
MCH RBC QN AUTO: 30.6 PG (ref 26–34)
MCHC RBC AUTO-ENTMCNC: 33.7 G/DL (ref 32–36)
MCV RBC AUTO: 91 FL (ref 80–100)
MYCOBACTERIUM SPEC CULT: NORMAL
NRBC BLD-RTO: 0 /100 WBCS (ref 0–0)
PHOSPHATE SERPL-MCNC: 3.3 MG/DL (ref 2.5–4.9)
PLATELET # BLD AUTO: 253 X10*3/UL (ref 150–450)
POTASSIUM SERPL-SCNC: 4 MMOL/L (ref 3.5–5.3)
RBC # BLD AUTO: 3.6 X10*6/UL (ref 4.5–5.9)
SODIUM SERPL-SCNC: 131 MMOL/L (ref 136–145)
WBC # BLD AUTO: 4.9 X10*3/UL (ref 4.4–11.3)

## 2025-06-11 PROCEDURE — 2500000004 HC RX 250 GENERAL PHARMACY W/ HCPCS (ALT 636 FOR OP/ED): Performed by: INTERNAL MEDICINE

## 2025-06-11 PROCEDURE — 80069 RENAL FUNCTION PANEL: CPT | Performed by: STUDENT IN AN ORGANIZED HEALTH CARE EDUCATION/TRAINING PROGRAM

## 2025-06-11 PROCEDURE — 2500000004 HC RX 250 GENERAL PHARMACY W/ HCPCS (ALT 636 FOR OP/ED): Performed by: PHYSICIAN ASSISTANT

## 2025-06-11 PROCEDURE — 2500000004 HC RX 250 GENERAL PHARMACY W/ HCPCS (ALT 636 FOR OP/ED): Performed by: FAMILY MEDICINE

## 2025-06-11 PROCEDURE — 1100000001 HC PRIVATE ROOM DAILY

## 2025-06-11 PROCEDURE — 99233 SBSQ HOSP IP/OBS HIGH 50: CPT | Performed by: STUDENT IN AN ORGANIZED HEALTH CARE EDUCATION/TRAINING PROGRAM

## 2025-06-11 PROCEDURE — 2500000005 HC RX 250 GENERAL PHARMACY W/O HCPCS: Performed by: PHYSICIAN ASSISTANT

## 2025-06-11 PROCEDURE — 2500000001 HC RX 250 WO HCPCS SELF ADMINISTERED DRUGS (ALT 637 FOR MEDICARE OP): Performed by: STUDENT IN AN ORGANIZED HEALTH CARE EDUCATION/TRAINING PROGRAM

## 2025-06-11 PROCEDURE — 83735 ASSAY OF MAGNESIUM: CPT | Performed by: STUDENT IN AN ORGANIZED HEALTH CARE EDUCATION/TRAINING PROGRAM

## 2025-06-11 PROCEDURE — 85027 COMPLETE CBC AUTOMATED: CPT | Performed by: STUDENT IN AN ORGANIZED HEALTH CARE EDUCATION/TRAINING PROGRAM

## 2025-06-11 PROCEDURE — 36415 COLL VENOUS BLD VENIPUNCTURE: CPT | Performed by: STUDENT IN AN ORGANIZED HEALTH CARE EDUCATION/TRAINING PROGRAM

## 2025-06-11 PROCEDURE — 2500000001 HC RX 250 WO HCPCS SELF ADMINISTERED DRUGS (ALT 637 FOR MEDICARE OP): Performed by: PHYSICIAN ASSISTANT

## 2025-06-11 RX ORDER — METOPROLOL SUCCINATE 25 MG/1
25 TABLET, EXTENDED RELEASE ORAL DAILY
Status: DISCONTINUED | OUTPATIENT
Start: 2025-06-11 | End: 2025-07-02 | Stop reason: HOSPADM

## 2025-06-11 RX ORDER — SODIUM CHLORIDE 0.9 % (FLUSH) 0.9 %
10 SYRINGE (ML) INJECTION AS NEEDED
Status: DISCONTINUED | OUTPATIENT
Start: 2025-06-11 | End: 2025-07-02 | Stop reason: HOSPADM

## 2025-06-11 RX ORDER — PANTOPRAZOLE SODIUM 40 MG/1
40 TABLET, DELAYED RELEASE ORAL
Status: DISCONTINUED | OUTPATIENT
Start: 2025-06-11 | End: 2025-06-20

## 2025-06-11 RX ORDER — SODIUM CHLORIDE 0.9 % (FLUSH) 0.9 %
10 SYRINGE (ML) INJECTION EVERY 12 HOURS SCHEDULED
Status: DISCONTINUED | OUTPATIENT
Start: 2025-06-11 | End: 2025-07-02 | Stop reason: HOSPADM

## 2025-06-11 RX ADMIN — METOPROLOL TARTRATE 5 MG: 5 INJECTION INTRAVENOUS at 14:42

## 2025-06-11 RX ADMIN — METOPROLOL TARTRATE 5 MG: 5 INJECTION INTRAVENOUS at 05:57

## 2025-06-11 RX ADMIN — DEXTROSE, SODIUM CHLORIDE, AND POTASSIUM CHLORIDE 100 ML/HR: 5; .45; .15 INJECTION INTRAVENOUS at 06:41

## 2025-06-11 RX ADMIN — ASPIRIN 81 MG: 81 TABLET, DELAYED RELEASE ORAL at 09:36

## 2025-06-11 RX ADMIN — KETOROLAC TROMETHAMINE 15 MG: 15 INJECTION, SOLUTION INTRAMUSCULAR; INTRAVENOUS at 09:35

## 2025-06-11 RX ADMIN — CYCLOBENZAPRINE HYDROCHLORIDE 5 MG: 5 TABLET, FILM COATED ORAL at 09:36

## 2025-06-11 RX ADMIN — I.V. FAT EMULSION 50 G: 20 EMULSION INTRAVENOUS at 20:00

## 2025-06-11 RX ADMIN — METOPROLOL TARTRATE 5 MG: 5 INJECTION INTRAVENOUS at 00:05

## 2025-06-11 RX ADMIN — PANTOPRAZOLE SODIUM 40 MG: 40 INJECTION, POWDER, FOR SOLUTION INTRAVENOUS at 09:31

## 2025-06-11 RX ADMIN — Medication 10 ML: at 21:00

## 2025-06-11 RX ADMIN — Medication 10 ML: at 09:36

## 2025-06-11 RX ADMIN — KETOROLAC TROMETHAMINE 15 MG: 15 INJECTION, SOLUTION INTRAMUSCULAR; INTRAVENOUS at 04:18

## 2025-06-11 RX ADMIN — CYCLOBENZAPRINE HYDROCHLORIDE 5 MG: 5 TABLET, FILM COATED ORAL at 21:02

## 2025-06-11 RX ADMIN — PANTOPRAZOLE SODIUM 40 MG: 40 TABLET, DELAYED RELEASE ORAL at 17:55

## 2025-06-11 RX ADMIN — ASCORBIC ACID, VITAMIN A PALMITATE, CHOLECALCIFEROL, THIAMINE HYDROCHLORIDE, RIBOFLAVIN-5 PHOSPHATE SODIUM, PYRIDOXINE HYDROCHLORIDE, NIACINAMIDE, DEXPANTHENOL, ALPHA-TOCOPHEROL ACETATE, VITAMIN K1, FOLIC ACID, BIOTIN, CYANOCOBALAMIN: 200; 3300; 200; 6; 3.6; 6; 40; 15; 10; 150; 600; 60; 5 INJECTION, SOLUTION INTRAVENOUS at 19:59

## 2025-06-11 RX ADMIN — METOPROLOL SUCCINATE 25 MG: 25 TABLET, EXTENDED RELEASE ORAL at 21:02

## 2025-06-11 RX ADMIN — Medication 10 ML: at 02:30

## 2025-06-11 ASSESSMENT — COGNITIVE AND FUNCTIONAL STATUS - GENERAL
TURNING FROM BACK TO SIDE WHILE IN FLAT BAD: A LITTLE
TOILETING: A LITTLE
MOVING FROM LYING ON BACK TO SITTING ON SIDE OF FLAT BED WITH BEDRAILS: A LITTLE
DAILY ACTIVITIY SCORE: 20
TOILETING: A LITTLE
HELP NEEDED FOR BATHING: A LITTLE
STANDING UP FROM CHAIR USING ARMS: A LITTLE
CLIMB 3 TO 5 STEPS WITH RAILING: A LITTLE
DAILY ACTIVITIY SCORE: 20
WALKING IN HOSPITAL ROOM: A LITTLE
DRESSING REGULAR LOWER BODY CLOTHING: A LITTLE
MOVING TO AND FROM BED TO CHAIR: A LITTLE
TURNING FROM BACK TO SIDE WHILE IN FLAT BAD: A LITTLE
DRESSING REGULAR UPPER BODY CLOTHING: A LITTLE
STANDING UP FROM CHAIR USING ARMS: A LITTLE
MOVING TO AND FROM BED TO CHAIR: A LITTLE
DRESSING REGULAR LOWER BODY CLOTHING: A LITTLE
CLIMB 3 TO 5 STEPS WITH RAILING: A LITTLE
MOVING FROM LYING ON BACK TO SITTING ON SIDE OF FLAT BED WITH BEDRAILS: A LITTLE
MOBILITY SCORE: 18
HELP NEEDED FOR BATHING: A LITTLE
WALKING IN HOSPITAL ROOM: A LITTLE
MOBILITY SCORE: 18
DRESSING REGULAR UPPER BODY CLOTHING: A LITTLE

## 2025-06-11 ASSESSMENT — PAIN SCALES - GENERAL
PAINLEVEL_OUTOF10: 0 - NO PAIN
PAINLEVEL_OUTOF10: 0 - NO PAIN

## 2025-06-11 ASSESSMENT — PAIN - FUNCTIONAL ASSESSMENT: PAIN_FUNCTIONAL_ASSESSMENT: 0-10

## 2025-06-11 NOTE — CARE PLAN
The patient's goals for the shift include      The clinical goals for the shift include patient will have more output from ostomy     Over the shift, the patient did not make progress toward the following goals. Barriers to progression include S/P SBO.

## 2025-06-11 NOTE — PROGRESS NOTES
"General/Trauma Surgery Daily Progress Note    Subjective   Doing well, feels in better spirits today. Ambulated in halls early this morning. Reports large amount of stool following gastrografin enema yesterday. Small amount of stool in ostomy bag this morning. NG still bilious, lower output. Denies increased pain or nausea.       Objective   Last Recorded Vitals:  Blood pressure 144/80, pulse 80, temperature 36.7 °C (98.1 °F), temperature source Temporal, resp. rate 16, height 1.803 m (5' 11\"), weight 64.1 kg (141 lb 5 oz), SpO2 96%.    Intake/Output last 3 Shifts:  I/O last 3 completed shifts:  In: 2524 (39.4 mL/kg) [P.O.:20; I.V.:50 (0.8 mL/kg); NG/GT:20; IV Piggyback:2241.7]  Out: 3765 (58.7 mL/kg) [Urine:2250 (1 mL/kg/hr); Emesis/NG output:1225; Drains:110; Stool:180]  Weight: 64.1 kg     Pain Score:  0-10 (Numeric) Pain Score: 1     Physical Exam:  Constitutional: No acute distress, sitting up in bed.  Neuro: Alert, oriented. Follows commands.   Eyes: EOMI. No scleral icterus. Conjunctiva pink.  ENT: MMM. NG bilious, clear, dark green.  Heart: Regular rate.  Respiratory: No increased work of breathing or audible wheeze.  Abdomen: Soft, mildly distended. Colostomy with liquid brown stool.   MSK: Moves all extremities.  Vascular: Palpable pulses throughout, no pitting edema.  Skin: No rashes.   Psychological: Appropriate mood and behavior.            Relevant Results  Laboratory Results:  CBC:   Lab Results   Component Value Date    WBC 4.9 06/11/2025    RBC 3.60 (L) 06/11/2025    HGB 11.0 (L) 06/11/2025    HCT 32.6 (L) 06/11/2025     06/11/2025       RFP:   Lab Results   Component Value Date     (L) 06/11/2025    K 4.0 06/11/2025     06/11/2025    CO2 25 06/11/2025    BUN 13 06/11/2025    CREATININE 0.61 06/11/2025    CALCIUM 7.8 (L) 06/11/2025    MG 2.01 06/11/2025    PHOS 3.3 06/11/2025        LFTs:   Lab Results   Component Value Date    PROT 6.1 (L) 06/09/2025    ALBUMIN 3.0 (L) 06/11/2025 "    BILITOT 0.9 06/09/2025    ALKPHOS 125 06/09/2025    AST 15 06/09/2025    ALT 13 06/09/2025         Imaging:  FL enema single contrast water soluble  Result Date: 6/10/2025  Unremarkable appearance of the colon.   Dilated small bowel.   MACRO: None   Signed by: Beryl Brito 6/10/2025 9:45 AM Dictation workstation:   [id number]          Assessment/Plan   This is a 70 y.o. male s/p Surinder for perforated diverticulitis 5/15/25.     Admitted 6/4/25 for possible ileus 2/2 intra-abdominal hematoma s/p NG decompression and percutaneous drain placement for hematoma     Gastrografin enema through colostomy 6/11 with continued dilated small bowel.     Concern for adhesive partial obstruction. Typically, we would offer laparoscopy for adhesiolysis, however patient is currently 4 weeks out from initial surgery. Discussed with patient and wife that he is at high risk for complication if needing surgical intervention within the 2-6 week post operative window and we therefore would plan to wait for surgical intervention until the 6 week refugio. Plan for now is continued NG decompression and TPN initiation. There is still potential he can still resolve this obstruction, however he may require 2 weeks of TPN.         Plan:   -- PICC ordered, start TPN   -- IVF to supplement 20 ml/hr  -- Multimodal pain regimen - tylenol, toradol, prn dilaudid  -- NPO, sips with meds and ice chips ok  -- NG continuous wall suction. Flush 4 times daily. OK to clamp for PO meds and ambulation  -- Colostomy care  -- Record NG and colostomy output            Seen and discussed with Dr. Canales who is in agreement with plan. Please secure chat with questions.     Cindy Baldwin PA-C

## 2025-06-11 NOTE — PROGRESS NOTES
06/11/25 0927   Discharge Planning   Living Arrangements Spouse/significant other   Support Systems Spouse/significant other   Assistance Needed A&Ox4, independent with ADLs, no DME, room air at baseline-[facility] Dr[facility] Ramon Gould   Type of Residence Private residence   Number of Stairs to Enter Residence 3   Number of Stairs Within Residence 20   Do you have animals or pets at home? Yes   Type of Animals or Pets 1 dog - [place]   Home or Post Acute Services In home services   Type of Home Care Services Home nursing visits   Expected Discharge Disposition Home Health  (resumed [facility](SN), will need internal referral placed. Pt now with PICC and TPN - per team, not expected to dc with PICC and TPN)   Does the patient need discharge transport arranged? Yes   RoundTrip coordination needed? Yes   Has discharge transport been arranged? No

## 2025-06-11 NOTE — CARE PLAN
The patient's goals for the shift include  feel better    The clinical goals for the shift include pt will have less pain

## 2025-06-11 NOTE — PROGRESS NOTES
"      Department of Hospital Medicine    PROGRESS NOTE    Diego Morales is a 70 y.o. male on day 7 of admission presenting with Small bowel obstruction (Multi).    Subjective   Minimal further colostomy output after Gastrografin cleared.  Continued bilious NG output.  Abdominal soreness unchanged.       Objective     Physical Exam:  Con: awake, alert; no distress;   Eyes: conjunctiva wnl; EOMI;   ENMT: hearing intact; MMM; NG tube  MSK: ROM wnl; digits wnl;   Resp: normal work of breathing; no cyanosis;   Neuro: moving all extremities; no abnormal movements  Psych: oriented to situation; affect wnl;     Last Recorded Vitals:  /80   Pulse 86   Temp 36.7 °C (98.1 °F) (Temporal)   Resp 16   Ht 1.803 m (5' 11\")   Wt 64.1 kg (141 lb 5 oz)   SpO2 96%   BMI 19.71 kg/m²      Scheduled medications:  Scheduled Medications[1]  Continuous medications:  Continuous Medications[2]  PRN medications:  PRN Medications[3]     Relevant Results:  Lab Results   Component Value Date    WBC 4.9 06/11/2025    HGB 11.0 (L) 06/11/2025    HCT 32.6 (L) 06/11/2025    MCV 91 06/11/2025     06/11/2025      Lab Results   Component Value Date    GLUCOSE 122 (H) 06/11/2025    CALCIUM 7.8 (L) 06/11/2025     (L) 06/11/2025    K 4.0 06/11/2025    CO2 25 06/11/2025     06/11/2025    BUN 13 06/11/2025    CREATININE 0.61 06/11/2025                Malnutrition Diagnosis Status: Active  Malnutrition Diagnosis: Severe malnutrition related to acute disease or injury  Related to: recent diverticular perforation s/p SBR with colostomy creation -- now c/b SBO secondary to post-op hematoma  As Evidenced by: meeting <75% EENs for the past x 1 month, significant 5% weight loss in <1 month, & moderate muscle wasting/fat loss  I agree with the dietitian's malnutrition diagnosis.          Assessment/Plan   Assessment & Plan    71yo CM with PMH of mitral valve prolapse, cardiac arrest " during cath in 2016 due to vfib needing cardioversion, and recent diverticulitis with perforation s/p resection and colostomy bag on 5/15/25 that presented to the ED with abdominal pain and was admitted for SBO.      SBO, no improvements  - evidenced on admitting imaging  - general surgery following, rec maintain NG tube to LIWS; Gastrografin study shows persistent small bowel dilation  - continue zofran PRN for symptoms  - keep NPO  - continue PPI IV BID   - PICC placed; started on TPN given prolonged n.p.o. status  - Per surgery, may have SBO due to adhesion; may need lysis of adhesions, but ideally would wait another 2 weeks to reduce risk of complications.  May need to be on TPN throughout that time  - continue to monitor     Abdominal hematoma  - CT showed 7.3cm x 13cm x 8.6cm abnormality in mass vs fluid collection  - IR drain placed with dark hemorrhagic hematoma collected but minimal output  - cultures NGTD  - monitor output closely but limited    Hyponatremia:  -Likely due to hypotonic IV fluid; decreased rate of maintenance fluids now that he is on TPN     Recent diverticulitis with perforation  - s/p colectomy with colostomy on 5/15/25  - ostomy bag with minimal output in setting of above     Mitral valve prolapse, Hx of arrest  - converted metoprolol from PO to IV while NPO     DVT Proph: SCDs only     Dispo: Anticipate continued prolonged inpatient admission due to persistent SBO.            Leonard Mcconnell MD    Patient Name:  Diego Morales   MRN:   [id number]   Room/Bed:  132/132-A        [1] acetaminophen, 650 mg, nasogastric tube, q6h  aspirin, 81 mg, oral, Daily  cyclobenzaprine, 5 mg, oral, BID  fat emulsion-plant based, 250 mL, intravenous, Daily  lidocaine, 5 mL, infiltration, Once  [Held by provider] metoprolol succinate XL, 25 mg, oral, Daily  metoprolol, 5 mg, intravenous, q6h  pantoprazole, 40 mg, intravenous, BID  polyethylene glycol, 17 g, oral, Daily  sodium chloride 0.9%, 10 mL,  intravenous, q12h LOU     [2] Adult Clinimix Parenteral Nutrition Continuous, 35 mL/hr  potassium jwacssi-C3-8.45%NaCl, 20 mL/hr, Last Rate: 20 mL/hr (06/11/25 1216)     [3] PRN medications: alteplase, HYDROmorphone, HYDROmorphone, lidocaine, melatonin, ondansetron **OR** ondansetron, phenoL, sodium chloride 0.9%

## 2025-06-12 LAB
ALBUMIN SERPL BCP-MCNC: 3.1 G/DL (ref 3.4–5)
ANION GAP SERPL CALC-SCNC: 11 MMOL/L (ref 10–20)
BUN SERPL-MCNC: 14 MG/DL (ref 6–23)
CALCIUM SERPL-MCNC: 8.2 MG/DL (ref 8.6–10.3)
CHLORIDE SERPL-SCNC: 99 MMOL/L (ref 98–107)
CHOLEST SERPL-MCNC: 97 MG/DL (ref 0–199)
CHOLESTEROL/HDL RATIO: 4.6
CO2 SERPL-SCNC: 26 MMOL/L (ref 21–32)
CREAT SERPL-MCNC: 0.6 MG/DL (ref 0.5–1.3)
EGFRCR SERPLBLD CKD-EPI 2021: >90 ML/MIN/1.73M*2
ERYTHROCYTE [DISTWIDTH] IN BLOOD BY AUTOMATED COUNT: 14 % (ref 11.5–14.5)
GLUCOSE SERPL-MCNC: 124 MG/DL (ref 74–99)
HCT VFR BLD AUTO: 33.2 % (ref 41–52)
HDLC SERPL-MCNC: 21.1 MG/DL
HGB BLD-MCNC: 11.3 G/DL (ref 13.5–17.5)
LDLC SERPL CALC-MCNC: 52 MG/DL
MAGNESIUM SERPL-MCNC: 1.82 MG/DL (ref 1.6–2.4)
MCH RBC QN AUTO: 30.6 PG (ref 26–34)
MCHC RBC AUTO-ENTMCNC: 34 G/DL (ref 32–36)
MCV RBC AUTO: 90 FL (ref 80–100)
NON HDL CHOLESTEROL: 76 MG/DL (ref 0–149)
NRBC BLD-RTO: 0 /100 WBCS (ref 0–0)
PHOSPHATE SERPL-MCNC: 3.6 MG/DL (ref 2.5–4.9)
PLATELET # BLD AUTO: 267 X10*3/UL (ref 150–450)
POTASSIUM SERPL-SCNC: 3.7 MMOL/L (ref 3.5–5.3)
RBC # BLD AUTO: 3.69 X10*6/UL (ref 4.5–5.9)
SODIUM SERPL-SCNC: 132 MMOL/L (ref 136–145)
STAPHYLOCOCCUS SPEC CULT: NORMAL
TRIGL SERPL-MCNC: 120 MG/DL (ref 0–149)
VLDL: 24 MG/DL (ref 0–40)
WBC # BLD AUTO: 6.1 X10*3/UL (ref 4.4–11.3)

## 2025-06-12 PROCEDURE — 80069 RENAL FUNCTION PANEL: CPT | Performed by: STUDENT IN AN ORGANIZED HEALTH CARE EDUCATION/TRAINING PROGRAM

## 2025-06-12 PROCEDURE — 36415 COLL VENOUS BLD VENIPUNCTURE: CPT | Performed by: PHYSICIAN ASSISTANT

## 2025-06-12 PROCEDURE — 99233 SBSQ HOSP IP/OBS HIGH 50: CPT | Performed by: STUDENT IN AN ORGANIZED HEALTH CARE EDUCATION/TRAINING PROGRAM

## 2025-06-12 PROCEDURE — 2500000001 HC RX 250 WO HCPCS SELF ADMINISTERED DRUGS (ALT 637 FOR MEDICARE OP): Performed by: STUDENT IN AN ORGANIZED HEALTH CARE EDUCATION/TRAINING PROGRAM

## 2025-06-12 PROCEDURE — 85027 COMPLETE CBC AUTOMATED: CPT | Performed by: STUDENT IN AN ORGANIZED HEALTH CARE EDUCATION/TRAINING PROGRAM

## 2025-06-12 PROCEDURE — 83735 ASSAY OF MAGNESIUM: CPT | Performed by: STUDENT IN AN ORGANIZED HEALTH CARE EDUCATION/TRAINING PROGRAM

## 2025-06-12 PROCEDURE — 1100000001 HC PRIVATE ROOM DAILY

## 2025-06-12 PROCEDURE — 2500000004 HC RX 250 GENERAL PHARMACY W/ HCPCS (ALT 636 FOR OP/ED): Mod: JZ | Performed by: STUDENT IN AN ORGANIZED HEALTH CARE EDUCATION/TRAINING PROGRAM

## 2025-06-12 PROCEDURE — 2500000001 HC RX 250 WO HCPCS SELF ADMINISTERED DRUGS (ALT 637 FOR MEDICARE OP): Performed by: PHYSICIAN ASSISTANT

## 2025-06-12 PROCEDURE — 80061 LIPID PANEL: CPT | Performed by: PHYSICIAN ASSISTANT

## 2025-06-12 PROCEDURE — 2500000004 HC RX 250 GENERAL PHARMACY W/ HCPCS (ALT 636 FOR OP/ED): Performed by: SURGERY

## 2025-06-12 PROCEDURE — 2500000004 HC RX 250 GENERAL PHARMACY W/ HCPCS (ALT 636 FOR OP/ED): Performed by: PHYSICIAN ASSISTANT

## 2025-06-12 PROCEDURE — 2500000005 HC RX 250 GENERAL PHARMACY W/O HCPCS: Performed by: PHYSICIAN ASSISTANT

## 2025-06-12 RX ORDER — KETOROLAC TROMETHAMINE 15 MG/ML
15 INJECTION, SOLUTION INTRAMUSCULAR; INTRAVENOUS ONCE
Status: COMPLETED | OUTPATIENT
Start: 2025-06-12 | End: 2025-06-12

## 2025-06-12 RX ORDER — POTASSIUM CHLORIDE 14.9 MG/ML
20 INJECTION INTRAVENOUS ONCE
Status: COMPLETED | OUTPATIENT
Start: 2025-06-12 | End: 2025-06-12

## 2025-06-12 RX ORDER — MAGNESIUM SULFATE HEPTAHYDRATE 40 MG/ML
2 INJECTION, SOLUTION INTRAVENOUS ONCE
Status: COMPLETED | OUTPATIENT
Start: 2025-06-12 | End: 2025-06-12

## 2025-06-12 RX ADMIN — MAGNESIUM SULFATE HEPTAHYDRATE 2 G: 2 INJECTION, SOLUTION INTRAVENOUS at 12:17

## 2025-06-12 RX ADMIN — ACETAMINOPHEN 650 MG: 650 SOLUTION ORAL at 21:50

## 2025-06-12 RX ADMIN — Medication 10 ML: at 10:11

## 2025-06-12 RX ADMIN — CYCLOBENZAPRINE HYDROCHLORIDE 5 MG: 5 TABLET, FILM COATED ORAL at 20:29

## 2025-06-12 RX ADMIN — CYCLOBENZAPRINE HYDROCHLORIDE 5 MG: 5 TABLET, FILM COATED ORAL at 10:00

## 2025-06-12 RX ADMIN — Medication 10 ML: at 21:50

## 2025-06-12 RX ADMIN — POLYETHYLENE GLYCOL 3350 17 G: 17 POWDER, FOR SOLUTION ORAL at 10:02

## 2025-06-12 RX ADMIN — METOPROLOL SUCCINATE 25 MG: 25 TABLET, EXTENDED RELEASE ORAL at 10:00

## 2025-06-12 RX ADMIN — PANTOPRAZOLE SODIUM 40 MG: 40 TABLET, DELAYED RELEASE ORAL at 17:00

## 2025-06-12 RX ADMIN — I.V. FAT EMULSION 50 G: 20 EMULSION INTRAVENOUS at 20:15

## 2025-06-12 RX ADMIN — ASCORBIC ACID, VITAMIN A PALMITATE, CHOLECALCIFEROL, THIAMINE HYDROCHLORIDE, RIBOFLAVIN-5 PHOSPHATE SODIUM, PYRIDOXINE HYDROCHLORIDE, NIACINAMIDE, DEXPANTHENOL, ALPHA-TOCOPHEROL ACETATE, VITAMIN K1, FOLIC ACID, BIOTIN, CYANOCOBALAMIN: 200; 3300; 200; 6; 3.6; 6; 40; 15; 10; 150; 600; 60; 5 INJECTION, SOLUTION INTRAVENOUS at 20:14

## 2025-06-12 RX ADMIN — ASPIRIN 81 MG: 81 TABLET, DELAYED RELEASE ORAL at 10:00

## 2025-06-12 RX ADMIN — KETOROLAC TROMETHAMINE 15 MG: 15 INJECTION, SOLUTION INTRAMUSCULAR; INTRAVENOUS at 18:23

## 2025-06-12 RX ADMIN — PANTOPRAZOLE SODIUM 40 MG: 40 TABLET, DELAYED RELEASE ORAL at 06:23

## 2025-06-12 RX ADMIN — POTASSIUM CHLORIDE 20 MEQ: 14.9 INJECTION, SOLUTION INTRAVENOUS at 09:35

## 2025-06-12 ASSESSMENT — COGNITIVE AND FUNCTIONAL STATUS - GENERAL
DRESSING REGULAR UPPER BODY CLOTHING: A LITTLE
WALKING IN HOSPITAL ROOM: A LITTLE
STANDING UP FROM CHAIR USING ARMS: A LITTLE
HELP NEEDED FOR BATHING: A LITTLE
DAILY ACTIVITIY SCORE: 20
WALKING IN HOSPITAL ROOM: A LITTLE
MOVING TO AND FROM BED TO CHAIR: A LITTLE
MOBILITY SCORE: 18
CLIMB 3 TO 5 STEPS WITH RAILING: A LITTLE
DAILY ACTIVITIY SCORE: 20
MOBILITY SCORE: 18
MOVING FROM LYING ON BACK TO SITTING ON SIDE OF FLAT BED WITH BEDRAILS: A LITTLE
DRESSING REGULAR LOWER BODY CLOTHING: A LITTLE
TOILETING: A LITTLE
TURNING FROM BACK TO SIDE WHILE IN FLAT BAD: A LITTLE
DRESSING REGULAR UPPER BODY CLOTHING: A LITTLE
CLIMB 3 TO 5 STEPS WITH RAILING: A LITTLE
MOVING TO AND FROM BED TO CHAIR: A LITTLE
TURNING FROM BACK TO SIDE WHILE IN FLAT BAD: A LITTLE
DRESSING REGULAR LOWER BODY CLOTHING: A LITTLE
TOILETING: A LITTLE
STANDING UP FROM CHAIR USING ARMS: A LITTLE
MOVING FROM LYING ON BACK TO SITTING ON SIDE OF FLAT BED WITH BEDRAILS: A LITTLE
HELP NEEDED FOR BATHING: A LITTLE

## 2025-06-12 ASSESSMENT — PAIN SCALES - GENERAL
PAINLEVEL_OUTOF10: 0 - NO PAIN
PAINLEVEL_OUTOF10: 6
PAINLEVEL_OUTOF10: 0 - NO PAIN
PAINLEVEL_OUTOF10: 0 - NO PAIN

## 2025-06-12 ASSESSMENT — PAIN DESCRIPTION - ORIENTATION: ORIENTATION: RIGHT

## 2025-06-12 ASSESSMENT — PAIN - FUNCTIONAL ASSESSMENT
PAIN_FUNCTIONAL_ASSESSMENT: 0-10

## 2025-06-12 ASSESSMENT — PAIN DESCRIPTION - LOCATION: LOCATION: OTHER (COMMENT)

## 2025-06-12 NOTE — PROGRESS NOTES
"General/Trauma Surgery Daily Progress Note    Subjective   Doing well, ambulatory today. Denies nausea or vomiting, abdominal pain. Low ostomy output overnight, has some soft/liquid stool in bag and scant gas this morning. Minimal NG output.        Objective   Last Recorded Vitals:  Blood pressure 140/86, pulse 94, temperature 36.6 °C (97.9 °F), temperature source Temporal, resp. rate 18, height 1.803 m (5' 11\"), weight 64.1 kg (141 lb 5 oz), SpO2 96%.    Intake/Output last 3 Shifts:  I/O last 3 completed shifts:  In: 2883.9 (45 mL/kg) [P.O.:35; NG/GT:90; IV Piggyback:2008]  Out: 4880 (76.1 mL/kg) [Urine:3980 (1.7 mL/kg/hr); Emesis/NG output:700; Drains:90; Stool:110]  Weight: 64.1 kg     Pain Score:  0-10 (Numeric) Pain Score: 0 - No pain     Physical Exam:  Constitutional: No acute distress, sitting up in bed.  Neuro: Alert, oriented. Follows commands.   Eyes: EOMI. No scleral icterus. Conjunctiva pink.  ENT: MMM. NG scant output.  Heart: Regular rate.  Respiratory: No increased work of breathing or audible wheeze.  Abdomen: Soft, mildly distended. Colostomy with liquid brown stool.   MSK: Moves all extremities.  Vascular: Palpable pulses throughout, no pitting edema.  Skin: No rashes.   Psychological: Appropriate mood and behavior.               Relevant Results  Laboratory Results:  CBC:   Lab Results   Component Value Date    WBC 6.1 06/12/2025    RBC 3.69 (L) 06/12/2025    HGB 11.3 (L) 06/12/2025    HCT 33.2 (L) 06/12/2025     06/12/2025       RFP:   Lab Results   Component Value Date     (L) 06/12/2025    K 3.7 06/12/2025    CL 99 06/12/2025    CO2 26 06/12/2025    BUN 14 06/12/2025    CREATININE 0.60 06/12/2025    CALCIUM 8.2 (L) 06/12/2025    MG 1.82 06/12/2025    PHOS 3.6 06/12/2025        LFTs:   Lab Results   Component Value Date    ALBUMIN 3.1 (L) 06/12/2025         Imaging:  No results found.    Cardiology, Vascular, and Other Imaging  Bedside PICC Imaging  Result Date: 6/11/2025  These " images are not reportable by radiology and will not be interpreted by  Radiologists.             Assessment/Plan   This is a 70 y.o. male s/p Surinder for perforated diverticulitis 5/15/25.     Admitted 6/4/25 for possible ileus 2/2 intra-abdominal hematoma s/p NG decompression and percutaneous drain placement for hematoma     Gastrografin enema through colostomy 6/11 with continued dilated small bowel.      Concern for adhesive partial obstruction. Typically, we would offer laparoscopy for adhesiolysis, however patient is currently 4 weeks out from initial surgery. Discussed with patient and wife that he is at high risk for complication if needing surgical intervention within the 2-6 week post operative window and we therefore would plan to wait for surgical intervention until the 6 week refugio. Plan for now is continued NG decompression and TPN initiation. There is still potential he can still resolve this obstruction, however he may require 2 weeks of TPN.         Plan:   -- PICC ordered, TPN 83 ml/hr  -- Daily RFP, weekly LFT, lipid  -- discontinue IVF 20 ml/hr  -- Multimodal pain regimen - tylenol, toradol, prn dilaudid  -- NPO, sips with meds and ice chips ok  -- NG continuous wall suction. Flush 4 times daily. OK to clamp for PO meds and ambulation  -- Colostomy care  -- Record NG and colostomy output   -- Plan SBFT tomorrow        Seen and discussed with Dr. Canales who is in agreement with plan. Please secure chat with questions.     Cindy Baldwin PA-C

## 2025-06-12 NOTE — CARE PLAN
The patient's goals for the shift include      The clinical goals for the shift include Patient will have increased stool output      Problem: Chronic Conditions and Co-morbidities  Goal: Patient's chronic conditions and co-morbidity symptoms are monitored and maintained or improved  6/12/2025 1358 by Alicia Flores LPN  Outcome: Not Progressing  6/12/2025 1126 by Alicia Flores LPN  Outcome: Progressing

## 2025-06-12 NOTE — CARE PLAN
The patient's goals for the shift include      The clinical goals for the shift include Patient will have increased stool output

## 2025-06-12 NOTE — PROGRESS NOTES
"    [facility]  Department of Hospital Medicine    PROGRESS NOTE    Diego Morales is a 70 y.o. male on day 8 of admission presenting with Small bowel obstruction (Multi).    Subjective   Decrease NG output.  Started to have some ostomy output.       Objective     Physical Exam:  Con: awake, alert; no distress;   Eyes: conjunctiva wnl; EOMI;   ENMT: hearing intact; MMM; NG tube  MSK: ROM wnl; digits wnl;   Resp: normal work of breathing; no cyanosis;   Neuro: moving all extremities; no abnormal movements  Psych: oriented to situation; affect wnl;     Last Recorded Vitals:  /80 (BP Location: Right arm, Patient Position: Lying)   Pulse 92   Temp 37 °C (98.6 °F) (Temporal)   Resp 16   Ht 1.803 m (5' 11\")   Wt 64.1 kg (141 lb 5 oz)   SpO2 95%   BMI 19.71 kg/m²      Scheduled medications:  Scheduled Medications[1]  Continuous medications:  Continuous Medications[2]  PRN medications:  PRN Medications[3]     Relevant Results:  Lab Results   Component Value Date    WBC 6.1 06/12/2025    HGB 11.3 (L) 06/12/2025    HCT 33.2 (L) 06/12/2025    MCV 90 06/12/2025     06/12/2025      Lab Results   Component Value Date    GLUCOSE 124 (H) 06/12/2025    CALCIUM 8.2 (L) 06/12/2025     (L) 06/12/2025    K 3.7 06/12/2025    CO2 26 06/12/2025    CL 99 06/12/2025    BUN 14 06/12/2025    CREATININE 0.60 06/12/2025                Malnutrition Diagnosis Status: Active  Malnutrition Diagnosis: Severe malnutrition related to acute disease or injury  Related to: recent diverticular perforation s/p SBR with colostomy creation -- now c/b SBO secondary to post-op hematoma  As Evidenced by: meeting <75% EENs for the past x 1 month, significant 5% weight loss in <1 month, & moderate muscle wasting/fat loss  I agree with the dietitian's malnutrition diagnosis.          Assessment/Plan   Assessment & Plan    71yo CM with PMH of mitral valve prolapse, cardiac arrest during cath in 2016 " due to vfib needing cardioversion, and recent diverticulitis with perforation s/p resection and colostomy bag on 5/15/25 that presented to the ED with abdominal pain and was admitted for SBO.      SBO, starting to resolve  - evidenced on admitting imaging  - general surgery following, rec maintain NG tube to LIWS; Gastrografin study shows persistent small bowel dilation  - continue zofran PRN for symptoms  - keep NPO  - continue PPI IV BID   - PICC placed; started on TPN given prolonged n.p.o. status  - Per surgery, may have SBO due to adhesion; may need lysis of adhesions, but ideally would wait another 2 weeks to reduce risk of complications.  May need to be on TPN throughout that time  - Plan for small bowel follow-through 6/13  - continue to monitor     Abdominal hematoma  - CT showed 7.3cm x 13cm x 8.6cm abnormality in mass vs fluid collection  - IR drain placed with dark hemorrhagic hematoma collected but minimal output  - cultures NGTD  - monitor output closely but limited    Hyponatremia:  -Likely due to hypotonic IV fluid; decreased rate of maintenance fluids now that he is on TPN     Recent diverticulitis with perforation  - s/p colectomy with colostomy on 5/15/25     Mitral valve prolapse, Hx of arrest  - converted metoprolol from PO to IV while NPO     DVT Proph: SCDs only     Dispo: Anticipate continued prolonged inpatient admission due to persistent SBO.            Leonard Mcconnell MD    Patient Name:  Diego Morales   MRN:   [id number]   Room/Bed:  132/132-A        [1] acetaminophen, 650 mg, nasogastric tube, q6h  aspirin, 81 mg, oral, Daily  cyclobenzaprine, 5 mg, oral, BID  fat emulsion-plant based, 250 mL, intravenous, Daily  lidocaine, 5 mL, infiltration, Once  magnesium sulfate, 2 g, intravenous, Once  metoprolol succinate XL, 25 mg, oral, Daily  pantoprazole, 40 mg, oral, BID AC  polyethylene glycol, 17 g, oral, Daily  sodium chloride 0.9%, 10 mL, intravenous, q12h LOU     [2] Adult Clinimix  Parenteral Nutrition Continuous, 70 mL/hr, Last Rate: 83 mL/hr (06/12/25 1215)     [3] PRN medications: alteplase, HYDROmorphone, HYDROmorphone, lidocaine, melatonin, ondansetron **OR** ondansetron, phenoL, sodium chloride 0.9%

## 2025-06-12 NOTE — PROGRESS NOTES
"Nutrition Follow Up Assessment:   Nutrition Assessment       Patient is a 70 y.o. male with h/o perforated diverticulitis now presenting with SBO 2/2 post-op hematoma. Pt s/p NGT in ED (6/4) for decompression.     IR consulted (6/5) for pelvic hematoma draining w/ drain placement.       Gastrografin enema through colostomy (6/11) reveals contrast not passing into ileum. PICC placed (6/10) for supplemental TPN.     Concern now for adhesive partial obstruction. Will need to wait for possible ABHISHEK until further out from recent surgery.    Nutrition History:  Food and Nutrient History: Visit made, pt resting in bed w/ NGT in place. TPN infusing. Pt denies any questions or needs currently.  Food Allergy:  (None)     Anthropometrics:  Height: 180.3 cm (5' 11\")   Weight: 64.1 kg (141 lb 5 oz)   BMI (Calculated): 19.72  IBW/kg (Dietitian Calculated): 78.2 kg  Percent of IBW: 82 %    Weight History:   Weight         6/4/2025  1735 6/4/2025  2246          Weight: 65.8 kg (145 lb) 64.1 kg (141 lb 5 oz)       Weight Change %:  Weight History / % Weight Change: No additional weights in EMR since admission. Unable to assess for changes.  Significant Weight Loss: No    Nutrition Focused Physical Exam Findings:  Defer: See RDN assessment (6/6/25)    Edema:  Edema: none    Physical Findings:  Skin:  (Abdominal incision)  Digestive System Findings:  (SBO w/ NGT in place)  Mouth Findings:  (None)    Nutrition Significant Labs:  BMP Trend:   Results from last 7 days   Lab Units 06/12/25  0719 06/11/25  0650 06/10/25  0558 06/09/25  0628   GLUCOSE mg/dL 124* 122* 102* 118*   CALCIUM mg/dL 8.2* 7.8* 8.2* 8.3*   SODIUM mmol/L 132* 131* 135* 136   POTASSIUM mmol/L 3.7 4.0 3.6 3.6   CO2 mmol/L 26 25 27 27   CHLORIDE mmol/L 99 101 102 102   BUN mg/dL 14 13 9 8   CREATININE mg/dL 0.60 0.61 0.71 0.71    , TPN/PPN Labs:   Results from last 7 days   Lab Units 06/12/25  0719 06/11/25  0650 06/10/25  0558 06/09/25  0628 06/09/25  0628   GLUCOSE " mg/dL 124* 122* 102*  --  118*   POTASSIUM mmol/L 3.7 4.0 3.6  --  3.6   PHOSPHORUS mg/dL 3.6 3.3 3.0   < >  --    MAGNESIUM mg/dL 1.82 2.01 1.79   < >  --    SODIUM mmol/L 132* 131* 135*  --  136   CHLORIDE mmol/L 99 101 102  --  102   ALT U/L  --   --   --   --  13   AST U/L  --   --   --   --  15   ALK PHOS U/L  --   --   --   --  125   BILIRUBIN TOTAL mg/dL  --   --   --   --  0.9   TRIGLYCERIDES mg/dL 120  --   --   --   --     < > = values in this interval not displayed.      Nutrition Specific Medications:  Scheduled medications  Scheduled Medications[1]  Continuous medications  Continuous Medications[2]  PRN medications  PRN Medications[3]    I/O:   +Colostomy: 50mL output (6/11)    Dietary Orders (From admission, onward)       Start     Ordered    06/09/25 1509  NPO Diet Except: Ice chips, Sips with meds; Effective now  Diet effective now        Question Answer Comment   Except: Ice chips    Except: Sips with meds        06/09/25 1508    06/04/25 2251  May Participate in Room Service  ( ROOM SERVICE MAY PARTICIPATE)  Once        Question:  .  Answer:  Yes    06/04/25 2250             Estimated Needs:   Total Energy Estimated Needs in 24 hours (kCal):  (1900+)  Method for Estimating Needs: ~30 kcals/kg x CBW (64.1 kg)  Total Protein Estimated Needs in 24 Hours (g):  (75-90)  Method for Estimating 24 Hour Protein Needs: 1.2-1.4 g/kg x CBW (64.1 kg)  Total Fluid Estimated Needs in 24 Hours (mL):  (1900+)  Method for Estimating 24 Hour Fluid Needs: 1mL/kcal or per team        Nutrition Diagnosis   Malnutrition Diagnosis  Patient has Malnutrition Diagnosis: Yes  Diagnosis Status: Active  Malnutrition Diagnosis: Severe malnutrition related to acute disease or injury  Related to: recent diverticular perforation s/p SBR with colostomy creation -- now c/b SBO secondary to post-op hematoma  As Evidenced by: meeting <75% EENs for the past x 1 month, significant 5% weight loss in <1 month, & moderate muscle  wasting/fat loss      Nutrition Interventions/Recommendations      Nutrition Recommendations:  Advance TPN of 5% AA, 15% dextrose to goal rate of 70mL/hr  Include MVI + trace elements  Provide lipids daily @ 20.8mL/hr x 12hrs overnight (250mL total)    TPN monitoring:  Daily weights  RFP + Mg daily; replete lytes PRN  LFTs + TGs weekly  Accuchecks q6h     Provides: 1693 kcals, 84g protein, 252g dextrose, 30% kcal from fat (meeting 89% kcal & 100% protein needs)      Once tolerating TPN at goal rate for at least 24hrs w/ stable labs, may consider transitioning to a cycled regimen:  Recommend Clinimix 5% AA, 15% Dextrose x 12 hrs  For the first hour: Run @ 80mL/hr  For x 10hrs in between: Titrate up to 165mL/hr  For the final hour: Decrease rate to 80mL/hr  Include MVI + trace elements    Provide lipids daily @ 20.8mL/hr x 12 hours overnight (250mL total)    TPN monitoring --> Same as above    Provides: 1785 kcals, 91g protein, 272g dextrose, 28% from fat (meeting 94% kcal & 100% protein needs)    Nutrition Interventions/Goals:   Parenteral Nutrition: Management of delivery rate of parenteral nutrition  Goal: Tolerate TPN at goal rate      Nutrition Monitoring and Evaluation   Enteral and Parenteral Nutrition Intake Determination: Parenteral nutrition intake - To meet > 75% estimated energy needs    Time Spent (min): 60 minutes              [1] acetaminophen, 650 mg, nasogastric tube, q6h  aspirin, 81 mg, oral, Daily  cyclobenzaprine, 5 mg, oral, BID  fat emulsion-plant based, 250 mL, intravenous, Daily  lidocaine, 5 mL, infiltration, Once  metoprolol succinate XL, 25 mg, oral, Daily  pantoprazole, 40 mg, oral, BID AC  polyethylene glycol, 17 g, oral, Daily  sodium chloride 0.9%, 10 mL, intravenous, q12h LOU  [2] Adult Clinimix Parenteral Nutrition Continuous, 70 mL/hr, Last Rate: 70 mL/hr (06/12/25 1355)  [3] PRN medications: alteplase, HYDROmorphone, HYDROmorphone, lidocaine, melatonin, ondansetron **OR**  ondansetron, phenoL, sodium chloride 0.9%

## 2025-06-13 ENCOUNTER — APPOINTMENT (OUTPATIENT)
Dept: RADIOLOGY | Facility: HOSPITAL | Age: 70
End: 2025-06-13
Payer: MEDICARE

## 2025-06-13 LAB
ALBUMIN SERPL BCP-MCNC: 3.1 G/DL (ref 3.4–5)
ANION GAP SERPL CALC-SCNC: 12 MMOL/L (ref 10–20)
BUN SERPL-MCNC: 19 MG/DL (ref 6–23)
CALCIUM SERPL-MCNC: 8.3 MG/DL (ref 8.6–10.3)
CHLORIDE SERPL-SCNC: 99 MMOL/L (ref 98–107)
CO2 SERPL-SCNC: 26 MMOL/L (ref 21–32)
CREAT SERPL-MCNC: 0.87 MG/DL (ref 0.5–1.3)
EGFRCR SERPLBLD CKD-EPI 2021: >90 ML/MIN/1.73M*2
ERYTHROCYTE [DISTWIDTH] IN BLOOD BY AUTOMATED COUNT: 14.1 % (ref 11.5–14.5)
GLUCOSE SERPL-MCNC: 136 MG/DL (ref 74–99)
HCT VFR BLD AUTO: 33.5 % (ref 41–52)
HGB BLD-MCNC: 11.1 G/DL (ref 13.5–17.5)
MAGNESIUM SERPL-MCNC: 2.02 MG/DL (ref 1.6–2.4)
MCH RBC QN AUTO: 30.1 PG (ref 26–34)
MCHC RBC AUTO-ENTMCNC: 33.1 G/DL (ref 32–36)
MCV RBC AUTO: 91 FL (ref 80–100)
NRBC BLD-RTO: 0 /100 WBCS (ref 0–0)
PHOSPHATE SERPL-MCNC: 4.1 MG/DL (ref 2.5–4.9)
PLATELET # BLD AUTO: 262 X10*3/UL (ref 150–450)
POTASSIUM SERPL-SCNC: 3.7 MMOL/L (ref 3.5–5.3)
RBC # BLD AUTO: 3.69 X10*6/UL (ref 4.5–5.9)
SODIUM SERPL-SCNC: 133 MMOL/L (ref 136–145)
WBC # BLD AUTO: 7.6 X10*3/UL (ref 4.4–11.3)

## 2025-06-13 PROCEDURE — 85027 COMPLETE CBC AUTOMATED: CPT | Performed by: STUDENT IN AN ORGANIZED HEALTH CARE EDUCATION/TRAINING PROGRAM

## 2025-06-13 PROCEDURE — 2500000001 HC RX 250 WO HCPCS SELF ADMINISTERED DRUGS (ALT 637 FOR MEDICARE OP): Performed by: PHYSICIAN ASSISTANT

## 2025-06-13 PROCEDURE — 74250 X-RAY XM SM INT 1CNTRST STD: CPT

## 2025-06-13 PROCEDURE — 2500000005 HC RX 250 GENERAL PHARMACY W/O HCPCS: Performed by: PHYSICIAN ASSISTANT

## 2025-06-13 PROCEDURE — 80069 RENAL FUNCTION PANEL: CPT | Performed by: STUDENT IN AN ORGANIZED HEALTH CARE EDUCATION/TRAINING PROGRAM

## 2025-06-13 PROCEDURE — 2500000004 HC RX 250 GENERAL PHARMACY W/ HCPCS (ALT 636 FOR OP/ED): Performed by: SURGERY

## 2025-06-13 PROCEDURE — 2500000001 HC RX 250 WO HCPCS SELF ADMINISTERED DRUGS (ALT 637 FOR MEDICARE OP): Performed by: STUDENT IN AN ORGANIZED HEALTH CARE EDUCATION/TRAINING PROGRAM

## 2025-06-13 PROCEDURE — 83735 ASSAY OF MAGNESIUM: CPT | Performed by: STUDENT IN AN ORGANIZED HEALTH CARE EDUCATION/TRAINING PROGRAM

## 2025-06-13 PROCEDURE — 74250 X-RAY XM SM INT 1CNTRST STD: CPT | Performed by: RADIOLOGY

## 2025-06-13 PROCEDURE — 36415 COLL VENOUS BLD VENIPUNCTURE: CPT | Performed by: STUDENT IN AN ORGANIZED HEALTH CARE EDUCATION/TRAINING PROGRAM

## 2025-06-13 PROCEDURE — 1100000001 HC PRIVATE ROOM DAILY

## 2025-06-13 RX ORDER — DIATRIZOATE MEGLUMINE AND DIATRIZOATE SODIUM 660; 100 MG/ML; MG/ML
240 SOLUTION ORAL; RECTAL ONCE
Status: DISCONTINUED | OUTPATIENT
Start: 2025-06-13 | End: 2025-06-15

## 2025-06-13 RX ADMIN — ACETAMINOPHEN 650 MG: 650 SOLUTION ORAL at 14:11

## 2025-06-13 RX ADMIN — CYCLOBENZAPRINE HYDROCHLORIDE 5 MG: 5 TABLET, FILM COATED ORAL at 20:14

## 2025-06-13 RX ADMIN — POLYETHYLENE GLYCOL 3350 17 G: 17 POWDER, FOR SOLUTION ORAL at 09:07

## 2025-06-13 RX ADMIN — ACETAMINOPHEN 650 MG: 650 SOLUTION ORAL at 20:35

## 2025-06-13 RX ADMIN — METOPROLOL SUCCINATE 25 MG: 25 TABLET, EXTENDED RELEASE ORAL at 09:07

## 2025-06-13 RX ADMIN — ASPIRIN 81 MG: 81 TABLET, DELAYED RELEASE ORAL at 09:07

## 2025-06-13 RX ADMIN — CYCLOBENZAPRINE HYDROCHLORIDE 5 MG: 5 TABLET, FILM COATED ORAL at 09:07

## 2025-06-13 RX ADMIN — ACETAMINOPHEN 650 MG: 650 SOLUTION ORAL at 09:07

## 2025-06-13 RX ADMIN — I.V. FAT EMULSION 50 G: 20 EMULSION INTRAVENOUS at 20:17

## 2025-06-13 RX ADMIN — Medication 10 ML: at 20:35

## 2025-06-13 RX ADMIN — ACETAMINOPHEN 650 MG: 650 SOLUTION ORAL at 03:01

## 2025-06-13 RX ADMIN — PANTOPRAZOLE SODIUM 40 MG: 40 TABLET, DELAYED RELEASE ORAL at 06:00

## 2025-06-13 RX ADMIN — ASCORBIC ACID, VITAMIN A PALMITATE, CHOLECALCIFEROL, THIAMINE HYDROCHLORIDE, RIBOFLAVIN-5 PHOSPHATE SODIUM, PYRIDOXINE HYDROCHLORIDE, NIACINAMIDE, DEXPANTHENOL, ALPHA-TOCOPHEROL ACETATE, VITAMIN K1, FOLIC ACID, BIOTIN, CYANOCOBALAMIN: 200; 3300; 200; 6; 3.6; 6; 40; 15; 10; 150; 600; 60; 5 INJECTION, SOLUTION INTRAVENOUS at 20:21

## 2025-06-13 RX ADMIN — PANTOPRAZOLE SODIUM 40 MG: 40 TABLET, DELAYED RELEASE ORAL at 16:09

## 2025-06-13 RX ADMIN — Medication 10 ML: at 09:07

## 2025-06-13 ASSESSMENT — COGNITIVE AND FUNCTIONAL STATUS - GENERAL
MOBILITY SCORE: 18
TOILETING: A LITTLE
TURNING FROM BACK TO SIDE WHILE IN FLAT BAD: A LITTLE
TOILETING: A LITTLE
HELP NEEDED FOR BATHING: A LITTLE
CLIMB 3 TO 5 STEPS WITH RAILING: A LITTLE
MOVING TO AND FROM BED TO CHAIR: A LITTLE
DAILY ACTIVITIY SCORE: 20
DRESSING REGULAR LOWER BODY CLOTHING: A LITTLE
MOBILITY SCORE: 18
MOVING FROM LYING ON BACK TO SITTING ON SIDE OF FLAT BED WITH BEDRAILS: A LITTLE
DRESSING REGULAR UPPER BODY CLOTHING: A LITTLE
TURNING FROM BACK TO SIDE WHILE IN FLAT BAD: A LITTLE
DRESSING REGULAR LOWER BODY CLOTHING: A LITTLE
HELP NEEDED FOR BATHING: A LITTLE
WALKING IN HOSPITAL ROOM: A LITTLE
WALKING IN HOSPITAL ROOM: A LITTLE
STANDING UP FROM CHAIR USING ARMS: A LITTLE
STANDING UP FROM CHAIR USING ARMS: A LITTLE
MOVING TO AND FROM BED TO CHAIR: A LITTLE
DAILY ACTIVITIY SCORE: 20
CLIMB 3 TO 5 STEPS WITH RAILING: A LITTLE
MOVING FROM LYING ON BACK TO SITTING ON SIDE OF FLAT BED WITH BEDRAILS: A LITTLE
DRESSING REGULAR UPPER BODY CLOTHING: A LITTLE

## 2025-06-13 ASSESSMENT — PAIN - FUNCTIONAL ASSESSMENT: PAIN_FUNCTIONAL_ASSESSMENT: 0-10

## 2025-06-13 ASSESSMENT — PAIN SCALES - GENERAL: PAINLEVEL_OUTOF10: 0 - NO PAIN

## 2025-06-13 NOTE — PROGRESS NOTES
"General/Trauma Surgery Daily Progress Note    Subjective   Doing well this morning, however had oral gastrografin for SBFT and had emesis while in radiology.        Objective   Last Recorded Vitals:  Blood pressure 125/86, pulse 105, temperature 36.7 °C (98.1 °F), temperature source Temporal, resp. rate 16, height 1.803 m (5' 11\"), weight 64.1 kg (141 lb 5 oz), SpO2 97%.    Intake/Output last 3 Shifts:  I/O last 3 completed shifts:  In: 4315.7 (67.3 mL/kg) [P.O.:60; I.V.:50 (0.8 mL/kg); NG/GT:260; IV Piggyback:1641.3]  Out: 4600 (71.8 mL/kg) [Urine:2955 (1.3 mL/kg/hr); Emesis/NG output:1450; Drains:115; Stool:80]  Weight: 64.1 kg     Pain Score:  0-10 (Numeric) Pain Score: 0 - No pain     Physical Exam:  Constitutional: No acute distress, sitting up in bed.  Neuro: Alert, oriented. Follows commands.   Eyes: EOMI. No scleral icterus. Conjunctiva pink.  ENT: NG placed to suction with 500cc clear bilious drainage.  Heart: Regular rate.  Respiratory: No increased work of breathing or audible wheeze.  Abdomen: Soft, nondistended. Colostomy with scant gas and liquid stool.  MSK: Moves all extremities.  Vascular: Palpable pulses throughout, no pitting edema.  Skin: No rashes.   Psychological: Appropriate mood and behavior.            Relevant Results  Laboratory Results:  CBC:   Lab Results   Component Value Date    WBC 7.6 06/13/2025    RBC 3.69 (L) 06/13/2025    HGB 11.1 (L) 06/13/2025    HCT 33.5 (L) 06/13/2025     06/13/2025       RFP:   Lab Results   Component Value Date     (L) 06/13/2025    K 3.7 06/13/2025    CL 99 06/13/2025    CO2 26 06/13/2025    BUN 19 06/13/2025    CREATININE 0.87 06/13/2025    CALCIUM 8.3 (L) 06/13/2025    MG 2.02 06/13/2025    PHOS 4.1 06/13/2025        LFTs:   Lab Results   Component Value Date    ALBUMIN 3.1 (L) 06/13/2025         Imaging:  FL small bowel series  Result Date: 6/13/2025  1. Suspect complete bowel obstruction. Recommend clinical correlation. Further evaluation " with CT scan of the abdomen may be performed for better assessment if clinically warranted.   SUPPLEMENTAL INFORMATION: Notifi message was left for RUSH BALDWIN regarding this exam by Dr. Tomlinson on 6/13/2025 at approximately 11:50 hours.   MACRO: None   Signed by: Chintan Tomlinson 6/13/2025 12:00 PM Dictation workstation:   [id number]          Assessment/Plan   This is a 70 y.o. male s/p Surinder for perforated diverticulitis 5/15/25.     Admitted 6/4/25 for possible ileus 2/2 intra-abdominal hematoma s/p NG decompression and percutaneous drain placement for hematoma     Gastrografin enema through colostomy 6/11 with continued dilated small bowel.      SBFT today produced emesis and therefore reconnected NG to continuous wall suction, 500cc output, clear and bilious. Imaging with contrast not extending past duodenum.       Plan:   -- PICC ordered, TPN 70 ml/hr  -- Daily RFP, weekly LFT, lipid  -- Multimodal pain regimen - tylenol, toradol, prn dilaudid  -- NPO, sips with meds and ice chips ok  -- NG continuous wall suction. Flush 4 times daily. OK to clamp for PO meds and ambulation  -- Colostomy care  -- Record NG and colostomy output           Seen and discussed with Dr. Canales who is in agreement with plan. Please secure chat with questions.     Rush Baldwin PA-C

## 2025-06-13 NOTE — PROGRESS NOTES
"    [facility]  Department of Hospital Medicine    PROGRESS NOTE    Diego Morales is a 70 y.o. male on day 9 of admission presenting with Small bowel obstruction (Multi).    Subjective   SBO not resolving.       Objective       Last Recorded Vitals:  /80 (BP Location: Right arm, Patient Position: Sitting)   Pulse (!) 111   Temp 36.6 °C (97.9 °F) (Temporal)   Resp 18   Ht 1.803 m (5' 11\")   Wt 64.1 kg (141 lb 5 oz)   SpO2 96%   BMI 19.71 kg/m²      Scheduled medications:  Scheduled Medications[1]  Continuous medications:  Continuous Medications[2]  PRN medications:  PRN Medications[3]     Relevant Results:  Lab Results   Component Value Date    WBC 7.6 06/13/2025    HGB 11.1 (L) 06/13/2025    HCT 33.5 (L) 06/13/2025    MCV 91 06/13/2025     06/13/2025      Lab Results   Component Value Date    GLUCOSE 136 (H) 06/13/2025    CALCIUM 8.3 (L) 06/13/2025     (L) 06/13/2025    K 3.7 06/13/2025    CO2 26 06/13/2025    CL 99 06/13/2025    BUN 19 06/13/2025    CREATININE 0.87 06/13/2025                Malnutrition Diagnosis Status: Active  Malnutrition Diagnosis: Severe malnutrition related to acute disease or injury  Related to: recent diverticular perforation s/p SBR with colostomy creation -- now c/b SBO secondary to post-op hematoma  As Evidenced by: meeting <75% EENs for the past x 1 month, significant 5% weight loss in <1 month, & moderate muscle wasting/fat loss  I agree with the dietitian's malnutrition diagnosis.          Assessment/Plan   Assessment & Plan    69yo CM with PMH of mitral valve prolapse, cardiac arrest during cath in 2016 due to vfib needing cardioversion, and recent diverticulitis with perforation s/p resection and colostomy bag on 5/15/25 that presented to the ED with abdominal pain and was admitted for SBO.      SBO, not resolving  - evidenced on admitting imaging  - general surgery following, rec maintain NG tube to LIWS; " Gastrografin study shows persistent small bowel dilation  - continue zofran PRN for symptoms  - keep NPO  - continue PPI IV BID   - PICC placed; started on TPN given prolonged n.p.o. status  - Per surgery, may have SBO due to adhesion; may need lysis of adhesions, but ideally would wait another 2 weeks to reduce risk of complications.  May need to be on TPN throughout that time  - small bowel follow-through 6/13; showed complete bowel obstruction  - continue to monitor     Abdominal hematoma  - CT showed 7.3cm x 13cm x 8.6cm abnormality in mass vs fluid collection  - IR drain placed with dark hemorrhagic hematoma collected but minimal output  - cultures NGTD  - monitor output closely but limited    Hyponatremia:  -Likely due to hypotonic IV fluid; decreased rate of maintenance fluids now that he is on TPN     Recent diverticulitis with perforation  - s/p colectomy with colostomy on 5/15/25     Mitral valve prolapse, Hx of arrest  - converted metoprolol from PO to IV while NPO     DVT Proph: SCDs only     Dispo: Anticipate continued prolonged inpatient admission due to persistent complete SBO.  Once NG tube can be removed, anticipate discharge home on TPN for the next 2 weeks or so until elective surgery can safely be performed.            Leonard Mcconnell MD    Patient Name:  Diego Morales   MRN:   [id number]   Room/Bed:  132/132-A        [1] acetaminophen, 650 mg, nasogastric tube, q6h  aspirin, 81 mg, oral, Daily  cyclobenzaprine, 5 mg, oral, BID  diatrizoate tank-diatrizoat sod, 240 mL, oral, Once  fat emulsion-plant based, 250 mL, intravenous, Daily  lidocaine, 5 mL, infiltration, Once  metoprolol succinate XL, 25 mg, oral, Daily  pantoprazole, 40 mg, oral, BID AC  polyethylene glycol, 17 g, oral, Daily  sodium chloride 0.9%, 10 mL, intravenous, q12h LOU     [2] Adult Clinimix Parenteral Nutrition Continuous, 70 mL/hr, Last Rate: 70 mL/hr (06/12/25 2014)     [3] PRN medications: alteplase, HYDROmorphone,  HYDROmorphone, lidocaine, melatonin, ondansetron **OR** ondansetron, phenoL, sodium chloride 0.9%

## 2025-06-13 NOTE — PROGRESS NOTES
06/13/25 0852   Discharge Planning   Living Arrangements Spouse/significant other   Support Systems Spouse/significant other   Assistance Needed A&Ox4, independent with ADLs, no DME, room air at baseline-[facility] Dr[facility] Ramon Gould   Type of Residence Private residence   Number of Stairs to Enter Residence 3   Number of Stairs Within Residence 20   Do you have animals or pets at home? Yes   Type of Animals or Pets 1 dog - Cord   Home or Post Acute Services In home services   Type of Home Care Services Home nursing visits   Expected Discharge Disposition Home Health  (resumed [facility]), will need internal referral placed. Pt now with PICC and TPN - per team, not expected to dc with PICC and TPN)   Does the patient need discharge transport arranged? Yes   RoundTrip coordination needed? Yes   Has discharge transport been arranged? No

## 2025-06-13 NOTE — PROGRESS NOTES
"Nutrition Follow Up Assessment:   Nutrition Assessment       Patient is a 70 y.o. male with h/o perforated diverticulitis now presenting with SBO 2/2 post-op hematoma. Pt s/p NGT in ED (6/4) for decompression.     IR consulted (6/5) for pelvic hematoma draining w/ drain placement.       Gastrografin enema through colostomy (6/11) reveals contrast not passing into ileum. PICC placed (6/10) for supplemental TPN.      Concern now for adhesive partial obstruction. Will need to wait for possible ABHISHEK until further out from recent surgery.    SBFT today (6/13) produced emesis and therefore reconnected NG to continuous wall suction. Imaging with contrast not extending past duodenum.      Nutrition History:  Food and Nutrient History: Pt resting in bed w/ TPN infusing at goal rate of 70mL/hr without issue. No additional needs at this time. Continue current nutritional course.  Food Allergy:  (None)     Anthropometrics:  Height: 180.3 cm (5' 11\")   Weight: 64.1 kg (141 lb 5 oz)   BMI (Calculated): 19.72  IBW/kg (Dietitian Calculated): 78.2 kg  Percent of IBW: 82 %    Weight History:   Weight         6/4/2025  1735 6/4/2025  2246          Weight: 65.8 kg (145 lb) 64.1 kg (141 lb 5 oz)       Weight Change %:  Weight History / % Weight Change: No updated weights in EMR, unable to assess for changes.  Significant Weight Loss: No    Nutrition Focused Physical Exam Findings:  Defer: See RDN assessment (6/6/25)    Edema:  Edema: none    Physical Findings:  Skin: Positive (Abdominal surgical incision)  Digestive System Findings:  (+Colostomy - SBO w/ decompressive NGT)  Mouth Findings:  (None)    Nutrition Significant Labs:  BMP Trend:   Results from last 7 days   Lab Units 06/13/25  0604 06/12/25  0719 06/11/25  0650 06/10/25  0558   GLUCOSE mg/dL 136* 124* 122* 102*   CALCIUM mg/dL 8.3* 8.2* 7.8* 8.2*   SODIUM mmol/L 133* 132* 131* 135*   POTASSIUM mmol/L 3.7 3.7 4.0 3.6   CO2 mmol/L 26 26 25 27   CHLORIDE mmol/L 99 99 101 102 "   BUN mg/dL 19 14 13 9   CREATININE mg/dL 0.87 0.60 0.61 0.71    , TPN/PPN Labs:   Results from last 7 days   Lab Units 06/13/25  0604 06/12/25  0719 06/11/25  0650 06/10/25  0558 06/09/25  0628 06/09/25  0628   GLUCOSE mg/dL 136* 124* 122* 102*  --  118*   POTASSIUM mmol/L 3.7 3.7 4.0 3.6  --  3.6   PHOSPHORUS mg/dL 4.1 3.6 3.3 3.0   < >  --    MAGNESIUM mg/dL 2.02 1.82 2.01 1.79   < >  --    SODIUM mmol/L 133* 132* 131* 135*  --  136   CHLORIDE mmol/L 99 99 101 102  --  102   ALT U/L  --   --   --   --   --  13   AST U/L  --   --   --   --   --  15   ALK PHOS U/L  --   --   --   --   --  125   BILIRUBIN TOTAL mg/dL  --   --   --   --   --  0.9   TRIGLYCERIDES mg/dL  --  120  --   --   --   --     < > = values in this interval not displayed.      Nutrition Specific Medications:  Scheduled medications  Scheduled Medications[1]  Continuous medications  Continuous Medications[2]  PRN medications  PRN Medications[3]    I/O:   +Colostomy: 30mL output on (6/12)    Dietary Orders (From admission, onward)       Start     Ordered    06/09/25 1509  NPO Diet Except: Ice chips, Sips with meds; Effective now  Diet effective now        Question Answer Comment   Except: Ice chips    Except: Sips with meds        06/09/25 1508    06/04/25 2251  May Participate in Room Service  ( ROOM SERVICE MAY PARTICIPATE)  Once        Question:  .  Answer:  Yes    06/04/25 2250             Estimated Needs:   Total Energy Estimated Needs in 24 hours (kCal):  (1900+)  Method for Estimating Needs: ~30 kcals/kg x CBW (64.1 kg)  Total Protein Estimated Needs in 24 Hours (g):  (75-90)  Method for Estimating 24 Hour Protein Needs: 1.2-1.4 g/kg x CBW (64.1 kg)  Total Fluid Estimated Needs in 24 Hours (mL):  (1900+)  Method for Estimating 24 Hour Fluid Needs: 1mL/kcal or per team      Nutrition Diagnosis   Malnutrition Diagnosis  Patient has Malnutrition Diagnosis: Yes  Diagnosis Status: Active  Malnutrition Diagnosis: Severe malnutrition related to  acute disease or injury  Related to: recent diverticular perforation s/p SBR with colostomy creation -- now c/b SBO secondary to post-op hematoma  As Evidenced by: meeting <75% EENs for the past x 1 month, significant 5% weight loss in <1 month, & moderate muscle wasting/fat loss    Nutrition Interventions/Recommendations      Nutrition Recommendations:  Continue current TPN regimen as ordered:  TPN of 5% AA, 15% dextrose to goal rate of 70mL/hr  Include MVI + trace elements  Provide lipids daily @ 20.8mL/hr x 12hrs overnight (250mL total)    TPN monitoring:  Daily weights  RFP + Mg daily; replete lytes PRN  LFTs + TGs weekly  Accuchecks q6h    Provides: 1693 kcals, 84g protein, 252g dextrose, 30% kcal from fat (meeting 89% kcal & 100% protein needs)      If pt to be discharged on TPN, recommend cycling 48hrs prior to discharge:  Recommend Clinimix 5% AA, 15% Dextrose x 12 hrs  For the first hour: Run @ 80mL/hr  For x 10hrs in between: Titrate up to 165mL/hr  For the final hour: Decrease rate to 80mL/hr  Include MVI + trace elements    Provide lipids daily @ 20.8mL/hr x 12 hours overnight (250mL total)    TPN monitoring --> Same as above    Provides: 1785 kcals, 91g protein, 272g dextrose, 28% from fat (meeting 94% kcal & 100% protein needs)      Nutrition Interventions/Goals:   Parenteral Nutrition: Management of delivery rate of parenteral nutrition  Goal: Continue to tolerate TPN at goal rate      Nutrition Monitoring and Evaluation   Enteral and Parenteral Nutrition Intake Determination: Parenteral nutrition intake - To meet > 75% estimated energy needs    Time Spent (min): 60 minutes              [1] acetaminophen, 650 mg, nasogastric tube, q6h  aspirin, 81 mg, oral, Daily  cyclobenzaprine, 5 mg, oral, BID  diatrizoate tank-diatrizoat sod, 240 mL, oral, Once  fat emulsion-plant based, 250 mL, intravenous, Daily  lidocaine, 5 mL, infiltration, Once  metoprolol succinate XL, 25 mg, oral, Daily  pantoprazole, 40  mg, oral, BID AC  polyethylene glycol, 17 g, oral, Daily  sodium chloride 0.9%, 10 mL, intravenous, q12h LOU  [2] Adult Clinimix Parenteral Nutrition Continuous, 70 mL/hr, Last Rate: 70 mL/hr (06/12/25 2014)  [3] PRN medications: alteplase, HYDROmorphone, HYDROmorphone, lidocaine, melatonin, ondansetron **OR** ondansetron, phenoL, sodium chloride 0.9%

## 2025-06-14 LAB
ALBUMIN SERPL BCP-MCNC: 3.5 G/DL (ref 3.4–5)
ANION GAP SERPL CALC-SCNC: 13 MMOL/L (ref 10–20)
BUN SERPL-MCNC: 23 MG/DL (ref 6–23)
CALCIUM SERPL-MCNC: 8.7 MG/DL (ref 8.6–10.3)
CHLORIDE SERPL-SCNC: 98 MMOL/L (ref 98–107)
CO2 SERPL-SCNC: 25 MMOL/L (ref 21–32)
CREAT SERPL-MCNC: 0.59 MG/DL (ref 0.5–1.3)
EGFRCR SERPLBLD CKD-EPI 2021: >90 ML/MIN/1.73M*2
ERYTHROCYTE [DISTWIDTH] IN BLOOD BY AUTOMATED COUNT: 14.2 % (ref 11.5–14.5)
GLUCOSE SERPL-MCNC: 134 MG/DL (ref 74–99)
HCT VFR BLD AUTO: 37 % (ref 41–52)
HGB BLD-MCNC: 12.1 G/DL (ref 13.5–17.5)
MAGNESIUM SERPL-MCNC: 2.12 MG/DL (ref 1.6–2.4)
MCH RBC QN AUTO: 30.6 PG (ref 26–34)
MCHC RBC AUTO-ENTMCNC: 32.7 G/DL (ref 32–36)
MCV RBC AUTO: 94 FL (ref 80–100)
NRBC BLD-RTO: 0 /100 WBCS (ref 0–0)
PHOSPHATE SERPL-MCNC: 4.1 MG/DL (ref 2.5–4.9)
PLATELET # BLD AUTO: 326 X10*3/UL (ref 150–450)
POTASSIUM SERPL-SCNC: 3.7 MMOL/L (ref 3.5–5.3)
RBC # BLD AUTO: 3.95 X10*6/UL (ref 4.5–5.9)
SODIUM SERPL-SCNC: 132 MMOL/L (ref 136–145)
WBC # BLD AUTO: 7.3 X10*3/UL (ref 4.4–11.3)

## 2025-06-14 PROCEDURE — 2500000005 HC RX 250 GENERAL PHARMACY W/O HCPCS: Performed by: FAMILY MEDICINE

## 2025-06-14 PROCEDURE — 2500000001 HC RX 250 WO HCPCS SELF ADMINISTERED DRUGS (ALT 637 FOR MEDICARE OP): Performed by: PHYSICIAN ASSISTANT

## 2025-06-14 PROCEDURE — 94760 N-INVAS EAR/PLS OXIMETRY 1: CPT

## 2025-06-14 PROCEDURE — 85027 COMPLETE CBC AUTOMATED: CPT | Performed by: STUDENT IN AN ORGANIZED HEALTH CARE EDUCATION/TRAINING PROGRAM

## 2025-06-14 PROCEDURE — 1100000001 HC PRIVATE ROOM DAILY

## 2025-06-14 PROCEDURE — 2500000004 HC RX 250 GENERAL PHARMACY W/ HCPCS (ALT 636 FOR OP/ED): Performed by: SURGERY

## 2025-06-14 PROCEDURE — 2500000001 HC RX 250 WO HCPCS SELF ADMINISTERED DRUGS (ALT 637 FOR MEDICARE OP): Performed by: STUDENT IN AN ORGANIZED HEALTH CARE EDUCATION/TRAINING PROGRAM

## 2025-06-14 PROCEDURE — 2500000005 HC RX 250 GENERAL PHARMACY W/O HCPCS: Performed by: PHYSICIAN ASSISTANT

## 2025-06-14 PROCEDURE — 99233 SBSQ HOSP IP/OBS HIGH 50: CPT | Performed by: STUDENT IN AN ORGANIZED HEALTH CARE EDUCATION/TRAINING PROGRAM

## 2025-06-14 PROCEDURE — 2500000004 HC RX 250 GENERAL PHARMACY W/ HCPCS (ALT 636 FOR OP/ED): Performed by: INTERNAL MEDICINE

## 2025-06-14 PROCEDURE — 80069 RENAL FUNCTION PANEL: CPT | Performed by: STUDENT IN AN ORGANIZED HEALTH CARE EDUCATION/TRAINING PROGRAM

## 2025-06-14 PROCEDURE — 83735 ASSAY OF MAGNESIUM: CPT | Performed by: STUDENT IN AN ORGANIZED HEALTH CARE EDUCATION/TRAINING PROGRAM

## 2025-06-14 PROCEDURE — 2500000004 HC RX 250 GENERAL PHARMACY W/ HCPCS (ALT 636 FOR OP/ED): Performed by: PHYSICIAN ASSISTANT

## 2025-06-14 PROCEDURE — 2500000001 HC RX 250 WO HCPCS SELF ADMINISTERED DRUGS (ALT 637 FOR MEDICARE OP): Performed by: SURGERY

## 2025-06-14 PROCEDURE — 36415 COLL VENOUS BLD VENIPUNCTURE: CPT | Performed by: STUDENT IN AN ORGANIZED HEALTH CARE EDUCATION/TRAINING PROGRAM

## 2025-06-14 RX ORDER — POTASSIUM CHLORIDE 14.9 MG/ML
20 INJECTION INTRAVENOUS ONCE
Status: COMPLETED | OUTPATIENT
Start: 2025-06-14 | End: 2025-06-14

## 2025-06-14 RX ORDER — ACETAMINOPHEN 160 MG/5ML
650 SOLUTION ORAL EVERY 6 HOURS PRN
Status: DISCONTINUED | OUTPATIENT
Start: 2025-06-14 | End: 2025-06-17

## 2025-06-14 RX ORDER — CYCLOBENZAPRINE HCL 5 MG
5 TABLET ORAL 3 TIMES DAILY PRN
Status: DISCONTINUED | OUTPATIENT
Start: 2025-06-14 | End: 2025-06-17

## 2025-06-14 RX ADMIN — PANTOPRAZOLE SODIUM 40 MG: 40 TABLET, DELAYED RELEASE ORAL at 06:10

## 2025-06-14 RX ADMIN — ASPIRIN 81 MG: 81 TABLET, DELAYED RELEASE ORAL at 09:16

## 2025-06-14 RX ADMIN — POTASSIUM CHLORIDE 20 MEQ: 14.9 INJECTION, SOLUTION INTRAVENOUS at 10:21

## 2025-06-14 RX ADMIN — METOPROLOL SUCCINATE 25 MG: 25 TABLET, EXTENDED RELEASE ORAL at 09:16

## 2025-06-14 RX ADMIN — ACETAMINOPHEN 650 MG: 650 SOLUTION ORAL at 09:16

## 2025-06-14 RX ADMIN — I.V. FAT EMULSION 50 G: 20 EMULSION INTRAVENOUS at 19:59

## 2025-06-14 RX ADMIN — CYCLOBENZAPRINE HYDROCHLORIDE 5 MG: 5 TABLET, FILM COATED ORAL at 09:16

## 2025-06-14 RX ADMIN — Medication 10 ML: at 09:17

## 2025-06-14 RX ADMIN — ACETAMINOPHEN 650 MG: 650 SOLUTION ORAL at 15:07

## 2025-06-14 RX ADMIN — LIDOCAINE HYDROCHLORIDE 1.25 ML: 20 SOLUTION ORAL; TOPICAL at 16:24

## 2025-06-14 RX ADMIN — ONDANSETRON 4 MG: 2 INJECTION, SOLUTION INTRAMUSCULAR; INTRAVENOUS at 02:41

## 2025-06-14 RX ADMIN — HYDROMORPHONE HYDROCHLORIDE 0.2 MG: 1 INJECTION, SOLUTION INTRAMUSCULAR; INTRAVENOUS; SUBCUTANEOUS at 08:45

## 2025-06-14 RX ADMIN — Medication 10 ML: at 20:00

## 2025-06-14 RX ADMIN — ACETAMINOPHEN 650 MG: 650 SOLUTION ORAL at 02:41

## 2025-06-14 RX ADMIN — PANTOPRAZOLE SODIUM 40 MG: 40 TABLET, DELAYED RELEASE ORAL at 15:07

## 2025-06-14 ASSESSMENT — COGNITIVE AND FUNCTIONAL STATUS - GENERAL
MOVING TO AND FROM BED TO CHAIR: A LITTLE
TOILETING: A LITTLE
WALKING IN HOSPITAL ROOM: A LITTLE
DRESSING REGULAR LOWER BODY CLOTHING: A LITTLE
MOBILITY SCORE: 18
TURNING FROM BACK TO SIDE WHILE IN FLAT BAD: A LITTLE
MOVING FROM LYING ON BACK TO SITTING ON SIDE OF FLAT BED WITH BEDRAILS: A LITTLE
WALKING IN HOSPITAL ROOM: A LITTLE
MOVING TO AND FROM BED TO CHAIR: A LITTLE
STANDING UP FROM CHAIR USING ARMS: A LITTLE
TURNING FROM BACK TO SIDE WHILE IN FLAT BAD: A LITTLE
STANDING UP FROM CHAIR USING ARMS: A LITTLE
DAILY ACTIVITIY SCORE: 20
MOVING FROM LYING ON BACK TO SITTING ON SIDE OF FLAT BED WITH BEDRAILS: A LITTLE
MOBILITY SCORE: 18
TOILETING: A LITTLE
HELP NEEDED FOR BATHING: A LITTLE
DAILY ACTIVITIY SCORE: 20
DRESSING REGULAR LOWER BODY CLOTHING: A LITTLE
DRESSING REGULAR UPPER BODY CLOTHING: A LITTLE
CLIMB 3 TO 5 STEPS WITH RAILING: A LITTLE
CLIMB 3 TO 5 STEPS WITH RAILING: A LITTLE
DRESSING REGULAR UPPER BODY CLOTHING: A LITTLE
HELP NEEDED FOR BATHING: A LITTLE

## 2025-06-14 ASSESSMENT — PAIN SCALES - GENERAL
PAINLEVEL_OUTOF10: 6
PAINLEVEL_OUTOF10: 0 - NO PAIN
PAINLEVEL_OUTOF10: 4
PAINLEVEL_OUTOF10: 0 - NO PAIN

## 2025-06-14 ASSESSMENT — PAIN - FUNCTIONAL ASSESSMENT
PAIN_FUNCTIONAL_ASSESSMENT: 0-10

## 2025-06-14 ASSESSMENT — PAIN DESCRIPTION - LOCATION: LOCATION: ABDOMEN

## 2025-06-14 NOTE — PROGRESS NOTES
"General/Trauma Surgery Daily Progress Note    Subjective   No changes today.       Objective   Last Recorded Vitals:  Blood pressure 139/87, pulse 84, temperature 36.4 °C (97.5 °F), temperature source Temporal, resp. rate 16, height 1.803 m (5' 11\"), weight 64.1 kg (141 lb 5 oz), SpO2 96%.    Intake/Output last 3 Shifts:  I/O last 3 completed shifts:  In: 3992 (62.3 mL/kg) [P.O.:90; I.V.:50 (0.8 mL/kg); NG/GT:90]  Out: 4165 (65 mL/kg) [Urine:1275 (0.6 mL/kg/hr); Emesis/NG output:2700; Drains:60; Stool:130]  Weight: 64.1 kg     Pain Score:  0-10 (Numeric) Pain Score: 4     Physical Exam:  Constitutional: No acute distress, sitting up in bed.  Neuro: Alert, oriented. Follows commands.   Eyes: EOMI. No scleral icterus. Conjunctiva pink.  ENT: NG placed to suction with 500cc clear bilious drainage.  Heart: Regular rate.  Respiratory: No increased work of breathing or audible wheeze.  Abdomen: Soft, nondistended. Colostomy with scant gas and liquid stool.  MSK: Moves all extremities.  Vascular: Palpable pulses throughout, no pitting edema.  Skin: No rashes.   Psychological: Appropriate mood and behavior.            Relevant Results  Laboratory Results:  CBC:   Lab Results   Component Value Date    WBC 7.3 06/14/2025    RBC 3.95 (L) 06/14/2025    HGB 12.1 (L) 06/14/2025    HCT 37.0 (L) 06/14/2025     06/14/2025       RFP:   Lab Results   Component Value Date     (L) 06/14/2025    K 3.7 06/14/2025    CL 98 06/14/2025    CO2 25 06/14/2025    BUN 23 06/14/2025    CREATININE 0.59 06/14/2025    CALCIUM 8.7 06/14/2025    MG 2.12 06/14/2025    PHOS 4.1 06/14/2025        LFTs:   Lab Results   Component Value Date    ALBUMIN 3.5 06/14/2025         Imaging:  FL small bowel series  Result Date: 6/13/2025  1. Suspect complete bowel obstruction. Recommend clinical correlation. Further evaluation with CT scan of the abdomen may be performed for better assessment if clinically warranted.   SUPPLEMENTAL INFORMATION: Notifi " message was left for RUSH DAVIS regarding this exam by Dr. Tomlinson on 6/13/2025 at approximately 11:50 hours.   MACRO: None   Signed by: Chintan Tomlinson 6/13/2025 12:00 PM Dictation workstation:   [id number]          Assessment/Plan   This is a 70 y.o. male s/p Surinder for perforated diverticulitis 5/15/25.     Admitted 6/4/25 for possible ileus 2/2 intra-abdominal hematoma s/p NG decompression and percutaneous drain placement for hematoma     Gastrografin enema through colostomy 6/11 with continued dilated small bowel.      SBFT showed obstruction still.       Plan:   -- PICC ordered, TPN 70 ml/hr  -- Daily RFP, weekly LFT, lipid  -- Multimodal pain regimen - tylenol, toradol, prn dilaudid  -- NPO, sips with meds and ice chips ok  -- NG continuous wall suction. Flush 4 times daily. OK to clamp for PO meds and ambulation  -- Colostomy care  -- Record NG and colostomy output   -- Probable surgery Tuesday        Anoop Canales MD  General Surgery  Office: [phone]  Fax:     [phone]  10:30 AM   06/14/25

## 2025-06-14 NOTE — PROGRESS NOTES
"    [facility]  Department of Hospital Medicine    PROGRESS NOTE    Diego Morales is a 70 y.o. male on day 10 of admission presenting with Small bowel obstruction (Multi).    Subjective   Feels the same.  Continued NG output.  Minimal colostomy output.       Objective     Con: awake, alert; no distress;   Eyes: conjunctiva wnl; EOMI;   ENMT: hearing intact; MMM; NG tube  MSK: ROM wnl; digits wnl;   Resp: normal work of breathing; no cyanosis;   Neuro: moving all extremities; no abnormal movements  Psych: oriented to situation; affect wnl;     Last Recorded Vitals:  /84   Pulse 98   Temp 36.4 °C (97.5 °F) (Temporal)   Resp 16   Ht 1.803 m (5' 11\")   Wt 64.1 kg (141 lb 5 oz)   SpO2 95%   BMI 19.71 kg/m²      Scheduled medications:  Scheduled Medications[1]  Continuous medications:  Continuous Medications[2]  PRN medications:  PRN Medications[3]     Relevant Results:  Lab Results   Component Value Date    WBC 7.3 06/14/2025    HGB 12.1 (L) 06/14/2025    HCT 37.0 (L) 06/14/2025    MCV 94 06/14/2025     06/14/2025      Lab Results   Component Value Date    GLUCOSE 134 (H) 06/14/2025    CALCIUM 8.7 06/14/2025     (L) 06/14/2025    K 3.7 06/14/2025    CO2 25 06/14/2025    CL 98 06/14/2025    BUN 23 06/14/2025    CREATININE 0.59 06/14/2025                Malnutrition Diagnosis Status: Active  Malnutrition Diagnosis: Severe malnutrition related to acute disease or injury  Related to: recent diverticular perforation s/p SBR with colostomy creation -- now c/b SBO secondary to post-op hematoma  As Evidenced by: meeting <75% EENs for the past x 1 month, significant 5% weight loss in <1 month, & moderate muscle wasting/fat loss  I agree with the dietitian's malnutrition diagnosis.          Assessment/Plan   Assessment & Plan    71yo CM with PMH of mitral valve prolapse, cardiac arrest during cath in 2016 due to vfib needing cardioversion, and recent diverticulitis " with perforation s/p resection and colostomy bag on 5/15/25 that presented to the ED with abdominal pain and was admitted for SBO.      SBO, not resolving  - evidenced on admitting imaging  - general surgery following, rec maintain NG tube to LIWS; Gastrografin study shows persistent small bowel dilation  - continue zofran PRN for symptoms  - keep NPO  - continue PPI IV BID   - PICC placed; started on TPN given prolonged n.p.o. status  - small bowel follow-through 6/13; showed complete bowel obstruction; anticipate will need surgery in the next few days  - continue to monitor     Abdominal hematoma  - CT showed 7.3cm x 13cm x 8.6cm abnormality in mass vs fluid collection  - IR drain placed with dark hemorrhagic hematoma collected but minimal output  - cultures NGTD  - monitor output closely but limited    Hyponatremia:  -Likely due to n.p.o. status, decreased solute intake  - trend     Recent diverticulitis with perforation  - s/p colectomy with colostomy on 5/15/25     Mitral valve prolapse, Hx of arrest  - Continue metoprolol     DVT Proph: SCDs only     Dispo: Remain inpatient, anticipating surgery on 6/17 followed by a few days of recovery.  Unable to leave the hospital at this time with complete SBO requiring ongoing NG decompression           Leonard Mcconnell MD    Patient Name:  Diego Morales   MRN:   [id number]   Room/Bed:  132/132-A        [1] aspirin, 81 mg, oral, Daily  diatrizoate tank-diatrizoat sod, 240 mL, oral, Once  fat emulsion-plant based, 250 mL, intravenous, Daily  lidocaine, 5 mL, infiltration, Once  metoprolol succinate XL, 25 mg, oral, Daily  pantoprazole, 40 mg, oral, BID AC  [Held by provider] polyethylene glycol, 17 g, oral, Daily  sodium chloride 0.9%, 10 mL, intravenous, q12h LOU     [2] Adult Clinimix Parenteral Nutrition Continuous, 70 mL/hr, Last Rate: 70 mL/hr (06/13/25 2021)     [3] PRN medications: acetaminophen, alteplase, cyclobenzaprine, HYDROmorphone, HYDROmorphone, lidocaine,  melatonin, ondansetron **OR** ondansetron, phenoL, sodium chloride 0.9%

## 2025-06-15 VITALS
RESPIRATION RATE: 16 BRPM | HEIGHT: 71 IN | TEMPERATURE: 98.1 F | WEIGHT: 141.31 LBS | SYSTOLIC BLOOD PRESSURE: 154 MMHG | BODY MASS INDEX: 19.78 KG/M2 | HEART RATE: 98 BPM | DIASTOLIC BLOOD PRESSURE: 88 MMHG | OXYGEN SATURATION: 95 %

## 2025-06-15 LAB
ALBUMIN SERPL BCP-MCNC: 3.3 G/DL (ref 3.4–5)
ANION GAP SERPL CALC-SCNC: 12 MMOL/L (ref 10–20)
BUN SERPL-MCNC: 22 MG/DL (ref 6–23)
CALCIUM SERPL-MCNC: 8.7 MG/DL (ref 8.6–10.3)
CHLORIDE SERPL-SCNC: 96 MMOL/L (ref 98–107)
CO2 SERPL-SCNC: 26 MMOL/L (ref 21–32)
CREAT SERPL-MCNC: 0.63 MG/DL (ref 0.5–1.3)
EGFRCR SERPLBLD CKD-EPI 2021: >90 ML/MIN/1.73M*2
ERYTHROCYTE [DISTWIDTH] IN BLOOD BY AUTOMATED COUNT: 14.1 % (ref 11.5–14.5)
GLUCOSE SERPL-MCNC: 155 MG/DL (ref 74–99)
HCT VFR BLD AUTO: 36.4 % (ref 41–52)
HGB BLD-MCNC: 11.9 G/DL (ref 13.5–17.5)
MAGNESIUM SERPL-MCNC: 2 MG/DL (ref 1.6–2.4)
MCH RBC QN AUTO: 30.3 PG (ref 26–34)
MCHC RBC AUTO-ENTMCNC: 32.7 G/DL (ref 32–36)
MCV RBC AUTO: 93 FL (ref 80–100)
NRBC BLD-RTO: 0 /100 WBCS (ref 0–0)
PHOSPHATE SERPL-MCNC: 3.6 MG/DL (ref 2.5–4.9)
PLATELET # BLD AUTO: 305 X10*3/UL (ref 150–450)
POTASSIUM SERPL-SCNC: 4.1 MMOL/L (ref 3.5–5.3)
RBC # BLD AUTO: 3.93 X10*6/UL (ref 4.5–5.9)
SODIUM SERPL-SCNC: 130 MMOL/L (ref 136–145)
WBC # BLD AUTO: 8.3 X10*3/UL (ref 4.4–11.3)

## 2025-06-15 PROCEDURE — 99233 SBSQ HOSP IP/OBS HIGH 50: CPT | Performed by: STUDENT IN AN ORGANIZED HEALTH CARE EDUCATION/TRAINING PROGRAM

## 2025-06-15 PROCEDURE — 80069 RENAL FUNCTION PANEL: CPT | Performed by: SURGERY

## 2025-06-15 PROCEDURE — 2500000001 HC RX 250 WO HCPCS SELF ADMINISTERED DRUGS (ALT 637 FOR MEDICARE OP): Performed by: STUDENT IN AN ORGANIZED HEALTH CARE EDUCATION/TRAINING PROGRAM

## 2025-06-15 PROCEDURE — 94760 N-INVAS EAR/PLS OXIMETRY 1: CPT

## 2025-06-15 PROCEDURE — 85027 COMPLETE CBC AUTOMATED: CPT | Performed by: SURGERY

## 2025-06-15 PROCEDURE — 2500000004 HC RX 250 GENERAL PHARMACY W/ HCPCS (ALT 636 FOR OP/ED): Performed by: INTERNAL MEDICINE

## 2025-06-15 PROCEDURE — 2500000001 HC RX 250 WO HCPCS SELF ADMINISTERED DRUGS (ALT 637 FOR MEDICARE OP): Performed by: PHYSICIAN ASSISTANT

## 2025-06-15 PROCEDURE — 83735 ASSAY OF MAGNESIUM: CPT | Performed by: SURGERY

## 2025-06-15 PROCEDURE — 2500000001 HC RX 250 WO HCPCS SELF ADMINISTERED DRUGS (ALT 637 FOR MEDICARE OP): Performed by: SURGERY

## 2025-06-15 PROCEDURE — 2500000005 HC RX 250 GENERAL PHARMACY W/O HCPCS: Performed by: PHYSICIAN ASSISTANT

## 2025-06-15 PROCEDURE — 36415 COLL VENOUS BLD VENIPUNCTURE: CPT | Performed by: SURGERY

## 2025-06-15 PROCEDURE — 1100000001 HC PRIVATE ROOM DAILY

## 2025-06-15 RX ORDER — SODIUM CHLORIDE 1000 MG
1000 TABLET, SOLUBLE MISCELLANEOUS
Status: DISCONTINUED | OUTPATIENT
Start: 2025-06-15 | End: 2025-06-18

## 2025-06-15 RX ADMIN — PANTOPRAZOLE SODIUM 40 MG: 40 TABLET, DELAYED RELEASE ORAL at 06:00

## 2025-06-15 RX ADMIN — ASCORBIC ACID, VITAMIN A PALMITATE, CHOLECALCIFEROL, THIAMINE HYDROCHLORIDE, RIBOFLAVIN-5 PHOSPHATE SODIUM, PYRIDOXINE HYDROCHLORIDE, NIACINAMIDE, DEXPANTHENOL, ALPHA-TOCOPHEROL ACETATE, VITAMIN K1, FOLIC ACID, BIOTIN, CYANOCOBALAMIN: 200; 3300; 200; 6; 3.6; 6; 40; 15; 10; 150; 600; 60; 5 INJECTION, SOLUTION INTRAVENOUS at 03:37

## 2025-06-15 RX ADMIN — Medication 10 ML: at 20:03

## 2025-06-15 RX ADMIN — Medication 1 G: at 16:14

## 2025-06-15 RX ADMIN — ASCORBIC ACID, VITAMIN A PALMITATE, CHOLECALCIFEROL, THIAMINE HYDROCHLORIDE, RIBOFLAVIN-5 PHOSPHATE SODIUM, PYRIDOXINE HYDROCHLORIDE, NIACINAMIDE, DEXPANTHENOL, ALPHA-TOCOPHEROL ACETATE, VITAMIN K1, FOLIC ACID, BIOTIN, CYANOCOBALAMIN: 200; 3300; 200; 6; 3.6; 6; 40; 15; 10; 150; 600; 60; 5 INJECTION, SOLUTION INTRAVENOUS at 21:55

## 2025-06-15 RX ADMIN — METOPROLOL SUCCINATE 25 MG: 25 TABLET, EXTENDED RELEASE ORAL at 08:30

## 2025-06-15 RX ADMIN — ACETAMINOPHEN 650 MG: 650 SOLUTION ORAL at 08:24

## 2025-06-15 RX ADMIN — ASPIRIN 81 MG: 81 TABLET, DELAYED RELEASE ORAL at 08:24

## 2025-06-15 RX ADMIN — Medication 10 ML: at 08:49

## 2025-06-15 RX ADMIN — ONDANSETRON 4 MG: 2 INJECTION, SOLUTION INTRAMUSCULAR; INTRAVENOUS at 08:24

## 2025-06-15 RX ADMIN — I.V. FAT EMULSION 50 G: 20 EMULSION INTRAVENOUS at 20:03

## 2025-06-15 RX ADMIN — PANTOPRAZOLE SODIUM 40 MG: 40 TABLET, DELAYED RELEASE ORAL at 16:14

## 2025-06-15 ASSESSMENT — PAIN SCALES - GENERAL
PAINLEVEL_OUTOF10: 0 - NO PAIN
PAINLEVEL_OUTOF10: 0 - NO PAIN

## 2025-06-15 ASSESSMENT — COGNITIVE AND FUNCTIONAL STATUS - GENERAL
DRESSING REGULAR LOWER BODY CLOTHING: A LITTLE
STANDING UP FROM CHAIR USING ARMS: A LITTLE
DAILY ACTIVITIY SCORE: 21
HELP NEEDED FOR BATHING: A LITTLE
CLIMB 3 TO 5 STEPS WITH RAILING: A LITTLE
WALKING IN HOSPITAL ROOM: A LITTLE
MOVING TO AND FROM BED TO CHAIR: A LITTLE
WALKING IN HOSPITAL ROOM: A LITTLE
TURNING FROM BACK TO SIDE WHILE IN FLAT BAD: A LITTLE
MOVING FROM LYING ON BACK TO SITTING ON SIDE OF FLAT BED WITH BEDRAILS: A LITTLE
HELP NEEDED FOR BATHING: A LITTLE
MOVING FROM LYING ON BACK TO SITTING ON SIDE OF FLAT BED WITH BEDRAILS: A LITTLE
TOILETING: A LITTLE
MOVING TO AND FROM BED TO CHAIR: A LITTLE
TURNING FROM BACK TO SIDE WHILE IN FLAT BAD: A LITTLE
DRESSING REGULAR LOWER BODY CLOTHING: A LITTLE
STANDING UP FROM CHAIR USING ARMS: A LITTLE
DRESSING REGULAR UPPER BODY CLOTHING: A LITTLE
DRESSING REGULAR UPPER BODY CLOTHING: A LITTLE
CLIMB 3 TO 5 STEPS WITH RAILING: A LITTLE
MOBILITY SCORE: 18

## 2025-06-15 ASSESSMENT — PAIN - FUNCTIONAL ASSESSMENT
PAIN_FUNCTIONAL_ASSESSMENT: 0-10
PAIN_FUNCTIONAL_ASSESSMENT: 0-10

## 2025-06-15 NOTE — PROGRESS NOTES
"    [facility]  Department of Hospital Medicine    PROGRESS NOTE    Diego Morales is a 70 y.o. male on day 11 of admission presenting with Small bowel obstruction (Multi).    Subjective   Abdominal cramping.  Mood is down.       Objective     Con: awake, alert; no distress;   Eyes: conjunctiva wnl; EOMI;   ENMT: hearing intact; MMM; NG tube  MSK: ROM wnl; digits wnl;   GI: Diminished bowel sounds, ostomy present  Resp: normal work of breathing; no cyanosis;   Neuro: moving all extremities; no abnormal movements  Psych: oriented to situation; affect wnl;     Last Recorded Vitals:  /85 (BP Location: Right arm, Patient Position: Lying)   Pulse 102   Temp 36.7 °C (98.1 °F) (Temporal)   Resp 16   Ht 1.803 m (5' 11\")   Wt 64.1 kg (141 lb 5 oz)   SpO2 92%   BMI 19.71 kg/m²      Scheduled medications:  Scheduled Medications[1]  Continuous medications:  Continuous Medications[2]  PRN medications:  PRN Medications[3]     Relevant Results:  Lab Results   Component Value Date    WBC 8.3 06/15/2025    HGB 11.9 (L) 06/15/2025    HCT 36.4 (L) 06/15/2025    MCV 93 06/15/2025     06/15/2025      Lab Results   Component Value Date    GLUCOSE 155 (H) 06/15/2025    CALCIUM 8.7 06/15/2025     (L) 06/15/2025    K 4.1 06/15/2025    CO2 26 06/15/2025    CL 96 (L) 06/15/2025    BUN 22 06/15/2025    CREATININE 0.63 06/15/2025                Malnutrition Diagnosis Status: Active  Malnutrition Diagnosis: Severe malnutrition related to acute disease or injury  Related to: recent diverticular perforation s/p SBR with colostomy creation -- now c/b SBO secondary to post-op hematoma  As Evidenced by: meeting <75% EENs for the past x 1 month, significant 5% weight loss in <1 month, & moderate muscle wasting/fat loss  I agree with the dietitian's malnutrition diagnosis.          Assessment/Plan   Assessment & Plan    69yo CM with PMH of mitral valve prolapse, cardiac arrest during cath " in 2016 due to vfib needing cardioversion, and recent diverticulitis with perforation s/p resection and colostomy bag on 5/15/25 that presented to the ED with abdominal pain and was admitted for SBO.      SBO, not resolving  - evidenced on admitting imaging  - general surgery following, rec maintain NG tube to LIWS; Gastrografin study shows persistent small bowel dilation  - continue zofran PRN for symptoms  - keep NPO  - continue PPI IV BID   - PICC placed; started on TPN given prolonged n.p.o. status  - small bowel follow-through 6/13; showed complete bowel obstruction  - Discussed with surgery: High risk for repeat surgery this early, but suspect there will be no resolution of obstruction with conservative management as he likely has an adhesion causing continued total obstruction; would not be able to leave an NG tube in place for 2 more weeks, and PEG placement is not a reasonable option, so will plan for surgery on Tuesday  - continue to monitor     Abdominal hematoma  - CT showed 7.3cm x 13cm x 8.6cm abnormality in mass vs fluid collection  - IR drain placed with dark hemorrhagic hematoma collected but minimal output  - cultures NGTD  - monitor output closely but limited    Hyponatremia:  -Likely due to n.p.o. status, decreased solute intake  - trend     Recent diverticulitis with perforation  - s/p colectomy with colostomy on 5/15/25     Mitral valve prolapse, Hx of arrest  - Continue metoprolol     DVT Proph: SCDs only     Dispo: Remain inpatient, anticipating surgery on 6/17 followed by a few days of recovery.  Unable to leave the hospital at this time with complete SBO requiring ongoing NG decompression           Leonard Mcconnell MD    Patient Name:  Diego Morales   MRN:   [id number]   Room/Bed:  132/132-A        [1] aspirin, 81 mg, oral, Daily  fat emulsion-plant based, 250 mL, intravenous, Daily  lidocaine, 5 mL, infiltration, Once  metoprolol succinate XL, 25 mg, oral, Daily  pantoprazole, 40 mg, oral,  BID AC  [Held by provider] polyethylene glycol, 17 g, oral, Daily  sodium chloride 0.9%, 10 mL, intravenous, q12h LOU  sodium chloride, 1,000 mg, nasogastric tube, BID     [2] Adult Clinimix Parenteral Nutrition Continuous, 70 mL/hr, Last Rate: 70 mL/hr (06/15/25 0638)     [3] PRN medications: acetaminophen, alteplase, cyclobenzaprine, HYDROmorphone, HYDROmorphone, lidocaine, melatonin, ondansetron **OR** ondansetron, phenoL, sodium chloride 0.9%

## 2025-06-15 NOTE — CARE PLAN
The patient's goals for the shift include      The clinical goals for the shift include encourage mobility, monitor intake and output      Problem: Pain - Adult  Goal: Verbalizes/displays adequate comfort level or baseline comfort level  Outcome: Progressing     Problem: Safety - Adult  Goal: Free from fall injury  Outcome: Progressing     Problem: Discharge Planning  Goal: Discharge to home or other facility with appropriate resources  Outcome: Progressing     Problem: Chronic Conditions and Co-morbidities  Goal: Patient's chronic conditions and co-morbidity symptoms are monitored and maintained or improved  Outcome: Progressing     Problem: Nutrition  Goal: Nutrient intake appropriate for maintaining nutritional needs  Outcome: Progressing     Problem: Pain  Goal: Takes deep breaths with improved pain control throughout the shift  Outcome: Progressing  Goal: Turns in bed with improved pain control throughout the shift  Outcome: Progressing  Goal: Walks with improved pain control throughout the shift  Outcome: Progressing  Goal: Performs ADL's with improved pain control throughout shift  Outcome: Progressing  Goal: Participates in PT with improved pain control throughout the shift  Outcome: Progressing  Goal: Free from opioid side effects throughout the shift  Outcome: Progressing  Goal: Free from acute confusion related to pain meds throughout the shift  Outcome: Progressing     Problem: Fall/Injury  Goal: Not fall by end of shift  Outcome: Progressing  Goal: Be free from injury by end of the shift  Outcome: Progressing  Goal: Verbalize understanding of personal risk factors for fall in the hospital  Outcome: Progressing  Goal: Verbalize understanding of risk factor reduction measures to prevent injury from fall in the home  Outcome: Progressing  Goal: Use assistive devices by end of the shift  Outcome: Progressing  Goal: Pace activities to prevent fatigue by end of the shift  Outcome: Progressing     Problem:  Skin  Goal: Decreased wound size/increased tissue granulation at next dressing change  Outcome: Progressing  Goal: Participates in plan/prevention/treatment measures  Outcome: Progressing  Flowsheets (Taken 6/14/2025 2301)  Participates in plan/prevention/treatment measures: Elevate heels  Goal: Prevent/manage excess moisture  Outcome: Progressing  Goal: Prevent/minimize sheer/friction injuries  Outcome: Progressing  Goal: Promote/optimize nutrition  Outcome: Progressing  Goal: Promote skin healing  Outcome: Progressing

## 2025-06-15 NOTE — CARE PLAN
The patient's goals for the shift include      The clinical goals for the shift include Pt will be free from injury this shift    Over the shift, the patient did not make progress toward the following goals. Barriers to progression include unfamiliar hospital environment, new medications and medical conditions. Recommendations to address these barriers include frequent observation, assist with repositioning as needed and ensure the call light is within reach..

## 2025-06-15 NOTE — PROGRESS NOTES
"General/Trauma Surgery Daily Progress Note    Subjective   No changes today.       Objective   Last Recorded Vitals:  Blood pressure 136/85, pulse 102, temperature 36.7 °C (98.1 °F), temperature source Temporal, resp. rate 16, height 1.803 m (5' 11\"), weight 64.1 kg (141 lb 5 oz), SpO2 92%.    Intake/Output last 3 Shifts:  I/O last 3 completed shifts:  In: 2855.3 (44.5 mL/kg) [P.O.:45; NG/GT:60; IV Piggyback:100]  Out: 2795 (43.6 mL/kg) [Urine:1400 (0.6 mL/kg/hr); Emesis/NG output:1100; Drains:95; Stool:200]  Weight: 64.1 kg     Pain Score:  0-10 (Numeric) Pain Score: 0 - No pain     Physical Exam:  Constitutional: No acute distress, sitting up in bed.  Neuro: Alert, oriented. Follows commands.   Eyes: EOMI. No scleral icterus. Conjunctiva pink.  ENT: NG placed to suction with 500cc clear bilious drainage.  Heart: Regular rate.  Respiratory: No increased work of breathing or audible wheeze.  Abdomen: Soft, nondistended. Colostomy with scant gas and liquid stool.  MSK: Moves all extremities.  Vascular: Palpable pulses throughout, no pitting edema.  Skin: No rashes.   Psychological: Appropriate mood and behavior.            Relevant Results  Laboratory Results:  CBC:   Lab Results   Component Value Date    WBC 8.3 06/15/2025    RBC 3.93 (L) 06/15/2025    HGB 11.9 (L) 06/15/2025    HCT 36.4 (L) 06/15/2025     06/15/2025       RFP:   Lab Results   Component Value Date     (L) 06/15/2025    K 4.1 06/15/2025    CL 96 (L) 06/15/2025    CO2 26 06/15/2025    BUN 22 06/15/2025    CREATININE 0.63 06/15/2025    CALCIUM 8.7 06/15/2025    MG 2.00 06/15/2025    PHOS 3.6 06/15/2025        LFTs:   Lab Results   Component Value Date    ALBUMIN 3.3 (L) 06/15/2025         Imaging:  FL small bowel series  Result Date: 6/13/2025  1. Suspect complete bowel obstruction. Recommend clinical correlation. Further evaluation with CT scan of the abdomen may be performed for better assessment if clinically warranted.   SUPPLEMENTAL " INFORMATION: Notifi message was left for RUSH LUCIDOMINGUEZ regarding this exam by Dr. Tomlinson on 6/13/2025 at approximately 11:50 hours.   MACRO: None   Signed by: Chintan Tomlinson 6/13/2025 12:00 PM Dictation workstation:   [id number]          Assessment/Plan   This is a 70 y.o. male s/p Surinder for perforated diverticulitis 5/15/25.     Admitted 6/4/25 for possible ileus 2/2 intra-abdominal hematoma s/p NG decompression and percutaneous drain placement for hematoma     Gastrografin enema through colostomy 6/11 with continued dilated small bowel.      SBFT showed obstruction still.       Plan:   -- PICC ordered, TPN 70 ml/hr  -- Daily RFP, weekly LFT, lipid  -- Multimodal pain regimen - tylenol, toradol, prn dilaudid  -- NPO, sips with meds and ice chips ok  -- NG continuous wall suction. Flush 4 times daily. OK to clamp for PO meds and ambulation  -- Colostomy care  -- Record NG and colostomy output   -- Probable surgery Tuesday        Aonop Canales MD  General Surgery  Office: [phone]  Fax:     [phone]  11:18 AM   06/15/25

## 2025-06-16 ENCOUNTER — ANESTHESIA EVENT (OUTPATIENT)
Dept: OPERATING ROOM | Facility: HOSPITAL | Age: 70
End: 2025-06-16
Payer: MEDICARE

## 2025-06-16 LAB
ALBUMIN SERPL BCP-MCNC: 3.3 G/DL (ref 3.4–5)
ANION GAP SERPL CALC-SCNC: 13 MMOL/L (ref 10–20)
BUN SERPL-MCNC: 21 MG/DL (ref 6–23)
CALCIUM SERPL-MCNC: 8.6 MG/DL (ref 8.6–10.3)
CHLORIDE SERPL-SCNC: 95 MMOL/L (ref 98–107)
CO2 SERPL-SCNC: 27 MMOL/L (ref 21–32)
CREAT SERPL-MCNC: 0.59 MG/DL (ref 0.5–1.3)
EGFRCR SERPLBLD CKD-EPI 2021: >90 ML/MIN/1.73M*2
ERYTHROCYTE [DISTWIDTH] IN BLOOD BY AUTOMATED COUNT: 14.1 % (ref 11.5–14.5)
GLUCOSE SERPL-MCNC: 136 MG/DL (ref 74–99)
HCT VFR BLD AUTO: 34.2 % (ref 41–52)
HGB BLD-MCNC: 11.7 G/DL (ref 13.5–17.5)
MAGNESIUM SERPL-MCNC: 2.01 MG/DL (ref 1.6–2.4)
MCH RBC QN AUTO: 29.8 PG (ref 26–34)
MCHC RBC AUTO-ENTMCNC: 34.2 G/DL (ref 32–36)
MCV RBC AUTO: 87 FL (ref 80–100)
NRBC BLD-RTO: 0 /100 WBCS (ref 0–0)
PHOSPHATE SERPL-MCNC: 3.6 MG/DL (ref 2.5–4.9)
PLATELET # BLD AUTO: 298 X10*3/UL (ref 150–450)
POTASSIUM SERPL-SCNC: 3.9 MMOL/L (ref 3.5–5.3)
RBC # BLD AUTO: 3.93 X10*6/UL (ref 4.5–5.9)
SODIUM SERPL-SCNC: 131 MMOL/L (ref 136–145)
WBC # BLD AUTO: 8 X10*3/UL (ref 4.4–11.3)

## 2025-06-16 PROCEDURE — 2500000004 HC RX 250 GENERAL PHARMACY W/ HCPCS (ALT 636 FOR OP/ED): Performed by: INTERNAL MEDICINE

## 2025-06-16 PROCEDURE — 36415 COLL VENOUS BLD VENIPUNCTURE: CPT | Performed by: SURGERY

## 2025-06-16 PROCEDURE — 1100000001 HC PRIVATE ROOM DAILY

## 2025-06-16 PROCEDURE — 2500000001 HC RX 250 WO HCPCS SELF ADMINISTERED DRUGS (ALT 637 FOR MEDICARE OP): Performed by: PHYSICIAN ASSISTANT

## 2025-06-16 PROCEDURE — 2500000001 HC RX 250 WO HCPCS SELF ADMINISTERED DRUGS (ALT 637 FOR MEDICARE OP): Performed by: STUDENT IN AN ORGANIZED HEALTH CARE EDUCATION/TRAINING PROGRAM

## 2025-06-16 PROCEDURE — 80069 RENAL FUNCTION PANEL: CPT | Performed by: SURGERY

## 2025-06-16 PROCEDURE — 83735 ASSAY OF MAGNESIUM: CPT | Performed by: SURGERY

## 2025-06-16 PROCEDURE — 85027 COMPLETE CBC AUTOMATED: CPT | Performed by: SURGERY

## 2025-06-16 PROCEDURE — 2500000001 HC RX 250 WO HCPCS SELF ADMINISTERED DRUGS (ALT 637 FOR MEDICARE OP): Performed by: SURGERY

## 2025-06-16 PROCEDURE — 99233 SBSQ HOSP IP/OBS HIGH 50: CPT | Performed by: STUDENT IN AN ORGANIZED HEALTH CARE EDUCATION/TRAINING PROGRAM

## 2025-06-16 PROCEDURE — 2500000005 HC RX 250 GENERAL PHARMACY W/O HCPCS: Performed by: PHYSICIAN ASSISTANT

## 2025-06-16 RX ORDER — PROCHLORPERAZINE EDISYLATE 5 MG/ML
10 INJECTION INTRAMUSCULAR; INTRAVENOUS EVERY 6 HOURS PRN
Status: DISCONTINUED | OUTPATIENT
Start: 2025-06-16 | End: 2025-07-02 | Stop reason: HOSPADM

## 2025-06-16 RX ORDER — ONDANSETRON HYDROCHLORIDE 2 MG/ML
4 INJECTION, SOLUTION INTRAVENOUS EVERY 6 HOURS PRN
Status: DISCONTINUED | OUTPATIENT
Start: 2025-06-16 | End: 2025-07-02 | Stop reason: HOSPADM

## 2025-06-16 RX ORDER — LIDOCAINE HYDROCHLORIDE 20 MG/ML
1.25 SOLUTION OROPHARYNGEAL AS NEEDED
Status: DISCONTINUED | OUTPATIENT
Start: 2025-06-16 | End: 2025-07-02 | Stop reason: HOSPADM

## 2025-06-16 RX ADMIN — Medication 10 ML: at 09:42

## 2025-06-16 RX ADMIN — Medication 1 G: at 09:42

## 2025-06-16 RX ADMIN — PANTOPRAZOLE SODIUM 40 MG: 40 TABLET, DELAYED RELEASE ORAL at 17:02

## 2025-06-16 RX ADMIN — METOPROLOL SUCCINATE 25 MG: 25 TABLET, EXTENDED RELEASE ORAL at 09:42

## 2025-06-16 RX ADMIN — Medication 1 G: at 17:02

## 2025-06-16 RX ADMIN — I.V. FAT EMULSION 50 G: 20 EMULSION INTRAVENOUS at 21:10

## 2025-06-16 RX ADMIN — PANTOPRAZOLE SODIUM 40 MG: 40 TABLET, DELAYED RELEASE ORAL at 06:00

## 2025-06-16 RX ADMIN — Medication 10 ML: at 21:10

## 2025-06-16 RX ADMIN — ONDANSETRON 4 MG: 2 INJECTION, SOLUTION INTRAMUSCULAR; INTRAVENOUS at 14:24

## 2025-06-16 RX ADMIN — ASCORBIC ACID, VITAMIN A PALMITATE, CHOLECALCIFEROL, THIAMINE HYDROCHLORIDE, RIBOFLAVIN-5 PHOSPHATE SODIUM, PYRIDOXINE HYDROCHLORIDE, NIACINAMIDE, DEXPANTHENOL, ALPHA-TOCOPHEROL ACETATE, VITAMIN K1, FOLIC ACID, BIOTIN, CYANOCOBALAMIN: 200; 3300; 200; 6; 3.6; 6; 40; 15; 10; 150; 600; 60; 5 INJECTION, SOLUTION INTRAVENOUS at 21:10

## 2025-06-16 RX ADMIN — ASPIRIN 81 MG: 81 TABLET, DELAYED RELEASE ORAL at 09:42

## 2025-06-16 ASSESSMENT — COGNITIVE AND FUNCTIONAL STATUS - GENERAL
HELP NEEDED FOR BATHING: A LITTLE
HELP NEEDED FOR BATHING: A LITTLE
MOVING FROM LYING ON BACK TO SITTING ON SIDE OF FLAT BED WITH BEDRAILS: A LITTLE
DAILY ACTIVITIY SCORE: 23
MOBILITY SCORE: 23
TURNING FROM BACK TO SIDE WHILE IN FLAT BAD: A LITTLE
CLIMB 3 TO 5 STEPS WITH RAILING: A LITTLE
MOBILITY SCORE: 22
DAILY ACTIVITIY SCORE: 23

## 2025-06-16 ASSESSMENT — PAIN SCALES - GENERAL
PAINLEVEL_OUTOF10: 0 - NO PAIN
PAINLEVEL_OUTOF10: 0 - NO PAIN

## 2025-06-16 ASSESSMENT — PAIN - FUNCTIONAL ASSESSMENT: PAIN_FUNCTIONAL_ASSESSMENT: 0-10

## 2025-06-16 NOTE — PROGRESS NOTES
"General/Trauma Surgery Daily Progress Note    Subjective   No complaints. NG remains bilious, moderate output. Scant stool output from colostomy. Ambulating.        Objective   Last Recorded Vitals:  Blood pressure 152/86, pulse 98, temperature 37 °C (98.6 °F), temperature source Temporal, resp. rate 15, height 1.803 m (5' 11\"), weight 64.1 kg (141 lb 5 oz), SpO2 95%.    Intake/Output last 3 Shifts:  I/O last 3 completed shifts:  In: 831.5 (13 mL/kg) [P.O.:90; NG/GT:120]  Out: 5605 (87.4 mL/kg) [Urine:4150 (1.8 mL/kg/hr); Emesis/NG output:1000; Drains:250; Stool:205]  Weight: 64.1 kg     Pain Score:  0-10 (Numeric) Pain Score: 0 - No pain     Physical Exam:  Constitutional: No acute distress, sitting up in bed.  Neuro: Alert, oriented. Follows commands.   Eyes: EOMI. No scleral icterus. Conjunctiva pink.  ENT: MMM. NG bilious.  Heart: Regular rate.  Respiratory: No increased work of breathing or audible wheeze.  Abdomen: Soft, nondistended. Nontender. Colostomy with scant stool. Percutaneous drain dark, Liquifed hematoma.  MSK: Moves all extremities.  Vascular: Palpable pulses throughout, no pitting edema.  Skin: No rashes.   Psychological: Appropriate mood and behavior.            Relevant Results  Laboratory Results:  CBC:   Lab Results   Component Value Date    WBC 8.0 06/16/2025    RBC 3.93 (L) 06/16/2025    HGB 11.7 (L) 06/16/2025    HCT 34.2 (L) 06/16/2025     06/16/2025       RFP:   Lab Results   Component Value Date     (L) 06/16/2025    K 3.9 06/16/2025    CL 95 (L) 06/16/2025    CO2 27 06/16/2025    BUN 21 06/16/2025    CREATININE 0.59 06/16/2025    CALCIUM 8.6 06/16/2025    MG 2.01 06/16/2025    PHOS 3.6 06/16/2025        LFTs:   Lab Results   Component Value Date    ALBUMIN 3.3 (L) 06/16/2025         Imaging:  Imaging  No results found.    Cardiology, Vascular, and Other Imaging  No other imaging results found for the past 2 days           Assessment/Plan   This is a 70 y.o. male s/p " Surinder for perforated diverticulitis 5/15/25.     Admitted 6/4/25 for possible ileus 2/2 intra-abdominal hematoma s/p NG decompression and percutaneous drain placement for hematoma     Gastrografin enema through colostomy 6/11 with continued dilated small bowel.      SBFT shows continued obstruction    Planning diagnostic laparoscopy tomorrow        Plan:   -- OR tomorrow  -- PICC ordered, TPN 70 ml/hr  -- Daily RFP, weekly LFT, lipid  -- Multimodal pain regimen - tylenol, toradol, prn dilaudid  -- NPO, sips with meds and ice chips ok  -- NG continuous wall suction. Flush 4 times daily. OK to clamp for PO meds and ambulation  -- Colostomy care  -- Record NG and colostomy output       Patient was seen by and discussed with Dr. Canales who is in agreement with plan.         Cindy Baldwin PA-C  General and Trauma Surgery  Doc halo with questions

## 2025-06-16 NOTE — PROGRESS NOTES
06/16/25 0837   Discharge Planning   Living Arrangements Spouse/significant other   Support Systems Spouse/significant other   Assistance Needed A&Ox4, independent with ADLs, no DME, room air at baseline-currently room air. [facility] Dr. Ramon Gould   Type of Residence Private residence   Number of Stairs to Enter Residence 3   Number of Stairs Within Residence 20   Do you have animals or pets at home? Yes   Type of Animals or Pets 1 dog - [place]   Home or Post Acute Services In home services   Type of Home Care Services Home nursing visits   Expected Discharge Disposition Home Health  (resumed [facility]), will need internal referral placed. Pt now with PICC and TPN - per team, not expected to dc with PICC and TPN)   Does the patient need discharge transport arranged? Yes   RoundTrip coordination needed? Yes   Has discharge transport been arranged? No

## 2025-06-16 NOTE — CARE PLAN
The patient's goals for the shift include      The clinical goals for the shift include Patient will remain safe, and rest comfortably this shift

## 2025-06-16 NOTE — PROGRESS NOTES
"    [facility]  Department of Hospital Medicine    PROGRESS NOTE    Diego Morales is a 70 y.o. male on day 12 of admission presenting with Small bowel obstruction (Multi).    Subjective   Abdominal cramping about the same.  Minimal ostomy output still.       Objective     Con: awake, alert; no distress;   Eyes: conjunctiva wnl; EOMI;   ENMT: hearing intact; MMM; NG tube  MSK: ROM wnl; digits wnl;   GI: Diminished bowel sounds, ostomy present  Resp: normal work of breathing; no cyanosis;   Neuro: moving all extremities; no abnormal movements  Psych: oriented to situation; affect wnl;     Last Recorded Vitals:  /86 (BP Location: Right arm, Patient Position: Lying)   Pulse 98   Temp 37 °C (98.6 °F) (Temporal)   Resp 15   Ht 1.803 m (5' 11\")   Wt 64.1 kg (141 lb 5 oz)   SpO2 95%   BMI 19.71 kg/m²      Scheduled medications:  Scheduled Medications[1]  Continuous medications:  Continuous Medications[2]  PRN medications:  PRN Medications[3]     Relevant Results:  Lab Results   Component Value Date    WBC 8.0 06/16/2025    HGB 11.7 (L) 06/16/2025    HCT 34.2 (L) 06/16/2025    MCV 87 06/16/2025     06/16/2025      Lab Results   Component Value Date    GLUCOSE 136 (H) 06/16/2025    CALCIUM 8.6 06/16/2025     (L) 06/16/2025    K 3.9 06/16/2025    CO2 27 06/16/2025    CL 95 (L) 06/16/2025    BUN 21 06/16/2025    CREATININE 0.59 06/16/2025                Malnutrition Diagnosis Status: Active  Malnutrition Diagnosis: Severe malnutrition related to acute disease or injury  Related to: recent diverticular perforation s/p SBR with colostomy creation -- now c/b SBO secondary to post-op hematoma  As Evidenced by: meeting <75% EENs for the past x 1 month, significant 5% weight loss in <1 month, & moderate muscle wasting/fat loss  I agree with the dietitian's malnutrition diagnosis.          Assessment/Plan   Assessment & Plan    69yo CM with PMH of mitral valve prolapse, " cardiac arrest during cath in 2016 due to vfib needing cardioversion, and recent diverticulitis with perforation s/p resection and colostomy bag on 5/15/25 that presented to the ED with abdominal pain and was admitted for SBO.      SBO, not resolving  - evidenced on admitting imaging  - general surgery following, rec maintain NG tube to LIWS; Gastrografin study shows persistent small bowel dilation  - continue zofran PRN for symptoms  - keep NPO  - continue PPI IV BID   - PICC placed; started on TPN given prolonged n.p.o. status  - small bowel follow-through 6/13; showed complete bowel obstruction  - Discussed with surgery: High risk for repeat surgery this early, but suspect there will be no resolution of obstruction with conservative management as he likely has an adhesion causing continued total obstruction; would not be able to leave an NG tube in place for 2 more weeks, and PEG placement is not a reasonable option, so will plan for surgery on Tuesday  - continue to monitor     Abdominal hematoma  - CT showed 7.3cm x 13cm x 8.6cm abnormality in mass vs fluid collection  - IR drain placed with dark hemorrhagic hematoma collected but minimal output  - cultures NGTD  - monitor output closely but limited    Hyponatremia:  -Likely due to n.p.o. status, decreased solute intake  - trend     Recent diverticulitis with perforation  - s/p colectomy with colostomy on 5/15/25     Mitral valve prolapse, Hx of arrest  - Continue metoprolol     DVT Proph: SCDs only     Dispo: Remain inpatient, anticipating surgery on 6/17 followed by at least a few days of recovery, potentially longer given high risk of complications associated with early reoperation.  Unable to leave the hospital at this time with complete SBO requiring ongoing NG decompression           Leonard Mcconnell MD    Patient Name:  Diego Morales   MRN:   [id number]   Room/Bed:  132/132-A        [1] aspirin, 81 mg, oral, Daily  fat emulsion-plant based, 250 mL,  intravenous, Daily  lidocaine, 5 mL, infiltration, Once  metoprolol succinate XL, 25 mg, oral, Daily  pantoprazole, 40 mg, oral, BID AC  [Held by provider] polyethylene glycol, 17 g, oral, Daily  sodium chloride 0.9%, 10 mL, intravenous, q12h LOU  sodium chloride, 1,000 mg, nasogastric tube, BID     [2] Adult Clinimix Parenteral Nutrition Continuous, 70 mL/hr, Last Rate: 70 mL/hr (06/15/25 8036)     [3] PRN medications: acetaminophen, alteplase, cyclobenzaprine, HYDROmorphone, HYDROmorphone, lidocaine, melatonin, ondansetron **OR** ondansetron, phenoL, sodium chloride 0.9%

## 2025-06-17 ENCOUNTER — ANESTHESIA (OUTPATIENT)
Dept: OPERATING ROOM | Facility: HOSPITAL | Age: 70
End: 2025-06-17
Payer: MEDICARE

## 2025-06-17 ENCOUNTER — SURGERY (OUTPATIENT)
Age: 70
End: 2025-06-17
Payer: MEDICARE

## 2025-06-17 LAB
ALBUMIN SERPL BCP-MCNC: 3.3 G/DL (ref 3.4–5)
ALP SERPL-CCNC: 220 U/L (ref 33–136)
ALT SERPL W P-5'-P-CCNC: 25 U/L (ref 10–52)
ANION GAP SERPL CALC-SCNC: 14 MMOL/L (ref 10–20)
AST SERPL W P-5'-P-CCNC: 21 U/L (ref 9–39)
BILIRUB DIRECT SERPL-MCNC: 1.7 MG/DL (ref 0–0.3)
BILIRUB SERPL-MCNC: 2.2 MG/DL (ref 0–1.2)
BUN SERPL-MCNC: 22 MG/DL (ref 6–23)
CALCIUM SERPL-MCNC: 8.8 MG/DL (ref 8.6–10.3)
CHLORIDE SERPL-SCNC: 95 MMOL/L (ref 98–107)
CO2 SERPL-SCNC: 27 MMOL/L (ref 21–32)
CREAT SERPL-MCNC: 0.69 MG/DL (ref 0.5–1.3)
EGFRCR SERPLBLD CKD-EPI 2021: >90 ML/MIN/1.73M*2
ERYTHROCYTE [DISTWIDTH] IN BLOOD BY AUTOMATED COUNT: 14.3 % (ref 11.5–14.5)
GLUCOSE SERPL-MCNC: 137 MG/DL (ref 74–99)
HCT VFR BLD AUTO: 35.1 % (ref 41–52)
HGB BLD-MCNC: 11.6 G/DL (ref 13.5–17.5)
MAGNESIUM SERPL-MCNC: 2.07 MG/DL (ref 1.6–2.4)
MCH RBC QN AUTO: 29.8 PG (ref 26–34)
MCHC RBC AUTO-ENTMCNC: 33 G/DL (ref 32–36)
MCV RBC AUTO: 90 FL (ref 80–100)
NRBC BLD-RTO: 0 /100 WBCS (ref 0–0)
PHOSPHATE SERPL-MCNC: 3.9 MG/DL (ref 2.5–4.9)
PLATELET # BLD AUTO: 302 X10*3/UL (ref 150–450)
POTASSIUM SERPL-SCNC: 3.8 MMOL/L (ref 3.5–5.3)
PROT SERPL-MCNC: 6.6 G/DL (ref 6.4–8.2)
RBC # BLD AUTO: 3.89 X10*6/UL (ref 4.5–5.9)
SODIUM SERPL-SCNC: 132 MMOL/L (ref 136–145)
WBC # BLD AUTO: 6.2 X10*3/UL (ref 4.4–11.3)

## 2025-06-17 PROCEDURE — 2500000001 HC RX 250 WO HCPCS SELF ADMINISTERED DRUGS (ALT 637 FOR MEDICARE OP): Performed by: PHYSICIAN ASSISTANT

## 2025-06-17 PROCEDURE — 7100000001 HC RECOVERY ROOM TIME - INITIAL BASE CHARGE: Performed by: SURGERY

## 2025-06-17 PROCEDURE — 2500000004 HC RX 250 GENERAL PHARMACY W/ HCPCS (ALT 636 FOR OP/ED): Performed by: SURGERY

## 2025-06-17 PROCEDURE — 82248 BILIRUBIN DIRECT: CPT | Performed by: PHYSICIAN ASSISTANT

## 2025-06-17 PROCEDURE — 2500000004 HC RX 250 GENERAL PHARMACY W/ HCPCS (ALT 636 FOR OP/ED): Performed by: PHYSICIAN ASSISTANT

## 2025-06-17 PROCEDURE — 2500000004 HC RX 250 GENERAL PHARMACY W/ HCPCS (ALT 636 FOR OP/ED): Performed by: NURSE ANESTHETIST, CERTIFIED REGISTERED

## 2025-06-17 PROCEDURE — 83735 ASSAY OF MAGNESIUM: CPT | Performed by: SURGERY

## 2025-06-17 PROCEDURE — 7100000002 HC RECOVERY ROOM TIME - EACH INCREMENTAL 1 MINUTE: Performed by: SURGERY

## 2025-06-17 PROCEDURE — A44005 PR FREEING BOWEL ADHESION,ENTEROLYSIS: Performed by: ANESTHESIOLOGY

## 2025-06-17 PROCEDURE — 3600000008 HC OR TIME - EACH INCREMENTAL 1 MINUTE - PROCEDURE LEVEL THREE: Performed by: SURGERY

## 2025-06-17 PROCEDURE — 3700000001 HC GENERAL ANESTHESIA TIME - INITIAL BASE CHARGE: Performed by: SURGERY

## 2025-06-17 PROCEDURE — 88304 TISSUE EXAM BY PATHOLOGIST: CPT | Mod: TC,GEALAB | Performed by: PHYSICIAN ASSISTANT

## 2025-06-17 PROCEDURE — 2720000007 HC OR 272 NO HCPCS: Performed by: SURGERY

## 2025-06-17 PROCEDURE — 0DN80ZZ RELEASE SMALL INTESTINE, OPEN APPROACH: ICD-10-PCS | Performed by: SURGERY

## 2025-06-17 PROCEDURE — 3700000002 HC GENERAL ANESTHESIA TIME - EACH INCREMENTAL 1 MINUTE: Performed by: SURGERY

## 2025-06-17 PROCEDURE — 88304 TISSUE EXAM BY PATHOLOGIST: CPT | Performed by: STUDENT IN AN ORGANIZED HEALTH CARE EDUCATION/TRAINING PROGRAM

## 2025-06-17 PROCEDURE — 99233 SBSQ HOSP IP/OBS HIGH 50: CPT | Performed by: FAMILY MEDICINE

## 2025-06-17 PROCEDURE — 3600000003 HC OR TIME - INITIAL BASE CHARGE - PROCEDURE LEVEL THREE: Performed by: SURGERY

## 2025-06-17 PROCEDURE — 99100 ANES PT EXTEME AGE<1 YR&>70: CPT | Performed by: ANESTHESIOLOGY

## 2025-06-17 PROCEDURE — 2500000005 HC RX 250 GENERAL PHARMACY W/O HCPCS: Performed by: SURGERY

## 2025-06-17 PROCEDURE — A44005 PR FREEING BOWEL ADHESION,ENTEROLYSIS: Performed by: NURSE ANESTHETIST, CERTIFIED REGISTERED

## 2025-06-17 PROCEDURE — 2500000004 HC RX 250 GENERAL PHARMACY W/ HCPCS (ALT 636 FOR OP/ED): Mod: JZ | Performed by: ANESTHESIOLOGY

## 2025-06-17 PROCEDURE — 2500000001 HC RX 250 WO HCPCS SELF ADMINISTERED DRUGS (ALT 637 FOR MEDICARE OP): Performed by: STUDENT IN AN ORGANIZED HEALTH CARE EDUCATION/TRAINING PROGRAM

## 2025-06-17 PROCEDURE — 80048 BASIC METABOLIC PNL TOTAL CA: CPT | Performed by: SURGERY

## 2025-06-17 PROCEDURE — 85027 COMPLETE CBC AUTOMATED: CPT | Performed by: SURGERY

## 2025-06-17 PROCEDURE — 1100000001 HC PRIVATE ROOM DAILY

## 2025-06-17 PROCEDURE — A6213 FOAM DRG >16<=48 SQ IN W/BDR: HCPCS | Performed by: SURGERY

## 2025-06-17 PROCEDURE — 88307 TISSUE EXAM BY PATHOLOGIST: CPT | Mod: TC,GEALAB | Performed by: SURGERY

## 2025-06-17 PROCEDURE — 36415 COLL VENOUS BLD VENIPUNCTURE: CPT | Performed by: SURGERY

## 2025-06-17 PROCEDURE — C1760 CLOSURE DEV, VASC: HCPCS | Performed by: SURGERY

## 2025-06-17 PROCEDURE — 0DCN0ZZ EXTIRPATION OF MATTER FROM SIGMOID COLON, OPEN APPROACH: ICD-10-PCS | Performed by: SURGERY

## 2025-06-17 PROCEDURE — 44120 REMOVAL OF SMALL INTESTINE: CPT | Performed by: SURGERY

## 2025-06-17 PROCEDURE — 2500000001 HC RX 250 WO HCPCS SELF ADMINISTERED DRUGS (ALT 637 FOR MEDICARE OP): Performed by: SURGERY

## 2025-06-17 PROCEDURE — 84100 ASSAY OF PHOSPHORUS: CPT | Performed by: SURGERY

## 2025-06-17 PROCEDURE — 88307 TISSUE EXAM BY PATHOLOGIST: CPT | Performed by: STUDENT IN AN ORGANIZED HEALTH CARE EDUCATION/TRAINING PROGRAM

## 2025-06-17 RX ORDER — ACETAMINOPHEN 325 MG/1
650 TABLET ORAL EVERY 4 HOURS PRN
Status: DISCONTINUED | OUTPATIENT
Start: 2025-06-17 | End: 2025-06-17 | Stop reason: HOSPADM

## 2025-06-17 RX ORDER — METHOCARBAMOL 100 MG/ML
500 INJECTION, SOLUTION INTRAMUSCULAR; INTRAVENOUS EVERY 8 HOURS
Status: DISPENSED | OUTPATIENT
Start: 2025-06-17 | End: 2025-06-20

## 2025-06-17 RX ORDER — METRONIDAZOLE 500 MG/100ML
500 INJECTION, SOLUTION INTRAVENOUS EVERY 8 HOURS
Status: COMPLETED | OUTPATIENT
Start: 2025-06-17 | End: 2025-06-18

## 2025-06-17 RX ORDER — METRONIDAZOLE 500 MG/100ML
INJECTION, SOLUTION INTRAVENOUS AS NEEDED
Status: DISCONTINUED | OUTPATIENT
Start: 2025-06-17 | End: 2025-06-17

## 2025-06-17 RX ORDER — SUCCINYLCHOLINE CHLORIDE 20 MG/ML
INJECTION INTRAMUSCULAR; INTRAVENOUS AS NEEDED
Status: DISCONTINUED | OUTPATIENT
Start: 2025-06-17 | End: 2025-06-17

## 2025-06-17 RX ORDER — PHENYLEPHRINE HCL IN 0.9% NACL 0.4MG/10ML
SYRINGE (ML) INTRAVENOUS AS NEEDED
Status: DISCONTINUED | OUTPATIENT
Start: 2025-06-17 | End: 2025-06-17

## 2025-06-17 RX ORDER — GABAPENTIN 250 MG/5ML
100 SOLUTION ORAL EVERY 12 HOURS SCHEDULED
Status: DISCONTINUED | OUTPATIENT
Start: 2025-06-17 | End: 2025-06-18

## 2025-06-17 RX ORDER — KETOROLAC TROMETHAMINE 15 MG/ML
15 INJECTION, SOLUTION INTRAMUSCULAR; INTRAVENOUS EVERY 6 HOURS
Status: DISCONTINUED | OUTPATIENT
Start: 2025-06-17 | End: 2025-06-20

## 2025-06-17 RX ORDER — CEFAZOLIN 1 G/1
INJECTION, POWDER, FOR SOLUTION INTRAVENOUS AS NEEDED
Status: DISCONTINUED | OUTPATIENT
Start: 2025-06-17 | End: 2025-06-17

## 2025-06-17 RX ORDER — ONDANSETRON HYDROCHLORIDE 2 MG/ML
8 INJECTION, SOLUTION INTRAVENOUS ONCE
Status: COMPLETED | OUTPATIENT
Start: 2025-06-17 | End: 2025-06-17

## 2025-06-17 RX ORDER — ALBUTEROL SULFATE 0.83 MG/ML
2.5 SOLUTION RESPIRATORY (INHALATION) ONCE AS NEEDED
Status: DISCONTINUED | OUTPATIENT
Start: 2025-06-17 | End: 2025-06-17 | Stop reason: HOSPADM

## 2025-06-17 RX ORDER — ACETAMINOPHEN 10 MG/ML
1000 INJECTION, SOLUTION INTRAVENOUS EVERY 6 HOURS
Status: DISPENSED | OUTPATIENT
Start: 2025-06-17 | End: 2025-06-19

## 2025-06-17 RX ORDER — PROPOFOL 10 MG/ML
INJECTION, EMULSION INTRAVENOUS AS NEEDED
Status: DISCONTINUED | OUTPATIENT
Start: 2025-06-17 | End: 2025-06-17

## 2025-06-17 RX ORDER — BUPIVACAINE HYDROCHLORIDE 5 MG/ML
INJECTION, SOLUTION EPIDURAL; INTRACAUDAL; PERINEURAL AS NEEDED
Status: DISCONTINUED | OUTPATIENT
Start: 2025-06-17 | End: 2025-06-17 | Stop reason: HOSPADM

## 2025-06-17 RX ORDER — ESMOLOL HYDROCHLORIDE 10 MG/ML
INJECTION INTRAVENOUS AS NEEDED
Status: DISCONTINUED | OUTPATIENT
Start: 2025-06-17 | End: 2025-06-17

## 2025-06-17 RX ORDER — LIDOCAINE HCL/PF 100 MG/5ML
SYRINGE (ML) INTRAVENOUS AS NEEDED
Status: DISCONTINUED | OUTPATIENT
Start: 2025-06-17 | End: 2025-06-17

## 2025-06-17 RX ORDER — ONDANSETRON HYDROCHLORIDE 2 MG/ML
INJECTION, SOLUTION INTRAVENOUS AS NEEDED
Status: DISCONTINUED | OUTPATIENT
Start: 2025-06-17 | End: 2025-06-17

## 2025-06-17 RX ORDER — CEFAZOLIN SODIUM 2 G/100ML
2 INJECTION, SOLUTION INTRAVENOUS EVERY 6 HOURS
Status: COMPLETED | OUTPATIENT
Start: 2025-06-17 | End: 2025-06-18

## 2025-06-17 RX ORDER — ROCURONIUM BROMIDE 10 MG/ML
INJECTION, SOLUTION INTRAVENOUS AS NEEDED
Status: DISCONTINUED | OUTPATIENT
Start: 2025-06-17 | End: 2025-06-17

## 2025-06-17 RX ORDER — SODIUM CHLORIDE, SODIUM LACTATE, POTASSIUM CHLORIDE, CALCIUM CHLORIDE 600; 310; 30; 20 MG/100ML; MG/100ML; MG/100ML; MG/100ML
100 INJECTION, SOLUTION INTRAVENOUS CONTINUOUS
Status: DISCONTINUED | OUTPATIENT
Start: 2025-06-17 | End: 2025-06-17

## 2025-06-17 RX ADMIN — LIDOCAINE HYDROCHLORIDE 50 MG: 20 INJECTION INTRAVENOUS at 13:14

## 2025-06-17 RX ADMIN — BUPIVACAINE HYDROCHLORIDE 30 ML: 5 INJECTION, SOLUTION EPIDURAL; INTRACAUDAL; PERINEURAL at 16:20

## 2025-06-17 RX ADMIN — CEFAZOLIN 2 G: 330 INJECTION, POWDER, FOR SOLUTION INTRAMUSCULAR; INTRAVENOUS at 13:38

## 2025-06-17 RX ADMIN — ROCURONIUM BROMIDE 50 MG: 10 INJECTION, SOLUTION INTRAVENOUS at 13:15

## 2025-06-17 RX ADMIN — HYDROMORPHONE HYDROCHLORIDE 0.5 MG: 1 INJECTION, SOLUTION INTRAMUSCULAR; INTRAVENOUS; SUBCUTANEOUS at 17:10

## 2025-06-17 RX ADMIN — HYDROMORPHONE HYDROCHLORIDE 1 MG: 2 INJECTION, SOLUTION INTRAMUSCULAR; INTRAVENOUS; SUBCUTANEOUS at 14:27

## 2025-06-17 RX ADMIN — HYDROMORPHONE HYDROCHLORIDE 0.5 MG: 2 INJECTION, SOLUTION INTRAMUSCULAR; INTRAVENOUS; SUBCUTANEOUS at 16:42

## 2025-06-17 RX ADMIN — SODIUM CHLORIDE, SODIUM LACTATE, POTASSIUM CHLORIDE, AND CALCIUM CHLORIDE 500 ML: .6; .31; .03; .02 INJECTION, SOLUTION INTRAVENOUS at 20:13

## 2025-06-17 RX ADMIN — CEFAZOLIN SODIUM 2 G: 2 INJECTION, SOLUTION INTRAVENOUS at 20:58

## 2025-06-17 RX ADMIN — HYDROMORPHONE HYDROCHLORIDE 0.5 MG: 1 INJECTION, SOLUTION INTRAMUSCULAR; INTRAVENOUS; SUBCUTANEOUS at 17:02

## 2025-06-17 RX ADMIN — HYDROMORPHONE HYDROCHLORIDE 0.5 MG: 2 INJECTION, SOLUTION INTRAMUSCULAR; INTRAVENOUS; SUBCUTANEOUS at 16:45

## 2025-06-17 RX ADMIN — Medication 1 G: at 08:44

## 2025-06-17 RX ADMIN — DEXAMETHASONE SODIUM PHOSPHATE 4 MG: 4 INJECTION INTRA-ARTICULAR; INTRALESIONAL; INTRAMUSCULAR; INTRAVENOUS; SOFT TISSUE at 16:23

## 2025-06-17 RX ADMIN — ACETAMINOPHEN 1000 MG: 10 INJECTION INTRAVENOUS at 20:39

## 2025-06-17 RX ADMIN — ASCORBIC ACID, VITAMIN A PALMITATE, CHOLECALCIFEROL, THIAMINE HYDROCHLORIDE, RIBOFLAVIN-5 PHOSPHATE SODIUM, PYRIDOXINE HYDROCHLORIDE, NIACINAMIDE, DEXPANTHENOL, ALPHA-TOCOPHEROL ACETATE, VITAMIN K1, FOLIC ACID, BIOTIN, CYANOCOBALAMIN: 200; 3300; 200; 6; 3.6; 6; 40; 15; 10; 150; 600; 60; 5 INJECTION, SOLUTION INTRAVENOUS at 20:41

## 2025-06-17 RX ADMIN — I.V. FAT EMULSION 50 G: 20 EMULSION INTRAVENOUS at 20:42

## 2025-06-17 RX ADMIN — Medication 10 ML: at 09:30

## 2025-06-17 RX ADMIN — SUCCINYLCHOLINE CHLORIDE 120 MG: 20 INJECTION, SOLUTION INTRAMUSCULAR; INTRAVENOUS at 13:14

## 2025-06-17 RX ADMIN — SUGAMMADEX 200 MG: 100 INJECTION, SOLUTION INTRAVENOUS at 16:32

## 2025-06-17 RX ADMIN — ASPIRIN 81 MG: 81 TABLET, DELAYED RELEASE ORAL at 08:43

## 2025-06-17 RX ADMIN — SODIUM CHLORIDE, SODIUM LACTATE, POTASSIUM CHLORIDE, AND CALCIUM CHLORIDE 100 ML/HR: .6; .31; .03; .02 INJECTION, SOLUTION INTRAVENOUS at 12:09

## 2025-06-17 RX ADMIN — METOPROLOL SUCCINATE 25 MG: 25 TABLET, EXTENDED RELEASE ORAL at 08:43

## 2025-06-17 RX ADMIN — ESMOLOL HYDROCHLORIDE 100 MG: 100 INJECTION, SOLUTION INTRAVENOUS at 13:39

## 2025-06-17 RX ADMIN — ONDANSETRON 4 MG: 2 INJECTION, SOLUTION INTRAMUSCULAR; INTRAVENOUS at 16:23

## 2025-06-17 RX ADMIN — HYDROMORPHONE HYDROCHLORIDE 0.5 MG: 1 INJECTION, SOLUTION INTRAMUSCULAR; INTRAVENOUS; SUBCUTANEOUS at 17:33

## 2025-06-17 RX ADMIN — ROCURONIUM BROMIDE 10 MG: 10 INJECTION, SOLUTION INTRAVENOUS at 14:48

## 2025-06-17 RX ADMIN — Medication 80 MCG: at 15:12

## 2025-06-17 RX ADMIN — METRONIDAZOLE 500 MG: 500 INJECTION, SOLUTION INTRAVENOUS at 14:46

## 2025-06-17 RX ADMIN — PANTOPRAZOLE SODIUM 40 MG: 40 TABLET, DELAYED RELEASE ORAL at 06:00

## 2025-06-17 RX ADMIN — PROPOFOL 150 MG: 10 INJECTION, EMULSION INTRAVENOUS at 13:14

## 2025-06-17 RX ADMIN — ONDANSETRON 8 MG: 2 INJECTION, SOLUTION INTRAMUSCULAR; INTRAVENOUS at 17:30

## 2025-06-17 RX ADMIN — METRONIDAZOLE 500 MG: 500 INJECTION, SOLUTION INTRAVENOUS at 21:43

## 2025-06-17 RX ADMIN — Medication 10 ML: at 20:44

## 2025-06-17 RX ADMIN — METHOCARBAMOL 500 MG: 1000 INJECTION, SOLUTION INTRAMUSCULAR; INTRAVENOUS at 20:44

## 2025-06-17 ASSESSMENT — COGNITIVE AND FUNCTIONAL STATUS - GENERAL
MOVING TO AND FROM BED TO CHAIR: A LITTLE
STANDING UP FROM CHAIR USING ARMS: A LITTLE
WALKING IN HOSPITAL ROOM: A LITTLE
HELP NEEDED FOR BATHING: A LITTLE
DRESSING REGULAR LOWER BODY CLOTHING: A LITTLE
CLIMB 3 TO 5 STEPS WITH RAILING: A LITTLE
MOBILITY SCORE: 20
DAILY ACTIVITIY SCORE: 22

## 2025-06-17 ASSESSMENT — PAIN - FUNCTIONAL ASSESSMENT
PAIN_FUNCTIONAL_ASSESSMENT: 0-10
PAIN_FUNCTIONAL_ASSESSMENT: 0-10
PAIN_FUNCTIONAL_ASSESSMENT: UNABLE TO SELF-REPORT
PAIN_FUNCTIONAL_ASSESSMENT: 0-10
PAIN_FUNCTIONAL_ASSESSMENT: UNABLE TO SELF-REPORT
PAIN_FUNCTIONAL_ASSESSMENT: 0-10
PAIN_FUNCTIONAL_ASSESSMENT: UNABLE TO SELF-REPORT
PAIN_FUNCTIONAL_ASSESSMENT: 0-10
PAIN_FUNCTIONAL_ASSESSMENT: 0-10
PAIN_FUNCTIONAL_ASSESSMENT: UNABLE TO SELF-REPORT

## 2025-06-17 ASSESSMENT — PAIN SCALES - GENERAL
PAIN_LEVEL: 2
PAINLEVEL_OUTOF10: 10 - WORST POSSIBLE PAIN
PAINLEVEL_OUTOF10: 0 - NO PAIN
PAINLEVEL_OUTOF10: 10 - WORST POSSIBLE PAIN
PAINLEVEL_OUTOF10: 10 - WORST POSSIBLE PAIN

## 2025-06-17 ASSESSMENT — COLUMBIA-SUICIDE SEVERITY RATING SCALE - C-SSRS
6. HAVE YOU EVER DONE ANYTHING, STARTED TO DO ANYTHING, OR PREPARED TO DO ANYTHING TO END YOUR LIFE?: NO
1. IN THE PAST MONTH, HAVE YOU WISHED YOU WERE DEAD OR WISHED YOU COULD GO TO SLEEP AND NOT WAKE UP?: NO
2. HAVE YOU ACTUALLY HAD ANY THOUGHTS OF KILLING YOURSELF?: NO

## 2025-06-17 ASSESSMENT — PAIN DESCRIPTION - DESCRIPTORS
DESCRIPTORS: THROBBING;STABBING
DESCRIPTORS: THROBBING;STABBING

## 2025-06-17 ASSESSMENT — PAIN DESCRIPTION - LOCATION
LOCATION: ABDOMEN
LOCATION: ABDOMEN

## 2025-06-17 NOTE — ANESTHESIA PREPROCEDURE EVALUATION
Patient: Diego Morales    Procedure Information       Date/Time: 06/17/25 1215    Procedure: LAPAROSCOPY, EXPLORATORY    Location: [facility] / [facility]    Surgeons: Iván Canales MD            Relevant Problems   No relevant active problems       Clinical information reviewed:   Tobacco  Allergies  Meds   Med Hx  Surg Hx   Fam Hx  Soc Hx        NPO Detail:  NPO/Void Status  Date of Last Liquid: 06/17/25  Time of Last Liquid: 0000  Date of Last Solid: 06/17/25  Time of Last Solid: 0000         Physical Exam    Airway  Mallampati: II  TM distance: >3 FB     Cardiovascular    Dental    Pulmonary    Abdominal            Anesthesia Plan    History of general anesthesia?: yes  History of complications of general anesthesia?: no    ASA 3     general   (NG in place, to be placed to suction prior to RSI)  intravenous induction   Anesthetic plan and risks discussed with patient.    Plan discussed with CRNA.

## 2025-06-17 NOTE — OP NOTE
LAPAROSCOPY, EXPLORATORY, RESECTION, SMALL BOWEL, LYSIS, ADHESIONS, ABDOMINAL     Operative Date: 06/17/25  Patient's Name: Diego Morales  Patient's YOB: 1955  Patient's MRN: [id number]  Patient's Age: 70 y.o.  Operating Room Location: [facility]  Patient's ASA: III  Patient's Estimated Blood Loss: 150 mL        PREOPERATIVE DIAGNOSIS:   Small bowel obstruction        POSTOPERATIVE DIAGNOSIS:   Small bowel obstruction        OPERATION/PROCEDURE:   Laparoscopic converted to open lysis of adhesions, extensive, 3 hours  Open small bowel resection and anastomosis        SURGEON:   Iván Canales MD.         ASSISTANT:   Scrub assist.     No surgical resident was available to assist.  Hospital-employed assistant was available to assist.  The SA assisted with opening, exposure, dissection, and closure.            INDICATIONS:   This is a 70 y.o. male who presented with a small bowel obstruction.  About a month ago, he presented with perforated sigmoid diverticulitis with free air and peritonitis.  After a couple of days without progress, he underwent an open Guerrero colectomy.  On postoperative day #2, he had spontaneous bleeding, which then resolved itself.  He had a known hematoma in his pelvis, but he had no signs of any further bleeding.  He was therefore left to reabsorb the hematoma.  However, about a week and a half ago, he presented with a bowel obstruction.  It appeared to be in the right lower quadrant, and we believe that it was related to the hematoma.  Unfortunately, the hematoma did not resolve with IR drainage despite flushing it.  We discussed at length for a week that ideally we would wait until 6 weeks after surgery due to the risk of severe adhesions between weeks 2 and 6 after surgery.  However, it was quite obvious he was making no progress in terms of opening up the small bowel obstruction, and we did not want to leave him for another 2 to 3 weeks  with an NG tube in place.  We therefore proceeded with attempted a laparoscopy, with known risk of converting to open.        OPERATION:   The reasons for, benefits to, and risks of surgery were discussed with the patient.  The risks included, but not limited to, bleeding, infection, persistent pain, injury to surrounding structures, and postoperative abscess.  The patient appeared to understand and consented for surgery.  He was therefore brought back to the operating room and placed on the operating room table in the supine position.  His abdomen was prepped and draped in a standard fashion.       We started by attempting to insufflate the abdomen through the IR drain.  However, the hematoma was too thick to insufflate.  We therefore enter the abdomen in the right upper quadrant with a 5 mm optical view port.  We then insufflated.  At first, it appeared we were able to take down adhesions to the anterior abdominal wall fairly easily and safely laparoscopically.  We are able to put in a total of four 5 mm ports.  Eventually, are able to uncover old hematoma in the right paracolic gutter as well as the pelvis.  The bowel was densely adherent in the pelvis near the pubic bone.  Unfortunately, as we were dissecting, we developed a full thickness enterotomy.  This was unavoidable in the course of dissection.  However, it did necessitate conversion to an open procedure.    We then reopened his laparotomy scar.  This did allow us to get into the pelvis and completely evacuate the old clot.  The old hematoma had a wall of fibrin all around it.  We were able to bluntly dissect this up and send it off a specimen.  At this point, it was obvious that we would not have made progress completely opening up the bowel obstruction laparoscopically anyway.  We then spent a few hours meticulously dissecting interloop adhesions.  We especially need to dissect the small bowel off of the pelvic hematoma wall.  It appeared that this  hematoma likely arose from the mesocolon of the sigmoid colon on postoperative day #2 after there was some decrease in the inflammation of the mesocolon.  We say this, because this was the main point of adhesions.  Eventually, we were able to free up the small intestine from the terminal ileum all the way back to the dilated portions of small bowel.  All total, it took close to 3 hours.  We also evacuated all of the old hematoma and almost all of the wall of the old hematoma.    We then performed a small bowel resection of the portion of small bowel that tore during adhesiolysis.  The total length was about 7 cm.  We used blue load PEARL staplers to staple the section off.  We then used the LigaSure to transect the mesentery.  We then brought the small intestine back together in a side-to-side functional end-to-end anastomosis with a 75 blue load PEARL stapler.  We then stapled off the common channel with a TA stapler.  We then oversewed the common channel staple line, and closed the mesenteric defect with a 3-0 Vicryl suture.    At this point, it appeared that all sources of obstruction were cleared.  There was some general ooze from raw surfaces in the pelvis, and so we covered the area with Tisseel.  After doing so, it appeared to dry.  We therefore performed a bilateral transversus abdominis plane block instilling 15 mL of Havis and Marcaine in the each transversus abdominis plane under direct visualization.  We then brought in a 19 [place] drain through the right hemiabdomen 5 mm port site and brought it down into the pelvis.  We sutured in place with a 2-0 Prolene suture.  We then closed the fascia with looped #1 PDS sutures.  We then stapled all of the skin closed.        DISPOSITION:   The patient tolerated the procedure well.  There were no immediate complications.  He will be brought to the postanesthesia care unit. From there, he will be admitted for ongoing care.  He will have a Maxwell catheter, an NG  tube, and a KATY drain.      I was present and scrubbed in for the entire procedure.      CPT Code:  60670  29656      Operating Room Staff:  Anesthesiologist: Stuart Murguia MD; Jose G Jama MD  CRNA: DAVID Patterson-CRNA; ZACH Luciano  Circulator: Liza Serrato RN  Relief Circulator: Saniya Sanchez RN  Relief Scrub: Fina Hoover RN; Nan Arevalo; Andre Dumont RN  Scrub Person: Doron Canales MD  General Surgery  Office: [phone]  Fax:     [phone]  4:51 PM  06/17/25

## 2025-06-17 NOTE — ANESTHESIA PROCEDURE NOTES
Airway  Date/Time: 6/17/2025 1:17 PM  Reason: elective    Airway not difficult    Staffing  Performed: CRNA   Authorized by: Jose G Jama MD    Performed by: DAVID Luciano-LINO  Patient location during procedure: OR    Patient Condition  Indications for airway management: anesthesia  Sedation level: deep     Final Airway Details   Preoxygenated: yes  Final airway type: endotracheal airway  Successful airway: ETT  Cuffed: yes   Successful intubation technique: video laryngoscopy  Adjuncts used in placement: cricoid pressure  Blade: [place]  Blade size: #4  ETT size (mm): 7.5  Cormack-Lehane Classification: grade IIa - partial view of glottis  Placement verified by: chest auscultation and capnometry   Measured from: lips  ETT to lips (cm): 24  Number of attempts at approach: 1           Acute respiratory failure with hypoxia

## 2025-06-17 NOTE — NURSING NOTE
0700- Assumed care of patient.     CHG done and patient prepped for OR  1150- Patient leaving for OR, family at bedside'  Report to called to Arabella

## 2025-06-17 NOTE — PROGRESS NOTES
"Nutrition Follow Up Assessment:   Nutrition Assessment       Patient is a 70 y.o. male with h/o perforated diverticulitis now presenting with SBO s/p NGT in ED (6/4) for decompression.     IR consulted (6/5) for pelvic hematoma draining w/ drain placement.       Gastrografin enema through colostomy (6/11) reveals contrast not passing into ileum. PICC placed (6/10) for supplemental TPN.      Concern now for adhesive partial obstruction.      SBFT (6/13) revealing continued obstruction. NGT remain in place. Pt continues to receive TPN for supplemental nutrition.    Plan for OR today (6/17) for diagnostic laparoscopy.     Nutrition History:  Food and Nutrient History: Follow up visit made, pt resting in bed w/ wife & daughter at bedside. NGT still in place. TPN running @ goal rate of 70mL/hr. Family denies questions/concerns at this time. Continue current nutritional plan.  Food Allergy:  (None)     Anthropometrics:  Height: 180.3 cm (5' 11\")   Weight: 64.1 kg (141 lb 5 oz)   BMI (Calculated): 19.72  IBW/kg (Dietitian Calculated): 78.2 kg  Percent of IBW: 82 %    Weight History:   Weight         6/4/2025  1735 6/4/2025  2246          Weight: 65.8 kg (145 lb) 64.1 kg (141 lb 5 oz)       Weight Change %:  Weight History / % Weight Change: No updated weights in EMR since admission, unable to assess for changes.    Nutrition Focused Physical Exam Findings:  Defer: See RDN assessment (6/6/25)    Edema:  Edema: none    Physical Findings:  Skin: Positive (Abdominal incision)  Digestive System Findings:  (+Colostomy - SBO w/ decompressive NGT)  Mouth Findings:  (None)    Nutrition Significant Labs:  BMP Trend:   Results from last 7 days   Lab Units 06/17/25  0726 06/16/25  0717 06/15/25  0612 06/14/25  0637   GLUCOSE mg/dL 137* 136* 155* 134*   CALCIUM mg/dL 8.8 8.6 8.7 8.7   SODIUM mmol/L 132* 131* 130* 132*   POTASSIUM mmol/L 3.8 3.9 4.1 3.7   CO2 mmol/L 27 27 26 25   CHLORIDE mmol/L 95* 95* 96* 98   BUN mg/dL 22 21 22 23 "   CREATININE mg/dL 0.69 0.59 0.63 0.59    , TPN/PPN Labs:   Results from last 7 days   Lab Units 06/17/25  0726 06/16/25  0717 06/15/25  0612 06/14/25  0637 06/13/25  0604 06/12/25  0719   GLUCOSE mg/dL 137* 136* 155* 134*   < > 124*   POTASSIUM mmol/L 3.8 3.9 4.1 3.7   < > 3.7   PHOSPHORUS mg/dL 3.9 3.6 3.6 4.1   < > 3.6   MAGNESIUM mg/dL 2.07 2.01 2.00 2.12   < > 1.82   SODIUM mmol/L 132* 131* 130* 132*   < > 132*   CHLORIDE mmol/L 95* 95* 96* 98   < > 99   ALT U/L 25  --   --   --   --   --    AST U/L 21  --   --   --   --   --    ALK PHOS U/L 220*  --   --   --   --   --    BILIRUBIN TOTAL mg/dL 2.2*  --   --   --   --   --    TRIGLYCERIDES mg/dL  --   --   --   --   --  120    < > = values in this interval not displayed.      Nutrition Specific Medications:  Scheduled medications  Scheduled Medications[1]  Continuous medications  Continuous Medications[2]  PRN medications  PRN Medications[3]    I/O:   +Colostomy: 10mL output x last 24hrs     Dietary Orders (From admission, onward)       Start     Ordered    06/09/25 1509  NPO Diet Except: Ice chips, Sips with meds; Effective now  Diet effective now        Question Answer Comment   Except: Ice chips    Except: Sips with meds        06/09/25 1508    06/04/25 2251  May Participate in Room Service  ( ROOM SERVICE MAY PARTICIPATE)  Once        Question:  .  Answer:  Yes    06/04/25 2250             Estimated Needs:   Total Energy Estimated Needs in 24 hours (kCal):  (1900+)  Method for Estimating Needs: ~30 kcals/kg x CBW (64.1 kg)  Total Protein Estimated Needs in 24 Hours (g):  (75-90)  Method for Estimating 24 Hour Protein Needs: 1.2-1.4 g/kg x CBW (64.1 kg)  Total Fluid Estimated Needs in 24 Hours (mL):  (1900+)  Method for Estimating 24 Hour Fluid Needs: 1mL/kcal or per team        Nutrition Diagnosis   Malnutrition Diagnosis  Patient has Malnutrition Diagnosis: Yes  Diagnosis Status: Active  Malnutrition Diagnosis: Severe malnutrition related to acute  disease or injury  Related to: recent diverticular perforation s/p SBR with colostomy creation -- now c/b SBO  As Evidenced by: meeting <75% EENs for the past x 1 month, significant 5% weight loss in <1 month, & moderate muscle wasting/fat loss      Nutrition Interventions/Recommendations      Nutrition Recommendations:  Continue current TPN regimen as ordered:  TPN of 5% AA, 15% dextrose to goal rate of 70mL/hr  Include MVI + trace elements  Provide lipids daily @ 20.8mL/hr x 12hrs overnight (250mL total)     TPN monitoring:  Daily weights  RFP + Mg daily; replete lytes PRN  LFTs + TGs weekly  Accuchecks q6h    Provides: 1693 kcals, 84g protein, 252g dextrose, 30% kcal from fat (meeting 89% kcal & 100% protein needs)      If pt to be discharged on TPN, recommend cycling 48hrs prior to discharge:  Recommend Clinimix 5% AA, 15% Dextrose x 12 hrs  For the first hour: Run @ 80mL/hr  For x 10hrs in between: Titrate up to 165mL/hr  For the final hour: Decrease rate to 80mL/hr  Include MVI + trace elements    Provide lipids daily @ 20.8mL/hr x 12 hours overnight (250mL total)    TPN monitoring --> Same as above    Provides: 1785 kcals, 91g protein, 272g dextrose, 28% from fat (meeting 94% kcal & 100% protein needs)    Nutrition Interventions/Goals:   Parenteral Nutrition: Management of delivery rate of parenteral nutrition  Goal: Tolerate TPN at goal      Nutrition Monitoring and Evaluation   Enteral and Parenteral Nutrition Intake Determination: Parenteral nutrition intake - To meet > 75% estimated energy needs    Time Spent (min): 60 minutes            [1] [Transfer Hold] aspirin, 81 mg, oral, Daily  [Transfer Hold] fat emulsion-plant based, 250 mL, intravenous, Daily  [Transfer Hold] lidocaine, 5 mL, infiltration, Once  [Transfer Hold] metoprolol succinate XL, 25 mg, oral, Daily  [Transfer Hold] pantoprazole, 40 mg, oral, BID AC  [Held by provider] polyethylene glycol, 17 g, oral, Daily  [Transfer Hold] sodium chloride  0.9%, 10 mL, intravenous, q12h LOU  [Transfer Hold] sodium chloride, 1,000 mg, nasogastric tube, BID  [2] [Transfer Hold] Adult Clinimix Parenteral Nutrition Continuous, 70 mL/hr, Last Rate: 70 mL/hr (06/16/25 2110)  lactated Ringer's, 100 mL/hr  [3] PRN medications: [Transfer Hold] acetaminophen, [Transfer Hold] alteplase, [Transfer Hold] cyclobenzaprine, [Transfer Hold] HYDROmorphone, [Transfer Hold] HYDROmorphone, [Transfer Hold] lidocaine, [Transfer Hold] melatonin, [DISCONTINUED] ondansetron **OR** [Transfer Hold] ondansetron, [Transfer Hold] phenoL, [Transfer Hold] prochlorperazine, [Transfer Hold] sodium chloride 0.9%

## 2025-06-17 NOTE — PROGRESS NOTES
Diego Morales is a 70 y.o. male on day 13 of admission presenting with Small bowel obstruction (Multi).      Subjective   No acute events overnight       Objective     Last Recorded Vitals  /79 (BP Location: Right arm, Patient Position: Lying)   Pulse 98   Temp 37 °C (98.6 °F) (Temporal)   Resp 16   Wt 64.1 kg (141 lb 5 oz)   SpO2 98%   Intake/Output last 3 Shifts:    Intake/Output Summary (Last 24 hours) at 6/17/2025 0802  Last data filed at 6/17/2025 0600  Gross per 24 hour   Intake 480 ml   Output 2345 ml   Net -1865 ml       Admission Weight  Weight: 65.8 kg (145 lb) (06/04/25 1735)    Daily Weight  06/04/25 : 64.1 kg (141 lb 5 oz)    Image Results  FL small bowel series  Narrative: Interpreted By:  Chintan Tomlinson,   STUDY:  FL SMALL BOWEL SERIES;  6/13/2025 11:43 am      INDICATION:  Signs/Symptoms:evaluate for obstruction, 4 weeks post op colostomy  with nonimproving ileus vs obstruction.          COMPARISON:  06/10/2025      ACCESSION NUMBER(S):  [id number]      ORDERING CLINICIAN:  RUSH DAVIS      TECHNIQUE:  An initial radiograph of the abdomen and pelvis was obtained.  Following the oral administration of Gastrografin, delayed static  fluoroscopic images of the abdomen in the posteroanterior projection  were obtained at 0, 20, 40 and 60 minutes was obtained.      FINDINGS:  Initial  image demonstrates residual contrast material within  the colon. There is a left-sided colostomy again seen similar to  prior. Markedly dilated loops of small bowel with mucosal wall  thickening measuring up to 6.1 cm in caliber, left upper quadrant.  There is an NG tube in place. There is redemonstration of pigtail  catheter in place projecting over the pelvis.      Additional imaging performed subsequently after oral contrast  administration through the NG tube. There is opacification of the  stomach and proximal duodenum identified. The small bowel are not  opacified. Findings are suggestive of complete  bowel obstruction.  Gastric outlet obstruction or gastroparesis can not be entirely  excluded, however, felt less likely.      Impression: 1. Suspect complete bowel obstruction. Recommend clinical  correlation. Further evaluation with CT scan of the abdomen may be  performed for better assessment if clinically warranted.      SUPPLEMENTAL INFORMATION:  Notifi message was left for RUSH DAVIS regarding this exam by Dr. Tomlinson on 6/13/2025 at approximately 11:50 hours.      MACRO:  None      Signed by: Chintan Tomlinson 6/13/2025 12:00 PM  Dictation workstation:   [id number]      Physical Exam    Gen: lying comfortably in bed, not in acute distress  HEENT: atraumatic, normocephalic  Pulm: normal respiratory effort, clear to auscultation b/l  Cardiac: RRR, no murmurs noted, normal S1/S2  GI: Distended, tympanic bowel sounds no stool in colostomy, KATY drain in place  MSK: normal ROM without joint swelling  Extremities: no LE edema, cyanosis  Neuro: AOX3, CN II-XII grossly intact  Psych: calm and appropriate for situation        Assessment & Plan    71yo CM with PMH of mitral valve prolapse, cardiac arrest during cath in 2016 due to vfib needing cardioversion, and recent diverticulitis with perforation s/p resection and colostomy bag on 5/15/25 that presented to the ED with abdominal pain and was admitted for SBO.      SBO, not resolving  - general surgery following, rec maintain NG tube to LIWS; Gastrografin study shows persistent small bowel dilation  continue zofran PRN for symptoms  keep NPO  continue PPI IV BID   PICC placed; started on TPN given prolonged n.p.o. status  small bowel follow-through 6/13; showed complete bowel obstruction  Surgery planning to return to the OR today     Abdominal hematoma  CT showed 7.3cm x 13cm x 8.6cm abnormality in mass vs fluid collection  IR drain placed with dark hemorrhagic hematoma collected but minimal output  cultures show no growth  monitor output closely but limited      Hyponatremia:  Likely due to n.p.o. status, decreased solute intake  trend     Recent diverticulitis with perforation  s/p colectomy with colostomy on 5/15/25     Mitral valve prolapse, Hx of arrest  Continue metoprolol     DVT Proph: SCDs only     Dispo: Remain inpatient, anticipating surgery on 6/17 followed by at least a few days of recovery, potentially longer given high risk of complications associated with early reoperation.  Unable to leave the hospital at this time with complete SBO requiring ongoing NG decompression           Brenton Rausch DO

## 2025-06-18 LAB
ACID FAST STN SPEC: NORMAL
ALBUMIN SERPL BCP-MCNC: 2.6 G/DL (ref 3.4–5)
ALP SERPL-CCNC: 159 U/L (ref 33–136)
ALT SERPL W P-5'-P-CCNC: 20 U/L (ref 10–52)
ANION GAP SERPL CALC-SCNC: 12 MMOL/L (ref 10–20)
AST SERPL W P-5'-P-CCNC: 20 U/L (ref 9–39)
BILIRUB DIRECT SERPL-MCNC: 1.5 MG/DL (ref 0–0.3)
BILIRUB SERPL-MCNC: 1.8 MG/DL (ref 0–1.2)
BUN SERPL-MCNC: 29 MG/DL (ref 6–23)
CALCIUM SERPL-MCNC: 7.9 MG/DL (ref 8.6–10.3)
CHLORIDE SERPL-SCNC: 98 MMOL/L (ref 98–107)
CHLORIDE UR-SCNC: 27 MMOL/L
CHLORIDE/CREATININE (MMOL/G) IN URINE: 23 MMOL/G CREAT (ref 23–275)
CO2 SERPL-SCNC: 22 MMOL/L (ref 21–32)
CREAT SERPL-MCNC: 0.69 MG/DL (ref 0.5–1.3)
CREAT UR-MCNC: 116.2 MG/DL (ref 20–370)
EGFRCR SERPLBLD CKD-EPI 2021: >90 ML/MIN/1.73M*2
ERYTHROCYTE [DISTWIDTH] IN BLOOD BY AUTOMATED COUNT: 14.2 % (ref 11.5–14.5)
GLUCOSE SERPL-MCNC: 194 MG/DL (ref 74–99)
HCT VFR BLD AUTO: 34.5 % (ref 41–52)
HGB BLD-MCNC: 11.8 G/DL (ref 13.5–17.5)
MAGNESIUM SERPL-MCNC: 1.73 MG/DL (ref 1.6–2.4)
MCH RBC QN AUTO: 29.3 PG (ref 26–34)
MCHC RBC AUTO-ENTMCNC: 34.2 G/DL (ref 32–36)
MCV RBC AUTO: 86 FL (ref 80–100)
MYCOBACTERIUM SPEC CULT: NORMAL
NRBC BLD-RTO: 0 /100 WBCS (ref 0–0)
OSMOLALITY SERPL: 279 MOSM/KG (ref 280–300)
OSMOLALITY UR: 881 MOSM/KG (ref 200–1200)
PHOSPHATE SERPL-MCNC: 4 MG/DL (ref 2.5–4.9)
PLATELET # BLD AUTO: 375 X10*3/UL (ref 150–450)
POTASSIUM SERPL-SCNC: 4.4 MMOL/L (ref 3.5–5.3)
POTASSIUM UR-SCNC: 79 MMOL/L
POTASSIUM/CREAT UR-RTO: 68 MMOL/G CREAT
PROT SERPL-MCNC: 5.2 G/DL (ref 6.4–8.2)
RBC # BLD AUTO: 4.03 X10*6/UL (ref 4.5–5.9)
SODIUM SERPL-SCNC: 128 MMOL/L (ref 136–145)
SODIUM UR-SCNC: 14 MMOL/L
SODIUM/CREAT UR-RTO: 12 MMOL/G CREAT
WBC # BLD AUTO: 12.1 X10*3/UL (ref 4.4–11.3)

## 2025-06-18 PROCEDURE — 2500000004 HC RX 250 GENERAL PHARMACY W/ HCPCS (ALT 636 FOR OP/ED): Performed by: SURGERY

## 2025-06-18 PROCEDURE — 84075 ASSAY ALKALINE PHOSPHATASE: CPT | Performed by: SURGERY

## 2025-06-18 PROCEDURE — 82570 ASSAY OF URINE CREATININE: CPT | Mod: GEALAB | Performed by: FAMILY MEDICINE

## 2025-06-18 PROCEDURE — 84100 ASSAY OF PHOSPHORUS: CPT | Performed by: SURGERY

## 2025-06-18 PROCEDURE — 2500000001 HC RX 250 WO HCPCS SELF ADMINISTERED DRUGS (ALT 637 FOR MEDICARE OP): Performed by: SURGERY

## 2025-06-18 PROCEDURE — 1100000001 HC PRIVATE ROOM DAILY

## 2025-06-18 PROCEDURE — 83935 ASSAY OF URINE OSMOLALITY: CPT | Mod: GEALAB | Performed by: FAMILY MEDICINE

## 2025-06-18 PROCEDURE — 2500000001 HC RX 250 WO HCPCS SELF ADMINISTERED DRUGS (ALT 637 FOR MEDICARE OP): Performed by: PHYSICIAN ASSISTANT

## 2025-06-18 PROCEDURE — 83735 ASSAY OF MAGNESIUM: CPT | Performed by: SURGERY

## 2025-06-18 PROCEDURE — 85027 COMPLETE CBC AUTOMATED: CPT | Performed by: SURGERY

## 2025-06-18 PROCEDURE — 2500000004 HC RX 250 GENERAL PHARMACY W/ HCPCS (ALT 636 FOR OP/ED): Performed by: FAMILY MEDICINE

## 2025-06-18 PROCEDURE — 2500000005 HC RX 250 GENERAL PHARMACY W/O HCPCS: Performed by: SURGERY

## 2025-06-18 PROCEDURE — 80048 BASIC METABOLIC PNL TOTAL CA: CPT | Performed by: SURGERY

## 2025-06-18 PROCEDURE — 36415 COLL VENOUS BLD VENIPUNCTURE: CPT | Performed by: SURGERY

## 2025-06-18 PROCEDURE — 99233 SBSQ HOSP IP/OBS HIGH 50: CPT | Performed by: FAMILY MEDICINE

## 2025-06-18 PROCEDURE — 83930 ASSAY OF BLOOD OSMOLALITY: CPT | Mod: GEALAB | Performed by: FAMILY MEDICINE

## 2025-06-18 RX ORDER — SODIUM CHLORIDE 9 MG/ML
50 INJECTION, SOLUTION INTRAVENOUS CONTINUOUS
Status: ACTIVE | OUTPATIENT
Start: 2025-06-18 | End: 2025-06-19

## 2025-06-18 RX ORDER — GABAPENTIN 250 MG/5ML
100 SOLUTION ORAL EVERY 12 HOURS SCHEDULED
Status: DISCONTINUED | OUTPATIENT
Start: 2025-06-18 | End: 2025-06-29

## 2025-06-18 RX ORDER — SODIUM CHLORIDE 9 MG/ML
30 INJECTION, SOLUTION INTRAVENOUS CONTINUOUS
Status: DISCONTINUED | OUTPATIENT
Start: 2025-06-18 | End: 2025-06-18

## 2025-06-18 RX ADMIN — GABAPENTIN 100 MG: 250 SOLUTION ORAL at 14:01

## 2025-06-18 RX ADMIN — ASPIRIN 81 MG: 81 TABLET, DELAYED RELEASE ORAL at 09:22

## 2025-06-18 RX ADMIN — ACETAMINOPHEN 1000 MG: 10 INJECTION INTRAVENOUS at 01:49

## 2025-06-18 RX ADMIN — Medication 1 G: at 09:23

## 2025-06-18 RX ADMIN — SODIUM CHLORIDE 50 ML/HR: 0.9 INJECTION, SOLUTION INTRAVENOUS at 14:33

## 2025-06-18 RX ADMIN — ASCORBIC ACID, VITAMIN A PALMITATE, CHOLECALCIFEROL, THIAMINE HYDROCHLORIDE, RIBOFLAVIN-5 PHOSPHATE SODIUM, PYRIDOXINE HYDROCHLORIDE, NIACINAMIDE, DEXPANTHENOL, ALPHA-TOCOPHEROL ACETATE, VITAMIN K1, FOLIC ACID, BIOTIN, CYANOCOBALAMIN: 200; 3300; 200; 6; 3.6; 6; 40; 15; 10; 150; 600; 60; 5 INJECTION, SOLUTION INTRAVENOUS at 21:30

## 2025-06-18 RX ADMIN — HYDROMORPHONE HYDROCHLORIDE 0.4 MG: 1 INJECTION, SOLUTION INTRAMUSCULAR; INTRAVENOUS; SUBCUTANEOUS at 08:38

## 2025-06-18 RX ADMIN — KETOROLAC TROMETHAMINE 15 MG: 15 INJECTION, SOLUTION INTRAMUSCULAR; INTRAVENOUS at 22:15

## 2025-06-18 RX ADMIN — KETOROLAC TROMETHAMINE 15 MG: 15 INJECTION, SOLUTION INTRAMUSCULAR; INTRAVENOUS at 16:50

## 2025-06-18 RX ADMIN — PROCHLORPERAZINE EDISYLATE 10 MG: 5 INJECTION INTRAMUSCULAR; INTRAVENOUS at 14:45

## 2025-06-18 RX ADMIN — METHOCARBAMOL 500 MG: 1000 INJECTION, SOLUTION INTRAMUSCULAR; INTRAVENOUS at 12:24

## 2025-06-18 RX ADMIN — Medication 10 ML: at 21:36

## 2025-06-18 RX ADMIN — METHOCARBAMOL 500 MG: 1000 INJECTION, SOLUTION INTRAMUSCULAR; INTRAVENOUS at 21:30

## 2025-06-18 RX ADMIN — GABAPENTIN 100 MG: 250 SOLUTION ORAL at 21:35

## 2025-06-18 RX ADMIN — Medication 10 ML: at 21:39

## 2025-06-18 RX ADMIN — METHOCARBAMOL 500 MG: 1000 INJECTION, SOLUTION INTRAMUSCULAR; INTRAVENOUS at 04:53

## 2025-06-18 RX ADMIN — CEFAZOLIN SODIUM 2 G: 2 INJECTION, SOLUTION INTRAVENOUS at 09:22

## 2025-06-18 RX ADMIN — METRONIDAZOLE 500 MG: 500 INJECTION, SOLUTION INTRAVENOUS at 14:00

## 2025-06-18 RX ADMIN — BENZOCAINE AND MENTHOL 1 LOZENGE: 15; 3.6 LOZENGE ORAL at 14:01

## 2025-06-18 RX ADMIN — SODIUM CHLORIDE 250 ML: 0.9 INJECTION, SOLUTION INTRAVENOUS at 12:27

## 2025-06-18 RX ADMIN — CEFAZOLIN SODIUM 2 G: 2 INJECTION, SOLUTION INTRAVENOUS at 02:04

## 2025-06-18 RX ADMIN — METRONIDAZOLE 500 MG: 500 INJECTION, SOLUTION INTRAVENOUS at 06:00

## 2025-06-18 RX ADMIN — METOPROLOL SUCCINATE 25 MG: 25 TABLET, EXTENDED RELEASE ORAL at 09:23

## 2025-06-18 RX ADMIN — HYDROMORPHONE HYDROCHLORIDE 0.2 MG: 1 INJECTION, SOLUTION INTRAMUSCULAR; INTRAVENOUS; SUBCUTANEOUS at 01:03

## 2025-06-18 RX ADMIN — Medication 10 ML: at 12:26

## 2025-06-18 RX ADMIN — PANTOPRAZOLE SODIUM 40 MG: 40 TABLET, DELAYED RELEASE ORAL at 16:39

## 2025-06-18 RX ADMIN — ACETAMINOPHEN 1000 MG: 10 INJECTION INTRAVENOUS at 08:49

## 2025-06-18 RX ADMIN — I.V. FAT EMULSION 50 G: 20 EMULSION INTRAVENOUS at 21:30

## 2025-06-18 RX ADMIN — SODIUM CHLORIDE 50 ML/HR: 0.9 INJECTION, SOLUTION INTRAVENOUS at 21:36

## 2025-06-18 RX ADMIN — ACETAMINOPHEN 1000 MG: 10 INJECTION INTRAVENOUS at 21:35

## 2025-06-18 RX ADMIN — POLYETHYLENE GLYCOL 3350 17 G: 17 POWDER, FOR SOLUTION ORAL at 09:22

## 2025-06-18 RX ADMIN — ACETAMINOPHEN 1000 MG: 10 INJECTION INTRAVENOUS at 14:23

## 2025-06-18 RX ADMIN — KETOROLAC TROMETHAMINE 15 MG: 15 INJECTION, SOLUTION INTRAMUSCULAR; INTRAVENOUS at 08:38

## 2025-06-18 ASSESSMENT — PAIN DESCRIPTION - LOCATION
LOCATION: ABDOMEN
LOCATION: ABDOMEN

## 2025-06-18 ASSESSMENT — COGNITIVE AND FUNCTIONAL STATUS - GENERAL
WALKING IN HOSPITAL ROOM: A LITTLE
HELP NEEDED FOR BATHING: A LITTLE
WALKING IN HOSPITAL ROOM: A LITTLE
CLIMB 3 TO 5 STEPS WITH RAILING: A LITTLE
MOBILITY SCORE: 20
MOBILITY SCORE: 19
STANDING UP FROM CHAIR USING ARMS: A LITTLE
MOVING TO AND FROM BED TO CHAIR: A LITTLE
DAILY ACTIVITIY SCORE: 20
HELP NEEDED FOR BATHING: A LITTLE
TOILETING: A LITTLE
CLIMB 3 TO 5 STEPS WITH RAILING: A LITTLE
DRESSING REGULAR LOWER BODY CLOTHING: A LITTLE
DAILY ACTIVITIY SCORE: 22
MOVING FROM LYING ON BACK TO SITTING ON SIDE OF FLAT BED WITH BEDRAILS: A LITTLE
DRESSING REGULAR UPPER BODY CLOTHING: A LITTLE
MOVING TO AND FROM BED TO CHAIR: A LITTLE
DRESSING REGULAR LOWER BODY CLOTHING: A LITTLE
STANDING UP FROM CHAIR USING ARMS: A LITTLE

## 2025-06-18 ASSESSMENT — PAIN SCALES - GENERAL
PAINLEVEL_OUTOF10: 6
PAINLEVEL_OUTOF10: 3
PAINLEVEL_OUTOF10: 10 - WORST POSSIBLE PAIN
PAINLEVEL_OUTOF10: 3
PAINLEVEL_OUTOF10: 0 - NO PAIN

## 2025-06-18 ASSESSMENT — PAIN - FUNCTIONAL ASSESSMENT
PAIN_FUNCTIONAL_ASSESSMENT: 0-10

## 2025-06-18 NOTE — PROGRESS NOTES
Diego Morales is a 70 y.o. male on day 14 of admission presenting with Small bowel obstruction (Multi).      Subjective   Patient reports an overall improvement overnight and his abdominal pain.       Objective     Last Recorded Vitals  /80 (BP Location: Right arm, Patient Position: Lying)   Pulse 108   Temp 36.1 °C (97 °F) (Temporal)   Resp 16   Wt 64.1 kg (141 lb 5 oz)   SpO2 95%   Intake/Output last 3 Shifts:    Intake/Output Summary (Last 24 hours) at 6/18/2025 0743  Last data filed at 6/18/2025 0700  Gross per 24 hour   Intake 1275 ml   Output 1510 ml   Net -235 ml       Admission Weight  Weight: 65.8 kg (145 lb) (06/04/25 1735)    Daily Weight  06/04/25 : 64.1 kg (141 lb 5 oz)    Image Results  FL small bowel series  Narrative: Interpreted By:  Chintan Tomlinson,   STUDY:  FL SMALL BOWEL SERIES;  6/13/2025 11:43 am      INDICATION:  Signs/Symptoms:evaluate for obstruction, 4 weeks post op colostomy  with nonimproving ileus vs obstruction.          COMPARISON:  06/10/2025      ACCESSION NUMBER(S):  [id number]      ORDERING CLINICIAN:  RUSH DAVIS      TECHNIQUE:  An initial radiograph of the abdomen and pelvis was obtained.  Following the oral administration of Gastrografin, delayed static  fluoroscopic images of the abdomen in the posteroanterior projection  were obtained at 0, 20, 40 and 60 minutes was obtained.      FINDINGS:  Initial  image demonstrates residual contrast material within  the colon. There is a left-sided colostomy again seen similar to  prior. Markedly dilated loops of small bowel with mucosal wall  thickening measuring up to 6.1 cm in caliber, left upper quadrant.  There is an NG tube in place. There is redemonstration of pigtail  catheter in place projecting over the pelvis.      Additional imaging performed subsequently after oral contrast  administration through the NG tube. There is opacification of the  stomach and proximal duodenum identified. The small bowel are  not  opacified. Findings are suggestive of complete bowel obstruction.  Gastric outlet obstruction or gastroparesis can not be entirely  excluded, however, felt less likely.      Impression: 1. Suspect complete bowel obstruction. Recommend clinical  correlation. Further evaluation with CT scan of the abdomen may be  performed for better assessment if clinically warranted.      SUPPLEMENTAL INFORMATION:  Notifi message was left for RUSH DAVIS regarding this exam by Dr. Tomlinson on 6/13/2025 at approximately 11:50 hours.      MACRO:  None      Signed by: Chintan Tomlinson 6/13/2025 12:00 PM  Dictation workstation:   [id number]      Physical Exam    Gen: Patient laying still, appears uncomfortable reports pain has improved since medication  HEENT: atraumatic, normocephalic  Pulm: normal respiratory effort, clear to auscultation b/l  Cardiac: RRR, no murmurs noted, normal S1/S2  GI: Abdominal binder in place, no bowel sounds appreciated  MSK: normal ROM without joint swelling  Extremities: no LE edema, cyanosis  Neuro: AOX3, CN II-XII grossly intact  Psych: calm and appropriate for situation        Assessment & Plan    69yo CM with PMH of mitral valve prolapse, cardiac arrest during cath in 2016 due to vfib needing cardioversion, and recent diverticulitis with perforation s/p resection and colostomy bag on 5/15/25 that presented to the ED with abdominal pain and was admitted for SBO.      SBO  Recent diverticulitis with perforation  S/P colectomy with colostomy on 5/15/25  S/P laparoscopic converted to open lysis of adhesions w/ small bowel resection and anastomosis 6/18  Continue TPN w/ lipids  Continue NPO status  Continue Dilaudid as needed for pain  Continue Zofran and Compazine as needed for nausea     Abdominal hematoma  CT showed 7.3cm x 13cm x 8.6cm abnormality in mass vs fluid collection  IR drain placed with dark hemorrhagic hematoma collected but minimal output  cultures show no growth  monitor output closely but  limited    Hyponatremia:  Likely due to n.p.o. status, decreased solute intake  Slight downtrend  Patient's status post 250 cc bolus yesterday with continued normal saline at 50 cc an hour  Will continue normal saline infusion with another bolus which will be 500 cc  Continue TPN at 70 cc an hour  Repeat CMP in AM    Mitral valve prolapse, Hx of arrest  Continue metoprolol     DVT Proph: SCDs only      Brenton Rausch, DO

## 2025-06-18 NOTE — PROGRESS NOTES
06/18/25 0909   Discharge Planning   Living Arrangements Spouse/significant other   Support Systems Spouse/significant other   Assistance Needed A&Ox4, independent with ADLs, no DME, room air at baseline-currently room air. [facility]CP Dr. Ramon Gould   Type of Residence Private residence   Number of Stairs to Enter Residence 3   Number of Stairs Within Residence 20   Do you have animals or pets at home? Yes   Type of Animals or Pets 1 dog - [place]   Home or Post Acute Services In home services   Type of Home Care Services Home nursing visits   Expected Discharge Disposition Home Health  (resumed [facility] will need internal referral placed. Pt now with PICC and TPN - per team, not expected to dc with PICC and TPN.)   Does the patient need discharge transport arranged? Yes   RoundTrip coordination needed? Yes   Has discharge transport been arranged? No

## 2025-06-18 NOTE — ANESTHESIA POSTPROCEDURE EVALUATION
Patient: Diego Morales    Procedure Summary       Date: 06/17/25 Room / Location: [facility] OR 08 / Virtual [facility] OR    Anesthesia Start: 1307 Anesthesia Stop: 1655    Procedures:       LAPAROSCOPY, EXPLORATORY      RESECTION, SMALL BOWEL      LYSIS, ADHESIONS, ABDOMINAL Diagnosis:       Small bowel obstruction (Multi)      (Small bowel obstruction (Multi) [K56.609])    Surgeons: Iván Canales MD Responsible Provider: Stuart Murguia MD    Anesthesia Type: general ASA Status: 3            Anesthesia Type: general    Vitals Value Taken Time   /70 06/17/25 18:20   Temp 36.1 °C (97 °F) 06/17/25 16:50   Pulse 115 06/17/25 18:20   Resp 10 06/17/25 18:20   SpO2 98 % 06/17/25 18:20       Anesthesia Post Evaluation    Patient location during evaluation: PACU  Patient participation: complete - patient participated  Level of consciousness: awake  Pain score: 2  Pain management: adequate  Multimodal analgesia pain management approach  Airway patency: patent  Two or more strategies used to mitigate risk of obstructive sleep apnea  Cardiovascular status: acceptable  Respiratory status: acceptable  Hydration status: acceptable  Postoperative Nausea and Vomiting: moderate        No notable events documented.

## 2025-06-18 NOTE — NURSING NOTE
0700- Assumed care of patient    0800- Patient tells Cindy, surgery PA, that he is in a lot of pain. Did not receive some of his meds overnight. Spoke to Cindy regarding allergy listed but had previously taken. Okay to remove allergy listed per PA. Patient was given Toradol at this time, which was due earlier per okay from PA. Patient also given 0.4 Dilaudid and Tylenol per PA request.     1245- Patient's guardado removed by this nurse.  Daughter at bedside at this time and updated with N.O. for guardado removal and IVF bolus of 250.    Patient had IVF running at 50/hr. Patient was able to get up and walk to bathroom and urinate. Patient nauseas and dry heaving once he got back to bed. PRN nausea medication given.     LUZ ELENA ANN, LPN

## 2025-06-18 NOTE — ADDENDUM NOTE
Addendum  created 06/17/25 2203 by Stuart Murguia MD    Attestation recorded in Intraprocedure, Intraprocedure Attestations filed

## 2025-06-18 NOTE — NURSING NOTE
Patient returned with IV running at 999/hr. Was told in report that patient needed 200ml more of bolus. No order noted to continue fluids. Patient also has order for Toradol which is listed as an allergy. Message sent to Dr. Berman regarding IVF and added night nurse to message. Passed on to night nurse that patient does not have true allergy to Toradol and has previously taken.     [facility], LPN

## 2025-06-18 NOTE — CARE PLAN
The patient's goals for the shift include      The clinical goals for the shift include Patient will have pain management, remain safe, and rest comfortably this shift

## 2025-06-19 LAB
ALBUMIN SERPL BCP-MCNC: 2.5 G/DL (ref 3.4–5)
ALP SERPL-CCNC: 113 U/L (ref 33–136)
ALT SERPL W P-5'-P-CCNC: 9 U/L (ref 10–52)
ANION GAP SERPL CALC-SCNC: 13 MMOL/L (ref 10–20)
AST SERPL W P-5'-P-CCNC: 12 U/L (ref 9–39)
BILIRUB SERPL-MCNC: 1.1 MG/DL (ref 0–1.2)
BUN SERPL-MCNC: 39 MG/DL (ref 6–23)
CALCIUM SERPL-MCNC: 8 MG/DL (ref 8.6–10.3)
CHLORIDE SERPL-SCNC: 96 MMOL/L (ref 98–107)
CO2 SERPL-SCNC: 24 MMOL/L (ref 21–32)
CREAT SERPL-MCNC: 0.94 MG/DL (ref 0.5–1.3)
EGFRCR SERPLBLD CKD-EPI 2021: 87 ML/MIN/1.73M*2
ERYTHROCYTE [DISTWIDTH] IN BLOOD BY AUTOMATED COUNT: 14.9 % (ref 11.5–14.5)
GLUCOSE SERPL-MCNC: 133 MG/DL (ref 74–99)
HCT VFR BLD AUTO: 27.2 % (ref 41–52)
HGB BLD-MCNC: 8.9 G/DL (ref 13.5–17.5)
MAGNESIUM SERPL-MCNC: 1.74 MG/DL (ref 1.6–2.4)
MCH RBC QN AUTO: 29.5 PG (ref 26–34)
MCHC RBC AUTO-ENTMCNC: 32.7 G/DL (ref 32–36)
MCV RBC AUTO: 90 FL (ref 80–100)
NRBC BLD-RTO: 0 /100 WBCS (ref 0–0)
PHOSPHATE SERPL-MCNC: 3.8 MG/DL (ref 2.5–4.9)
PLATELET # BLD AUTO: 286 X10*3/UL (ref 150–450)
POTASSIUM SERPL-SCNC: 3.8 MMOL/L (ref 3.5–5.3)
PROT SERPL-MCNC: 5.2 G/DL (ref 6.4–8.2)
RBC # BLD AUTO: 3.02 X10*6/UL (ref 4.5–5.9)
SODIUM SERPL-SCNC: 129 MMOL/L (ref 136–145)
WBC # BLD AUTO: 11.2 X10*3/UL (ref 4.4–11.3)

## 2025-06-19 PROCEDURE — 2500000001 HC RX 250 WO HCPCS SELF ADMINISTERED DRUGS (ALT 637 FOR MEDICARE OP): Performed by: SURGERY

## 2025-06-19 PROCEDURE — 1100000001 HC PRIVATE ROOM DAILY

## 2025-06-19 PROCEDURE — 2500000004 HC RX 250 GENERAL PHARMACY W/ HCPCS (ALT 636 FOR OP/ED): Performed by: SURGERY

## 2025-06-19 PROCEDURE — 36415 COLL VENOUS BLD VENIPUNCTURE: CPT | Performed by: SURGERY

## 2025-06-19 PROCEDURE — 2500000001 HC RX 250 WO HCPCS SELF ADMINISTERED DRUGS (ALT 637 FOR MEDICARE OP): Performed by: PHYSICIAN ASSISTANT

## 2025-06-19 PROCEDURE — 83735 ASSAY OF MAGNESIUM: CPT | Performed by: SURGERY

## 2025-06-19 PROCEDURE — 2500000004 HC RX 250 GENERAL PHARMACY W/ HCPCS (ALT 636 FOR OP/ED): Performed by: FAMILY MEDICINE

## 2025-06-19 PROCEDURE — 84100 ASSAY OF PHOSPHORUS: CPT | Performed by: SURGERY

## 2025-06-19 PROCEDURE — 2500000005 HC RX 250 GENERAL PHARMACY W/O HCPCS: Performed by: SURGERY

## 2025-06-19 PROCEDURE — 84075 ASSAY ALKALINE PHOSPHATASE: CPT | Performed by: FAMILY MEDICINE

## 2025-06-19 PROCEDURE — 85027 COMPLETE CBC AUTOMATED: CPT | Performed by: SURGERY

## 2025-06-19 RX ORDER — SODIUM CHLORIDE 9 MG/ML
50 INJECTION, SOLUTION INTRAVENOUS CONTINUOUS
Status: DISCONTINUED | OUTPATIENT
Start: 2025-06-19 | End: 2025-06-19

## 2025-06-19 RX ORDER — ACETAMINOPHEN 10 MG/ML
1000 INJECTION, SOLUTION INTRAVENOUS EVERY 6 HOURS
Status: COMPLETED | OUTPATIENT
Start: 2025-06-19 | End: 2025-06-20

## 2025-06-19 RX ORDER — MAGNESIUM SULFATE HEPTAHYDRATE 40 MG/ML
2 INJECTION, SOLUTION INTRAVENOUS ONCE
Status: COMPLETED | OUTPATIENT
Start: 2025-06-19 | End: 2025-06-19

## 2025-06-19 RX ADMIN — ONDANSETRON 4 MG: 2 INJECTION, SOLUTION INTRAMUSCULAR; INTRAVENOUS at 16:04

## 2025-06-19 RX ADMIN — ASCORBIC ACID, VITAMIN A PALMITATE, CHOLECALCIFEROL, THIAMINE HYDROCHLORIDE, RIBOFLAVIN-5 PHOSPHATE SODIUM, PYRIDOXINE HYDROCHLORIDE, NIACINAMIDE, DEXPANTHENOL, ALPHA-TOCOPHEROL ACETATE, VITAMIN K1, FOLIC ACID, BIOTIN, CYANOCOBALAMIN: 200; 3300; 200; 6; 3.6; 6; 40; 15; 10; 150; 600; 60; 5 INJECTION, SOLUTION INTRAVENOUS at 20:22

## 2025-06-19 RX ADMIN — SODIUM CHLORIDE 50 ML/HR: 0.9 INJECTION, SOLUTION INTRAVENOUS at 14:07

## 2025-06-19 RX ADMIN — HYDROMORPHONE HYDROCHLORIDE 0.4 MG: 1 INJECTION, SOLUTION INTRAMUSCULAR; INTRAVENOUS; SUBCUTANEOUS at 14:49

## 2025-06-19 RX ADMIN — GABAPENTIN 100 MG: 250 SOLUTION ORAL at 10:27

## 2025-06-19 RX ADMIN — PANTOPRAZOLE SODIUM 40 MG: 40 TABLET, DELAYED RELEASE ORAL at 06:19

## 2025-06-19 RX ADMIN — KETOROLAC TROMETHAMINE 15 MG: 15 INJECTION, SOLUTION INTRAMUSCULAR; INTRAVENOUS at 15:59

## 2025-06-19 RX ADMIN — GABAPENTIN 100 MG: 250 SOLUTION ORAL at 20:23

## 2025-06-19 RX ADMIN — POLYETHYLENE GLYCOL 3350 17 G: 17 POWDER, FOR SOLUTION ORAL at 10:27

## 2025-06-19 RX ADMIN — ASPIRIN 81 MG: 81 TABLET, DELAYED RELEASE ORAL at 10:27

## 2025-06-19 RX ADMIN — METHOCARBAMOL 500 MG: 1000 INJECTION, SOLUTION INTRAMUSCULAR; INTRAVENOUS at 21:30

## 2025-06-19 RX ADMIN — ACETAMINOPHEN 1000 MG: 1000 INJECTION, SOLUTION INTRAVENOUS at 23:31

## 2025-06-19 RX ADMIN — SODIUM CHLORIDE 500 ML: 0.9 INJECTION, SOLUTION INTRAVENOUS at 12:34

## 2025-06-19 RX ADMIN — Medication 10 ML: at 11:46

## 2025-06-19 RX ADMIN — ACETAMINOPHEN 1000 MG: 1000 INJECTION, SOLUTION INTRAVENOUS at 16:42

## 2025-06-19 RX ADMIN — METHOCARBAMOL 500 MG: 1000 INJECTION, SOLUTION INTRAMUSCULAR; INTRAVENOUS at 05:20

## 2025-06-19 RX ADMIN — METHOCARBAMOL 500 MG: 1000 INJECTION, SOLUTION INTRAMUSCULAR; INTRAVENOUS at 12:34

## 2025-06-19 RX ADMIN — KETOROLAC TROMETHAMINE 15 MG: 15 INJECTION, SOLUTION INTRAMUSCULAR; INTRAVENOUS at 23:31

## 2025-06-19 RX ADMIN — METOPROLOL SUCCINATE 25 MG: 25 TABLET, EXTENDED RELEASE ORAL at 10:27

## 2025-06-19 RX ADMIN — I.V. FAT EMULSION 50 G: 20 EMULSION INTRAVENOUS at 20:23

## 2025-06-19 RX ADMIN — MAGNESIUM SULFATE HEPTAHYDRATE 2 G: 40 INJECTION, SOLUTION INTRAVENOUS at 10:27

## 2025-06-19 RX ADMIN — Medication 10 ML: at 20:44

## 2025-06-19 RX ADMIN — KETOROLAC TROMETHAMINE 15 MG: 15 INJECTION, SOLUTION INTRAMUSCULAR; INTRAVENOUS at 10:27

## 2025-06-19 RX ADMIN — PANTOPRAZOLE SODIUM 40 MG: 40 TABLET, DELAYED RELEASE ORAL at 15:59

## 2025-06-19 RX ADMIN — ACETAMINOPHEN 1000 MG: 10 INJECTION INTRAVENOUS at 03:23

## 2025-06-19 RX ADMIN — KETOROLAC TROMETHAMINE 15 MG: 15 INJECTION, SOLUTION INTRAMUSCULAR; INTRAVENOUS at 05:20

## 2025-06-19 ASSESSMENT — PAIN SCALES - GENERAL
PAINLEVEL_OUTOF10: 0 - NO PAIN
PAINLEVEL_OUTOF10: 5 - MODERATE PAIN
PAINLEVEL_OUTOF10: 0 - NO PAIN
PAINLEVEL_OUTOF10: 0 - NO PAIN
PAINLEVEL_OUTOF10: 8

## 2025-06-19 ASSESSMENT — COGNITIVE AND FUNCTIONAL STATUS - GENERAL
MOVING FROM LYING ON BACK TO SITTING ON SIDE OF FLAT BED WITH BEDRAILS: A LITTLE
TURNING FROM BACK TO SIDE WHILE IN FLAT BAD: A LITTLE
DRESSING REGULAR LOWER BODY CLOTHING: A LITTLE
CLIMB 3 TO 5 STEPS WITH RAILING: A LOT
HELP NEEDED FOR BATHING: A LITTLE
MOVING FROM LYING ON BACK TO SITTING ON SIDE OF FLAT BED WITH BEDRAILS: A LITTLE
CLIMB 3 TO 5 STEPS WITH RAILING: A LOT
MOBILITY SCORE: 17
DRESSING REGULAR UPPER BODY CLOTHING: A LITTLE
DRESSING REGULAR UPPER BODY CLOTHING: A LITTLE
MOVING TO AND FROM BED TO CHAIR: A LITTLE
STANDING UP FROM CHAIR USING ARMS: A LITTLE
DAILY ACTIVITIY SCORE: 21
WALKING IN HOSPITAL ROOM: A LITTLE
HELP NEEDED FOR BATHING: A LITTLE
DRESSING REGULAR LOWER BODY CLOTHING: A LITTLE
WALKING IN HOSPITAL ROOM: A LITTLE
MOBILITY SCORE: 17
MOVING TO AND FROM BED TO CHAIR: A LITTLE
TURNING FROM BACK TO SIDE WHILE IN FLAT BAD: A LITTLE
STANDING UP FROM CHAIR USING ARMS: A LITTLE
DAILY ACTIVITIY SCORE: 21

## 2025-06-19 ASSESSMENT — PAIN - FUNCTIONAL ASSESSMENT
PAIN_FUNCTIONAL_ASSESSMENT: 0-10

## 2025-06-19 ASSESSMENT — PAIN DESCRIPTION - LOCATION
LOCATION: ABDOMEN
LOCATION: ABDOMEN

## 2025-06-19 NOTE — CARE PLAN
The patient's goals for the shift include      The clinical goals for the shift include pt will have pain controlled throughout shift    Over the shift, the patient did not make progress toward the following goals. Barriers to progression include none. Recommendations to address these barriers include none.

## 2025-06-19 NOTE — NURSING NOTE
1930: assumed pt care, NG tube in right nare, working at low continuous suction    2320: flushed pt's blue port on NG tube with 20 ccs of air as ordered

## 2025-06-19 NOTE — PROGRESS NOTES
"General/Trauma Surgery Daily Progress Note    Subjective   Feels stronger today, reports in good spirits and feeling well. Pain controlled, ambulated at 4am. Denies n/v. Scant output to NG, very small smear of stool within colostomy appliance, no gas.        Objective   Last Recorded Vitals:  Blood pressure 115/70, pulse 91, temperature 36.7 °C (98.1 °F), temperature source Temporal, resp. rate 18, height 1.803 m (5' 11\"), weight 64 kg (141 lb), SpO2 97%.    Intake/Output last 3 Shifts:  I/O last 3 completed shifts:  In: 03333.1 (159.7 mL/kg) [P.O.:30; I.V.:767.5 (12 mL/kg); NG/GT:480; IV Piggyback:1350]  Out: 3890 (60.8 mL/kg) [Urine:1560 (0.7 mL/kg/hr); Emesis/NG output:1700; Drains:630]  Weight: 64 kg     Pain Score:  0-10 (Numeric) Pain Score: 0 - No pain     Physical Exam:  Constitutional: No acute distress, sitting up in bed.  Neuro: Alert, oriented. Follows commands.   Eyes: EOMI. No scleral icterus. Conjunctiva pink.  ENT: MMM.   Heart: Regular rate.  Respiratory: No increased work of breathing or audible wheeze.  Abdomen: Soft, nondistended. Appropriately tender. Incisions clean, dry, intact. Colostomy pink and budded, small smear of liquid stool in bag, no gas. KATY drain with dark, liquid hematoma.   MSK: Moves all extremities.  Vascular: Palpable pulses throughout, no pitting edema.  Skin: No rashes.   Psychological: Appropriate mood and behavior.            Relevant Results  Laboratory Results:  CBC:   Lab Results   Component Value Date    WBC 11.2 06/19/2025    RBC 3.02 (L) 06/19/2025    HGB 8.9 (L) 06/19/2025    HCT 27.2 (L) 06/19/2025     06/19/2025       RFP:   Lab Results   Component Value Date     (L) 06/19/2025    K 3.8 06/19/2025    CL 96 (L) 06/19/2025    CO2 24 06/19/2025    BUN 39 (H) 06/19/2025    CREATININE 0.94 06/19/2025    CALCIUM 8.0 (L) 06/19/2025    MG 1.74 06/19/2025    PHOS 3.8 06/19/2025        LFTs:   Lab Results   Component Value Date    PROT 5.2 (L) 06/19/2025    " ALBUMIN 2.5 (L) 06/19/2025    BILITOT 1.1 06/19/2025    BILIDIR 1.5 (H) 06/18/2025    ALKPHOS 113 06/19/2025    AST 12 06/19/2025    ALT 9 (L) 06/19/2025         Imaging:  No results found.    Cardiology, Vascular, and Other Imaging  No other imaging results found for the past 2 days           Assessment/Plan   This is a 70 y.o. male POD 2 from laparotomy with lysis of adhesions and small bowel resection for enterotomy. Improving. Hopeful resolution of ileus in the next couple days.      Plan:   -- NG low continuous with flushing as ordered  -- NPO, TPN with supplemental fluids 50ml/hr  -- Daily CBC, RFP, Mg, weekly LFT, Lipids  -- Replete electrolytes to maintain K > 4.0, Mg > 2.0, Phos > 2.5  -- Ambulation  -- KATY drain care, colostomy care  -- Multimodal pain regimen with scheduled tylenol, toradol, robaxin, gabapentin. PRN narcotic.             Seen and discussed with Dr. Canales who is in agreement with plan. Please secure chat with questions.    Cindy Baldwin PA-C

## 2025-06-19 NOTE — NURSING NOTE
0730 Assumed care of pt.     1027 Medications given and assessment completed on pt. Pt states he is comfortable with pain well controlled. Abdominal incisions intact. Midline incision dressing shows a small amount of drainage. Drainage area marked and Cindy Baldwin notified. Vitals stable. Pt ambulating halls without discomfort. NG tube flushed with air and water. KATY drain stripped.

## 2025-06-19 NOTE — PROGRESS NOTES
"General/Trauma Surgery Daily Progress Note    Subjective   Pain uncontrolled this morning. Patient's toradol was held along with gabapentin overnight. Nauseated 2/2 pain.     Objective   Last Recorded Vitals:  Blood pressure 115/70, pulse 91, temperature 36.7 °C (98.1 °F), temperature source Temporal, resp. rate 18, height 1.803 m (5' 11\"), weight 64 kg (141 lb), SpO2 97%.    Intake/Output last 3 Shifts:  I/O last 3 completed shifts:  In: 87690.1 (159.7 mL/kg) [P.O.:30; I.V.:767.5 (12 mL/kg); NG/GT:480; IV Piggyback:1350]  Out: 3890 (60.8 mL/kg) [Urine:1560 (0.7 mL/kg/hr); Emesis/NG output:1700; Drains:630]  Weight: 64 kg     Pain Score:  0-10 (Numeric) Pain Score: 0 - No pain     Physical Exam:  Constitutional: No acute distress, sitting up in bed.  Neuro: Alert, oriented. Follows commands.   Eyes: EOMI. No scleral icterus. Conjunctiva pink.  ENT: MMM. NG with mild bilious output.   Heart: Regular rate.   Respiratory: No increased work of breathing or audible wheeze.  Abdomen: Soft, nondistended. Tender throughout abdomen. Nauseated. KATY serosanguinous. Colostomy pink and budded, no output.  MSK: Moves all extremities.  Vascular: Palpable pulses throughout, no pitting edema.  Skin: No rashes.   Psychological: Appropriate mood and behavior.            Relevant Results  Laboratory Results:  CBC:   Lab Results   Component Value Date    WBC 11.2 06/19/2025    RBC 3.02 (L) 06/19/2025    HGB 8.9 (L) 06/19/2025    HCT 27.2 (L) 06/19/2025     06/19/2025       RFP:   Lab Results   Component Value Date     (L) 06/19/2025    K 3.8 06/19/2025    CL 96 (L) 06/19/2025    CO2 24 06/19/2025    BUN 39 (H) 06/19/2025    CREATININE 0.94 06/19/2025    CALCIUM 8.0 (L) 06/19/2025    MG 1.74 06/19/2025    PHOS 3.8 06/19/2025        LFTs:   Lab Results   Component Value Date    PROT 5.2 (L) 06/19/2025    ALBUMIN 2.5 (L) 06/19/2025    BILITOT 1.1 06/19/2025    BILIDIR 1.5 (H) 06/18/2025    ALKPHOS 113 06/19/2025    AST 12 " "06/19/2025    ALT 9 (L) 06/19/2025         Imaging:  No results found.    Cardiology, Vascular, and Other Imaging  No other imaging results found for the past 2 days           Assessment/Plan   This is a 70 y.o. male POD 1 from laparotomy with lysis of adhesions and small bowel resection for enterotomy. Pain uncontrolled this morning, suspect due to pain medications withheld overnight.        Plan:   -- NG low continuous with flushing as ordered  -- NPO, TPN with supplemental fluids 50ml/hr  -- Daily CBC, RFP, Mg, weekly LFT, Lipids  -- Replete electrolytes to maintain K > 4.0, Mg > 2.0, Phos > 2.5  -- Ambulation  -- KATY drain care, colostomy care  -- Multimodal pain regimen with scheduled tylenol, toradol, robaxin, gabapentin. PRN narcotic.   -- Toradol removed from listed allergies as the \"allergy\" listed was hallucinations. Patient has been tolerating Toradol this entire admission with no adverse effects.  -- Gabapentin is ordered as liquid via the NG. Patient is okay to receive PO medications. Clamp NG for 20-30 min after PO med administration.     Work on getting pain in better control today.                 Seen and discussed with Dr. Canales who is in agreement with plan. Please secure chat with questions.     Cindy Baldwin PA-C      "

## 2025-06-20 LAB
ALBUMIN SERPL BCP-MCNC: 2.4 G/DL (ref 3.4–5)
ANION GAP SERPL CALC-SCNC: 12 MMOL/L (ref 10–20)
BUN SERPL-MCNC: 34 MG/DL (ref 6–23)
CALCIUM SERPL-MCNC: 8 MG/DL (ref 8.6–10.3)
CHLORIDE SERPL-SCNC: 96 MMOL/L (ref 98–107)
CO2 SERPL-SCNC: 24 MMOL/L (ref 21–32)
CREAT SERPL-MCNC: 0.73 MG/DL (ref 0.5–1.3)
EGFRCR SERPLBLD CKD-EPI 2021: >90 ML/MIN/1.73M*2
ERYTHROCYTE [DISTWIDTH] IN BLOOD BY AUTOMATED COUNT: 15.2 % (ref 11.5–14.5)
GLUCOSE SERPL-MCNC: 130 MG/DL (ref 74–99)
HCT VFR BLD AUTO: 24.1 % (ref 41–52)
HGB BLD-MCNC: 7.8 G/DL (ref 13.5–17.5)
MAGNESIUM SERPL-MCNC: 2.2 MG/DL (ref 1.6–2.4)
MCH RBC QN AUTO: 29.3 PG (ref 26–34)
MCHC RBC AUTO-ENTMCNC: 32.4 G/DL (ref 32–36)
MCV RBC AUTO: 91 FL (ref 80–100)
NRBC BLD-RTO: 0 /100 WBCS (ref 0–0)
PHOSPHATE SERPL-MCNC: 3.3 MG/DL (ref 2.5–4.9)
PLATELET # BLD AUTO: 301 X10*3/UL (ref 150–450)
POTASSIUM SERPL-SCNC: 3.8 MMOL/L (ref 3.5–5.3)
RBC # BLD AUTO: 2.66 X10*6/UL (ref 4.5–5.9)
SODIUM SERPL-SCNC: 128 MMOL/L (ref 136–145)
WBC # BLD AUTO: 10 X10*3/UL (ref 4.4–11.3)

## 2025-06-20 PROCEDURE — 2500000004 HC RX 250 GENERAL PHARMACY W/ HCPCS (ALT 636 FOR OP/ED): Performed by: SURGERY

## 2025-06-20 PROCEDURE — 80069 RENAL FUNCTION PANEL: CPT | Performed by: FAMILY MEDICINE

## 2025-06-20 PROCEDURE — 1100000001 HC PRIVATE ROOM DAILY

## 2025-06-20 PROCEDURE — 99233 SBSQ HOSP IP/OBS HIGH 50: CPT | Performed by: FAMILY MEDICINE

## 2025-06-20 PROCEDURE — 2500000001 HC RX 250 WO HCPCS SELF ADMINISTERED DRUGS (ALT 637 FOR MEDICARE OP): Performed by: PHYSICIAN ASSISTANT

## 2025-06-20 PROCEDURE — 2500000001 HC RX 250 WO HCPCS SELF ADMINISTERED DRUGS (ALT 637 FOR MEDICARE OP): Performed by: SURGERY

## 2025-06-20 PROCEDURE — 2500000005 HC RX 250 GENERAL PHARMACY W/O HCPCS: Performed by: SURGERY

## 2025-06-20 PROCEDURE — 83735 ASSAY OF MAGNESIUM: CPT | Performed by: FAMILY MEDICINE

## 2025-06-20 PROCEDURE — 85027 COMPLETE CBC AUTOMATED: CPT | Performed by: FAMILY MEDICINE

## 2025-06-20 PROCEDURE — 36415 COLL VENOUS BLD VENIPUNCTURE: CPT | Performed by: FAMILY MEDICINE

## 2025-06-20 PROCEDURE — 2500000004 HC RX 250 GENERAL PHARMACY W/ HCPCS (ALT 636 FOR OP/ED): Performed by: FAMILY MEDICINE

## 2025-06-20 RX ORDER — PANTOPRAZOLE SODIUM 40 MG/1
40 TABLET, DELAYED RELEASE ORAL
Status: DISCONTINUED | OUTPATIENT
Start: 2025-06-20 | End: 2025-07-02 | Stop reason: HOSPADM

## 2025-06-20 RX ORDER — PANTOPRAZOLE SODIUM 40 MG/10ML
40 INJECTION, POWDER, LYOPHILIZED, FOR SOLUTION INTRAVENOUS
Status: DISCONTINUED | OUTPATIENT
Start: 2025-06-20 | End: 2025-06-27

## 2025-06-20 RX ADMIN — ASCORBIC ACID, VITAMIN A PALMITATE, CHOLECALCIFEROL, THIAMINE HYDROCHLORIDE, RIBOFLAVIN-5 PHOSPHATE SODIUM, PYRIDOXINE HYDROCHLORIDE, NIACINAMIDE, DEXPANTHENOL, ALPHA-TOCOPHEROL ACETATE, VITAMIN K1, FOLIC ACID, BIOTIN, CYANOCOBALAMIN: 200; 3300; 200; 6; 3.6; 6; 40; 15; 10; 150; 600; 60; 5 INJECTION, SOLUTION INTRAVENOUS at 20:14

## 2025-06-20 RX ADMIN — KETOROLAC TROMETHAMINE 15 MG: 15 INJECTION, SOLUTION INTRAMUSCULAR; INTRAVENOUS at 04:01

## 2025-06-20 RX ADMIN — Medication 10 ML: at 09:49

## 2025-06-20 RX ADMIN — ASPIRIN 81 MG: 81 TABLET, DELAYED RELEASE ORAL at 09:49

## 2025-06-20 RX ADMIN — GABAPENTIN 100 MG: 250 SOLUTION ORAL at 20:16

## 2025-06-20 RX ADMIN — GABAPENTIN 100 MG: 250 SOLUTION ORAL at 09:48

## 2025-06-20 RX ADMIN — PANTOPRAZOLE SODIUM 40 MG: 40 TABLET, DELAYED RELEASE ORAL at 06:08

## 2025-06-20 RX ADMIN — ACETAMINOPHEN 1000 MG: 1000 INJECTION, SOLUTION INTRAVENOUS at 11:09

## 2025-06-20 RX ADMIN — HYDROMORPHONE HYDROCHLORIDE 0.4 MG: 1 INJECTION, SOLUTION INTRAMUSCULAR; INTRAVENOUS; SUBCUTANEOUS at 22:10

## 2025-06-20 RX ADMIN — I.V. FAT EMULSION 50 G: 20 EMULSION INTRAVENOUS at 20:14

## 2025-06-20 RX ADMIN — PANTOPRAZOLE SODIUM 40 MG: 40 INJECTION, POWDER, FOR SOLUTION INTRAVENOUS at 15:44

## 2025-06-20 RX ADMIN — METOPROLOL SUCCINATE 25 MG: 25 TABLET, EXTENDED RELEASE ORAL at 09:49

## 2025-06-20 RX ADMIN — ACETAMINOPHEN 1000 MG: 1000 INJECTION, SOLUTION INTRAVENOUS at 04:01

## 2025-06-20 RX ADMIN — POLYETHYLENE GLYCOL 3350 17 G: 17 POWDER, FOR SOLUTION ORAL at 09:49

## 2025-06-20 RX ADMIN — METHOCARBAMOL 500 MG: 1000 INJECTION, SOLUTION INTRAMUSCULAR; INTRAVENOUS at 13:35

## 2025-06-20 RX ADMIN — HYDROMORPHONE HYDROCHLORIDE 0.4 MG: 1 INJECTION, SOLUTION INTRAMUSCULAR; INTRAVENOUS; SUBCUTANEOUS at 18:28

## 2025-06-20 RX ADMIN — Medication 10 ML: at 20:17

## 2025-06-20 RX ADMIN — ONDANSETRON 4 MG: 2 INJECTION, SOLUTION INTRAMUSCULAR; INTRAVENOUS at 04:02

## 2025-06-20 RX ADMIN — KETOROLAC TROMETHAMINE 15 MG: 15 INJECTION, SOLUTION INTRAMUSCULAR; INTRAVENOUS at 09:50

## 2025-06-20 ASSESSMENT — PAIN SCALES - GENERAL
PAINLEVEL_OUTOF10: 7
PAINLEVEL_OUTOF10: 5 - MODERATE PAIN
PAINLEVEL_OUTOF10: 5 - MODERATE PAIN
PAINLEVEL_OUTOF10: 0 - NO PAIN

## 2025-06-20 ASSESSMENT — COGNITIVE AND FUNCTIONAL STATUS - GENERAL
HELP NEEDED FOR BATHING: A LITTLE
HELP NEEDED FOR BATHING: A LITTLE
DAILY ACTIVITIY SCORE: 21
MOBILITY SCORE: 22
DRESSING REGULAR LOWER BODY CLOTHING: A LITTLE
CLIMB 3 TO 5 STEPS WITH RAILING: A LITTLE
WALKING IN HOSPITAL ROOM: A LITTLE
DAILY ACTIVITIY SCORE: 21
DRESSING REGULAR UPPER BODY CLOTHING: A LITTLE
WALKING IN HOSPITAL ROOM: A LITTLE
DRESSING REGULAR UPPER BODY CLOTHING: A LITTLE
MOBILITY SCORE: 22
DRESSING REGULAR LOWER BODY CLOTHING: A LITTLE
CLIMB 3 TO 5 STEPS WITH RAILING: A LITTLE

## 2025-06-20 ASSESSMENT — PAIN - FUNCTIONAL ASSESSMENT
PAIN_FUNCTIONAL_ASSESSMENT: 0-10

## 2025-06-20 ASSESSMENT — PAIN DESCRIPTION - LOCATION
LOCATION: ABDOMEN
LOCATION: ABDOMEN

## 2025-06-20 NOTE — PROGRESS NOTES
"Nutrition Follow Up Assessment:   Nutrition Assessment       Patient is a 70 y.o. male with h/o perforated diverticulitis - now presenting with SBO s/p NGT in ED (6/4) for decompression.     Pt s/p IR drain placement (6/5) for pelvic hematoma.  PICC placed (6/10) for supplemental TPN.      Pt with prolonged obstruction so taken to OR (6/17) for laparoscopic converted to open extensive ABHISHEK & open small bowel resection w/ anastomosis.     Still awaiting ROBF w/ decompressive NGT in place. TPN w/ daily lipids to supplement nutritional needs.    Nutrition History:  Food and Nutrient History: Pt remains NPO w/ TPN infusing at goal rate of 70mL/hr. No additional nutritional needs at this time. Continue current course.  Food Allergy:  (None)     Anthropometrics:  Height: 180.3 cm (5' 11\")   Weight: 64 kg (141 lb)   BMI (Calculated): 19.67  IBW/kg (Dietitian Calculated): 78.2 kg  Percent of IBW: 82 %    Weight History:   Weight         6/4/2025  1735 6/4/2025  2246 6/19/2025  0507       Weight: 65.8 kg (145 lb) 64.1 kg (141 lb 5 oz) 64 kg (141 lb)      Weight Change %:  Weight History / % Weight Change: No weight changes noted at this time    Nutrition Focused Physical Exam Findings:  Defer: See RDN assessment (6/6/25)    Edema:  Edema: none    Physical Findings:  Skin: Positive (Abdominal incision x2)  Digestive System Findings:  (+Colostomy - SBO & decompressive NGT)  Mouth Findings:  (None)    Nutrition Significant Labs:  BMP Trend:   Results from last 7 days   Lab Units 06/20/25  0716 06/19/25  0651 06/18/25  0641 06/17/25  0726   GLUCOSE mg/dL 130* 133* 194* 137*   CALCIUM mg/dL 8.0* 8.0* 7.9* 8.8   SODIUM mmol/L 128* 129* 128* 132*   POTASSIUM mmol/L 3.8 3.8 4.4 3.8   CO2 mmol/L 24 24 22 27   CHLORIDE mmol/L 96* 96* 98 95*   BUN mg/dL 34* 39* 29* 22   CREATININE mg/dL 0.73 0.94 0.69 0.69      Nutrition Specific Medications:  Scheduled medications  Scheduled Medications[1]  Continuous medications  Continuous " Medications[2]  PRN medications  PRN Medications[3]    I/O:   Last BM Date: 06/15/25; Stool Appearance: Liquid (06/15/25 2000)    Dietary Orders (From admission, onward)       Start     Ordered    06/09/25 1509  NPO Diet Except: Ice chips, Sips with meds; Effective now  Diet effective now        Question Answer Comment   Except: Ice chips    Except: Sips with meds        06/09/25 1508    06/04/25 2251  May Participate in Room Service  ( ROOM SERVICE MAY PARTICIPATE)  Once        Question:  .  Answer:  Yes    06/04/25 2250             Estimated Needs:   Total Energy Estimated Needs in 24 hours (kCal):  (1900+)  Method for Estimating Needs: ~30 kcals/kg x CBW (64.1 kg)  Total Protein Estimated Needs in 24 Hours (g):  (75-90)  Method for Estimating 24 Hour Protein Needs: 1.2-1.4 g/kg x CBW (64.1 kg)  Total Fluid Estimated Needs in 24 Hours (mL):  (1900+)  Method for Estimating 24 Hour Fluid Needs: 1mL/kcal or per team        Nutrition Diagnosis   Malnutrition Diagnosis  Patient has Malnutrition Diagnosis: Yes  Diagnosis Status: Active  Malnutrition Diagnosis: Severe malnutrition related to acute disease or injury  Related to: recent diverticular perforation s/p SBR with colostomy creation -- now c/b SBO  As Evidenced by: meeting <75% EENs for the past x 1 month, significant 5% weight loss in <1 month, & moderate muscle wasting/fat loss      Nutrition Interventions/Recommendations      Nutrition Recommendations:  Continue current TPN regimen as ordered:  TPN of 5% AA, 15% dextrose to goal rate of 70mL/hr  Include MVI + trace elements  Provide lipids daily @ 20.8mL/hr x 12hrs overnight (250mL total)     TPN monitoring:  Daily weights  RFP + Mg daily; replete lytes PRN  LFTs + TGs weekly  Accuchecks q6h     Provides: 1693 kcals, 84g protein, 252g dextrose, 30% kcal from fat (meeting 89% kcal & 100% protein needs)       If pt to be discharged on TPN, recommend cycling 48hrs prior to discharge:  Recommend Clinimix 5% AA,  15% Dextrose x 12 hrs  For the first hour: Run @ 80mL/hr  For x 10hrs in between: Titrate up to 165mL/hr  For the final hour: Decrease rate to 80mL/hr  Include MVI + trace elements    Provide lipids daily @ 20.8mL/hr x 12 hours overnight (250mL total)    TPN monitoring --> Same as above    Provides: 1785 kcals, 91g protein, 272g dextrose, 28% from fat (meeting 94% kcal & 100% protein needs)        Nutrition Interventions/Goals:   Parenteral Nutrition: Management of delivery rate of parenteral nutrition  Goal: Tolerate TPN at goal rate    Nutrition Monitoring and Evaluation   Enteral and Parenteral Nutrition Intake Determination: Parenteral nutrition intake - To meet > 75% estimated energy needs    Time Spent (min): 60 minutes            [1] aspirin, 81 mg, oral, Daily  fat emulsion-plant based, 250 mL, intravenous, Daily  gabapentin, 100 mg, nasogastric tube, q12h LOU  methocarbamol, 500 mg, intravenous, q8h  metoprolol succinate XL, 25 mg, oral, Daily  pantoprazole, 40 mg, oral, BID AC  polyethylene glycol, 17 g, oral, Daily  promethazine, 6.25 mg, intravenous, Once  sodium chloride 0.9%, 10 mL, intravenous, q12h LOU  [2] Adult Clinimix Parenteral Nutrition Continuous, 70 mL/hr, Last Rate: 70 mL/hr (06/19/25 2022)  [3] PRN medications: alteplase, benzocaine-menthol, HYDROmorphone, HYDROmorphone, lidocaine, melatonin, [DISCONTINUED] ondansetron **OR** ondansetron, phenoL, prochlorperazine, sodium chloride 0.9%

## 2025-06-20 NOTE — PROGRESS NOTES
Diego Morales is a 70 y.o. male on day 16 of admission presenting with Small bowel obstruction (Multi).      Subjective   Reports an improvement in pain.       Objective     Last Recorded Vitals  /84 (BP Location: Right arm, Patient Position: Lying)   Pulse 88   Temp 36.6 °C (97.9 °F) (Temporal)   Resp 18   Wt 64 kg (141 lb)   SpO2 99%   Intake/Output last 3 Shifts:    Intake/Output Summary (Last 24 hours) at 6/20/2025 0843  Last data filed at 6/20/2025 0600  Gross per 24 hour   Intake 2677.87 ml   Output 1860 ml   Net 817.87 ml       Admission Weight  Weight: 65.8 kg (145 lb) (06/04/25 1735)    Daily Weight  06/19/25 : 64 kg (141 lb)    Image Results  FL small bowel series  Narrative: Interpreted By:  Chintan Tomlinson,   STUDY:  FL SMALL BOWEL SERIES;  6/13/2025 11:43 am      INDICATION:  Signs/Symptoms:evaluate for obstruction, 4 weeks post op colostomy  with nonimproving ileus vs obstruction.          COMPARISON:  06/10/2025      ACCESSION NUMBER(S):  [id number]      ORDERING CLINICIAN:  RUSH DAVIS      TECHNIQUE:  An initial radiograph of the abdomen and pelvis was obtained.  Following the oral administration of Gastrografin, delayed static  fluoroscopic images of the abdomen in the posteroanterior projection  were obtained at 0, 20, 40 and 60 minutes was obtained.      FINDINGS:  Initial  image demonstrates residual contrast material within  the colon. There is a left-sided colostomy again seen similar to  prior. Markedly dilated loops of small bowel with mucosal wall  thickening measuring up to 6.1 cm in caliber, left upper quadrant.  There is an NG tube in place. There is redemonstration of pigtail  catheter in place projecting over the pelvis.      Additional imaging performed subsequently after oral contrast  administration through the NG tube. There is opacification of the  stomach and proximal duodenum identified. The small bowel are not  opacified. Findings are suggestive of complete  bowel obstruction.  Gastric outlet obstruction or gastroparesis can not be entirely  excluded, however, felt less likely.      Impression: 1. Suspect complete bowel obstruction. Recommend clinical  correlation. Further evaluation with CT scan of the abdomen may be  performed for better assessment if clinically warranted.      SUPPLEMENTAL INFORMATION:  Notifi message was left for RUSH SUSAN regarding this exam by Dr. Tomlinson on 6/13/2025 at approximately 11:50 hours.      MACRO:  None      Signed by: Chintan Tomlinson 6/13/2025 12:00 PM  Dictation workstation:   [id number]      Physical Exam    Gen: Patient laying still, appears uncomfortable reports pain has improved since medication  HEENT: atraumatic, normocephalic  Pulm: normal respiratory effort, clear to auscultation b/l  Cardiac: RRR, no murmurs noted, normal S1/S2  GI: Abdominal binder in place, no bowel sounds appreciated  MSK: normal ROM without joint swelling  Extremities: no LE edema, cyanosis  Neuro: AOX3, CN II-XII grossly intact  Psych: calm and appropriate for situation        Assessment & Plan    71yo CM with PMH of mitral valve prolapse, cardiac arrest during cath in 2016 due to vfib needing cardioversion, and recent diverticulitis with perforation s/p resection and colostomy bag on 5/15/25 that presented to the ED with abdominal pain and was admitted for SBO.      SBO  Recent diverticulitis with perforation  S/P colectomy with colostomy on 5/15/25  S/P laparoscopic converted to open lysis of adhesions w/ small bowel resection and anastomosis 6/18  Continue NG tube to low continuous suction with flushing  Continue TPN w/ lipids  Continue NPO status  Continue Dilaudid as needed for pain  Continue Zofran and Compazine as needed for nausea     Abdominal hematoma  CT showed 7.3cm x 13cm x 8.6cm abnormality in mass vs fluid collection  IR drain placed with dark hemorrhagic hematoma collected but minimal output  cultures show no growth  monitor output  closely but limited    Hyponatremia/ SIADH  Likely due to recent surgery  CT angio chest and CT of abdomen and pelvis with contrast 6//25 revealed a mass but this associate with hematoma from previous surgery  Patient had a 250 cc bolus of normal saline 2 days ago and 500 cc bolus yesterday, sodium levels continue to trend down  Urine osmolality significantly elevated  Continue TPN at 70 cc an hour  Will consult nephrology    Mitral valve prolapse, Hx of arrest  Continue metoprolol     DVT Proph: SCDs only      Brenton Rausch, DO

## 2025-06-20 NOTE — PROGRESS NOTES
"General/Trauma Surgery Daily Progress Note    Subjective   Some pain today, believes this is related to refusing robaxin injection this morning. Denies n/v. NG fluid is getting more clear every day. No output from stoma yet. Drain has slowed in production      Objective   Last Recorded Vitals:  Blood pressure 134/84, pulse 88, temperature 36.6 °C (97.9 °F), temperature source Temporal, resp. rate 18, height 1.803 m (5' 11\"), weight 64 kg (141 lb), SpO2 99%.    Intake/Output last 3 Shifts:  I/O last 3 completed shifts:  In: 4724.5 (73.9 mL/kg) [P.O.:50; I.V.:1184.6 (18.5 mL/kg); NG/GT:330; IV Piggyback:1000]  Out: 3975 (62.2 mL/kg) [Urine:1960 (0.9 mL/kg/hr); Emesis/NG output:1550; Drains:465]  Weight: 64 kg     Pain Score:  0-10 (Numeric) Pain Score: 5 - Moderate pain     Physical Exam:  Constitutional: No acute distress, sitting up in bed.  Neuro: Alert, oriented. Follows commands.   Eyes: EOMI. No scleral icterus. Conjunctiva pink.  ENT: NG lightly green tinged, mucous  Heart: Regular rate.  Respiratory: No increased work of breathing or audible wheeze.  Abdomen: Soft, nondistended. Appropriately tender. Incisions clean, dry, intact. Stoma healthy. Very little stool in bag, no gas. KATY with dark brown/fibrinous fluid  MSK: Moves all extremities.  Vascular: Palpable pulses throughout, no pitting edema.  Skin: No rashes.   Psychological: Appropriate mood and behavior.            Relevant Results  Laboratory Results:  CBC:   Lab Results   Component Value Date    WBC 10.0 06/20/2025    RBC 2.66 (L) 06/20/2025    HGB 7.8 (L) 06/20/2025    HCT 24.1 (L) 06/20/2025     06/20/2025       RFP:   Lab Results   Component Value Date     (L) 06/20/2025    K 3.8 06/20/2025    CL 96 (L) 06/20/2025    CO2 24 06/20/2025    BUN 34 (H) 06/20/2025    CREATININE 0.73 06/20/2025    CALCIUM 8.0 (L) 06/20/2025    MG 2.20 06/20/2025    PHOS 3.3 06/20/2025        LFTs:   Lab Results   Component Value Date    PROT 5.2 (L) " 06/19/2025    ALBUMIN 2.4 (L) 06/20/2025    BILITOT 1.1 06/19/2025    BILIDIR 1.5 (H) 06/18/2025    ALKPHOS 113 06/19/2025    AST 12 06/19/2025    ALT 9 (L) 06/19/2025         Imaging:  No results found.    Cardiology, Vascular, and Other Imaging  No other imaging results found for the past 2 days           Assessment/Plan   This is a 70 y.o. male POD 3 from laparotomy with lysis of adhesions and small bowel resection for enterotomy. Improving. Hopeful resolution of ileus in the next couple days.        Plan:   -- NG low continuous with flushing as ordered  -- NPO, TPN with supplemental fluids 50ml/hr  -- Daily CBC, RFP, Mg, weekly LFT, Lipids  -- Replete electrolytes to maintain K > 4.0, Mg > 2.0, Phos > 2.5  -- Ambulation  -- KATY drain care, colostomy care  -- Multimodal pain regimen with scheduled tylenol, toradol, robaxin, gabapentin. PRN narcotic.                  Seen and discussed with Dr. Canales who is in agreement with plan. Please secure chat with questions.     Cindy Baldwin PA-C

## 2025-06-20 NOTE — NURSING NOTE
1200: Patient was on the toilet to urinate and passed bright red blood clots per rectum. Photo taken to chart. Attending and surgery notified. No new orders.

## 2025-06-20 NOTE — CARE PLAN
I was asked to evaluate this patient for low sodium.    Patient with prolonged hospitalization.  Hyponatremia at this time seems to be secondary to Toradol.  He is also getting Clinimix which is hypotonic.  Today would be the last day of his Toradol dose.  Discussed with Dr. Canales about switching his Clinimix to customized TPN.  Unfortunately this is not available at this time.    I made my recommendations to the wife and there was miscommunication when I suggested changing the TPN.  She requested me not to see the patient.  I went back with the nursing staff along with rest of the family and clarified to the wife my recommendations.    Discussed with Dr. Canales and Dr. Rausch.    Will sign off

## 2025-06-20 NOTE — PROGRESS NOTES
06/20/25 1144   Discharge Planning   Living Arrangements Spouse/significant other   Support Systems Spouse/significant other   Assistance Needed A&Ox4, independent with ADLs, no DME, room air at baseline-currently room air. [facility] PCP Dr. Ramon Gould   Type of Residence Private residence   Number of Stairs to Enter Residence 3   Number of Stairs Within Residence 20   Do you have animals or pets at home? Yes   Type of Animals or Pets 1 dog - [place]   Home or Post Acute Services In home services   Type of Home Care Services Home nursing visits   Expected Discharge Disposition Home Health  (resumed [facility]), will need internal referral placed. Pt now with PICC and TPN - per team, not expected to dc with PICC and TPN.)   Patient Choice   Provider Choice list and CMS website (https://medicare.gov/care-compare#search) for post-acute Quality and Resource Measure Data were provided and reviewed with: Family;Patient   Patient / Family choosing to utilize agency / facility established prior to hospitalization Yes   Stroke Family Assessment   Stroke Family Assessment Needed No   Intensity of Service   Intensity of Service 0-30 min

## 2025-06-21 ENCOUNTER — APPOINTMENT (OUTPATIENT)
Dept: RADIOLOGY | Facility: HOSPITAL | Age: 70
End: 2025-06-21
Payer: MEDICARE

## 2025-06-21 LAB
ALBUMIN SERPL BCP-MCNC: 2.6 G/DL (ref 3.4–5)
ALP SERPL-CCNC: 261 U/L (ref 33–136)
ALT SERPL W P-5'-P-CCNC: 10 U/L (ref 10–52)
ANION GAP SERPL CALC-SCNC: 14 MMOL/L (ref 10–20)
AST SERPL W P-5'-P-CCNC: 18 U/L (ref 9–39)
BILIRUB SERPL-MCNC: 0.7 MG/DL (ref 0–1.2)
BUN SERPL-MCNC: 21 MG/DL (ref 6–23)
CALCIUM SERPL-MCNC: 7.8 MG/DL (ref 8.6–10.3)
CHLORIDE SERPL-SCNC: 95 MMOL/L (ref 98–107)
CO2 SERPL-SCNC: 25 MMOL/L (ref 21–32)
CREAT SERPL-MCNC: 0.55 MG/DL (ref 0.5–1.3)
EGFRCR SERPLBLD CKD-EPI 2021: >90 ML/MIN/1.73M*2
ERYTHROCYTE [DISTWIDTH] IN BLOOD BY AUTOMATED COUNT: 15.1 % (ref 11.5–14.5)
GLUCOSE SERPL-MCNC: 123 MG/DL (ref 74–99)
HCT VFR BLD AUTO: 25.2 % (ref 41–52)
HGB BLD-MCNC: 8.1 G/DL (ref 13.5–17.5)
MAGNESIUM SERPL-MCNC: 1.86 MG/DL (ref 1.6–2.4)
MCH RBC QN AUTO: 29 PG (ref 26–34)
MCHC RBC AUTO-ENTMCNC: 32.1 G/DL (ref 32–36)
MCV RBC AUTO: 90 FL (ref 80–100)
NRBC BLD-RTO: 0 /100 WBCS (ref 0–0)
PLATELET # BLD AUTO: 303 X10*3/UL (ref 150–450)
POTASSIUM SERPL-SCNC: 3.9 MMOL/L (ref 3.5–5.3)
PROT SERPL-MCNC: 5.5 G/DL (ref 6.4–8.2)
RBC # BLD AUTO: 2.79 X10*6/UL (ref 4.5–5.9)
SODIUM SERPL-SCNC: 130 MMOL/L (ref 136–145)
WBC # BLD AUTO: 9.3 X10*3/UL (ref 4.4–11.3)

## 2025-06-21 PROCEDURE — 80053 COMPREHEN METABOLIC PANEL: CPT | Performed by: FAMILY MEDICINE

## 2025-06-21 PROCEDURE — 85027 COMPLETE CBC AUTOMATED: CPT | Performed by: FAMILY MEDICINE

## 2025-06-21 PROCEDURE — 2500000004 HC RX 250 GENERAL PHARMACY W/ HCPCS (ALT 636 FOR OP/ED): Performed by: SURGERY

## 2025-06-21 PROCEDURE — 83735 ASSAY OF MAGNESIUM: CPT | Performed by: FAMILY MEDICINE

## 2025-06-21 PROCEDURE — 99233 SBSQ HOSP IP/OBS HIGH 50: CPT | Performed by: FAMILY MEDICINE

## 2025-06-21 PROCEDURE — 2550000001 HC RX 255 CONTRASTS: Performed by: FAMILY MEDICINE

## 2025-06-21 PROCEDURE — 74177 CT ABD & PELVIS W/CONTRAST: CPT | Performed by: RADIOLOGY

## 2025-06-21 PROCEDURE — 99024 POSTOP FOLLOW-UP VISIT: CPT | Performed by: SURGERY

## 2025-06-21 PROCEDURE — 2500000005 HC RX 250 GENERAL PHARMACY W/O HCPCS: Performed by: SURGERY

## 2025-06-21 PROCEDURE — 74177 CT ABD & PELVIS W/CONTRAST: CPT

## 2025-06-21 PROCEDURE — 1200000002 HC GENERAL ROOM WITH TELEMETRY DAILY

## 2025-06-21 PROCEDURE — 2500000004 HC RX 250 GENERAL PHARMACY W/ HCPCS (ALT 636 FOR OP/ED): Performed by: FAMILY MEDICINE

## 2025-06-21 RX ORDER — METOPROLOL TARTRATE 1 MG/ML
5 INJECTION, SOLUTION INTRAVENOUS EVERY 6 HOURS
Status: DISCONTINUED | OUTPATIENT
Start: 2025-06-21 | End: 2025-06-27

## 2025-06-21 RX ADMIN — PANTOPRAZOLE SODIUM 40 MG: 40 INJECTION, POWDER, FOR SOLUTION INTRAVENOUS at 06:32

## 2025-06-21 RX ADMIN — ASCORBIC ACID, VITAMIN A PALMITATE, CHOLECALCIFEROL, THIAMINE HYDROCHLORIDE, RIBOFLAVIN-5 PHOSPHATE SODIUM, PYRIDOXINE HYDROCHLORIDE, NIACINAMIDE, DEXPANTHENOL, ALPHA-TOCOPHEROL ACETATE, VITAMIN K1, FOLIC ACID, BIOTIN, CYANOCOBALAMIN: 200; 3300; 200; 6; 3.6; 6; 40; 15; 10; 150; 600; 60; 5 INJECTION, SOLUTION INTRAVENOUS at 20:26

## 2025-06-21 RX ADMIN — HYDROMORPHONE HYDROCHLORIDE 0.4 MG: 1 INJECTION, SOLUTION INTRAMUSCULAR; INTRAVENOUS; SUBCUTANEOUS at 01:45

## 2025-06-21 RX ADMIN — HYDROMORPHONE HYDROCHLORIDE 0.4 MG: 1 INJECTION, SOLUTION INTRAMUSCULAR; INTRAVENOUS; SUBCUTANEOUS at 19:24

## 2025-06-21 RX ADMIN — I.V. FAT EMULSION 50 G: 20 EMULSION INTRAVENOUS at 20:25

## 2025-06-21 RX ADMIN — ONDANSETRON 4 MG: 2 INJECTION, SOLUTION INTRAMUSCULAR; INTRAVENOUS at 06:32

## 2025-06-21 RX ADMIN — HYDROMORPHONE HYDROCHLORIDE 0.4 MG: 1 INJECTION, SOLUTION INTRAMUSCULAR; INTRAVENOUS; SUBCUTANEOUS at 06:08

## 2025-06-21 RX ADMIN — Medication 10 ML: at 10:12

## 2025-06-21 RX ADMIN — PIPERACILLIN SODIUM AND TAZOBACTAM SODIUM 3.38 G: 3; .375 INJECTION, SOLUTION INTRAVENOUS at 10:12

## 2025-06-21 RX ADMIN — HYDROMORPHONE HYDROCHLORIDE 0.4 MG: 1 INJECTION, SOLUTION INTRAMUSCULAR; INTRAVENOUS; SUBCUTANEOUS at 10:18

## 2025-06-21 RX ADMIN — PIPERACILLIN SODIUM AND TAZOBACTAM SODIUM 3.38 G: 3; .375 INJECTION, SOLUTION INTRAVENOUS at 20:25

## 2025-06-21 RX ADMIN — METOPROLOL TARTRATE 5 MG: 5 INJECTION INTRAVENOUS at 14:08

## 2025-06-21 RX ADMIN — PIPERACILLIN SODIUM AND TAZOBACTAM SODIUM 3.38 G: 3; .375 INJECTION, SOLUTION INTRAVENOUS at 15:14

## 2025-06-21 RX ADMIN — PANTOPRAZOLE SODIUM 40 MG: 40 INJECTION, POWDER, FOR SOLUTION INTRAVENOUS at 15:14

## 2025-06-21 RX ADMIN — HYDROMORPHONE HYDROCHLORIDE 0.4 MG: 1 INJECTION, SOLUTION INTRAMUSCULAR; INTRAVENOUS; SUBCUTANEOUS at 08:06

## 2025-06-21 RX ADMIN — Medication 10 ML: at 20:52

## 2025-06-21 RX ADMIN — IOHEXOL 75 ML: 350 INJECTION, SOLUTION INTRAVENOUS at 10:04

## 2025-06-21 RX ADMIN — HYDROMORPHONE HYDROCHLORIDE 0.4 MG: 1 INJECTION, SOLUTION INTRAMUSCULAR; INTRAVENOUS; SUBCUTANEOUS at 14:08

## 2025-06-21 RX ADMIN — HYDROMORPHONE HYDROCHLORIDE 0.4 MG: 1 INJECTION, SOLUTION INTRAMUSCULAR; INTRAVENOUS; SUBCUTANEOUS at 22:16

## 2025-06-21 RX ADMIN — METOPROLOL TARTRATE 5 MG: 5 INJECTION INTRAVENOUS at 20:25

## 2025-06-21 RX ADMIN — ONDANSETRON 4 MG: 2 INJECTION, SOLUTION INTRAMUSCULAR; INTRAVENOUS at 14:19

## 2025-06-21 ASSESSMENT — COGNITIVE AND FUNCTIONAL STATUS - GENERAL
DRESSING REGULAR UPPER BODY CLOTHING: A LITTLE
DAILY ACTIVITIY SCORE: 21
CLIMB 3 TO 5 STEPS WITH RAILING: A LITTLE
DRESSING REGULAR LOWER BODY CLOTHING: A LITTLE
WALKING IN HOSPITAL ROOM: A LITTLE
MOBILITY SCORE: 22
HELP NEEDED FOR BATHING: A LITTLE

## 2025-06-21 ASSESSMENT — PAIN SCALES - GENERAL
PAINLEVEL_OUTOF10: 9
PAINLEVEL_OUTOF10: 7
PAINLEVEL_OUTOF10: 4
PAINLEVEL_OUTOF10: 4
PAINLEVEL_OUTOF10: 5 - MODERATE PAIN
PAINLEVEL_OUTOF10: 9

## 2025-06-21 ASSESSMENT — PAIN - FUNCTIONAL ASSESSMENT
PAIN_FUNCTIONAL_ASSESSMENT: 0-10

## 2025-06-21 ASSESSMENT — PAIN DESCRIPTION - LOCATION
LOCATION: ABDOMEN

## 2025-06-21 NOTE — PROGRESS NOTES
General Surgery/Trauma Inpatient Progress Note    Interval History:  Diego Morales is a 70 y.o. male    Was having pain all night  No fever  Ostomy not output   Drain output turned dark greenish thick fluids    Past Medical History:  Medical History[1]     Past Surgical History:  Surgical History[2]     Medications:  Current Outpatient Medications   Medication Instructions    aspirin 81 mg EC tablet 1 tablet, oral, Daily    gabapentin (NEURONTIN) 100 mg, oral, 2 times daily    metoprolol succinate XL (TOPROL-XL) 25 mg, oral, Daily    multivitamin with minerals tablet 1 tablet, oral, Daily    polyethylene glycol (GLYCOLAX, MIRALAX) 17 g, oral, Daily        Allergies:  RX Allergies[3]     Family History:  Family History[4]     Social History:  Social History[5]     Review of Systems:  A complete 12 point review of systems was performed. It is otherwise negative except as noted in the above interval history.     Vital Signs:  Vitals:    06/21/25 0357   BP: 148/87   Pulse: 98   Resp: 17   Temp: 36.5 °C (97.7 °F)   SpO2: 98%      Body mass index is 20.23 kg/m².      Physical Exam:  General: No acute distress.   Neuro: Alert and oriented ×3. Follows commands.  Face: Atraumatic, no visible skin lesion.   Head: Atraumatic  Eyes: Pupils equal reactive to light. Extraocular motions intact.  Ears: Hears normal speaking voice.  Mouth, Nose, Throat: Mucous membranes moist.     Neck: Supple. No visible masses.  Breast: Not examined.   Lung: Patent airway and no labored breath.   Abdomen: soft, ostomy viable and pink, dressing dry, drain output consistent with enteric content   Genitourinary: Not examined.   Musculoskeletal: Moves all extremities.   Extremities: No cyanosis.   Skin: No rashes or lesions.  Psychological: Normal affect      Laboratory Values:  CBC:   Lab Results   Component Value Date    WBC 9.3 06/21/2025    RBC 2.79 (L) 06/21/2025    HGB 8.1 (L) 06/21/2025    HCT 25.2 (L) 06/21/2025     06/21/2025        RFP:   Lab Results   Component Value Date     (L) 06/21/2025    K 3.9 06/21/2025    CL 95 (L) 06/21/2025    CO2 25 06/21/2025    BUN 21 06/21/2025    CREATININE 0.55 06/21/2025    CALCIUM 7.8 (L) 06/21/2025    MG 1.86 06/21/2025    PHOS 3.3 06/20/2025        LFTs:   Lab Results   Component Value Date    PROT 5.5 (L) 06/21/2025    ALBUMIN 2.6 (L) 06/21/2025    BILITOT 0.7 06/21/2025    ALKPHOS 261 (H) 06/21/2025    AST 18 06/21/2025    ALT 10 06/21/2025            Imaging:  I have personally reviewed the images and the radiologist's report.  Imaging  No results found.    Cardiology, Vascular, and Other Imaging  No other imaging results found for the past 7 days        Assessment:  This is a 70 y.o. male who has below conditions:   Recent history of Surinder procedure for perforated diverticulitis 5/15  POD 4 from laparotomy with lysis of adhesions and small bowel resection for small bowel obstruction due to postop hematoma   Drain with enteric content, concern for leak   Clinically not toxic     Plan:  Request CT, explained possible surgical intervention to patient  Continue other care including TPN and NPO   Start zosyn   Will monitor closely       Rayne Velasquez MD [facility]  General Surgery  Office: [phone]  Fax:     [phone]  8:05 AM   06/21/25        [1]   Past Medical History:  Diagnosis Date    Allergic dermatitis 01/09/2022    Cardiac arrest with successful resuscitation 2016    During cardiac catheterization / V-fib requiring cardioversion (normal coronary arteries    Diverticulitis of large intestine with perforation 05/12/2025    Heart murmur 05/13/2016    Hyperlipidemia     Mitral valve prolapse     Sigmoid diverticulitis 05/12/2025    Small bowel obstruction 06/04/2025   [2]   Past Surgical History:  Procedure Laterality Date    CARDIAC CATHETERIZATION  06/17/2016    Compa Dominguez MD Normal Coronary Arteries.    CARDIOVERSION  06/17/2016    Compa Dominguez MD    COLOSTOMY  05/15/2025     SIGMOIDECTOMY  05/15/2025    Guerrero's colectomy    TONSILLECTOMY     [3] No Known Allergies  [4] No family history on file.  [5]   Social History  Socioeconomic History    Marital status:    Tobacco Use    Smoking status: Never    Smokeless tobacco: Never   Substance and Sexual Activity    Alcohol use: Yes     Comment: social    Drug use: Never    Sexual activity: Defer     Social Drivers of Health     Financial Resource Strain: Low Risk  (6/9/2025)    Overall Financial Resource Strain (CARDIA)     Difficulty of Paying Living Expenses: Not very hard   Food Insecurity: No Food Insecurity (6/4/2025)    Hunger Vital Sign     Worried About Running Out of Food in the Last Year: Never true     Ran Out of Food in the Last Year: Never true   Transportation Needs: No Transportation Needs (6/9/2025)    PRAPARE - Transportation     Lack of Transportation (Medical): No     Lack of Transportation (Non-Medical): No   Stress: No Stress Concern Present (6/4/2025)    [employer] [place] [employer] - Occupational Stress Questionnaire     Feeling of Stress : Not at all   Social Connections: Feeling Socially Integrated (5/23/2025)    OASIS : Social Isolation     Frequency of experiencing loneliness or isolation: Never   Intimate Partner Violence: Not At Risk (6/4/2025)    Humiliation, Afraid, Rape, and Kick questionnaire     Fear of Current or Ex-Partner: No     Emotionally Abused: No     Physically Abused: No     Sexually Abused: No   Housing Stability: Low Risk  (6/9/2025)    Housing Stability Vital Sign     Unable to Pay for Housing in the Last Year: No     Number of Times Moved in the Last Year: 0     Homeless in the Last Year: No

## 2025-06-21 NOTE — CARE PLAN
The clinical goals for the shift include Pt will have pain controlled         Cyclosporine Counseling:  I discussed with the patient the risks of cyclosporine including but not limited to hypertension, gingival hyperplasia,myelosuppression, immunosuppression, liver damage, kidney damage, neurotoxicity, lymphoma, and serious infections. The patient understands that monitoring is required including baseline blood pressure, CBC, CMP, lipid panel and uric acid, and then 1-2 times monthly CMP and blood pressure.

## 2025-06-21 NOTE — NURSING NOTE
1137: Pt continues to pass small bright red clots when sitting on toilet. Dr and surgeon aware.    1300: NG tube no longer pulling back with LCWS. Flushed twice with 20mL water, air port flushed with air. No resistance met to pushing flush, but nothing returned back. Still nothing pulled back from tube. Dr Velasquez notified by phone, states no new orders. Dr Rausch notified by phone, states no new orders.    1345: NG tube pulling again. New orders received from Dr Velasquez regarding ng flushes and suction.

## 2025-06-21 NOTE — PROGRESS NOTES
Diego Morales is a 70 y.o. male on day 17 of admission presenting with Small bowel obstruction (Multi).      Subjective   Nurse report patient had increasing pain overnight.  After adjusting medication patient reports improving pain control.       Objective     Last Recorded Vitals  /87 (BP Location: Right arm, Patient Position: Lying)   Pulse 98   Temp 36.5 °C (97.7 °F) (Temporal)   Resp 17   Wt 65.8 kg (145 lb 1 oz)   SpO2 98%   Intake/Output last 3 Shifts:    Intake/Output Summary (Last 24 hours) at 6/21/2025 0751  Last data filed at 6/21/2025 0357  Gross per 24 hour   Intake 210 ml   Output 1705 ml   Net -1495 ml       Admission Weight  Weight: 65.8 kg (145 lb) (06/04/25 1735)    Daily Weight  06/20/25 : 65.8 kg (145 lb 1 oz)    Image Results  FL small bowel series  Narrative: Interpreted By:  Chintan Tomlinson,   STUDY:  FL SMALL BOWEL SERIES;  6/13/2025 11:43 am      INDICATION:  Signs/Symptoms:evaluate for obstruction, 4 weeks post op colostomy  with nonimproving ileus vs obstruction.          COMPARISON:  06/10/2025      ACCESSION NUMBER(S):  [id number]      ORDERING CLINICIAN:  RUSH DAVIS      TECHNIQUE:  An initial radiograph of the abdomen and pelvis was obtained.  Following the oral administration of Gastrografin, delayed static  fluoroscopic images of the abdomen in the posteroanterior projection  were obtained at 0, 20, 40 and 60 minutes was obtained.      FINDINGS:  Initial  image demonstrates residual contrast material within  the colon. There is a left-sided colostomy again seen similar to  prior. Markedly dilated loops of small bowel with mucosal wall  thickening measuring up to 6.1 cm in caliber, left upper quadrant.  There is an NG tube in place. There is redemonstration of pigtail  catheter in place projecting over the pelvis.      Additional imaging performed subsequently after oral contrast  administration through the NG tube. There is opacification of the  stomach and  proximal duodenum identified. The small bowel are not  opacified. Findings are suggestive of complete bowel obstruction.  Gastric outlet obstruction or gastroparesis can not be entirely  excluded, however, felt less likely.      Impression: 1. Suspect complete bowel obstruction. Recommend clinical  correlation. Further evaluation with CT scan of the abdomen may be  performed for better assessment if clinically warranted.      SUPPLEMENTAL INFORMATION:  Notifi message was left for RUSH SUSAN regarding this exam by Dr. Tomlinson on 6/13/2025 at approximately 11:50 hours.      MACRO:  None      Signed by: Chintan Tomlinson 6/13/2025 12:00 PM  Dictation workstation:   [id number]      Physical Exam    Gen: Patient laying still, appears uncomfortable reports pain has improved since medication  HEENT: atraumatic, normocephalic  Pulm: normal respiratory effort, clear to auscultation b/l  Cardiac: RRR, no murmurs noted, normal S1/S2  GI: Abdominal binder in place, no bowel sounds appreciated  MSK: normal ROM without joint swelling  Extremities: no LE edema, cyanosis  Neuro: AOX3, CN II-XII grossly intact  Psych: calm and appropriate for situation        Assessment & Plan    69yo CM with PMH of mitral valve prolapse, cardiac arrest during cath in 2016 due to vfib needing cardioversion, and recent diverticulitis with perforation s/p resection and colostomy bag on 5/15/25 that presented to the ED with abdominal pain and was admitted for SBO.      SBO  Fecal frainage seen in KATY drain /CT of abdomen pelvis shows 2 fluid collections  Surgery has started Zosyn  Recent diverticulitis with perforation  S/P colectomy with colostomy on 5/15/25  S/P laparoscopic converted to open lysis of adhesions w/ small bowel resection and anastomosis 6/18  Continue NG tube to low continuous suction with flushing  Continue TPN w/ lipids  Continue NPO status  Continue Dilaudid as needed for pain  Continue Zofran and Compazine as needed for nausea      Abdominal hematoma  CT showed 7.3cm x 13cm x 8.6cm abnormality in mass vs fluid collection  IR drain placed with dark hemorrhagic hematoma collected but minimal output  cultures show no growth  Repeat CT showed interval evacuation of large pelvic hematoma    Hyponatremia  Nephrology advised sodium level likely related to Toradol and Clinimix  Family advised they would like surgery to determine nutrition    Mitral valve prolapse, Hx of arrest  Tachycardia likely related to missed dose of metoprolol  .Concerns tachycardia could be related to infection  As patient is n.p.o. we will transition p.o. metoprolol 25 mg to Lopressor 5 mg every 6 hours  Continue telemetry     DVT Proph: SCDs only      Brenton Rausch, DO

## 2025-06-21 NOTE — CARE PLAN
The patient's goals for the shift include      The clinical goals for the shift include Pt will have colostomy output      Problem: Pain - Adult  Goal: Verbalizes/displays adequate comfort level or baseline comfort level  Outcome: Progressing     Problem: Safety - Adult  Goal: Free from fall injury  Outcome: Progressing     Problem: Discharge Planning  Goal: Discharge to home or other facility with appropriate resources  Outcome: Progressing     Problem: Chronic Conditions and Co-morbidities  Goal: Patient's chronic conditions and co-morbidity symptoms are monitored and maintained or improved  Outcome: Progressing     Problem: Nutrition  Goal: Nutrient intake appropriate for maintaining nutritional needs  Outcome: Progressing     Problem: Pain  Goal: Takes deep breaths with improved pain control throughout the shift  Outcome: Progressing  Goal: Turns in bed with improved pain control throughout the shift  Outcome: Progressing  Goal: Walks with improved pain control throughout the shift  Outcome: Progressing  Goal: Performs ADL's with improved pain control throughout shift  Outcome: Progressing  Goal: Participates in PT with improved pain control throughout the shift  Outcome: Progressing  Goal: Free from opioid side effects throughout the shift  Outcome: Progressing  Goal: Free from acute confusion related to pain meds throughout the shift  Outcome: Progressing     Problem: Fall/Injury  Goal: Not fall by end of shift  Outcome: Progressing  Goal: Be free from injury by end of the shift  Outcome: Progressing  Goal: Verbalize understanding of personal risk factors for fall in the hospital  Outcome: Progressing  Goal: Verbalize understanding of risk factor reduction measures to prevent injury from fall in the home  Outcome: Progressing  Goal: Use assistive devices by end of the shift  Outcome: Progressing  Goal: Pace activities to prevent fatigue by end of the shift  Outcome: Progressing     Problem: Skin  Goal:  Decreased wound size/increased tissue granulation at next dressing change  Outcome: Progressing  Flowsheets (Taken 6/20/2025 1238 by Nelia Cervantes RN)  Decreased wound size/increased tissue granulation at next dressing change: Promote sleep for wound healing  Goal: Participates in plan/prevention/treatment measures  Outcome: Progressing  Flowsheets (Taken 6/20/2025 1238 by Nelia Cervantes RN)  Participates in plan/prevention/treatment measures: Increase activity/out of bed for meals  Goal: Prevent/manage excess moisture  Outcome: Progressing  Flowsheets (Taken 6/19/2025 1315 by Gregoria Machado RN)  Prevent/manage excess moisture: Cleanse incontinence/protect with barrier cream  Goal: Prevent/minimize sheer/friction injuries  Outcome: Progressing  Flowsheets (Taken 6/20/2025 1238 by Nelia Cervantes RN)  Prevent/minimize sheer/friction injuries: Increase activity/out of bed for meals  Goal: Promote/optimize nutrition  Outcome: Progressing  Flowsheets (Taken 6/20/2025 1238 by Nelia Cervantes RN)  Promote/optimize nutrition: Monitor/record intake including meals  Goal: Promote skin healing  Outcome: Progressing  Flowsheets (Taken 6/20/2025 1238 by Nelia Cervantes RN)  Promote skin healing: Rotate device position/do not position patient on device

## 2025-06-22 VITALS
TEMPERATURE: 98.1 F | DIASTOLIC BLOOD PRESSURE: 68 MMHG | SYSTOLIC BLOOD PRESSURE: 111 MMHG | HEIGHT: 71 IN | HEART RATE: 86 BPM | BODY MASS INDEX: 20.31 KG/M2 | WEIGHT: 145.06 LBS | OXYGEN SATURATION: 96 % | RESPIRATION RATE: 18 BRPM

## 2025-06-22 LAB
ALBUMIN SERPL BCP-MCNC: 2.4 G/DL (ref 3.4–5)
ANION GAP SERPL CALC-SCNC: 12 MMOL/L (ref 10–20)
BUN SERPL-MCNC: 15 MG/DL (ref 6–23)
CALCIUM SERPL-MCNC: 7.9 MG/DL (ref 8.6–10.3)
CHLORIDE SERPL-SCNC: 96 MMOL/L (ref 98–107)
CO2 SERPL-SCNC: 27 MMOL/L (ref 21–32)
CREAT SERPL-MCNC: 0.58 MG/DL (ref 0.5–1.3)
EGFRCR SERPLBLD CKD-EPI 2021: >90 ML/MIN/1.73M*2
ERYTHROCYTE [DISTWIDTH] IN BLOOD BY AUTOMATED COUNT: 15 % (ref 11.5–14.5)
GLUCOSE SERPL-MCNC: 145 MG/DL (ref 74–99)
HCT VFR BLD AUTO: 24.2 % (ref 41–52)
HGB BLD-MCNC: 7.8 G/DL (ref 13.5–17.5)
MCH RBC QN AUTO: 28.9 PG (ref 26–34)
MCHC RBC AUTO-ENTMCNC: 32.2 G/DL (ref 32–36)
MCV RBC AUTO: 90 FL (ref 80–100)
NRBC BLD-RTO: 0 /100 WBCS (ref 0–0)
PHOSPHATE SERPL-MCNC: 3.7 MG/DL (ref 2.5–4.9)
PLATELET # BLD AUTO: 292 X10*3/UL (ref 150–450)
POTASSIUM SERPL-SCNC: 4 MMOL/L (ref 3.5–5.3)
RBC # BLD AUTO: 2.7 X10*6/UL (ref 4.5–5.9)
SODIUM SERPL-SCNC: 131 MMOL/L (ref 136–145)
WBC # BLD AUTO: 9.4 X10*3/UL (ref 4.4–11.3)

## 2025-06-22 PROCEDURE — 2500000004 HC RX 250 GENERAL PHARMACY W/ HCPCS (ALT 636 FOR OP/ED): Performed by: SURGERY

## 2025-06-22 PROCEDURE — 99024 POSTOP FOLLOW-UP VISIT: CPT | Performed by: SURGERY

## 2025-06-22 PROCEDURE — 80069 RENAL FUNCTION PANEL: CPT | Performed by: FAMILY MEDICINE

## 2025-06-22 PROCEDURE — 85027 COMPLETE CBC AUTOMATED: CPT | Performed by: FAMILY MEDICINE

## 2025-06-22 PROCEDURE — 99233 SBSQ HOSP IP/OBS HIGH 50: CPT | Performed by: FAMILY MEDICINE

## 2025-06-22 PROCEDURE — 1200000002 HC GENERAL ROOM WITH TELEMETRY DAILY

## 2025-06-22 PROCEDURE — 2500000005 HC RX 250 GENERAL PHARMACY W/O HCPCS: Performed by: SURGERY

## 2025-06-22 PROCEDURE — 2500000004 HC RX 250 GENERAL PHARMACY W/ HCPCS (ALT 636 FOR OP/ED): Performed by: FAMILY MEDICINE

## 2025-06-22 RX ADMIN — PIPERACILLIN SODIUM AND TAZOBACTAM SODIUM 3.38 G: 3; .375 INJECTION, SOLUTION INTRAVENOUS at 02:09

## 2025-06-22 RX ADMIN — METOPROLOL TARTRATE 5 MG: 5 INJECTION INTRAVENOUS at 09:13

## 2025-06-22 RX ADMIN — PIPERACILLIN SODIUM AND TAZOBACTAM SODIUM 3.38 G: 3; .375 INJECTION, SOLUTION INTRAVENOUS at 14:02

## 2025-06-22 RX ADMIN — ONDANSETRON 4 MG: 2 INJECTION, SOLUTION INTRAMUSCULAR; INTRAVENOUS at 07:25

## 2025-06-22 RX ADMIN — ASCORBIC ACID, VITAMIN A PALMITATE, CHOLECALCIFEROL, THIAMINE HYDROCHLORIDE, RIBOFLAVIN-5 PHOSPHATE SODIUM, PYRIDOXINE HYDROCHLORIDE, NIACINAMIDE, DEXPANTHENOL, ALPHA-TOCOPHEROL ACETATE, VITAMIN K1, FOLIC ACID, BIOTIN, CYANOCOBALAMIN: 200; 3300; 200; 6; 3.6; 6; 40; 15; 10; 150; 600; 60; 5 INJECTION, SOLUTION INTRAVENOUS at 20:33

## 2025-06-22 RX ADMIN — HYDROMORPHONE HYDROCHLORIDE 0.4 MG: 1 INJECTION, SOLUTION INTRAMUSCULAR; INTRAVENOUS; SUBCUTANEOUS at 20:32

## 2025-06-22 RX ADMIN — PIPERACILLIN SODIUM AND TAZOBACTAM SODIUM 3.38 G: 3; .375 INJECTION, SOLUTION INTRAVENOUS at 20:32

## 2025-06-22 RX ADMIN — HYDROMORPHONE HYDROCHLORIDE 0.4 MG: 1 INJECTION, SOLUTION INTRAMUSCULAR; INTRAVENOUS; SUBCUTANEOUS at 04:30

## 2025-06-22 RX ADMIN — Medication 10 ML: at 21:11

## 2025-06-22 RX ADMIN — HYDROMORPHONE HYDROCHLORIDE 0.4 MG: 1 INJECTION, SOLUTION INTRAMUSCULAR; INTRAVENOUS; SUBCUTANEOUS at 22:59

## 2025-06-22 RX ADMIN — HYDROMORPHONE HYDROCHLORIDE 0.4 MG: 1 INJECTION, SOLUTION INTRAMUSCULAR; INTRAVENOUS; SUBCUTANEOUS at 11:58

## 2025-06-22 RX ADMIN — METOPROLOL TARTRATE 5 MG: 5 INJECTION INTRAVENOUS at 20:32

## 2025-06-22 RX ADMIN — I.V. FAT EMULSION 50 G: 20 EMULSION INTRAVENOUS at 20:32

## 2025-06-22 RX ADMIN — METOPROLOL TARTRATE 5 MG: 5 INJECTION INTRAVENOUS at 01:19

## 2025-06-22 RX ADMIN — HYDROMORPHONE HYDROCHLORIDE 0.4 MG: 1 INJECTION, SOLUTION INTRAMUSCULAR; INTRAVENOUS; SUBCUTANEOUS at 06:57

## 2025-06-22 RX ADMIN — Medication 10 ML: at 09:13

## 2025-06-22 RX ADMIN — HYDROMORPHONE HYDROCHLORIDE 0.4 MG: 1 INJECTION, SOLUTION INTRAMUSCULAR; INTRAVENOUS; SUBCUTANEOUS at 01:19

## 2025-06-22 RX ADMIN — HYDROMORPHONE HYDROCHLORIDE 0.4 MG: 1 INJECTION, SOLUTION INTRAMUSCULAR; INTRAVENOUS; SUBCUTANEOUS at 16:29

## 2025-06-22 RX ADMIN — HYDROMORPHONE HYDROCHLORIDE 0.4 MG: 1 INJECTION, SOLUTION INTRAMUSCULAR; INTRAVENOUS; SUBCUTANEOUS at 14:01

## 2025-06-22 RX ADMIN — PIPERACILLIN SODIUM AND TAZOBACTAM SODIUM 3.38 G: 3; .375 INJECTION, SOLUTION INTRAVENOUS at 09:13

## 2025-06-22 RX ADMIN — METOPROLOL TARTRATE 5 MG: 5 INJECTION INTRAVENOUS at 14:02

## 2025-06-22 RX ADMIN — PANTOPRAZOLE SODIUM 40 MG: 40 INJECTION, POWDER, FOR SOLUTION INTRAVENOUS at 16:29

## 2025-06-22 RX ADMIN — HYDROMORPHONE HYDROCHLORIDE 0.4 MG: 1 INJECTION, SOLUTION INTRAMUSCULAR; INTRAVENOUS; SUBCUTANEOUS at 09:37

## 2025-06-22 RX ADMIN — PANTOPRAZOLE SODIUM 40 MG: 40 INJECTION, POWDER, FOR SOLUTION INTRAVENOUS at 06:36

## 2025-06-22 ASSESSMENT — PAIN SCALES - GENERAL
PAINLEVEL_OUTOF10: 3
PAINLEVEL_OUTOF10: 7
PAINLEVEL_OUTOF10: 0 - NO PAIN
PAINLEVEL_OUTOF10: 3
PAINLEVEL_OUTOF10: 7
PAINLEVEL_OUTOF10: 7
PAINLEVEL_OUTOF10: 0 - NO PAIN
PAINLEVEL_OUTOF10: 7

## 2025-06-22 ASSESSMENT — PAIN - FUNCTIONAL ASSESSMENT
PAIN_FUNCTIONAL_ASSESSMENT: 0-10

## 2025-06-22 ASSESSMENT — PAIN DESCRIPTION - LOCATION: LOCATION: ABDOMEN

## 2025-06-22 ASSESSMENT — COGNITIVE AND FUNCTIONAL STATUS - GENERAL
MOBILITY SCORE: 22
DAILY ACTIVITIY SCORE: 21
DRESSING REGULAR LOWER BODY CLOTHING: A LITTLE
WALKING IN HOSPITAL ROOM: A LITTLE
CLIMB 3 TO 5 STEPS WITH RAILING: A LITTLE
HELP NEEDED FOR BATHING: A LITTLE
DRESSING REGULAR UPPER BODY CLOTHING: A LITTLE

## 2025-06-22 ASSESSMENT — PAIN DESCRIPTION - ORIENTATION: ORIENTATION: MID

## 2025-06-22 NOTE — PROGRESS NOTES
General Surgery/Trauma Inpatient Progress Note    Interval History:  Diego Morales is a 70 y.o. male     Pain is controlled   Walked in shay   Drain still enteric   Some stool in colostomy     CT from yesterday:   IMPRESSION:  1.  Interval evacuation of large pelvic hematoma.  2. Findings suggest small bowel obstruction with proximal dilatation  up to 5.6 cm and transition point involving a loop of bowel in the  right lower quadrant just proximal to anastomosis.  3. Suspected 7.2 cm loculated fluid collection in the left mid  abdomen extending into the pelvis, containing small locules of air.  There is also a separate more anterior loculated pelvic fluid  collection measuring 9.1 cm.  4. Trace postoperative pneumoperitoneum.          Past Medical History:  Medical History[1]     Past Surgical History:  Surgical History[2]     Medications:  Current Outpatient Medications   Medication Instructions    aspirin 81 mg EC tablet 1 tablet, oral, Daily    gabapentin (NEURONTIN) 100 mg, oral, 2 times daily    metoprolol succinate XL (TOPROL-XL) 25 mg, oral, Daily    multivitamin with minerals tablet 1 tablet, oral, Daily        Allergies:  RX Allergies[3]     Family History:  Family History[4]     Social History:  Social History[5]     Review of Systems:  A complete 12 point review of systems was performed. It is otherwise negative except as noted in the above interval history.     Vital Signs:  Vitals:    06/22/25 0804   BP: 118/75   Pulse: 94   Resp: 18   Temp: 36.9 °C (98.4 °F)   SpO2: 96%      Body mass index is 20.23 kg/m².      Physical Exam:  General: No acute distress.   Neuro: Alert and oriented ×3. Follows commands.  Face: Atraumatic, no visible skin lesion.   Head: Atraumatic  Eyes: Pupils equal reactive to light. Extraocular motions intact.  Ears: Hears normal speaking voice.  Mouth, Nose, Throat: Mucous membranes moist.     Neck: Supple. No visible masses.  Breast: Not examined.   Lung: Patent airway and no  labored breath.   Vascular: Palpable radial pulses bilaterally.  Abdomen: soft, dressing dry. Surgical drain enteric. Colostomy has some liquid stool.    Rectal and Perianal: Not examined.   Genitourinary: Not examined.   Musculoskeletal: Moves all extremities.   Extremities: No cyanosis.    Lymphatic: No palpable lymph nodes.  Skin: No rashes or lesions.  Psychological: Normal affect      Laboratory Values:  CBC:   Lab Results   Component Value Date    WBC 9.4 06/22/2025    RBC 2.70 (L) 06/22/2025    HGB 7.8 (L) 06/22/2025    HCT 24.2 (L) 06/22/2025     06/22/2025       RFP:   Lab Results   Component Value Date     (L) 06/22/2025    K 4.0 06/22/2025    CL 96 (L) 06/22/2025    CO2 27 06/22/2025    BUN 15 06/22/2025    CREATININE 0.58 06/22/2025    CALCIUM 7.9 (L) 06/22/2025    MG 1.86 06/21/2025    PHOS 3.7 06/22/2025        LFTs:   Lab Results   Component Value Date    PROT 5.5 (L) 06/21/2025    ALBUMIN 2.4 (L) 06/22/2025    BILITOT 0.7 06/21/2025    ALKPHOS 261 (H) 06/21/2025    AST 18 06/21/2025    ALT 10 06/21/2025            Imaging:  I have personally reviewed the images and the radiologist's report.  Imaging  CT abdomen pelvis w IV contrast  Result Date: 6/21/2025  1.  Interval evacuation of large pelvic hematoma. 2. Findings suggest small bowel obstruction with proximal dilatation up to 5.6 cm and transition point involving a loop of bowel in the right lower quadrant just proximal to anastomosis. 3. Suspected 7.2 cm loculated fluid collection in the left mid abdomen extending into the pelvis, containing small locules of air. There is also a separate more anterior loculated pelvic fluid collection measuring 9.1 cm. 4. Trace postoperative pneumoperitoneum.   MACRO: None.   Signed by: Marshall Ogden 6/21/2025 11:05 AM Dictation workstation:   [id number]      Cardiology, Vascular, and Other Imaging  No other imaging results found for the past 7 days        Assessment:  This is a 70 y.o. male who has  below conditions:   Recent history of Surinder procedure for perforated diverticulitis 5/15  POD 5 from laparotomy with lysis of adhesions and small bowel resection for small bowel obstruction due to postop hematoma   Drain noted on POD4 with enteric content, concern for leak     Plan:  Clinically not toxic: wbc normal, no fever  CT reviewed. Collections likely residual hematoma or isolated collection. Not appear to be related to enteric leak which seemed to be controlled with surgical drain for now.   Continue current care   Monitor closely   Cover Dr Canales over the weekend          Rayne Velasquez MD [facility]  General Surgery  Office: [phone]  Fax:     [phone]  9:55 AM   06/22/25        [1]   Past Medical History:  Diagnosis Date    Allergic dermatitis 01/09/2022    Cardiac arrest with successful resuscitation 2016    During cardiac catheterization / V-fib requiring cardioversion (normal coronary arteries    Diverticulitis of large intestine with perforation 05/12/2025    Heart murmur 05/13/2016    Hyperlipidemia     Mitral valve prolapse     Sigmoid diverticulitis 05/12/2025    Small bowel obstruction 06/04/2025   [2]   Past Surgical History:  Procedure Laterality Date    CARDIAC CATHETERIZATION  06/17/2016    Compa Dominguez MD Normal Coronary Arteries.    CARDIOVERSION  06/17/2016    Compa Dominguez MD    COLOSTOMY  05/15/2025    SIGMOIDECTOMY  05/15/2025    Guerrero's colectomy    TONSILLECTOMY     [3] No Known Allergies  [4] No family history on file.  [5]   Social History  Socioeconomic History    Marital status:    Tobacco Use    Smoking status: Never    Smokeless tobacco: Never   Substance and Sexual Activity    Alcohol use: Yes     Comment: social    Drug use: Never    Sexual activity: Defer     Social Drivers of Health     Financial Resource Strain: Low Risk  (6/9/2025)    Overall Financial Resource Strain (CARDIA)     Difficulty of Paying Living Expenses: Not very hard   Food Insecurity: No Food  Insecurity (6/4/2025)    Hunger Vital Sign     Worried About Running Out of Food in the Last Year: Never true     Ran Out of Food in the Last Year: Never true   Transportation Needs: No Transportation Needs (6/9/2025)    PRAPARE - Transportation     Lack of Transportation (Medical): No     Lack of Transportation (Non-Medical): No   Stress: No Stress Concern Present (6/4/2025)    [employer] [place] [employer] - Occupational Stress Questionnaire     Feeling of Stress : Not at all   Social Connections: Feeling Socially Integrated (5/23/2025)    OASIS : Social Isolation     Frequency of experiencing loneliness or isolation: Never   Intimate Partner Violence: Not At Risk (6/4/2025)    Humiliation, Afraid, Rape, and Kick questionnaire     Fear of Current or Ex-Partner: No     Emotionally Abused: No     Physically Abused: No     Sexually Abused: No   Housing Stability: Low Risk  (6/9/2025)    Housing Stability Vital Sign     Unable to Pay for Housing in the Last Year: No     Number of Times Moved in the Last Year: 0     Homeless in the Last Year: No

## 2025-06-22 NOTE — PROGRESS NOTES
Diego Morales is a 70 y.o. male on day 18 of admission presenting with Small bowel obstruction (Multi).      Subjective    Reports improving pain control with pain medications.    Objective     Last Recorded Vitals  /75 (BP Location: Right arm, Patient Position: Lying)   Pulse 94   Temp 36.9 °C (98.4 °F) (Temporal)   Resp 18   Wt 65.8 kg (145 lb 1 oz)   SpO2 96%   Intake/Output last 3 Shifts:    Intake/Output Summary (Last 24 hours) at 6/22/2025 0906  Last data filed at 6/22/2025 0323  Gross per 24 hour   Intake 753.33 ml   Output 2135 ml   Net -1381.67 ml       Admission Weight  Weight: 65.8 kg (145 lb) (06/04/25 1735)    Daily Weight  06/20/25 : 65.8 kg (145 lb 1 oz)    Image Results  CT abdomen pelvis w IV contrast  Narrative: Interpreted By:  Marshall Ogden,   STUDY:  CT ABDOMEN PELVIS W IV CONTRAST;  6/21/2025 9:20 am      INDICATION:  Signs/Symptoms:history of colostomy, recent ex lap for hematoma  evacuation, SBR and ABHISHEK. drain with enteric content..      COMPARISON:  CT 06/04/2025      ACCESSION NUMBER(S):  [id number]      ORDERING CLINICIAN:  ROXANE BESS      TECHNIQUE:  CT of the abdomen and pelvis was performed.  Contiguous axial images  were obtained at 3 mm slice thickness through the abdomen and pelvis.  Coronal and sagittal reconstructions at 3 mm slice thickness were  performed. 75 mL Omnipaque 350 administered intravenously without  immediate complication.      FINDINGS:  LOWER CHEST:  Mild bibasilar atelectasis. Cardiomegaly.      ABDOMEN:      LIVER:  Small low-attenuation lesion centrally in the liver is not  significantly changed.      BILE DUCTS:  Not dilated.      GALLBLADDER:  No calcified stone or definite inflammation.      PANCREAS:  Unremarkable      SPLEEN:  Borderline enlarged measuring 13.3 cm in length.      ADRENAL GLANDS:  Unremarkable      KIDNEYS AND URETERS:  Unremarkable without hydronephrosis.      PELVIS:      BLADDER:  Partially distended.      REPRODUCTIVE  ORGANS:  Prostatomegaly. Gas tracks along both inguinal canals.      BOWEL:  Nasogastric tube terminates in the mid to distal stomach.  Proximal small bowel is decompressed. Slightly more distally there is  marked dilatation loops of jejunum up to 5.6 cm proximally. There  appears to be a caliber transition involving a bowel loop in the  right mid abdomen relating to an angulated loop of small bowel on  image (Series 201, Image 108), just proximal to a small bowel  anastomosis.      There is residual contrast scattered throughout the colon which is  otherwise decompressed to the level of the left mid abdominal  colostomy. There may be a Surinder pouch present. Appendix not  identified.      There appears to be a interloop loculated fluid collection containing  a small amount of air as best seen on coronal image (Series 202,  Image 48) measuring 7.2 x 6.5 cm.      There appears to be a separate more anterior pelvic fluid collection  which measures 9.1 x 3.1 cm on (series 201, Image 119).      VESSELS:  Aortoiliac system is patent without aneurysm.  Major visceral  branches are patent.Mild atherosclerosis. IVC and major branches are  grossly patent. Major portal venous branches are patent.      PERITONEUM AND RETROPERITONEUM:  Pelvic surgical drain is in place. There is scattered trace free  fluid. There is trace pneumoperitoneum.      The previous large pelvic hematoma is no longer present. No abdominal  or pelvic lymphadenopathy.      BONE AND ABDOMINAL WALL:  There is scattered subcutaneous gas in the lower abdomen extending  into the inguinal canals and proximal thighs. Skin staples are  present along vertically-oriented ventral abdominal incision. Mild  degenerative changes of the spine.          Impression: 1.  Interval evacuation of large pelvic hematoma.  2. Findings suggest small bowel obstruction with proximal dilatation  up to 5.6 cm and transition point involving a loop of bowel in the  right lower  quadrant just proximal to anastomosis.  3. Suspected 7.2 cm loculated fluid collection in the left mid  abdomen extending into the pelvis, containing small locules of air.  There is also a separate more anterior loculated pelvic fluid  collection measuring 9.1 cm.  4. Trace postoperative pneumoperitoneum.      MACRO:  None.      Signed by: Marshall Ogden 6/21/2025 11:05 AM  Dictation workstation:   [id number]      Physical Exam    Gen: Patient laying still, appears uncomfortable reports pain has improved since medication  HEENT: atraumatic, normocephalic  Pulm: normal respiratory effort, clear to auscultation b/l  Cardiac: RRR, no murmurs noted, normal S1/S2  GI: Abdominal binder in place, hypoactive bowel sounds appreciated  MSK: normal ROM without joint swelling  Extremities: no LE edema, cyanosis  Neuro: AOX3, CN II-XII grossly intact  Psych: calm and appropriate for situation        Assessment & Plan    69yo CM with PMH of mitral valve prolapse, cardiac arrest during cath in 2016 due to vfib needing cardioversion, and recent diverticulitis with perforation s/p resection and colostomy bag on 5/15/25 that presented to the ED with abdominal pain and was admitted for SBO.      SBO  Recent diverticulitis with perforation  S/P colectomy with colostomy on 5/15/25  S/P laparoscopic converted to open lysis of adhesions w/ small bowel resection and anastomosis 6/18 6/21 KATY draining enteric content  CT of abdomen pelvis shows 2 fluid collections  Surgery has started Zosyn  Continue NG tube to low continuous suction with flushing  Continue TPN w/ lipids  Continue NPO status  Continue Dilaudid as needed for pain  Continue Zofran and Compazine as needed for nausea     Abdominal hematoma  CT showed 7.3cm x 13cm x 8.6cm abnormality in mass vs fluid collection  IR drain placed with dark hemorrhagic hematoma collected but minimal output  cultures show no growth  Repeat CT 6/21 showed interval evacuation of large pelvic  hematoma    Hyponatremia  Nephrology advised sodium level likely related to Toradol and Clinimix  Family advised they would like surgery to determine nutrition  Sodium continues to trend up    Mitral valve prolapse, Hx of arrest  Tachycardia likely related to missed dose of metoprolol  .Concerns tachycardia could be related to infection  As patient is n.p.o. we will transition p.o. metoprolol 25 mg to Lopressor 5 mg every 6 hours  Continue telemetry     DVT Proph: SCDs only      Brenton Rausch, DO

## 2025-06-23 ENCOUNTER — APPOINTMENT (OUTPATIENT)
Dept: RADIOLOGY | Facility: HOSPITAL | Age: 70
End: 2025-06-23
Payer: MEDICARE

## 2025-06-23 LAB
ALBUMIN SERPL BCP-MCNC: 2.5 G/DL (ref 3.4–5)
ALP SERPL-CCNC: 313 U/L (ref 33–136)
ALT SERPL W P-5'-P-CCNC: 12 U/L (ref 10–52)
ANION GAP SERPL CALC-SCNC: 10 MMOL/L (ref 10–20)
AST SERPL W P-5'-P-CCNC: 14 U/L (ref 9–39)
BILIRUB SERPL-MCNC: 1.1 MG/DL (ref 0–1.2)
BUN SERPL-MCNC: 14 MG/DL (ref 6–23)
CALCIUM SERPL-MCNC: 7.8 MG/DL (ref 8.6–10.3)
CHLORIDE SERPL-SCNC: 97 MMOL/L (ref 98–107)
CO2 SERPL-SCNC: 29 MMOL/L (ref 21–32)
CREAT SERPL-MCNC: 0.61 MG/DL (ref 0.5–1.3)
EGFRCR SERPLBLD CKD-EPI 2021: >90 ML/MIN/1.73M*2
ERYTHROCYTE [DISTWIDTH] IN BLOOD BY AUTOMATED COUNT: 15.2 % (ref 11.5–14.5)
GLUCOSE SERPL-MCNC: 134 MG/DL (ref 74–99)
HCT VFR BLD AUTO: 22.7 % (ref 41–52)
HGB BLD-MCNC: 7.6 G/DL (ref 13.5–17.5)
MCH RBC QN AUTO: 29.8 PG (ref 26–34)
MCHC RBC AUTO-ENTMCNC: 33.5 G/DL (ref 32–36)
MCV RBC AUTO: 89 FL (ref 80–100)
NRBC BLD-RTO: 0 /100 WBCS (ref 0–0)
PLATELET # BLD AUTO: 293 X10*3/UL (ref 150–450)
POTASSIUM SERPL-SCNC: 3.9 MMOL/L (ref 3.5–5.3)
PROT SERPL-MCNC: 5.5 G/DL (ref 6.4–8.2)
RBC # BLD AUTO: 2.55 X10*6/UL (ref 4.5–5.9)
SODIUM SERPL-SCNC: 132 MMOL/L (ref 136–145)
WBC # BLD AUTO: 8.3 X10*3/UL (ref 4.4–11.3)

## 2025-06-23 PROCEDURE — 74176 CT ABD & PELVIS W/O CONTRAST: CPT

## 2025-06-23 PROCEDURE — 74176 CT ABD & PELVIS W/O CONTRAST: CPT | Performed by: STUDENT IN AN ORGANIZED HEALTH CARE EDUCATION/TRAINING PROGRAM

## 2025-06-23 PROCEDURE — 99233 SBSQ HOSP IP/OBS HIGH 50: CPT | Performed by: FAMILY MEDICINE

## 2025-06-23 PROCEDURE — 2500000001 HC RX 250 WO HCPCS SELF ADMINISTERED DRUGS (ALT 637 FOR MEDICARE OP): Performed by: SURGERY

## 2025-06-23 PROCEDURE — 84075 ASSAY ALKALINE PHOSPHATASE: CPT | Performed by: FAMILY MEDICINE

## 2025-06-23 PROCEDURE — 2500000004 HC RX 250 GENERAL PHARMACY W/ HCPCS (ALT 636 FOR OP/ED): Performed by: FAMILY MEDICINE

## 2025-06-23 PROCEDURE — 2500000004 HC RX 250 GENERAL PHARMACY W/ HCPCS (ALT 636 FOR OP/ED): Performed by: SURGERY

## 2025-06-23 PROCEDURE — 85027 COMPLETE CBC AUTOMATED: CPT | Performed by: FAMILY MEDICINE

## 2025-06-23 PROCEDURE — 2550000001 HC RX 255 CONTRASTS: Performed by: FAMILY MEDICINE

## 2025-06-23 PROCEDURE — 2500000005 HC RX 250 GENERAL PHARMACY W/O HCPCS: Performed by: SURGERY

## 2025-06-23 PROCEDURE — A9698 NON-RAD CONTRAST MATERIALNOC: HCPCS | Performed by: FAMILY MEDICINE

## 2025-06-23 PROCEDURE — 1200000002 HC GENERAL ROOM WITH TELEMETRY DAILY

## 2025-06-23 RX ORDER — KETOROLAC TROMETHAMINE 15 MG/ML
15 INJECTION, SOLUTION INTRAMUSCULAR; INTRAVENOUS EVERY 6 HOURS PRN
Status: DISCONTINUED | OUTPATIENT
Start: 2025-06-23 | End: 2025-06-24

## 2025-06-23 RX ORDER — METHOCARBAMOL 100 MG/ML
500 INJECTION, SOLUTION INTRAMUSCULAR; INTRAVENOUS EVERY 8 HOURS
Status: DISCONTINUED | OUTPATIENT
Start: 2025-06-23 | End: 2025-06-25

## 2025-06-23 RX ORDER — DIATRIZOATE MEGLUMINE AND DIATRIZOATE SODIUM 660; 100 MG/ML; MG/ML
30 SOLUTION ORAL; RECTAL
Status: DISCONTINUED | OUTPATIENT
Start: 2025-06-23 | End: 2025-06-25

## 2025-06-23 RX ORDER — ACETAMINOPHEN 10 MG/ML
1000 INJECTION, SOLUTION INTRAVENOUS EVERY 6 HOURS
Status: DISPENSED | OUTPATIENT
Start: 2025-06-23 | End: 2025-06-24

## 2025-06-23 RX ORDER — ERYTHROMYCIN ETHYLSUCCINATE 400 MG/5ML
400 SUSPENSION ORAL EVERY 6 HOURS SCHEDULED
Status: DISCONTINUED | OUTPATIENT
Start: 2025-06-23 | End: 2025-06-26

## 2025-06-23 RX ADMIN — ERYTHROMYCIN 400 MG: 400 SUSPENSION ORAL at 17:21

## 2025-06-23 RX ADMIN — HYDROMORPHONE HYDROCHLORIDE 0.4 MG: 1 INJECTION, SOLUTION INTRAMUSCULAR; INTRAVENOUS; SUBCUTANEOUS at 13:55

## 2025-06-23 RX ADMIN — METHOCARBAMOL 500 MG: 100 INJECTION INTRAMUSCULAR; INTRAVENOUS at 23:47

## 2025-06-23 RX ADMIN — PANTOPRAZOLE SODIUM 40 MG: 40 INJECTION, POWDER, FOR SOLUTION INTRAVENOUS at 16:18

## 2025-06-23 RX ADMIN — PANTOPRAZOLE SODIUM 40 MG: 40 INJECTION, POWDER, FOR SOLUTION INTRAVENOUS at 05:55

## 2025-06-23 RX ADMIN — ASCORBIC ACID, VITAMIN A PALMITATE, CHOLECALCIFEROL, THIAMINE HYDROCHLORIDE, RIBOFLAVIN-5 PHOSPHATE SODIUM, PYRIDOXINE HYDROCHLORIDE, NIACINAMIDE, DEXPANTHENOL, ALPHA-TOCOPHEROL ACETATE, VITAMIN K1, FOLIC ACID, BIOTIN, CYANOCOBALAMIN: 200; 3300; 200; 6; 3.6; 6; 40; 15; 10; 150; 600; 60; 5 INJECTION, SOLUTION INTRAVENOUS at 20:00

## 2025-06-23 RX ADMIN — Medication 10 ML: at 08:48

## 2025-06-23 RX ADMIN — KETOROLAC TROMETHAMINE 15 MG: 15 INJECTION, SOLUTION INTRAMUSCULAR; INTRAVENOUS at 20:11

## 2025-06-23 RX ADMIN — IOHEXOL 500 ML: 12 SOLUTION ORAL at 21:33

## 2025-06-23 RX ADMIN — PIPERACILLIN SODIUM AND TAZOBACTAM SODIUM 3.38 G: 3; .375 INJECTION, SOLUTION INTRAVENOUS at 02:34

## 2025-06-23 RX ADMIN — HYDROMORPHONE HYDROCHLORIDE 0.4 MG: 1 INJECTION, SOLUTION INTRAMUSCULAR; INTRAVENOUS; SUBCUTANEOUS at 11:43

## 2025-06-23 RX ADMIN — ERYTHROMYCIN 400 MG: 400 SUSPENSION ORAL at 23:47

## 2025-06-23 RX ADMIN — HYDROMORPHONE HYDROCHLORIDE 0.4 MG: 1 INJECTION, SOLUTION INTRAMUSCULAR; INTRAVENOUS; SUBCUTANEOUS at 05:05

## 2025-06-23 RX ADMIN — METOPROLOL TARTRATE 5 MG: 5 INJECTION INTRAVENOUS at 14:15

## 2025-06-23 RX ADMIN — METHOCARBAMOL 500 MG: 100 INJECTION INTRAMUSCULAR; INTRAVENOUS at 16:18

## 2025-06-23 RX ADMIN — PIPERACILLIN SODIUM AND TAZOBACTAM SODIUM 3.38 G: 3; .375 INJECTION, SOLUTION INTRAVENOUS at 22:55

## 2025-06-23 RX ADMIN — Medication 10 ML: at 20:00

## 2025-06-23 RX ADMIN — HYDROMORPHONE HYDROCHLORIDE 0.4 MG: 1 INJECTION, SOLUTION INTRAMUSCULAR; INTRAVENOUS; SUBCUTANEOUS at 02:54

## 2025-06-23 RX ADMIN — METOPROLOL TARTRATE 5 MG: 5 INJECTION INTRAVENOUS at 02:34

## 2025-06-23 RX ADMIN — HYDROMORPHONE HYDROCHLORIDE 0.4 MG: 1 INJECTION, SOLUTION INTRAMUSCULAR; INTRAVENOUS; SUBCUTANEOUS at 16:18

## 2025-06-23 RX ADMIN — METOPROLOL TARTRATE 5 MG: 5 INJECTION INTRAVENOUS at 20:00

## 2025-06-23 RX ADMIN — ONDANSETRON 4 MG: 2 INJECTION, SOLUTION INTRAMUSCULAR; INTRAVENOUS at 14:05

## 2025-06-23 RX ADMIN — METOPROLOL TARTRATE 5 MG: 5 INJECTION INTRAVENOUS at 08:49

## 2025-06-23 RX ADMIN — PIPERACILLIN SODIUM AND TAZOBACTAM SODIUM 3.38 G: 3; .375 INJECTION, SOLUTION INTRAVENOUS at 17:21

## 2025-06-23 RX ADMIN — I.V. FAT EMULSION 50 G: 20 EMULSION INTRAVENOUS at 20:00

## 2025-06-23 RX ADMIN — ACETAMINOPHEN 1000 MG: 1000 INJECTION, SOLUTION INTRAVENOUS at 22:36

## 2025-06-23 RX ADMIN — ERYTHROMYCIN 400 MG: 400 SUSPENSION ORAL at 11:44

## 2025-06-23 RX ADMIN — HYDROMORPHONE HYDROCHLORIDE 0.4 MG: 1 INJECTION, SOLUTION INTRAMUSCULAR; INTRAVENOUS; SUBCUTANEOUS at 08:58

## 2025-06-23 ASSESSMENT — COGNITIVE AND FUNCTIONAL STATUS - GENERAL
MOBILITY SCORE: 22
HELP NEEDED FOR BATHING: A LITTLE
DAILY ACTIVITIY SCORE: 21
MOBILITY SCORE: 22
DRESSING REGULAR LOWER BODY CLOTHING: A LITTLE
DAILY ACTIVITIY SCORE: 21
WALKING IN HOSPITAL ROOM: A LITTLE
CLIMB 3 TO 5 STEPS WITH RAILING: A LITTLE
HELP NEEDED FOR BATHING: A LITTLE
DRESSING REGULAR UPPER BODY CLOTHING: A LITTLE
DRESSING REGULAR UPPER BODY CLOTHING: A LITTLE
DRESSING REGULAR LOWER BODY CLOTHING: A LITTLE
WALKING IN HOSPITAL ROOM: A LITTLE
CLIMB 3 TO 5 STEPS WITH RAILING: A LITTLE

## 2025-06-23 ASSESSMENT — PAIN SCALES - GENERAL
PAINLEVEL_OUTOF10: 4
PAINLEVEL_OUTOF10: 7
PAINLEVEL_OUTOF10: 6
PAINLEVEL_OUTOF10: 0 - NO PAIN

## 2025-06-23 ASSESSMENT — PAIN DESCRIPTION - LOCATION
LOCATION: ABDOMEN
LOCATION: ABDOMEN

## 2025-06-23 ASSESSMENT — PAIN - FUNCTIONAL ASSESSMENT
PAIN_FUNCTIONAL_ASSESSMENT: 0-10

## 2025-06-23 NOTE — CARE PLAN
The patient's goals for the shift include  patient will remain safe and comfortable throughout shift.    The clinical goals for the shift include patient will have controlled pain throughout shift.      Problem: Pain - Adult  Goal: Verbalizes/displays adequate comfort level or baseline comfort level  Outcome: Progressing     Problem: Safety - Adult  Goal: Free from fall injury  Outcome: Progressing     Problem: Discharge Planning  Goal: Discharge to home or other facility with appropriate resources  Outcome: Progressing     Problem: Chronic Conditions and Co-morbidities  Goal: Patient's chronic conditions and co-morbidity symptoms are monitored and maintained or improved  Outcome: Progressing     Problem: Nutrition  Goal: Nutrient intake appropriate for maintaining nutritional needs  Outcome: Progressing     Problem: Pain  Goal: Takes deep breaths with improved pain control throughout the shift  Outcome: Progressing  Goal: Turns in bed with improved pain control throughout the shift  Outcome: Progressing  Goal: Walks with improved pain control throughout the shift  Outcome: Progressing  Goal: Performs ADL's with improved pain control throughout shift  Outcome: Progressing  Goal: Participates in PT with improved pain control throughout the shift  Outcome: Progressing  Goal: Free from opioid side effects throughout the shift  Outcome: Progressing  Goal: Free from acute confusion related to pain meds throughout the shift  Outcome: Progressing     Problem: Fall/Injury  Goal: Not fall by end of shift  Outcome: Progressing  Goal: Be free from injury by end of the shift  Outcome: Progressing  Goal: Verbalize understanding of personal risk factors for fall in the hospital  Outcome: Progressing  Goal: Verbalize understanding of risk factor reduction measures to prevent injury from fall in the home  Outcome: Progressing  Goal: Use assistive devices by end of the shift  Outcome: Progressing  Goal: Pace activities to prevent  fatigue by end of the shift  Outcome: Progressing     Problem: Skin  Goal: Decreased wound size/increased tissue granulation at next dressing change  Outcome: Progressing  Flowsheets (Taken 6/23/2025 0936)  Decreased wound size/increased tissue granulation at next dressing change: Promote sleep for wound healing  Goal: Participates in plan/prevention/treatment measures  Outcome: Progressing  Flowsheets (Taken 6/23/2025 0936)  Participates in plan/prevention/treatment measures: Increase activity/out of bed for meals  Goal: Prevent/manage excess moisture  Outcome: Progressing  Flowsheets (Taken 6/23/2025 0936)  Prevent/manage excess moisture: Cleanse incontinence/protect with barrier cream  Goal: Prevent/minimize sheer/friction injuries  Outcome: Progressing  Flowsheets (Taken 6/23/2025 0936)  Prevent/minimize sheer/friction injuries: Increase activity/out of bed for meals  Goal: Promote/optimize nutrition  Outcome: Progressing  Flowsheets (Taken 6/23/2025 0936)  Promote/optimize nutrition: Monitor/record intake including meals  Goal: Promote skin healing  Outcome: Progressing  Flowsheets (Taken 6/23/2025 0936)  Promote skin healing: Rotate device position/do not position patient on device

## 2025-06-23 NOTE — PROGRESS NOTES
General Surgery Daily Progress Note      Subjective:  The patient is doing well today.  He is no acute distress.  The patient denies nausea and vomiting.  His NG tube output remains bilious.  He continues to have no output from his colostomy.  His KATY drain now looks like stool.        Objective:  Vitals:   Vitals:    06/23/25 1301   BP:    Pulse: 92   Resp:    Temp:    SpO2: 95%       Physical Exam:   General: No acute distress. Sitting up in bed.   Neuro: Alert and oriented ×3. Follows commands.  Head: Atraumatic  Eyes: Pupils equal reactive to light. Extraocular motions intact.  Ears: Hears normal speaking voice.  Neck: Supple. No appreciable masses.  Heart: Regular rate  Lungs: No audible wheeze.  Breathing comfortably.  Abdomen: Soft. Nondistended.  Midline wound is healthy.  Left upper quadrant colostomy is healthy.  KATY drain with stool.  Musculoskeletal: Moves all extremities.  Normal range of motion.  Skin: No rashes or lesions.  Psychological: Normal affect        Labs:  CBC:   Lab Results   Component Value Date    WBC 8.3 06/23/2025    RBC 2.55 (L) 06/23/2025    HGB 7.6 (L) 06/23/2025    HCT 22.7 (L) 06/23/2025     06/23/2025       RFP:   Lab Results   Component Value Date     (L) 06/23/2025    K 3.9 06/23/2025    CL 97 (L) 06/23/2025    CO2 29 06/23/2025    BUN 14 06/23/2025    CREATININE 0.61 06/23/2025    CALCIUM 7.8 (L) 06/23/2025    MG 1.86 06/21/2025    PHOS 3.7 06/22/2025        LFTs:   Lab Results   Component Value Date    PROT 5.5 (L) 06/23/2025    ALBUMIN 2.5 (L) 06/23/2025    BILITOT 1.1 06/23/2025    ALKPHOS 313 (H) 06/23/2025    AST 14 06/23/2025    ALT 12 06/23/2025              Images:    No new      Assessment:  This is a 70 y.o. year old male who is Post Operative Day #7 from exploratory laparotomy and lysis of adhesions for small bowel obstruction after a Guerrero's colectomy.  He has a persistent ileus.  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      Plan:  -- Multimodal pain control  -- Zosyn  -- TPN        Iván Canales MD  General Surgery  Office: [phone]  Fax: [phone]  3:24 PM  06/23/25

## 2025-06-23 NOTE — PROGRESS NOTES
06/23/25 0852   Discharge Planning   Living Arrangements Spouse/significant other   Support Systems Spouse/significant other   Assistance Needed A&Ox4, independent with ADLs, no DME, room air at baseline-currently room air. [facility] Dr[facility] Ramon Gould   Type of Residence Private residence   Number of Stairs to Enter Residence 3   Number of Stairs Within Residence 20   Do you have animals or pets at home? Yes   Type of Animals or Pets 1 dog - [place]   Home or Post Acute Services In home services   Type of Home Care Services Home nursing visits   Expected Discharge Disposition Home Health  (resumed [facility](SN), will need internal referral placed. Pt now with PICC and TPN - per team, not expected to dc with PICC and TPN.)

## 2025-06-23 NOTE — PROGRESS NOTES
Diego Morales is a 70 y.o. male on day 19 of admission presenting with Small bowel obstruction (Multi).      Subjective   No acute events overnight    Objective     Last Recorded Vitals  /82 (BP Location: Right arm)   Pulse 88   Temp 36.4 °C (97.5 °F) (Temporal)   Resp 16   Wt 64.4 kg (142 lb)   SpO2 96%   Intake/Output last 3 Shifts:    Intake/Output Summary (Last 24 hours) at 6/23/2025 0958  Last data filed at 6/23/2025 0644  Gross per 24 hour   Intake 100 ml   Output 2685 ml   Net -2585 ml       Admission Weight  Weight: 65.8 kg (145 lb) (06/04/25 1735)    Daily Weight  06/23/25 : 64.4 kg (142 lb)    Image Results  CT abdomen pelvis w IV contrast  Narrative: Interpreted By:  Marshall Ogden,   STUDY:  CT ABDOMEN PELVIS W IV CONTRAST;  6/21/2025 9:20 am      INDICATION:  Signs/Symptoms:history of colostomy, recent ex lap for hematoma  evacuation, SBR and ABHISHEK. drain with enteric content..      COMPARISON:  CT 06/04/2025      ACCESSION NUMBER(S):  [id number]      ORDERING CLINICIAN:  ROXANE BESS      TECHNIQUE:  CT of the abdomen and pelvis was performed.  Contiguous axial images  were obtained at 3 mm slice thickness through the abdomen and pelvis.  Coronal and sagittal reconstructions at 3 mm slice thickness were  performed. 75 mL Omnipaque 350 administered intravenously without  immediate complication.      FINDINGS:  LOWER CHEST:  Mild bibasilar atelectasis. Cardiomegaly.      ABDOMEN:      LIVER:  Small low-attenuation lesion centrally in the liver is not  significantly changed.      BILE DUCTS:  Not dilated.      GALLBLADDER:  No calcified stone or definite inflammation.      PANCREAS:  Unremarkable      SPLEEN:  Borderline enlarged measuring 13.3 cm in length.      ADRENAL GLANDS:  Unremarkable      KIDNEYS AND URETERS:  Unremarkable without hydronephrosis.      PELVIS:      BLADDER:  Partially distended.      REPRODUCTIVE ORGANS:  Prostatomegaly. Gas tracks along both inguinal canals.       BOWEL:  Nasogastric tube terminates in the mid to distal stomach.  Proximal small bowel is decompressed. Slightly more distally there is  marked dilatation loops of jejunum up to 5.6 cm proximally. There  appears to be a caliber transition involving a bowel loop in the  right mid abdomen relating to an angulated loop of small bowel on  image (Series 201, Image 108), just proximal to a small bowel  anastomosis.      There is residual contrast scattered throughout the colon which is  otherwise decompressed to the level of the left mid abdominal  colostomy. There may be a Surinder pouch present. Appendix not  identified.      There appears to be a interloop loculated fluid collection containing  a small amount of air as best seen on coronal image (Series 202,  Image 48) measuring 7.2 x 6.5 cm.      There appears to be a separate more anterior pelvic fluid collection  which measures 9.1 x 3.1 cm on (series 201, Image 119).      VESSELS:  Aortoiliac system is patent without aneurysm.  Major visceral  branches are patent.Mild atherosclerosis. IVC and major branches are  grossly patent. Major portal venous branches are patent.      PERITONEUM AND RETROPERITONEUM:  Pelvic surgical drain is in place. There is scattered trace free  fluid. There is trace pneumoperitoneum.      The previous large pelvic hematoma is no longer present. No abdominal  or pelvic lymphadenopathy.      BONE AND ABDOMINAL WALL:  There is scattered subcutaneous gas in the lower abdomen extending  into the inguinal canals and proximal thighs. Skin staples are  present along vertically-oriented ventral abdominal incision. Mild  degenerative changes of the spine.          Impression: 1.  Interval evacuation of large pelvic hematoma.  2. Findings suggest small bowel obstruction with proximal dilatation  up to 5.6 cm and transition point involving a loop of bowel in the  right lower quadrant just proximal to anastomosis.  3. Suspected 7.2 cm loculated  fluid collection in the left mid  abdomen extending into the pelvis, containing small locules of air.  There is also a separate more anterior loculated pelvic fluid  collection measuring 9.1 cm.  4. Trace postoperative pneumoperitoneum.      MACRO:  None.      Signed by: Marshall Ogden 6/21/2025 11:05 AM  Dictation workstation:   [id number]      Physical Exam    Gen: Patient laying still, appears uncomfortable reports pain has improved since medication  HEENT: atraumatic, normocephalic  Pulm: normal respiratory effort, clear to auscultation b/l  Cardiac: RRR, no murmurs noted, normal S1/S2  GI: Abdominal binder in place, hypoactive bowel sounds appreciated/improving from yesterday's exam  MSK: normal ROM without joint swelling  Extremities: no LE edema, cyanosis  Neuro: AOX3, CN II-XII grossly intact  Psych: calm and appropriate for situation        Assessment & Plan    71yo CM with PMH of mitral valve prolapse, cardiac arrest during cath in 2016 due to vfib needing cardioversion, and recent diverticulitis with perforation s/p resection and colostomy bag on 5/15/25 that presented to the ED with abdominal pain and was admitted for SBO.      SBO  Recent diverticulitis with perforation  S/P colectomy with colostomy on 5/15/25  S/P laparoscopic converted to open lysis of adhesions w/ small bowel resection and anastomosis 6/18 6/21 KATY draining enteric content  CT of abdomen pelvis shows 2 fluid collections  Surgery has started Zosyn  Continue NG tube to low continuous suction with flushing  Continue TPN w/ lipids  Continue NPO status  Continue Dilaudid as needed for pain  Toradol previously held due to ESTELITA and hyponatremia, in discussion with pharmacy we will reorder it at 50 mg every 6 hours as needed for 3 days  If hyponatremia becomes significant or ESTELITA develops would discontinue it prior to 3 days  Continue Zofran and Compazine as needed for nausea     Abdominal hematoma  CT showed 7.3cm x 13cm x 8.6cm abnormality in  mass vs fluid collection  IR drain placed with dark hemorrhagic hematoma collected but minimal output  cultures show no growth  Repeat CT 6/21 showed interval evacuation of large pelvic hematoma    Hyponatremia  Nephrology advised sodium level likely related to Toradol and Clinimix  Family advised they would like surgery to determine nutrition  Sodium continues to trend up    Mitral valve prolapse, Hx of arrest  Tachycardia likely related to missed dose of metoprolol  .Concerns tachycardia could be related to infection  As patient is n.p.o. we will transition p.o. metoprolol 25 mg to Lopressor 5 mg every 6 hours  Continue telemetry     DVT Proph: SCDs only      Brenton Rausch, DO

## 2025-06-24 ENCOUNTER — ANESTHESIA (OUTPATIENT)
Dept: OPERATING ROOM | Facility: HOSPITAL | Age: 70
End: 2025-06-24
Payer: MEDICARE

## 2025-06-24 ENCOUNTER — ANESTHESIA EVENT (OUTPATIENT)
Dept: OPERATING ROOM | Facility: HOSPITAL | Age: 70
End: 2025-06-24
Payer: MEDICARE

## 2025-06-24 ENCOUNTER — SURGERY (OUTPATIENT)
Age: 70
End: 2025-06-24
Payer: MEDICARE

## 2025-06-24 LAB
ABO GROUP (TYPE) IN BLOOD: NORMAL
ALBUMIN SERPL BCP-MCNC: 2.6 G/DL (ref 3.4–5)
ANION GAP SERPL CALC-SCNC: 15 MMOL/L (ref 10–20)
ANION GAP SERPL CALC-SCNC: 15 MMOL/L (ref 10–20)
ANTIBODY SCREEN: NORMAL
BUN SERPL-MCNC: 16 MG/DL (ref 6–23)
BUN SERPL-MCNC: 17 MG/DL (ref 6–23)
CALCIUM SERPL-MCNC: 8.1 MG/DL (ref 8.6–10.3)
CALCIUM SERPL-MCNC: 8.7 MG/DL (ref 8.6–10.3)
CHLORIDE SERPL-SCNC: 94 MMOL/L (ref 98–107)
CHLORIDE SERPL-SCNC: 95 MMOL/L (ref 98–107)
CHOLEST SERPL-MCNC: 89 MG/DL (ref 0–199)
CHOLESTEROL/HDL RATIO: 6.3
CO2 SERPL-SCNC: 24 MMOL/L (ref 21–32)
CO2 SERPL-SCNC: 25 MMOL/L (ref 21–32)
CREAT SERPL-MCNC: 0.72 MG/DL (ref 0.5–1.3)
CREAT SERPL-MCNC: 0.81 MG/DL (ref 0.5–1.3)
EGFRCR SERPLBLD CKD-EPI 2021: >90 ML/MIN/1.73M*2
EGFRCR SERPLBLD CKD-EPI 2021: >90 ML/MIN/1.73M*2
ERYTHROCYTE [DISTWIDTH] IN BLOOD BY AUTOMATED COUNT: 15.2 % (ref 11.5–14.5)
GLUCOSE SERPL-MCNC: 136 MG/DL (ref 74–99)
GLUCOSE SERPL-MCNC: 159 MG/DL (ref 74–99)
HCT VFR BLD AUTO: 25.6 % (ref 41–52)
HDLC SERPL-MCNC: 14.1 MG/DL
HGB BLD-MCNC: 8.5 G/DL (ref 13.5–17.5)
LDLC SERPL CALC-MCNC: 42 MG/DL
MAGNESIUM SERPL-MCNC: 2.12 MG/DL (ref 1.6–2.4)
MAGNESIUM SERPL-MCNC: 2.18 MG/DL (ref 1.6–2.4)
MCH RBC QN AUTO: 29 PG (ref 26–34)
MCHC RBC AUTO-ENTMCNC: 33.2 G/DL (ref 32–36)
MCV RBC AUTO: 87 FL (ref 80–100)
NON HDL CHOLESTEROL: 75 MG/DL (ref 0–149)
NRBC BLD-RTO: 0 /100 WBCS (ref 0–0)
PHOSPHATE SERPL-MCNC: 4.6 MG/DL (ref 2.5–4.9)
PLATELET # BLD AUTO: 346 X10*3/UL (ref 150–450)
POTASSIUM SERPL-SCNC: 3.9 MMOL/L (ref 3.5–5.3)
POTASSIUM SERPL-SCNC: 3.9 MMOL/L (ref 3.5–5.3)
RBC # BLD AUTO: 2.93 X10*6/UL (ref 4.5–5.9)
RH FACTOR (ANTIGEN D): NORMAL
SODIUM SERPL-SCNC: 130 MMOL/L (ref 136–145)
SODIUM SERPL-SCNC: 130 MMOL/L (ref 136–145)
TRIGL SERPL-MCNC: 163 MG/DL (ref 0–149)
VLDL: 33 MG/DL (ref 0–40)
WBC # BLD AUTO: 11.7 X10*3/UL (ref 4.4–11.3)

## 2025-06-24 PROCEDURE — 2720000007 HC OR 272 NO HCPCS: Performed by: SURGERY

## 2025-06-24 PROCEDURE — 99233 SBSQ HOSP IP/OBS HIGH 50: CPT | Performed by: INTERNAL MEDICINE

## 2025-06-24 PROCEDURE — 2500000004 HC RX 250 GENERAL PHARMACY W/ HCPCS (ALT 636 FOR OP/ED): Performed by: ANESTHESIOLOGY

## 2025-06-24 PROCEDURE — 2500000004 HC RX 250 GENERAL PHARMACY W/ HCPCS (ALT 636 FOR OP/ED): Mod: JW | Performed by: NURSE ANESTHETIST, CERTIFIED REGISTERED

## 2025-06-24 PROCEDURE — 2500000004 HC RX 250 GENERAL PHARMACY W/ HCPCS (ALT 636 FOR OP/ED): Performed by: SURGERY

## 2025-06-24 PROCEDURE — 86923 COMPATIBILITY TEST ELECTRIC: CPT

## 2025-06-24 PROCEDURE — 0D1B0Z4 BYPASS ILEUM TO CUTANEOUS, OPEN APPROACH: ICD-10-PCS | Performed by: SURGERY

## 2025-06-24 PROCEDURE — 2500000005 HC RX 250 GENERAL PHARMACY W/O HCPCS: Performed by: SURGERY

## 2025-06-24 PROCEDURE — 1200000002 HC GENERAL ROOM WITH TELEMETRY DAILY

## 2025-06-24 PROCEDURE — A6213 FOAM DRG >16<=48 SQ IN W/BDR: HCPCS | Performed by: SURGERY

## 2025-06-24 PROCEDURE — 7100000001 HC RECOVERY ROOM TIME - INITIAL BASE CHARGE: Performed by: SURGERY

## 2025-06-24 PROCEDURE — 2500000004 HC RX 250 GENERAL PHARMACY W/ HCPCS (ALT 636 FOR OP/ED): Performed by: FAMILY MEDICINE

## 2025-06-24 PROCEDURE — 0DB80ZZ EXCISION OF SMALL INTESTINE, OPEN APPROACH: ICD-10-PCS | Performed by: SURGERY

## 2025-06-24 PROCEDURE — 2500000001 HC RX 250 WO HCPCS SELF ADMINISTERED DRUGS (ALT 637 FOR MEDICARE OP): Performed by: SURGERY

## 2025-06-24 PROCEDURE — 83735 ASSAY OF MAGNESIUM: CPT | Performed by: INTERNAL MEDICINE

## 2025-06-24 PROCEDURE — 80069 RENAL FUNCTION PANEL: CPT | Performed by: INTERNAL MEDICINE

## 2025-06-24 PROCEDURE — 7100000002 HC RECOVERY ROOM TIME - EACH INCREMENTAL 1 MINUTE: Performed by: SURGERY

## 2025-06-24 PROCEDURE — 99100 ANES PT EXTEME AGE<1 YR&>70: CPT | Performed by: ANESTHESIOLOGY

## 2025-06-24 PROCEDURE — 83735 ASSAY OF MAGNESIUM: CPT | Performed by: SURGERY

## 2025-06-24 PROCEDURE — 99140 ANES COMP EMERGENCY COND: CPT | Performed by: ANESTHESIOLOGY

## 2025-06-24 PROCEDURE — A44005 PR FREEING BOWEL ADHESION,ENTEROLYSIS: Performed by: NURSE ANESTHETIST, CERTIFIED REGISTERED

## 2025-06-24 PROCEDURE — A44005 PR FREEING BOWEL ADHESION,ENTEROLYSIS: Performed by: ANESTHESIOLOGY

## 2025-06-24 PROCEDURE — 3600000003 HC OR TIME - INITIAL BASE CHARGE - PROCEDURE LEVEL THREE: Performed by: SURGERY

## 2025-06-24 PROCEDURE — 80061 LIPID PANEL: CPT | Performed by: SURGERY

## 2025-06-24 PROCEDURE — 3600000008 HC OR TIME - EACH INCREMENTAL 1 MINUTE - PROCEDURE LEVEL THREE: Performed by: SURGERY

## 2025-06-24 PROCEDURE — 86901 BLOOD TYPING SEROLOGIC RH(D): CPT | Performed by: ANESTHESIOLOGY

## 2025-06-24 PROCEDURE — 85027 COMPLETE CBC AUTOMATED: CPT | Performed by: FAMILY MEDICINE

## 2025-06-24 PROCEDURE — 3700000001 HC GENERAL ANESTHESIA TIME - INITIAL BASE CHARGE: Performed by: SURGERY

## 2025-06-24 PROCEDURE — 44310 ILEOSTOMY/JEJUNOSTOMY: CPT | Performed by: SURGERY

## 2025-06-24 PROCEDURE — 36415 COLL VENOUS BLD VENIPUNCTURE: CPT | Performed by: SURGERY

## 2025-06-24 PROCEDURE — 80048 BASIC METABOLIC PNL TOTAL CA: CPT | Mod: CCI | Performed by: FAMILY MEDICINE

## 2025-06-24 PROCEDURE — 3700000002 HC GENERAL ANESTHESIA TIME - EACH INCREMENTAL 1 MINUTE: Performed by: SURGERY

## 2025-06-24 RX ORDER — SODIUM CHLORIDE 9 MG/ML
50 INJECTION, SOLUTION INTRAVENOUS CONTINUOUS
Status: DISCONTINUED | OUTPATIENT
Start: 2025-06-24 | End: 2025-06-25

## 2025-06-24 RX ORDER — PHENYLEPHRINE HCL IN 0.9% NACL 0.4MG/10ML
SYRINGE (ML) INTRAVENOUS AS NEEDED
Status: DISCONTINUED | OUTPATIENT
Start: 2025-06-24 | End: 2025-06-24

## 2025-06-24 RX ORDER — ACETAMINOPHEN 325 MG/1
650 TABLET ORAL EVERY 4 HOURS PRN
Status: DISCONTINUED | OUTPATIENT
Start: 2025-06-24 | End: 2025-06-24 | Stop reason: HOSPADM

## 2025-06-24 RX ORDER — CEFAZOLIN 1 G/1
INJECTION, POWDER, FOR SOLUTION INTRAVENOUS AS NEEDED
Status: DISCONTINUED | OUTPATIENT
Start: 2025-06-24 | End: 2025-06-24

## 2025-06-24 RX ORDER — ONDANSETRON HYDROCHLORIDE 2 MG/ML
8 INJECTION, SOLUTION INTRAVENOUS ONCE
Status: COMPLETED | OUTPATIENT
Start: 2025-06-24 | End: 2025-06-24

## 2025-06-24 RX ORDER — ROCURONIUM BROMIDE 10 MG/ML
INJECTION, SOLUTION INTRAVENOUS AS NEEDED
Status: DISCONTINUED | OUTPATIENT
Start: 2025-06-24 | End: 2025-06-24

## 2025-06-24 RX ORDER — SODIUM CHLORIDE, SODIUM LACTATE, POTASSIUM CHLORIDE, CALCIUM CHLORIDE 600; 310; 30; 20 MG/100ML; MG/100ML; MG/100ML; MG/100ML
100 INJECTION, SOLUTION INTRAVENOUS CONTINUOUS
Status: ACTIVE | OUTPATIENT
Start: 2025-06-24 | End: 2025-06-24

## 2025-06-24 RX ORDER — MIDAZOLAM HYDROCHLORIDE 1 MG/ML
INJECTION, SOLUTION INTRAMUSCULAR; INTRAVENOUS AS NEEDED
Status: DISCONTINUED | OUTPATIENT
Start: 2025-06-24 | End: 2025-06-24

## 2025-06-24 RX ORDER — ACETAMINOPHEN 10 MG/ML
1000 INJECTION, SOLUTION INTRAVENOUS EVERY 6 HOURS
Status: DISCONTINUED | OUTPATIENT
Start: 2025-06-24 | End: 2025-06-26

## 2025-06-24 RX ORDER — ALBUTEROL SULFATE 0.83 MG/ML
2.5 SOLUTION RESPIRATORY (INHALATION) ONCE AS NEEDED
Status: DISCONTINUED | OUTPATIENT
Start: 2025-06-24 | End: 2025-06-24 | Stop reason: HOSPADM

## 2025-06-24 RX ORDER — KETOROLAC TROMETHAMINE 15 MG/ML
15 INJECTION, SOLUTION INTRAMUSCULAR; INTRAVENOUS EVERY 6 HOURS
Status: DISCONTINUED | OUTPATIENT
Start: 2025-06-24 | End: 2025-06-26

## 2025-06-24 RX ORDER — BUPIVACAINE HYDROCHLORIDE 5 MG/ML
INJECTION, SOLUTION PERINEURAL AS NEEDED
Status: DISCONTINUED | OUTPATIENT
Start: 2025-06-24 | End: 2025-06-24 | Stop reason: HOSPADM

## 2025-06-24 RX ORDER — ONDANSETRON HYDROCHLORIDE 2 MG/ML
INJECTION, SOLUTION INTRAVENOUS AS NEEDED
Status: DISCONTINUED | OUTPATIENT
Start: 2025-06-24 | End: 2025-06-24

## 2025-06-24 RX ORDER — DEXMEDETOMIDINE IN 0.9 % NACL 20 MCG/5ML
SYRINGE (ML) INTRAVENOUS AS NEEDED
Status: DISCONTINUED | OUTPATIENT
Start: 2025-06-24 | End: 2025-06-24

## 2025-06-24 RX ORDER — PROPOFOL 10 MG/ML
INJECTION, EMULSION INTRAVENOUS AS NEEDED
Status: DISCONTINUED | OUTPATIENT
Start: 2025-06-24 | End: 2025-06-24

## 2025-06-24 RX ORDER — LIDOCAINE HCL/PF 100 MG/5ML
SYRINGE (ML) INTRAVENOUS AS NEEDED
Status: DISCONTINUED | OUTPATIENT
Start: 2025-06-24 | End: 2025-06-24

## 2025-06-24 RX ORDER — MAGNESIUM SULFATE HEPTAHYDRATE 500 MG/ML
INJECTION, SOLUTION INTRAMUSCULAR; INTRAVENOUS AS NEEDED
Status: DISCONTINUED | OUTPATIENT
Start: 2025-06-24 | End: 2025-06-24

## 2025-06-24 RX ORDER — SODIUM CHLORIDE 0.9 G/100ML
INJECTION, SOLUTION IRRIGATION AS NEEDED
Status: DISCONTINUED | OUTPATIENT
Start: 2025-06-24 | End: 2025-06-24 | Stop reason: HOSPADM

## 2025-06-24 RX ADMIN — ACETAMINOPHEN 1000 MG: 1000 INJECTION, SOLUTION INTRAVENOUS at 21:38

## 2025-06-24 RX ADMIN — MIDAZOLAM 2 MG: 1 INJECTION INTRAMUSCULAR; INTRAVENOUS at 14:25

## 2025-06-24 RX ADMIN — ASCORBIC ACID, VITAMIN A PALMITATE, CHOLECALCIFEROL, THIAMINE HYDROCHLORIDE, RIBOFLAVIN-5 PHOSPHATE SODIUM, PYRIDOXINE HYDROCHLORIDE, NIACINAMIDE, DEXPANTHENOL, ALPHA-TOCOPHEROL ACETATE, VITAMIN K1, FOLIC ACID, BIOTIN, CYANOCOBALAMIN: 200; 3300; 200; 6; 3.6; 6; 40; 15; 10; 150; 600; 60; 5 INJECTION, SOLUTION INTRAVENOUS at 21:49

## 2025-06-24 RX ADMIN — HYDROMORPHONE HYDROCHLORIDE 0.5 MG: 1 INJECTION, SOLUTION INTRAMUSCULAR; INTRAVENOUS; SUBCUTANEOUS at 18:13

## 2025-06-24 RX ADMIN — METOPROLOL TARTRATE 5 MG: 5 INJECTION INTRAVENOUS at 01:51

## 2025-06-24 RX ADMIN — SUGAMMADEX 200 MG: 100 INJECTION, SOLUTION INTRAVENOUS at 17:10

## 2025-06-24 RX ADMIN — HYDROMORPHONE HYDROCHLORIDE 0.5 MG: 1 INJECTION, SOLUTION INTRAMUSCULAR; INTRAVENOUS; SUBCUTANEOUS at 17:49

## 2025-06-24 RX ADMIN — SODIUM CHLORIDE 9000 ML: 900 IRRIGANT IRRIGATION at 15:48

## 2025-06-24 RX ADMIN — ERYTHROMYCIN 400 MG: 400 SUSPENSION ORAL at 12:33

## 2025-06-24 RX ADMIN — ROCURONIUM BROMIDE 20 MG: 10 INJECTION, SOLUTION INTRAVENOUS at 15:26

## 2025-06-24 RX ADMIN — GABAPENTIN 100 MG: 250 SOLUTION ORAL at 08:52

## 2025-06-24 RX ADMIN — KETOROLAC TROMETHAMINE 15 MG: 15 INJECTION, SOLUTION INTRAMUSCULAR; INTRAVENOUS at 10:17

## 2025-06-24 RX ADMIN — Medication 10 ML: at 08:52

## 2025-06-24 RX ADMIN — HYDROMORPHONE HYDROCHLORIDE 1 MG: 2 INJECTION, SOLUTION INTRAMUSCULAR; INTRAVENOUS; SUBCUTANEOUS at 17:24

## 2025-06-24 RX ADMIN — DEXAMETHASONE SODIUM PHOSPHATE 4 MG: 4 INJECTION INTRA-ARTICULAR; INTRALESIONAL; INTRAMUSCULAR; INTRAVENOUS; SOFT TISSUE at 17:09

## 2025-06-24 RX ADMIN — I.V. FAT EMULSION 50 G: 20 EMULSION INTRAVENOUS at 21:38

## 2025-06-24 RX ADMIN — HYDROMORPHONE HYDROCHLORIDE 0.5 MG: 2 INJECTION, SOLUTION INTRAMUSCULAR; INTRAVENOUS; SUBCUTANEOUS at 15:38

## 2025-06-24 RX ADMIN — ACETAMINOPHEN 1000 MG: 1000 INJECTION, SOLUTION INTRAVENOUS at 10:17

## 2025-06-24 RX ADMIN — Medication 120 MCG: at 16:18

## 2025-06-24 RX ADMIN — ACETAMINOPHEN 1000 MG: 1000 INJECTION, SOLUTION INTRAVENOUS at 03:43

## 2025-06-24 RX ADMIN — ONDANSETRON 4 MG: 2 INJECTION, SOLUTION INTRAMUSCULAR; INTRAVENOUS at 17:09

## 2025-06-24 RX ADMIN — KETOROLAC TROMETHAMINE 15 MG: 15 INJECTION, SOLUTION INTRAMUSCULAR; INTRAVENOUS at 21:34

## 2025-06-24 RX ADMIN — BUPIVACAINE HYDROCHLORIDE 19 ML: 5 INJECTION, SOLUTION PERINEURAL at 17:17

## 2025-06-24 RX ADMIN — PIPERACILLIN SODIUM AND TAZOBACTAM SODIUM 3.38 G: 3; .375 INJECTION, SOLUTION INTRAVENOUS at 21:38

## 2025-06-24 RX ADMIN — HYDROMORPHONE HYDROCHLORIDE 0.4 MG: 1 INJECTION, SOLUTION INTRAMUSCULAR; INTRAVENOUS; SUBCUTANEOUS at 04:03

## 2025-06-24 RX ADMIN — LIDOCAINE HYDROCHLORIDE 50 MG: 20 INJECTION INTRAVENOUS at 14:32

## 2025-06-24 RX ADMIN — ROCURONIUM BROMIDE 50 MG: 10 INJECTION, SOLUTION INTRAVENOUS at 14:32

## 2025-06-24 RX ADMIN — METOPROLOL TARTRATE 5 MG: 5 INJECTION INTRAVENOUS at 22:11

## 2025-06-24 RX ADMIN — POLYETHYLENE GLYCOL 3350 17 G: 17 POWDER, FOR SOLUTION ORAL at 08:52

## 2025-06-24 RX ADMIN — METHOCARBAMOL 500 MG: 100 INJECTION INTRAMUSCULAR; INTRAVENOUS at 08:52

## 2025-06-24 RX ADMIN — PIPERACILLIN SODIUM AND TAZOBACTAM SODIUM 3.38 G: 3; .375 INJECTION, SOLUTION INTRAVENOUS at 15:04

## 2025-06-24 RX ADMIN — CEFAZOLIN 2 G: 1 INJECTION, POWDER, FOR SOLUTION INTRAMUSCULAR; INTRAVENOUS at 15:00

## 2025-06-24 RX ADMIN — Medication 10 ML: at 22:11

## 2025-06-24 RX ADMIN — PANTOPRAZOLE SODIUM 40 MG: 40 INJECTION, POWDER, FOR SOLUTION INTRAVENOUS at 06:15

## 2025-06-24 RX ADMIN — METOPROLOL TARTRATE 5 MG: 5 INJECTION INTRAVENOUS at 08:52

## 2025-06-24 RX ADMIN — Medication 8 MCG: at 15:07

## 2025-06-24 RX ADMIN — PIPERACILLIN SODIUM AND TAZOBACTAM SODIUM 3.38 G: 3; .375 INJECTION, SOLUTION INTRAVENOUS at 04:03

## 2025-06-24 RX ADMIN — GABAPENTIN 100 MG: 250 SOLUTION ORAL at 21:38

## 2025-06-24 RX ADMIN — HYDROMORPHONE HYDROCHLORIDE 0.5 MG: 2 INJECTION, SOLUTION INTRAMUSCULAR; INTRAVENOUS; SUBCUTANEOUS at 17:18

## 2025-06-24 RX ADMIN — Medication 4 MCG: at 15:34

## 2025-06-24 RX ADMIN — ONDANSETRON 8 MG: 2 INJECTION, SOLUTION INTRAMUSCULAR; INTRAVENOUS at 17:58

## 2025-06-24 RX ADMIN — ERYTHROMYCIN 400 MG: 400 SUSPENSION ORAL at 06:15

## 2025-06-24 RX ADMIN — MAGNESIUM SULFATE HEPTAHYDRATE 2 G: 500 INJECTION, SOLUTION INTRAMUSCULAR; INTRAVENOUS at 14:39

## 2025-06-24 RX ADMIN — SODIUM CHLORIDE 2000 ML: 900 IRRIGANT IRRIGATION at 16:05

## 2025-06-24 RX ADMIN — SODIUM CHLORIDE 50 ML/HR: 0.9 INJECTION, SOLUTION INTRAVENOUS at 22:09

## 2025-06-24 RX ADMIN — PIPERACILLIN SODIUM AND TAZOBACTAM SODIUM 3.38 G: 3; .375 INJECTION, SOLUTION INTRAVENOUS at 08:52

## 2025-06-24 RX ADMIN — PROPOFOL 150 MG: 10 INJECTION, EMULSION INTRAVENOUS at 14:31

## 2025-06-24 SDOH — HEALTH STABILITY: MENTAL HEALTH: CURRENT SMOKER: 0

## 2025-06-24 ASSESSMENT — COGNITIVE AND FUNCTIONAL STATUS - GENERAL
DRESSING REGULAR LOWER BODY CLOTHING: A LITTLE
WALKING IN HOSPITAL ROOM: A LITTLE
HELP NEEDED FOR BATHING: A LITTLE
CLIMB 3 TO 5 STEPS WITH RAILING: A LITTLE
PERSONAL GROOMING: A LITTLE
DRESSING REGULAR UPPER BODY CLOTHING: A LITTLE
MOVING FROM LYING ON BACK TO SITTING ON SIDE OF FLAT BED WITH BEDRAILS: A LITTLE
MOBILITY SCORE: 22
MOVING TO AND FROM BED TO CHAIR: A LITTLE
DAILY ACTIVITIY SCORE: 21
DAILY ACTIVITIY SCORE: 19
TOILETING: A LITTLE
CLIMB 3 TO 5 STEPS WITH RAILING: A LITTLE
DRESSING REGULAR UPPER BODY CLOTHING: A LITTLE
STANDING UP FROM CHAIR USING ARMS: A LITTLE
MOBILITY SCORE: 18
HELP NEEDED FOR BATHING: A LITTLE
TURNING FROM BACK TO SIDE WHILE IN FLAT BAD: A LITTLE
DRESSING REGULAR LOWER BODY CLOTHING: A LITTLE
WALKING IN HOSPITAL ROOM: A LITTLE

## 2025-06-24 ASSESSMENT — PAIN - FUNCTIONAL ASSESSMENT
PAIN_FUNCTIONAL_ASSESSMENT: 0-10

## 2025-06-24 ASSESSMENT — PAIN SCALES - GENERAL
PAINLEVEL_OUTOF10: 0 - NO PAIN
PAINLEVEL_OUTOF10: 6
PAINLEVEL_OUTOF10: 7
PAINLEVEL_OUTOF10: 0 - NO PAIN
PAINLEVEL_OUTOF10: 7
PAINLEVEL_OUTOF10: 7

## 2025-06-24 ASSESSMENT — PAIN DESCRIPTION - ORIENTATION: ORIENTATION: RIGHT

## 2025-06-24 ASSESSMENT — COLUMBIA-SUICIDE SEVERITY RATING SCALE - C-SSRS
1. IN THE PAST MONTH, HAVE YOU WISHED YOU WERE DEAD OR WISHED YOU COULD GO TO SLEEP AND NOT WAKE UP?: NO
6. HAVE YOU EVER DONE ANYTHING, STARTED TO DO ANYTHING, OR PREPARED TO DO ANYTHING TO END YOUR LIFE?: NO
2. HAVE YOU ACTUALLY HAD ANY THOUGHTS OF KILLING YOURSELF?: NO

## 2025-06-24 ASSESSMENT — PAIN DESCRIPTION - LOCATION
LOCATION: ABDOMEN
LOCATION: ABDOMEN

## 2025-06-24 NOTE — PROGRESS NOTES
Patient: Diego Morales  Room/bed: 132/132-A  Admitted on: 6/4/2025    Age: 70 y.o.   Gender: male  Code Status:  Full Code   Admitting Dx: Small bowel obstruction (Multi) [K56.609]  Hematoma [T14.8XXA]  Lower abdominal pain [R10.30]    MRN: [id number]  PCP: Ramon Gould MD       Subjective   Tired. Waiting to go to surgery again    Objective    Physical Exam  Constitutional:       Comments: Fatigued appearing thin male, nad at this time   HENT:      Head: Normocephalic.      Nose: Nose normal.      Comments: NG in place     Mouth/Throat:      Mouth: Mucous membranes are dry.   Eyes:      Extraocular Movements: Extraocular movements intact.      Pupils: Pupils are equal, round, and reactive to light.   Cardiovascular:      Comments: Regular. Harsh 4-5/6 systolic murmur. No S2  Pulmonary:      Effort: Pulmonary effort is normal.      Breath sounds: Normal breath sounds.   Abdominal:      Comments: Occasional bowel sounds noted  Incision looks good  Drain with brownish bilious fluid   Colostomy with brown stool within. Binder in place   Musculoskeletal:      Comments: Generalized weakness   Skin:     Comments: Cool , slightly diaphoretic   Neurological:      General: No focal deficit present.   Psychiatric:         Behavior: Behavior normal.        Temp:  [36.3 °C (97.3 °F)-36.8 °C (98.2 °F)] 36.6 °C (97.9 °F)  Heart Rate:  [79-94] 83  Resp:  [14-16] 16  BP: (109-131)/(73-79) 124/79    Vitals:    06/24/25 0630   Weight: 65.2 kg (143 lb 11.8 oz)           I/Os    Intake/Output Summary (Last 24 hours) at 6/24/2025 1103  Last data filed at 6/24/2025 1054  Gross per 24 hour   Intake 9221.48 ml   Output 3270 ml   Net 5951.48 ml       Labs:   Results from last 72 hours   Lab Units 06/24/25  0635 06/24/25  0254 06/23/25  0556 06/22/25  0634   SODIUM mmol/L 130* 130* 132* 131*   POTASSIUM mmol/L 3.9 3.9 3.9 4.0   CHLORIDE mmol/L 95* 94* 97* 96*   CO2 mmol/L 24 25 29 27   BUN mg/dL 17 16 14 15   CREATININE mg/dL 0.72 0.81  "0.61 0.58   GLUCOSE mg/dL 159* 136* 134* 145*   CALCIUM mg/dL 8.1* 8.7 7.8* 7.9*   ANION GAP mmol/L 15 15 10 12   EGFR mL/min/1.73m*2 >90 >90 >90 >90   PHOSPHORUS mg/dL 4.6  --   --  3.7      Results from last 72 hours   Lab Units 06/24/25  0635 06/23/25  0556 06/22/25  0634   WBC AUTO x10*3/uL 11.7* 8.3 9.4   HEMOGLOBIN g/dL 8.5* 7.6* 7.8*   HEMATOCRIT % 25.6* 22.7* 24.2*   PLATELETS AUTO x10*3/uL 346 293 292      Lab Results   Component Value Date    CALCIUM 8.1 (L) 06/24/2025    PHOS 4.6 06/24/2025      Lab Results   Component Value Date    CRP 0.17 06/18/2022      [unfilled]     Micro/ID:   No results found for the last 90 days.                   No lab exists for component: \"AGALPCRNB\"   .ID  No results found for: \"URINECULTURE\", \"BLOODCULT\", \"CSFCULTSMEAR\"    Images:  CT abdomen and pelvis w oral contrast only  Narrative: Interpreted By:  Inés Hung,   STUDY:  CT ABDOMEN AND PELVIS W ORAL CONTRAST ONLY;  6/23/2025 9:32 pm      INDICATION:  Signs/Symptoms:Cecal leak?  Colocutaneuos fistula.  Perform 3 horus  after NG tube gastrographin contrast..          COMPARISON:  Multiple prior CT abdomen pelvis including recent prior dated  06/21/2025.      ACCESSION NUMBER(S):  [id number]      ORDERING CLINICIAN:  PAULINA FLORES      TECHNIQUE:  CT of the abdomen and pelvis was performed without intravenous  contrast. Contiguous axial images were obtained at 3 mm slice  thickness through the abdomen and pelvis. Coronal and sagittal  reconstructions at 3 mm slice thickness were performed.  No  intravenous contrast was administered; positive oral contrast was  given.      FINDINGS:  Please note that the evaluation of vessels, lymph nodes and organs is  limited without intravenous contrast.      LOWER CHEST:  Bandlike airspace opacities in the dependent region of bilateral  lower lobes are likely favored to represent subsegmental atelectasis.  Otherwise lung bases are clear. Visualized heart is mildly " enlarged.  No pericardial effusion. Nasogastric tube extends through distal  thoracic esophagus.      ABDOMEN:      LIVER:  Liver is normal in size and morphology. No suspicious focal hepatic  mass lesions within limits of noncontrast technique.      BILE DUCTS:  The intrahepatic and extrahepatic ducts are not dilated.      GALLBLADDER:  Gallbladder is moderately distended and does not demonstrate  calcified stones in the lumen.      PANCREAS:  The pancreas appears unremarkable without evidence of ductal  dilatation or masses.      SPLEEN:  The spleen is normal in size.      ADRENAL GLANDS:  Bilateral adrenal glands appear normal.      KIDNEYS AND URETERS:  The kidneys are normal in size and unremarkable in appearance.  No  hydroureteronephrosis or nephroureterolithiasis is identified.      PELVIS:      BLADDER:  Urinary bladder is moderately distended and appears grossly  unremarkable.      REPRODUCTIVE ORGANS:  Prostate gland is mildly enlarged with median lobe hypertrophy  indenting on the base of the bladder.      BOWEL/PERITONEUM/LYMPH NODES:  Nasogastric tube extends to the distal body of the stomach. Stomach  is moderately distended and appears grossly unremarkable within  limits of evaluation. Postsurgical changes of partial colectomy with  Surinder's pouch and colostomy in the left lower quadrant are noted.  There is persistent evidence of diffusely distended small bowel loops  measuring up to 3.5 cm without definite evidence of transition point.  These are similar compared to immediate prior CT examination dated  06/21/2025. There is no definite evidence of transition point and  these are most likely favored to be related to ileus. There is dense  contrast extending throughout the colon to the level of colostomy  site. The density of the contrast is similar compared to prior CT  examination dated 06/21/2025. This is most likely related to prior  contrast. There is extension of the Gastrografin that was  given  during today's examination to the level of left lower quadrant just  adjacent to the previously noted residual collection/hematoma (best  seen on axial image 95/164). However it is difficult to determine  whether this contrast is extraluminal versus intraluminal in the  distal small bowel as there is no pham contrast extravasation. There  are also postsurgical changes related to small bowel small bowel  anastomosis in the right lower quadrant.      There is persistent evidence of fluid collection extending along the  posterior aspect of the left lower quadrant into the pelvis  corresponding to the site of previously noted hematoma. This is  similar compared to immediate prior CT examination. There is interval  evidence of few small foci of gas in close proximity to this  collection, which could be related to interval surgery versus bowel  perforation. There is trace amount of fluid extending along the  anterior peritoneal cavity just adjacent to the level of small bowel  small bowel anastomosis (best seen on axial image 111/164). This was  also seen on prior CT examination dated 06/21/2024 and is slightly  smaller in size compared to prior CT examination. This measures up to  9 x 1.5 cm compared 9 x 3 cm. This fluid collection is contiguous  with the midline abdominal wall incision. There is a surgical drain  extending from the right lower quadrant with its tip located in the  left lower quadrant.      No evidence of enlarged lymphadenopathy in the abdomen and pelvis.      VESSELS:  Mild atherosclerotic calcifications of the abdominal aorta are noted.  IVC appears grossly unremarkable within limits of noncontrast  technique.          ABDOMINAL WALL:  Midline anterior abdominal wall incision is noted.      BONES:  No acute osseous findings.      Impression: 1.  Extensive postsurgical changes of Surinder's pouch with colostomy  in the left lower quadrant and postsurgical changes related to  partial small-bowel  resection with anastomosis in the right lower  quadrant are noted. There is no pham contrast extravasation on this  examination, however there is suggestion of intraluminal contrast  abruptly ending at the level of distal small bowel loops in the left  lower quadrant that is in close proximity to the previously noted  collection/hematoma (best seen on axial image 95/164). There is also  interval evidence of few small foci of air adjacent to this region.  This could be related to a possible subtle small bowel perforation.  There is also a small pocket of fluid extending along the anterior  peritoneal cavity that is in close proximity to the small bowel small  bowel anastomosis in the right lower quadrant and possibility of  anastomotic leak can not be completely excluded. Recommend attention  to this region if surgery is pursued.  2. Few scattered foci of free intraperitoneal air in the lower  abdomen, which could be related to recent surgery versus possible  small bowel perforation.  3. Redemonstration of dense contrast extending throughout the colon  to the level of colostomy site. This is similar compared to prior CT  examination and is most likely favored to be related to contrast  related to previous examination.  4. Other findings as described above.      MACRO:  Dr. Inés Hung discussed the significance and urgency of this  critical finding in person with Dr. PAULINA FLORES on 6/24/2025 at  9:25 am.  (**-RCF-**) Findings:  See findings.      Signed by: Inés Hung 6/24/2025 9:26 AM  Dictation workstation:   [id number]       Meds    Scheduled medications  Scheduled Medications[1]  Continuous medications  Continuous Medications[2]  PRN medications  PRN Medications[3]     Assessment and Plan    Diego Morales is a 70 y.o. male   POD 8, Guerrero procedure/ABHISHEK. Overnight, had stool in ostomy, but also stool/bilious drainage from drain--about 80 ml. NG had about 1200 out as well. Plan is to return to  surgery this afternoon for further exploration  Protein lexx malnutrition. He is on tPN, continue  Hyponatremia. He is positive about 5 liters, some of this may be dilutional, monitor closely while tpn , can adjust if needed  ESTELITA, resolved.   Pelvic hematoma  Diverticulosis  Anemia, multifactorial. Monitor. Transfuse if needed. Consider iv iron after surgery as well.     Maddie Singleton MD         [1] acetaminophen, 1,000 mg, intravenous, q6h  [Held by provider] aspirin, 81 mg, oral, Daily  diatrizoate tank-diatrizoat sod, 30 mL, nasogastric tube, Once in imaging  erythromycin ethylsuccinate, 400 mg, nasogastric tube, q6h LOU  fat emulsion-plant based, 250 mL, intravenous, Daily  gabapentin, 100 mg, nasogastric tube, q12h LOU  methocarbamol, 500 mg, intravenous, q8h  [Held by provider] metoprolol succinate XL, 25 mg, oral, Daily  metoprolol, 5 mg, intravenous, q6h  pantoprazole, 40 mg, oral, BID AC   Or  pantoprazole, 40 mg, intravenous, BID AC  piperacillin-tazobactam, 3.375 g, intravenous, q6h  polyethylene glycol, 17 g, oral, Daily  promethazine, 6.25 mg, intravenous, Once  sodium chloride 0.9%, 10 mL, intravenous, q12h LOU  [2] Adult Clinimix Parenteral Nutrition Continuous, 70 mL/hr, Last Rate: 70 mL/hr (06/24/25 1054)  [3] PRN medications: alteplase, benzocaine-menthol, HYDROmorphone, HYDROmorphone, ketorolac, lidocaine, melatonin, [DISCONTINUED] ondansetron **OR** ondansetron, phenoL, prochlorperazine, sodium chloride 0.9%

## 2025-06-24 NOTE — PROGRESS NOTES
"Nutrition Follow Up Assessment:   Nutrition Assessment       Patient is a 70 y.o. male with h/o perforated diverticulitis - now presenting with SBO s/p NGT in ED (6/4) for decompression.     Pt s/p IR drain placement (6/5) for pelvic hematoma.  PICC placed (6/10) for supplemental TPN.      Pt with prolonged obstruction so taken to OR (6/17) for laparoscopic converted to open extensive ABHISHEK & open small bowel resection w/ anastomosis.      KATY drain output turned bilious now with c/f colocutaneous controlled fistula. Pt taken to OR today (6/24) for exploration.    Nutrition History:  Food and Nutrient History: Pt off floor to OR at time of assessment. Continues with TPN of Clinimix 5/15 @ 70mL/hr + 250mL daily lipids. No additional information at this time.  Food Allergy:  (None)     Anthropometrics:  Height: 180.3 cm (5' 11\")   Weight: 65.2 kg (143 lb 11.8 oz)   BMI (Calculated): 20.06  IBW/kg (Dietitian Calculated): 78.2 kg  Percent of IBW: 83 %    Weight History:   Weight         6/4/2025  2246 6/19/2025  0507 6/20/2025  1546 6/23/2025  0644 6/24/2025  0630    Weight: 64.1 kg (141 lb 5 oz) 64 kg (141 lb) 65.8 kg (145 lb 1 oz) 64.4 kg (142 lb) 65.2 kg (143 lb 11.8 oz)     Weight Change %:  Weight History / % Weight Change: No weight loss noted since admission.    Nutrition Focused Physical Exam Findings:  Defer: See RDN note (6/6/25)    Edema:  Edema: none  Physical Findings:  Skin: Positive (Abdominal incision)  Digestive System Findings:  (+Colostomy - Ileus w/ decompressive NGT)  Mouth Findings:  (None)    Nutrition Significant Labs:  BMP Trend:   Results from last 7 days   Lab Units 06/24/25  0635 06/24/25  0254 06/23/25  0556 06/22/25  0634   GLUCOSE mg/dL 159* 136* 134* 145*   CALCIUM mg/dL 8.1* 8.7 7.8* 7.9*   SODIUM mmol/L 130* 130* 132* 131*   POTASSIUM mmol/L 3.9 3.9 3.9 4.0   CO2 mmol/L 24 25 29 27   CHLORIDE mmol/L 95* 94* 97* 96*   BUN mg/dL 17 16 14 15   CREATININE mg/dL 0.72 0.81 0.61 0.58    , TPN/PPN " Labs:   Results from last 7 days   Lab Units 06/24/25  0635 06/24/25  0254 06/23/25  0556 06/22/25  0634   GLUCOSE mg/dL 159* 136* 134* 145*   POTASSIUM mmol/L 3.9 3.9 3.9 4.0   PHOSPHORUS mg/dL 4.6  --   --  3.7   MAGNESIUM mg/dL 2.12 2.18  --   --    SODIUM mmol/L 130* 130* 132* 131*   CHLORIDE mmol/L 95* 94* 97* 96*   ALT U/L  --   --  12  --    AST U/L  --   --  14  --    ALK PHOS U/L  --   --  313*  --    BILIRUBIN TOTAL mg/dL  --   --  1.1  --    TRIGLYCERIDES mg/dL 163*  --   --   --       Nutrition Specific Medications:  Scheduled medications  Scheduled Medications[1]  Continuous medications  Continuous Medications[2]  PRN medications  PRN Medications[3]    I/O:   +Colostomy: 80mL output x 24hrs    Dietary Orders (From admission, onward)       Start     Ordered    06/09/25 1509  NPO Diet Except: Ice chips, Sips with meds; Effective now  Diet effective now        Question Answer Comment   Except: Ice chips    Except: Sips with meds        06/09/25 1508    06/04/25 2251  May Participate in Room Service  ( ROOM SERVICE MAY PARTICIPATE)  Once        Question:  .  Answer:  Yes    06/04/25 2250             Estimated Needs:   Total Energy Estimated Needs in 24 hours (kCal):  (1900+)  Method for Estimating Needs: ~30 kcals/kg x CBW (64.1 kg)  Total Protein Estimated Needs in 24 Hours (g):  (95+)  Method for Estimating 24 Hour Protein Needs: ~1.5 g/kg x admit wt (64.1 kg)  Total Fluid Estimated Needs in 24 Hours (mL):  (1900+)  Method for Estimating 24 Hour Fluid Needs: 1mL/kcal or per team        Nutrition Diagnosis   Malnutrition Diagnosis  Patient has Malnutrition Diagnosis: Yes  Diagnosis Status: Active  Malnutrition Diagnosis: Severe malnutrition related to acute disease or injury  Related to: recent diverticular perforation s/p SBR with colostomy creation -- now c/b SBO  As Evidenced by: meeting <75% EENs for the past x 1 month, significant 5% weight loss in <1 month, & moderate muscle wasting/fat  loss      Nutrition Interventions/Recommendations      Nutrition Recommendations:  Recommend increasing TPN regimen to meet increased needs accordingly:    Continue TPN of 5% AA, 15% dextrose w/ new goal rate @ 83mL/hr  Include MVI + trace elements  Provide lipids daily @ 20.8mL/hr x 12hrs overnight (250mL total)    TPN monitoring:  Daily weights  RFP + Mg daily; replete lytes PRN  LFTs + TGs weekly  Accuchecks q6h     Provides: 1914 kcals, 100g protein, 299g dextrose, 26% kcal from fat (meeting 100% kcal & protein needs)      Nutrition Interventions/Goals:   Parenteral Nutrition: Management of delivery rate of parenteral nutrition  Goal: Tolerate TPN at goal rate      Nutrition Monitoring and Evaluation   Enteral and Parenteral Nutrition Intake Determination: Parenteral nutrition intake - To meet > 75% estimated energy needs    Time Spent (min): 60 minutes            [1] [Held by provider] aspirin, 81 mg, oral, Daily  [Transfer Hold] diatrizoate tank-diatrizoat sod, 30 mL, nasogastric tube, Once in imaging  [Transfer Hold] erythromycin ethylsuccinate, 400 mg, nasogastric tube, q6h LOU  [Transfer Hold] fat emulsion-plant based, 250 mL, intravenous, Daily  [Transfer Hold] gabapentin, 100 mg, nasogastric tube, q12h LOU  [Transfer Hold] methocarbamol, 500 mg, intravenous, q8h  [Held by provider] metoprolol succinate XL, 25 mg, oral, Daily  [Transfer Hold] metoprolol, 5 mg, intravenous, q6h  [Transfer Hold] pantoprazole, 40 mg, oral, BID AC   Or  [Transfer Hold] pantoprazole, 40 mg, intravenous, BID AC  [Transfer Hold] piperacillin-tazobactam, 3.375 g, intravenous, q6h  piperacillin-tazobactam, 3.375 g, intravenous, Once  [Transfer Hold] polyethylene glycol, 17 g, oral, Daily  [Transfer Hold] promethazine, 6.25 mg, intravenous, Once  [Transfer Hold] sodium chloride 0.9%, 10 mL, intravenous, q12h LOU  [2] [Transfer Hold] Adult Clinimix Parenteral Nutrition Continuous, 70 mL/hr, Last Rate: 70 mL/hr (06/24/25 1232)  [3] PRN  medications: [Transfer Hold] alteplase, [Transfer Hold] benzocaine-menthol, [Transfer Hold] HYDROmorphone, [Transfer Hold] HYDROmorphone, [Transfer Hold] ketorolac, [Transfer Hold] lidocaine, [Transfer Hold] melatonin, [DISCONTINUED] ondansetron **OR** [Transfer Hold] ondansetron, [Transfer Hold] phenoL, [Transfer Hold] prochlorperazine, sodium chloride, [Transfer Hold] sodium chloride 0.9%

## 2025-06-24 NOTE — ANESTHESIA PROCEDURE NOTES
Airway  Date/Time: 6/24/2025 2:34 PM  Reason: elective    Airway not difficult    Staffing  Performed: CRNA   Authorized by: Dasia Moulton DO    Performed by: DAVID Luciano-LINO  Patient location during procedure: OR    Patient Condition  Indications for airway management: anesthesia  Sedation level: deep     Final Airway Details   Preoxygenated: yes  Final airway type: endotracheal airway  Successful airway: ETT   Successful intubation technique: video laryngoscopy  Blade: [place]  Blade size: #4  ETT size (mm): 7.5  Cormack-Lehane Classification: grade IIa - partial view of glottis  Placement verified by: chest auscultation and capnometry   Measured from: lips  ETT to lips (cm): 25  Number of attempts at approach: 1

## 2025-06-24 NOTE — CARE PLAN
The patient's goals for the shift include  pt will have pain controlled this shift.    The clinical goals for the shift include Patient will have pain management, remain safe, and rest comfortably this shift      Problem: Pain - Adult  Goal: Verbalizes/displays adequate comfort level or baseline comfort level  Outcome: Progressing     Problem: Safety - Adult  Goal: Free from fall injury  Outcome: Progressing     Problem: Discharge Planning  Goal: Discharge to home or other facility with appropriate resources  Outcome: Progressing     Problem: Chronic Conditions and Co-morbidities  Goal: Patient's chronic conditions and co-morbidity symptoms are monitored and maintained or improved  Outcome: Progressing     Problem: Nutrition  Goal: Nutrient intake appropriate for maintaining nutritional needs  Outcome: Progressing     Problem: Pain  Goal: Takes deep breaths with improved pain control throughout the shift  Outcome: Progressing  Goal: Turns in bed with improved pain control throughout the shift  Outcome: Progressing  Goal: Walks with improved pain control throughout the shift  Outcome: Progressing  Goal: Performs ADL's with improved pain control throughout shift  Outcome: Progressing  Goal: Participates in PT with improved pain control throughout the shift  Outcome: Progressing  Goal: Free from opioid side effects throughout the shift  Outcome: Progressing  Goal: Free from acute confusion related to pain meds throughout the shift  Outcome: Progressing     Problem: Fall/Injury  Goal: Not fall by end of shift  Outcome: Progressing  Goal: Be free from injury by end of the shift  Outcome: Progressing  Goal: Verbalize understanding of personal risk factors for fall in the hospital  Outcome: Progressing  Goal: Verbalize understanding of risk factor reduction measures to prevent injury from fall in the home  Outcome: Progressing  Goal: Use assistive devices by end of the shift  Outcome: Progressing  Goal: Pace activities to  prevent fatigue by end of the shift  Outcome: Progressing     Problem: Skin  Goal: Decreased wound size/increased tissue granulation at next dressing change  Outcome: Progressing  Goal: Participates in plan/prevention/treatment measures  Outcome: Progressing  Goal: Prevent/manage excess moisture  Outcome: Progressing  Goal: Prevent/minimize sheer/friction injuries  Outcome: Progressing  Flowsheets (Taken 6/24/2025 1041)  Prevent/minimize sheer/friction injuries: Turn/reposition every 2 hours/use positioning/transfer devices  Goal: Promote/optimize nutrition  Outcome: Progressing  Goal: Promote skin healing  Outcome: Progressing

## 2025-06-24 NOTE — OP NOTE
LAPAROTOMY, EXPLORATORY     Operative Date: 06/24/25  Patient's Name: Diego Morales  Patient's YOB: 1955  Patient's MRN: [id number]  Patient's Age: 70 y.o.  Operating Room Location: [facility]  Patient's ASA: III  Patient's Estimated Blood Loss: 100 mL        PREOPERATIVE DIAGNOSIS:   Small bowel perforation        POSTOPERATIVE DIAGNOSIS:   Small bowel perforation        OPERATION/PROCEDURE:   Exploratory laparotomy with lysis of adhesions and loop ileostomy        SURGEON:   Iván Canales MD.         ASSISTANT:   Morenita Maloney MD    No qualified surgical resident was available to assist.          INDICATIONS:   This is a 70 y.o. male who presented originally with perforated sigmoid diverticulitis.  He originally underwent an open Guerrero colectomy.  He had postoperative bleeding on postoperative day #2 that stopped on its own, but he developed a hematoma.  He was stable from that standpoint and was discharged home.  About 3 weeks after surgery, he Ronald presented with a small bowel obstruction.  We attempted NG tube decompression with TPN, but his small bowel obstruction was not opening up at all.  We therefore went back to the operating room for lysis of adhesions and evacuation of the hematoma.  This was converted to open.  This case went well, but required a small bowel resection due to the intense adhesions to the hematoma.  On postoperative day #7, he developed bilious output from his KATY drain, and still had not opened up from his ileus.  We therefore discussed the case amongst 4 of his surgeons, and decided that reoperating 1 week after his second surgery was the most prudent course of action.  Dr. Maloney scrubbed in with me the entire case to assist with this complex reoperative case.        OPERATION:   The reasons for, benefits to, and risks of surgery were discussed with the patient.  The risks included, but not limited to, bleeding, infection,  persistent pain, injury to surrounding structures, and postoperative abscess.  The patient appeared to understand and consented for surgery.  He was therefore brought back to the operating room and placed on the operating room table in the supine position.  His abdomen was prepped and draped in a standard fashion.       We started by reopening his laparotomy.  After entering the abdomen, we saw dense adhesions.  We were surprised to find such dense adhesions 1 week after surgery, rather than the normal expected timeline of 2 weeks.  However, with slow and meticulous dissection using a great deal of warmed saline irrigation, we did eventually break up adhesions so that we could determine where the small bowel was.  We were then able to traced this down, and find our previous small bowel anastomosis.  We saw that this was about 2 cm distal to our small bowel anastomosis.  We used a series of interrupted 2-0 Vicryl sutures to close this.  Her drain was right over this area.  We are able to inspect the rest of the intestine, and did not find any other source of perforation or leak.  We did find 1 other area of serosal thinning of the small bowel, which we closed with interrupted 3-0 Vicryl Lembert sutures.    We decided to bring up a loop ileostomy upstream from the small bowel anastomosis and therefore the perforation, but his distal end of the small bowel as we could reasonably place it.  At this point, due to adhesions and thickened bowel wall, this reached best in the left lower quadrant.  We therefore made a small circular opening through the skin in the left lower quadrant.  We then opened the anterior and posterior fascia longitudinally in this area, bluntly retracting of the rectus muscle out of the way.  Due to the edema of the small bowel, it was tight getting our loop of intestine through this, but we were able to bring up her loop of small intestine.  We made an opening in the mesentery so that we could  bring up an ileostomy dom.  After exteriorizing the loop of small bowel, we performed our transversus abdominis plane block bilaterally.  We then closed the fascia with running looped #1 PDS sutures.  We then stapled the skin closed.    We then made a transverse opening in the loop ileostomy.  We then used 3-0 Vicryl pop-off sutures to mature the loop ileostomy.  We ensured to brooked the upstream side.  We kept our ileostomy dom in place.  We then placed a new colostomy appliance over his colostomy, and an ileostomy appliance over the ileostomy.  We then applied a new dressing around the existing KATY drain that was in the pelvis.  We then applied a midline dressing over top.        DISPOSITION:   The patient tolerated the procedure well.  There were no immediate complications.  He will be brought to the postanesthesia care unit. From there, he will be admitted for ongoing care.  He will have a Maxwell catheter, an NG tube, and a KATY drain.      I was present and scrubbed in for the entire procedure.      CPT Code:  76866      Operating Room Staff:  Anesthesiologist: Dasia Moulton DO  CRNA: DAVID Luciano-LINO  SRNA: Adryan Umaña  Circulator: Enid Porter RN  Relief Circulator: Latesha Novak RN; Iram Oconnor RN  Relief Scrub: Seema Denson  Scrub Person: Fina Hoover RN; Juliet Rivera RN  RNFA: MONSERRAT Luis MD  General Surgery  Office: [phone]  Fax:     [phone]  5:26 PM  06/24/25

## 2025-06-24 NOTE — NURSING NOTE
Patient arrived back on [address], into room 108 at 6:40pm. Vital signs taken, family at bedside, patient A&Ox4, stable

## 2025-06-24 NOTE — ANESTHESIA PREPROCEDURE EVALUATION
Patient: Diego Morales    Procedure Information       Date/Time: 25 1348    Procedure: LAPAROTOMY, EXPLORATORY    Location: [facility] OR [facility] / [facility]    Surgeons: Iván Canales MD            Relevant Problems   No relevant active problems       Clinical information reviewed:   Tobacco  Allergies  Meds  Problems  Med Hx  Surg Hx   Fam Hx  Soc   Hx        NPO Detail:  NPO/Void Status  Carbohydrate Drink Given Prior to Surgery? : N  Date of Last Liquid: 25  Time of Last Liquid: 0700 (ice chips)  Date of Last Solid: 25  Time of Last Solid: 1800  Last Intake Type: Clear fluids  Time of Last Void: 1300         Physical Exam    Airway  Mallampati: II  TM distance: >3 FB  Neck ROM: full  Mouth openin finger widths     Cardiovascular - normal exam   Dental    Pulmonary - normal exam   Abdominal Abdomen: tender             Anesthesia Plan    History of general anesthesia?: yes  History of complications of general anesthesia?: no    ASA 3 - emergent     general   (Ngt in situ)  The patient is not a current smoker.    intravenous induction   Anesthetic plan and risks discussed with patient.    Plan discussed with attending and CRNA.

## 2025-06-24 NOTE — PROGRESS NOTES
General Surgery Daily Progress Note      Subjective:  The patient is doing well today.  He is no acute distress.  The patient denies nausea and vomiting.  His NG tube output remains bilious.  Overall he remains relatively unchanged.  However, he finally started having output from his colostomy after CT scan last night.  But then his drain output increased, and it has become bilious.        Objective:  Vitals:   Vitals:    06/24/25 0904   BP:    Pulse: 83   Resp:    Temp:    SpO2: 96%       Physical Exam:   General: No acute distress. Sitting up in bed.   Neuro: Alert and oriented ×3. Follows commands.  Head: Atraumatic  Eyes: Pupils equal reactive to light. Extraocular motions intact.  Ears: Hears normal speaking voice.  Neck: Supple. No appreciable masses.  Heart: Regular rate  Lungs: No audible wheeze.  Breathing comfortably.  Abdomen: Soft. Nondistended.  Midline wound is healthy.  Left upper quadrant colostomy is healthy.  KATY drain with bilious output.  Musculoskeletal: Moves all extremities.  Normal range of motion.  Skin: No rashes or lesions.  Psychological: Normal affect        Labs:  CBC:   Lab Results   Component Value Date    WBC 11.7 (H) 06/24/2025    RBC 2.93 (L) 06/24/2025    HGB 8.5 (L) 06/24/2025    HCT 25.6 (L) 06/24/2025     06/24/2025       RFP:   Lab Results   Component Value Date     (L) 06/24/2025    K 3.9 06/24/2025    CL 95 (L) 06/24/2025    CO2 24 06/24/2025    BUN 17 06/24/2025    CREATININE 0.72 06/24/2025    CALCIUM 8.1 (L) 06/24/2025    MG 2.12 06/24/2025    PHOS 4.6 06/24/2025        LFTs:   Lab Results   Component Value Date    PROT 5.5 (L) 06/23/2025    ALBUMIN 2.6 (L) 06/24/2025    BILITOT 1.1 06/23/2025    ALKPHOS 313 (H) 06/23/2025    AST 14 06/23/2025    ALT 12 06/23/2025              Images:    No new      Assessment:  This is a 70 y.o. year old male who is Post Operative Day #8 from exploratory laparotomy and lysis of adhesions for small bowel obstruction after a  Guerrero's colectomy.    He had a persistent ileus.  It appears that finally the ileus is resolving, but he began having stool output from his KATY drain.  That stool output has now turned to bilious output.  I have discussed this case with multiple partners, we all agree that proceeding with exploration is the best option despite being the third operation in 6 weeks.      Plan:  -- Multimodal pain control  -- Zosyn  -- TPN  -- Plan for the OR today        Iván Canales MD  General Surgery  Office: [phone]  Fax: [phone]  10:43 AM  06/24/25

## 2025-06-25 LAB
ALBUMIN SERPL BCP-MCNC: 2.4 G/DL (ref 3.4–5)
ALP SERPL-CCNC: 215 U/L (ref 33–136)
ALT SERPL W P-5'-P-CCNC: 17 U/L (ref 10–52)
ANION GAP SERPL CALC-SCNC: 14 MMOL/L (ref 10–20)
AST SERPL W P-5'-P-CCNC: 12 U/L (ref 9–39)
BILIRUB SERPL-MCNC: 1 MG/DL (ref 0–1.2)
BUN SERPL-MCNC: 26 MG/DL (ref 6–23)
CALCIUM SERPL-MCNC: 7.7 MG/DL (ref 8.6–10.3)
CHLORIDE SERPL-SCNC: 95 MMOL/L (ref 98–107)
CO2 SERPL-SCNC: 23 MMOL/L (ref 21–32)
CREAT SERPL-MCNC: 0.75 MG/DL (ref 0.5–1.3)
EGFRCR SERPLBLD CKD-EPI 2021: >90 ML/MIN/1.73M*2
ERYTHROCYTE [DISTWIDTH] IN BLOOD BY AUTOMATED COUNT: 15.6 % (ref 11.5–14.5)
GLUCOSE SERPL-MCNC: 182 MG/DL (ref 74–99)
HCT VFR BLD AUTO: 26.5 % (ref 41–52)
HGB BLD-MCNC: 8.8 G/DL (ref 13.5–17.5)
LABORATORY COMMENT REPORT: NORMAL
LABORATORY COMMENT REPORT: NORMAL
MCH RBC QN AUTO: 29.8 PG (ref 26–34)
MCHC RBC AUTO-ENTMCNC: 33.2 G/DL (ref 32–36)
MCV RBC AUTO: 90 FL (ref 80–100)
NRBC BLD-RTO: 0 /100 WBCS (ref 0–0)
PATH REPORT.FINAL DX SPEC: NORMAL
PATH REPORT.FINAL DX SPEC: NORMAL
PATH REPORT.GROSS SPEC: NORMAL
PATH REPORT.GROSS SPEC: NORMAL
PATH REPORT.RELEVANT HX SPEC: NORMAL
PATH REPORT.RELEVANT HX SPEC: NORMAL
PATH REPORT.TOTAL CANCER: NORMAL
PATH REPORT.TOTAL CANCER: NORMAL
PHOSPHATE SERPL-MCNC: 4.1 MG/DL (ref 2.5–4.9)
PLATELET # BLD AUTO: 470 X10*3/UL (ref 150–450)
POTASSIUM SERPL-SCNC: 4.3 MMOL/L (ref 3.5–5.3)
PROT SERPL-MCNC: 5.2 G/DL (ref 6.4–8.2)
RBC # BLD AUTO: 2.95 X10*6/UL (ref 4.5–5.9)
SODIUM SERPL-SCNC: 128 MMOL/L (ref 136–145)
WBC # BLD AUTO: 16 X10*3/UL (ref 4.4–11.3)

## 2025-06-25 PROCEDURE — 80053 COMPREHEN METABOLIC PANEL: CPT | Performed by: SURGERY

## 2025-06-25 PROCEDURE — 84100 ASSAY OF PHOSPHORUS: CPT | Performed by: SURGERY

## 2025-06-25 PROCEDURE — 1200000002 HC GENERAL ROOM WITH TELEMETRY DAILY

## 2025-06-25 PROCEDURE — 36415 COLL VENOUS BLD VENIPUNCTURE: CPT | Performed by: SURGERY

## 2025-06-25 PROCEDURE — 2500000005 HC RX 250 GENERAL PHARMACY W/O HCPCS: Performed by: SURGERY

## 2025-06-25 PROCEDURE — 2500000004 HC RX 250 GENERAL PHARMACY W/ HCPCS (ALT 636 FOR OP/ED): Performed by: INTERNAL MEDICINE

## 2025-06-25 PROCEDURE — 2500000001 HC RX 250 WO HCPCS SELF ADMINISTERED DRUGS (ALT 637 FOR MEDICARE OP): Performed by: SURGERY

## 2025-06-25 PROCEDURE — 99232 SBSQ HOSP IP/OBS MODERATE 35: CPT | Performed by: INTERNAL MEDICINE

## 2025-06-25 PROCEDURE — 2500000004 HC RX 250 GENERAL PHARMACY W/ HCPCS (ALT 636 FOR OP/ED): Mod: JZ | Performed by: SURGERY

## 2025-06-25 PROCEDURE — 85027 COMPLETE CBC AUTOMATED: CPT | Performed by: SURGERY

## 2025-06-25 PROCEDURE — 2500000004 HC RX 250 GENERAL PHARMACY W/ HCPCS (ALT 636 FOR OP/ED): Performed by: SURGERY

## 2025-06-25 RX ORDER — SODIUM CHLORIDE 9 MG/ML
50 INJECTION, SOLUTION INTRAVENOUS CONTINUOUS
Status: DISCONTINUED | OUTPATIENT
Start: 2025-06-25 | End: 2025-06-25

## 2025-06-25 RX ORDER — DIAZEPAM 5 MG/ML
2 INJECTION, SOLUTION INTRAMUSCULAR; INTRAVENOUS EVERY 8 HOURS
Status: DISCONTINUED | OUTPATIENT
Start: 2025-06-25 | End: 2025-06-26

## 2025-06-25 RX ORDER — SODIUM CHLORIDE 9 MG/ML
50 INJECTION, SOLUTION INTRAVENOUS CONTINUOUS
Status: DISCONTINUED | OUTPATIENT
Start: 2025-06-25 | End: 2025-06-26

## 2025-06-25 RX ADMIN — METOPROLOL TARTRATE 5 MG: 5 INJECTION INTRAVENOUS at 16:46

## 2025-06-25 RX ADMIN — Medication 10 ML: at 09:47

## 2025-06-25 RX ADMIN — METHOCARBAMOL 500 MG: 100 INJECTION INTRAMUSCULAR; INTRAVENOUS at 08:10

## 2025-06-25 RX ADMIN — KETOROLAC TROMETHAMINE 15 MG: 15 INJECTION, SOLUTION INTRAMUSCULAR; INTRAVENOUS at 20:47

## 2025-06-25 RX ADMIN — HYDROMORPHONE HYDROCHLORIDE 0.2 MG: 1 INJECTION, SOLUTION INTRAMUSCULAR; INTRAVENOUS; SUBCUTANEOUS at 13:02

## 2025-06-25 RX ADMIN — ASCORBIC ACID, VITAMIN A PALMITATE, CHOLECALCIFEROL, THIAMINE HYDROCHLORIDE, RIBOFLAVIN-5 PHOSPHATE SODIUM, PYRIDOXINE HYDROCHLORIDE, NIACINAMIDE, DEXPANTHENOL, ALPHA-TOCOPHEROL ACETATE, VITAMIN K1, FOLIC ACID, BIOTIN, CYANOCOBALAMIN: 200; 3300; 200; 6; 3.6; 6; 40; 15; 10; 150; 600; 60; 5 INJECTION, SOLUTION INTRAVENOUS at 20:42

## 2025-06-25 RX ADMIN — METOPROLOL TARTRATE 5 MG: 5 INJECTION INTRAVENOUS at 23:01

## 2025-06-25 RX ADMIN — Medication 10 ML: at 20:40

## 2025-06-25 RX ADMIN — ACETAMINOPHEN 1000 MG: 1000 INJECTION, SOLUTION INTRAVENOUS at 16:46

## 2025-06-25 RX ADMIN — SODIUM CHLORIDE 50 ML/HR: 0.9 INJECTION, SOLUTION INTRAVENOUS at 09:46

## 2025-06-25 RX ADMIN — ACETAMINOPHEN 1000 MG: 1000 INJECTION, SOLUTION INTRAVENOUS at 10:42

## 2025-06-25 RX ADMIN — KETOROLAC TROMETHAMINE 15 MG: 15 INJECTION, SOLUTION INTRAMUSCULAR; INTRAVENOUS at 02:43

## 2025-06-25 RX ADMIN — METOPROLOL TARTRATE 5 MG: 5 INJECTION INTRAVENOUS at 10:58

## 2025-06-25 RX ADMIN — HYDROMORPHONE HYDROCHLORIDE 0.2 MG: 1 INJECTION, SOLUTION INTRAMUSCULAR; INTRAVENOUS; SUBCUTANEOUS at 05:38

## 2025-06-25 RX ADMIN — SODIUM CHLORIDE 50 ML/HR: 9 INJECTION, SOLUTION INTRAVENOUS at 10:18

## 2025-06-25 RX ADMIN — KETOROLAC TROMETHAMINE 15 MG: 15 INJECTION, SOLUTION INTRAMUSCULAR; INTRAVENOUS at 14:12

## 2025-06-25 RX ADMIN — ERYTHROMYCIN 400 MG: 400 SUSPENSION ORAL at 18:06

## 2025-06-25 RX ADMIN — ERYTHROMYCIN 400 MG: 400 SUSPENSION ORAL at 01:33

## 2025-06-25 RX ADMIN — METOPROLOL TARTRATE 5 MG: 5 INJECTION INTRAVENOUS at 05:00

## 2025-06-25 RX ADMIN — I.V. FAT EMULSION 50 G: 20 EMULSION INTRAVENOUS at 20:42

## 2025-06-25 RX ADMIN — PIPERACILLIN SODIUM AND TAZOBACTAM SODIUM 3.38 G: 3; .375 INJECTION, SOLUTION INTRAVENOUS at 21:19

## 2025-06-25 RX ADMIN — GABAPENTIN 100 MG: 250 SOLUTION ORAL at 20:39

## 2025-06-25 RX ADMIN — HYDROMORPHONE HYDROCHLORIDE 0.2 MG: 1 INJECTION, SOLUTION INTRAMUSCULAR; INTRAVENOUS; SUBCUTANEOUS at 18:45

## 2025-06-25 RX ADMIN — PANTOPRAZOLE SODIUM 40 MG: 40 INJECTION, POWDER, FOR SOLUTION INTRAVENOUS at 16:46

## 2025-06-25 RX ADMIN — POLYETHYLENE GLYCOL 3350 17 G: 17 POWDER, FOR SOLUTION ORAL at 09:45

## 2025-06-25 RX ADMIN — ACETAMINOPHEN 1000 MG: 1000 INJECTION, SOLUTION INTRAVENOUS at 03:55

## 2025-06-25 RX ADMIN — DIAZEPAM 2 MG: 5 INJECTION INTRAMUSCULAR; INTRAVENOUS at 10:58

## 2025-06-25 RX ADMIN — PIPERACILLIN SODIUM AND TAZOBACTAM SODIUM 3.38 G: 3; .375 INJECTION, SOLUTION INTRAVENOUS at 09:46

## 2025-06-25 RX ADMIN — KETOROLAC TROMETHAMINE 15 MG: 15 INJECTION, SOLUTION INTRAMUSCULAR; INTRAVENOUS at 08:10

## 2025-06-25 RX ADMIN — PIPERACILLIN SODIUM AND TAZOBACTAM SODIUM 3.38 G: 3; .375 INJECTION, SOLUTION INTRAVENOUS at 03:02

## 2025-06-25 RX ADMIN — GABAPENTIN 100 MG: 250 SOLUTION ORAL at 09:45

## 2025-06-25 RX ADMIN — ACETAMINOPHEN 1000 MG: 1000 INJECTION, SOLUTION INTRAVENOUS at 23:02

## 2025-06-25 RX ADMIN — ERYTHROMYCIN 400 MG: 400 SUSPENSION ORAL at 06:42

## 2025-06-25 RX ADMIN — PIPERACILLIN SODIUM AND TAZOBACTAM SODIUM 3.38 G: 3; .375 INJECTION, SOLUTION INTRAVENOUS at 16:00

## 2025-06-25 RX ADMIN — SODIUM CHLORIDE 50 ML/HR: 9 INJECTION, SOLUTION INTRAVENOUS at 20:46

## 2025-06-25 RX ADMIN — PANTOPRAZOLE SODIUM 40 MG: 40 INJECTION, POWDER, FOR SOLUTION INTRAVENOUS at 05:38

## 2025-06-25 RX ADMIN — METHOCARBAMOL 500 MG: 100 INJECTION INTRAMUSCULAR; INTRAVENOUS at 00:46

## 2025-06-25 RX ADMIN — ERYTHROMYCIN 400 MG: 400 SUSPENSION ORAL at 12:50

## 2025-06-25 ASSESSMENT — COGNITIVE AND FUNCTIONAL STATUS - GENERAL
TOILETING: A LITTLE
MOBILITY SCORE: 18
MOVING FROM LYING ON BACK TO SITTING ON SIDE OF FLAT BED WITH BEDRAILS: A LITTLE
DRESSING REGULAR UPPER BODY CLOTHING: A LITTLE
MOVING TO AND FROM BED TO CHAIR: A LITTLE
TURNING FROM BACK TO SIDE WHILE IN FLAT BAD: A LITTLE
HELP NEEDED FOR BATHING: A LITTLE
DRESSING REGULAR LOWER BODY CLOTHING: A LITTLE
TURNING FROM BACK TO SIDE WHILE IN FLAT BAD: A LITTLE
STANDING UP FROM CHAIR USING ARMS: A LITTLE
CLIMB 3 TO 5 STEPS WITH RAILING: A LITTLE
DAILY ACTIVITIY SCORE: 20
STANDING UP FROM CHAIR USING ARMS: A LITTLE
WALKING IN HOSPITAL ROOM: A LITTLE
TOILETING: A LITTLE
WALKING IN HOSPITAL ROOM: A LITTLE
MOVING FROM LYING ON BACK TO SITTING ON SIDE OF FLAT BED WITH BEDRAILS: A LITTLE
DAILY ACTIVITIY SCORE: 20
MOVING TO AND FROM BED TO CHAIR: A LITTLE
CLIMB 3 TO 5 STEPS WITH RAILING: A LITTLE
DRESSING REGULAR LOWER BODY CLOTHING: A LITTLE
MOBILITY SCORE: 18
HELP NEEDED FOR BATHING: A LITTLE
DRESSING REGULAR UPPER BODY CLOTHING: A LITTLE

## 2025-06-25 ASSESSMENT — PAIN - FUNCTIONAL ASSESSMENT
PAIN_FUNCTIONAL_ASSESSMENT: 0-10

## 2025-06-25 ASSESSMENT — PAIN SCALES - GENERAL
PAINLEVEL_OUTOF10: 6
PAINLEVEL_OUTOF10: 5 - MODERATE PAIN
PAINLEVEL_OUTOF10: 5 - MODERATE PAIN
PAINLEVEL_OUTOF10: 0 - NO PAIN
PAINLEVEL_OUTOF10: 5 - MODERATE PAIN
PAINLEVEL_OUTOF10: 0 - NO PAIN
PAINLEVEL_OUTOF10: 4

## 2025-06-25 ASSESSMENT — PAIN DESCRIPTION - LOCATION
LOCATION: ABDOMEN
LOCATION: ABDOMEN

## 2025-06-25 NOTE — NURSING NOTE
0700- Assumed care of patient.     Maxwell removed per  order.     Patient ambulating to bathroom, in pain afterwards. IV Dilaudid given.     Verified with Pharmacy that TPN can run with fluids, IV tylenol, and Zosyn through double lumen PICC.     NG flushed as ordered.     Patient refused Valium at 1800. Does not like the way it made him feel with previous dose. Unable to message Dr. Canales who ordered med to make him aware of patient refusal. Dr. Singleton notified.     LUZ ELENA ANN LPN

## 2025-06-25 NOTE — PROGRESS NOTES
06/25/25 0722   Discharge Planning   Living Arrangements Spouse/significant other   Support Systems Spouse/significant other   Assistance Needed A&Ox4, independent with ADLs, no DME, room air at baseline-[facility] Dr[facility] Ramon Gould   Type of Residence Private residence   Number of Stairs to Enter Residence 3   Number of Stairs Within Residence 20   Do you have animals or pets at home? Yes   Type of Animals or Pets 1 dog - [place]   Home or Post Acute Services In home services   Type of Home Care Services Home nursing visits   Expected Discharge Disposition Home Health  (resumed [facility](SN), will need internal referral placed. Pt now with PICC and TPN - per team, not expected to dc with PICC and TPN.)

## 2025-06-25 NOTE — CONSULTS
Wound Care Consult     Visit Date: 6/25/2025      Patient Name: Diego Morales         MRN: [id number]             Reason for Consult: colostomy; new loop ileostomy        Wound History: POD#1    Assessment: Patient with wife at bedside, seen this a.m.  patient with NG, pain at times, currently feels fine although drowsy, had just received medicine prior to this RN visit.  Education folder provided to wife.  Patient has had colostomy and caring for this at home, pink and budded.  New ileostomy with bar intact, dark red output.  Appliances intact.  Abdominal binder on.  ABD pads placed under appliances and KATY drain tubing for comfort /protection under binder.  Discussed ostomy care with which patient seems pretty comfortable and also eager to have reversed in 6 months.        Colostomy LUQ (Active)   No placement date or time found.   Location: LUQ   Number of days:       Colostomy LUQ (Active)   Stomal Appliance 2 piece;Clean;Dry;Intact 06/24/25 2203   Site/Stoma Assessment Clean;Intact 06/25/25 1003   Peristomal Assessment Clean;Intact 06/25/25 1003   Drainage Characteristics Brown 06/24/25 2203   Output (mL) 0 mL 06/25/25 0521     Ileostomy Loop LLQ (Active)   Placement Date/Time: 06/24/25 1715   Placed by: DR. FLORES AND DR. EATON IN OR  Hand Hygiene Completed: Yes  Ileostomy Type: Loop  Location: LLQ   Number of days: 0      Ileostomy Loop LLQ (Active)   Stomal Appliance 2 piece;Clean;Dry;Intact 06/25/25 0521   Site/Stoma Assessment Clean;Intact 06/25/25 0916   Peristomal Assessment Clean;Intact 06/25/25 0916   Output (mL) 25 mL 06/25/25 0521   Output Description Clear 06/25/25 0521            Wound Plan: Will continue education tomorrow. Please message with questions.      Arabella Wiley, RN, [facility]  6/25/2025  11:30 AM

## 2025-06-25 NOTE — CARE PLAN
The patient's goals for the shift include      The clinical goals for the shift include pt will have good pain control    Over the shift, the patient did not make progress toward the following goals. Barriers to progression include NG tube, NPO, post op day 1. Problem: Pain - Adult  Goal: Verbalizes/displays adequate comfort level or baseline comfort level  Outcome: Not Progressing     Problem: Nutrition  Goal: Nutrient intake appropriate for maintaining nutritional needs  Outcome: Not Progressing     Problem: Pain  Goal: Takes deep breaths with improved pain control throughout the shift  Outcome: Not Progressing  Goal: Turns in bed with improved pain control throughout the shift  Outcome: Not Progressing

## 2025-06-25 NOTE — NURSING NOTE
1930: assumed pt care, was informed from previous shift, per surgery, pt has a guardado to keep during the night so he can rest    6-25-25/0111: pt is due to receive through his NG tube erythromycin ethylsuccinate but a warning message pops up when scanning the medication between this and propofol that was given at 1431 on 6/24/25 that it can cause TdP. Asking Kari Huggins NP if it is still ok to give.     6-25-25/0142: it is ok to give erythromycin ethylsuccinate per Kari Huggins NP    6-25-25/0223: pt's had a run of SVT on the tele monitor then went back into NSR, pt states he felt fine, no CP, let Kari Huggins NP know, no further orders. Will continue to monitor.

## 2025-06-25 NOTE — PROGRESS NOTES
General Surgery Daily Progress Note      Subjective:  The patient is doing well today.  He is no acute distress.  The patient denies nausea and vomiting.  His NG tube output remains bilious.  He reports feeling significantly better today.        Objective:  Vitals:   Vitals:    06/25/25 0538   BP:    Pulse: 79   Resp:    Temp:    SpO2: 96%       Physical Exam:   General: No acute distress. Sitting up in bed.   Neuro: Alert and oriented ×3. Follows commands.  Head: Atraumatic  Eyes: Pupils equal reactive to light. Extraocular motions intact.  Ears: Hears normal speaking voice.  Neck: Supple. No appreciable masses.  Heart: Regular rate  Lungs: No audible wheeze.  Breathing comfortably.  Abdomen: Soft. Nondistended.  Midline wound is healthy.  Left upper quadrant colostomy is healthy.  Left lower quadrant loop ileostomy is viable but bruised.  KATY drain with bilious output.  Musculoskeletal: Moves all extremities.  Normal range of motion.  Skin: No rashes or lesions.  Psychological: Normal affect        Labs:  CBC:   Lab Results   Component Value Date    WBC 16.0 (H) 06/25/2025    RBC 2.95 (L) 06/25/2025    HGB 8.8 (L) 06/25/2025    HCT 26.5 (L) 06/25/2025     (H) 06/25/2025       RFP:   Lab Results   Component Value Date     (L) 06/25/2025    K 4.3 06/25/2025    CL 95 (L) 06/25/2025    CO2 23 06/25/2025    BUN 26 (H) 06/25/2025    CREATININE 0.75 06/25/2025    CALCIUM 7.7 (L) 06/25/2025    MG 2.12 06/24/2025    PHOS 4.1 06/25/2025        LFTs:   Lab Results   Component Value Date    PROT 5.2 (L) 06/25/2025    ALBUMIN 2.4 (L) 06/25/2025    BILITOT 1.0 06/25/2025    ALKPHOS 215 (H) 06/25/2025    AST 12 06/25/2025    ALT 17 06/25/2025              Images:    No new      Assessment:  This is a 70 y.o. 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  He had a persistent ileus.        Plan:  -- Multimodal pain control  -- Zosyn  -- TPN  -- Ambulation    Dr. Maloney will be taking care of him for the next few days starting tomorrow.        Iván Canales MD  General Surgery  Office: [phone]  Fax: [phone]  9:36 AM  06/25/25
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
## 2025-06-26 LAB
ALBUMIN SERPL BCP-MCNC: 2.6 G/DL (ref 3.4–5)
ANION GAP SERPL CALC-SCNC: 13 MMOL/L (ref 10–20)
BUN SERPL-MCNC: 24 MG/DL (ref 6–23)
CALCIUM SERPL-MCNC: 8 MG/DL (ref 8.6–10.3)
CHLORIDE SERPL-SCNC: 94 MMOL/L (ref 98–107)
CO2 SERPL-SCNC: 25 MMOL/L (ref 21–32)
CREAT SERPL-MCNC: 0.77 MG/DL (ref 0.5–1.3)
EGFRCR SERPLBLD CKD-EPI 2021: >90 ML/MIN/1.73M*2
ERYTHROCYTE [DISTWIDTH] IN BLOOD BY AUTOMATED COUNT: 16 % (ref 11.5–14.5)
GLUCOSE SERPL-MCNC: 123 MG/DL (ref 74–99)
HCT VFR BLD AUTO: 25 % (ref 41–52)
HGB BLD-MCNC: 7.9 G/DL (ref 13.5–17.5)
MAGNESIUM SERPL-MCNC: 2.06 MG/DL (ref 1.6–2.4)
MCH RBC QN AUTO: 29.3 PG (ref 26–34)
MCHC RBC AUTO-ENTMCNC: 31.6 G/DL (ref 32–36)
MCV RBC AUTO: 93 FL (ref 80–100)
NRBC BLD-RTO: 0 /100 WBCS (ref 0–0)
PHOSPHATE SERPL-MCNC: 3.5 MG/DL (ref 2.5–4.9)
PLATELET # BLD AUTO: 430 X10*3/UL (ref 150–450)
POTASSIUM SERPL-SCNC: 4.3 MMOL/L (ref 3.5–5.3)
RBC # BLD AUTO: 2.7 X10*6/UL (ref 4.5–5.9)
SODIUM SERPL-SCNC: 128 MMOL/L (ref 136–145)
WBC # BLD AUTO: 14.2 X10*3/UL (ref 4.4–11.3)

## 2025-06-26 PROCEDURE — 36415 COLL VENOUS BLD VENIPUNCTURE: CPT | Performed by: SURGERY

## 2025-06-26 PROCEDURE — 2500000001 HC RX 250 WO HCPCS SELF ADMINISTERED DRUGS (ALT 637 FOR MEDICARE OP): Performed by: NURSE PRACTITIONER

## 2025-06-26 PROCEDURE — 2500000005 HC RX 250 GENERAL PHARMACY W/O HCPCS: Performed by: SURGERY

## 2025-06-26 PROCEDURE — 85027 COMPLETE CBC AUTOMATED: CPT | Performed by: SURGERY

## 2025-06-26 PROCEDURE — 99232 SBSQ HOSP IP/OBS MODERATE 35: CPT | Performed by: INTERNAL MEDICINE

## 2025-06-26 PROCEDURE — 2500000001 HC RX 250 WO HCPCS SELF ADMINISTERED DRUGS (ALT 637 FOR MEDICARE OP): Performed by: SURGERY

## 2025-06-26 PROCEDURE — 83735 ASSAY OF MAGNESIUM: CPT | Performed by: SURGERY

## 2025-06-26 PROCEDURE — 2500000004 HC RX 250 GENERAL PHARMACY W/ HCPCS (ALT 636 FOR OP/ED): Performed by: SURGERY

## 2025-06-26 PROCEDURE — 2500000001 HC RX 250 WO HCPCS SELF ADMINISTERED DRUGS (ALT 637 FOR MEDICARE OP): Performed by: INTERNAL MEDICINE

## 2025-06-26 PROCEDURE — 80069 RENAL FUNCTION PANEL: CPT | Performed by: SURGERY

## 2025-06-26 PROCEDURE — 1200000002 HC GENERAL ROOM WITH TELEMETRY DAILY

## 2025-06-26 PROCEDURE — 99232 SBSQ HOSP IP/OBS MODERATE 35: CPT | Performed by: NURSE PRACTITIONER

## 2025-06-26 RX ORDER — ERYTHROMYCIN ETHYLSUCCINATE 400 MG/5ML
400 SUSPENSION ORAL EVERY 6 HOURS SCHEDULED
Status: DISCONTINUED | OUTPATIENT
Start: 2025-06-26 | End: 2025-07-01

## 2025-06-26 RX ORDER — OXYCODONE HYDROCHLORIDE 5 MG/1
5 TABLET ORAL EVERY 6 HOURS PRN
Refills: 0 | Status: DISCONTINUED | OUTPATIENT
Start: 2025-06-26 | End: 2025-07-02 | Stop reason: HOSPADM

## 2025-06-26 RX ORDER — ACETAMINOPHEN 10 MG/ML
1000 INJECTION, SOLUTION INTRAVENOUS EVERY 6 HOURS
Status: COMPLETED | OUTPATIENT
Start: 2025-06-26 | End: 2025-06-26

## 2025-06-26 RX ORDER — FLUTICASONE PROPIONATE 50 MCG
2 SPRAY, SUSPENSION (ML) NASAL DAILY
Status: DISCONTINUED | OUTPATIENT
Start: 2025-06-26 | End: 2025-07-02 | Stop reason: HOSPADM

## 2025-06-26 RX ADMIN — PIPERACILLIN SODIUM AND TAZOBACTAM SODIUM 3.38 G: 3; .375 INJECTION, SOLUTION INTRAVENOUS at 16:37

## 2025-06-26 RX ADMIN — METOPROLOL TARTRATE 5 MG: 5 INJECTION INTRAVENOUS at 05:01

## 2025-06-26 RX ADMIN — POLYETHYLENE GLYCOL 3350 17 G: 17 POWDER, FOR SOLUTION ORAL at 08:36

## 2025-06-26 RX ADMIN — PANTOPRAZOLE SODIUM 40 MG: 40 INJECTION, POWDER, FOR SOLUTION INTRAVENOUS at 06:22

## 2025-06-26 RX ADMIN — Medication 10 ML: at 21:40

## 2025-06-26 RX ADMIN — PIPERACILLIN SODIUM AND TAZOBACTAM SODIUM 3.38 G: 3; .375 INJECTION, SOLUTION INTRAVENOUS at 08:58

## 2025-06-26 RX ADMIN — PIPERACILLIN SODIUM AND TAZOBACTAM SODIUM 3.38 G: 3; .375 INJECTION, SOLUTION INTRAVENOUS at 21:21

## 2025-06-26 RX ADMIN — KETOROLAC TROMETHAMINE 15 MG: 15 INJECTION, SOLUTION INTRAMUSCULAR; INTRAVENOUS at 03:14

## 2025-06-26 RX ADMIN — ERYTHROMYCIN 400 MG: 400 SUSPENSION ORAL at 06:22

## 2025-06-26 RX ADMIN — ERYTHROMYCIN 400 MG: 400 SUSPENSION ORAL at 11:47

## 2025-06-26 RX ADMIN — KETOROLAC TROMETHAMINE 15 MG: 15 INJECTION, SOLUTION INTRAMUSCULAR; INTRAVENOUS at 08:30

## 2025-06-26 RX ADMIN — PIPERACILLIN SODIUM AND TAZOBACTAM SODIUM 3.38 G: 3; .375 INJECTION, SOLUTION INTRAVENOUS at 03:40

## 2025-06-26 RX ADMIN — OXYCODONE HYDROCHLORIDE 5 MG: 5 TABLET ORAL at 20:48

## 2025-06-26 RX ADMIN — METOPROLOL TARTRATE 5 MG: 5 INJECTION INTRAVENOUS at 11:10

## 2025-06-26 RX ADMIN — ASCORBIC ACID, VITAMIN A PALMITATE, CHOLECALCIFEROL, THIAMINE HYDROCHLORIDE, RIBOFLAVIN-5 PHOSPHATE SODIUM, PYRIDOXINE HYDROCHLORIDE, NIACINAMIDE, DEXPANTHENOL, ALPHA-TOCOPHEROL ACETATE, VITAMIN K1, FOLIC ACID, BIOTIN, CYANOCOBALAMIN: 200; 3300; 200; 6; 3.6; 6; 40; 15; 10; 150; 600; 60; 5 INJECTION, SOLUTION INTRAVENOUS at 20:26

## 2025-06-26 RX ADMIN — GABAPENTIN 100 MG: 250 SOLUTION ORAL at 08:34

## 2025-06-26 RX ADMIN — METOPROLOL TARTRATE 5 MG: 5 INJECTION INTRAVENOUS at 16:25

## 2025-06-26 RX ADMIN — ERYTHROMYCIN 400 MG: 400 SUSPENSION ORAL at 00:57

## 2025-06-26 RX ADMIN — ACETAMINOPHEN 1000 MG: 10 INJECTION, SOLUTION INTRAVENOUS at 05:00

## 2025-06-26 RX ADMIN — PANTOPRAZOLE SODIUM 40 MG: 40 INJECTION, POWDER, FOR SOLUTION INTRAVENOUS at 16:25

## 2025-06-26 RX ADMIN — METOPROLOL TARTRATE 5 MG: 5 INJECTION INTRAVENOUS at 22:01

## 2025-06-26 RX ADMIN — ERYTHROMYCIN 400 MG: 400 SUSPENSION ORAL at 18:06

## 2025-06-26 RX ADMIN — I.V. FAT EMULSION 50 G: 20 EMULSION INTRAVENOUS at 20:25

## 2025-06-26 RX ADMIN — GABAPENTIN 100 MG: 250 SOLUTION ORAL at 20:48

## 2025-06-26 RX ADMIN — Medication 3 MG: at 20:48

## 2025-06-26 ASSESSMENT — COGNITIVE AND FUNCTIONAL STATUS - GENERAL
MOBILITY SCORE: 22
DRESSING REGULAR UPPER BODY CLOTHING: A LITTLE
TOILETING: A LITTLE
DRESSING REGULAR LOWER BODY CLOTHING: A LITTLE
CLIMB 3 TO 5 STEPS WITH RAILING: A LITTLE
WALKING IN HOSPITAL ROOM: A LITTLE
HELP NEEDED FOR BATHING: A LITTLE
DAILY ACTIVITIY SCORE: 20

## 2025-06-26 ASSESSMENT — PAIN DESCRIPTION - LOCATION: LOCATION: GENERALIZED

## 2025-06-26 ASSESSMENT — PAIN SCALES - GENERAL
PAIN_LEVEL: 2
PAINLEVEL_OUTOF10: 5 - MODERATE PAIN

## 2025-06-26 ASSESSMENT — PAIN DESCRIPTION - DESCRIPTORS: DESCRIPTORS: DISCOMFORT

## 2025-06-26 ASSESSMENT — PAIN - FUNCTIONAL ASSESSMENT: PAIN_FUNCTIONAL_ASSESSMENT: 0-10

## 2025-06-26 NOTE — NURSING NOTE
1330- NG tube clamped by Jose Ramon. Order received to leave clamped for 3 hours, at 1630 hook NG up to suction for 10 min if output is less than 100mL then NG could be removed.  1630- NG hooked to suction, 10mL output obtained. Jose Ramon notified and Ok'd NG tube to be removed. NG tubed removed without difficulty. Pt tolerated well.   1700- Pt and family asking when pt diet will be advanced. Jose Ramon notified. And Clear liquid diet order placed.  Pt tolerated lemon ice well at dinner.

## 2025-06-26 NOTE — PROGRESS NOTES
Diego Morales is a 70 y.o. male on day 22 of admission presenting with Small bowel obstruction (Multi).    Subjective   Pt is post op day 2 from exploratory laparotomy with creation of loop ileostomy with Dr. Canales 6/24/25     No acute overnight events.   Pt reports HA this am. No dizziness.   No CP or SOB.   Using IS.   No n/v. No heartburn.  Notes that loop ileostomy bag has had a lot of output overnight.   Urinating without diff.   Has been up ambulating in halls.   Feels strength improving some.     Objective     Physical Exam  Vitals reviewed.   Constitutional:       General: He is not in acute distress.     Appearance: Normal appearance. He is normal weight. He is not ill-appearing, toxic-appearing or diaphoretic.   HENT:      Head: Normocephalic and atraumatic.      Nose:      Comments: NG in right nares  Eyes:      General: No scleral icterus.        Right eye: No discharge.         Left eye: No discharge.      Conjunctiva/sclera: Conjunctivae normal.   Cardiovascular:      Rate and Rhythm: Normal rate and regular rhythm.      Pulses: Normal pulses.      Heart sounds: Murmur (3/6 at apex) heard.      No friction rub. No gallop.   Pulmonary:      Effort: Pulmonary effort is normal. No respiratory distress.      Breath sounds: Normal breath sounds. No stridor. No wheezing, rhonchi or rales.   Chest:      Chest wall: No tenderness.   Abdominal:      General: There is no distension.      Palpations: Abdomen is soft. There is no mass.      Tenderness: There is no abdominal tenderness. There is no guarding or rebound.      Hernia: No hernia is present.      Comments: Right abd with KATY drain. Scant serosanguinous output in bulb.   Left upper abd with colostomy. Stoma is red, moist and soft. Brown liquid stool in bag.   Left mid abd distal to the colostomy site is the loop ileostomy. Stoma is red, moist soft. Bar remains in place. Liquid dark green output in bag.   Abd is soft, pt is thin, hypoactive BS x 4 q.   "  Musculoskeletal:      Right lower leg: No edema.      Left lower leg: No edema.   Skin:     General: Skin is warm and dry.      Coloration: Skin is pale.   Neurological:      Mental Status: He is alert and oriented to person, place, and time.      Gait: Gait normal.   Psychiatric:         Attention and Perception: Attention and perception normal.         Mood and Affect: Affect is blunt.         Behavior: Behavior is slowed. Behavior is cooperative.         Thought Content: Thought content normal.         Cognition and Memory: Cognition and memory normal.         Judgment: Judgment normal.         Last Recorded Vitals  Blood pressure 114/73, pulse 90, temperature 36.5 °C (97.7 °F), temperature source Temporal, resp. rate 19, height 1.803 m (5' 11\"), weight 65.2 kg (143 lb 11.8 oz), SpO2 97%.  Intake/Output last 3 Shifts:  I/O last 3 completed shifts:  In: 6642.8 (101.9 mL/kg) [I.V.:1997.5 (30.6 mL/kg); NG/GT:410; IV Piggyback:900]  Out: 4088 (62.7 mL/kg) [Urine:1825 (0.8 mL/kg/hr); Emesis/NG output:525; Drains:285; Stool:1453]  Weight: 65.2 kg     Relevant Results    CT abdomen and pelvis w oral contrast only  Result Date: 6/24/2025  Interpreted By:  Inés Hung, STUDY: CT ABDOMEN AND PELVIS W ORAL CONTRAST ONLY;  6/23/2025 9:32 pm   INDICATION: Signs/Symptoms:Cecal leak?  Colocutaneuos fistula.  Perform 3 horus after NG tube gastrographin contrast..     COMPARISON: Multiple prior CT abdomen pelvis including recent prior dated 06/21/2025.   ACCESSION NUMBER(S): [id number]   ORDERING CLINICIAN: PAULINA FLORES   TECHNIQUE: CT of the abdomen and pelvis was performed without intravenous contrast. Contiguous axial images were obtained at 3 mm slice thickness through the abdomen and pelvis. Coronal and sagittal reconstructions at 3 mm slice thickness were performed.  No intravenous contrast was administered; positive oral contrast was given.   FINDINGS: Please note that the evaluation of vessels, lymph nodes " and organs is limited without intravenous contrast.   LOWER CHEST: Bandlike airspace opacities in the dependent region of bilateral lower lobes are likely favored to represent subsegmental atelectasis. Otherwise lung bases are clear. Visualized heart is mildly enlarged. No pericardial effusion. Nasogastric tube extends through distal thoracic esophagus.   ABDOMEN:   LIVER: Liver is normal in size and morphology. No suspicious focal hepatic mass lesions within limits of noncontrast technique.   BILE DUCTS: The intrahepatic and extrahepatic ducts are not dilated.   GALLBLADDER: Gallbladder is moderately distended and does not demonstrate calcified stones in the lumen.   PANCREAS: The pancreas appears unremarkable without evidence of ductal dilatation or masses.   SPLEEN: The spleen is normal in size.   ADRENAL GLANDS: Bilateral adrenal glands appear normal.   KIDNEYS AND URETERS: The kidneys are normal in size and unremarkable in appearance.  No hydroureteronephrosis or nephroureterolithiasis is identified.   PELVIS:   BLADDER: Urinary bladder is moderately distended and appears grossly unremarkable.   REPRODUCTIVE ORGANS: Prostate gland is mildly enlarged with median lobe hypertrophy indenting on the base of the bladder.   BOWEL/PERITONEUM/LYMPH NODES: Nasogastric tube extends to the distal body of the stomach. Stomach is moderately distended and appears grossly unremarkable within limits of evaluation. Postsurgical changes of partial colectomy with Surinder's pouch and colostomy in the left lower quadrant are noted. There is persistent evidence of diffusely distended small bowel loops measuring up to 3.5 cm without definite evidence of transition point. These are similar compared to immediate prior CT examination dated 06/21/2025. There is no definite evidence of transition point and these are most likely favored to be related to ileus. There is dense contrast extending throughout the colon to the level of colostomy  site. The density of the contrast is similar compared to prior CT examination dated 06/21/2025. This is most likely related to prior contrast. There is extension of the Gastrografin that was given during today's examination to the level of left lower quadrant just adjacent to the previously noted residual collection/hematoma (best seen on axial image 95/164). However it is difficult to determine whether this contrast is extraluminal versus intraluminal in the distal small bowel as there is no pham contrast extravasation. There are also postsurgical changes related to small bowel small bowel anastomosis in the right lower quadrant.   There is persistent evidence of fluid collection extending along the posterior aspect of the left lower quadrant into the pelvis corresponding to the site of previously noted hematoma. This is similar compared to immediate prior CT examination. There is interval evidence of few small foci of gas in close proximity to this collection, which could be related to interval surgery versus bowel perforation. There is trace amount of fluid extending along the anterior peritoneal cavity just adjacent to the level of small bowel small bowel anastomosis (best seen on axial image 111/164). This was also seen on prior CT examination dated 06/21/2024 and is slightly smaller in size compared to prior CT examination. This measures up to 9 x 1.5 cm compared 9 x 3 cm. This fluid collection is contiguous with the midline abdominal wall incision. There is a surgical drain extending from the right lower quadrant with its tip located in the left lower quadrant.   No evidence of enlarged lymphadenopathy in the abdomen and pelvis.   VESSELS: Mild atherosclerotic calcifications of the abdominal aorta are noted. IVC appears grossly unremarkable within limits of noncontrast technique.     ABDOMINAL WALL: Midline anterior abdominal wall incision is noted.   BONES: No acute osseous findings.       1.  Extensive  postsurgical changes of Surinder's pouch with colostomy in the left lower quadrant and postsurgical changes related to partial small-bowel resection with anastomosis in the right lower quadrant are noted. There is no pham contrast extravasation on this examination, however there is suggestion of intraluminal contrast abruptly ending at the level of distal small bowel loops in the left lower quadrant that is in close proximity to the previously noted collection/hematoma (best seen on axial image 95/164). There is also interval evidence of few small foci of air adjacent to this region. This could be related to a possible subtle small bowel perforation. There is also a small pocket of fluid extending along the anterior peritoneal cavity that is in close proximity to the small bowel small bowel anastomosis in the right lower quadrant and possibility of anastomotic leak can not be completely excluded. Recommend attention to this region if surgery is pursued. 2. Few scattered foci of free intraperitoneal air in the lower abdomen, which could be related to recent surgery versus possible small bowel perforation. 3. Redemonstration of dense contrast extending throughout the colon to the level of colostomy site. This is similar compared to prior CT examination and is most likely favored to be related to contrast related to previous examination. 4. Other findings as described above.   MACRO: Dr. Inés Hung discussed the significance and urgency of this critical finding in person with Dr. PAULINA FLORES on 6/24/2025 at 9:25 am.  (**-RCF-**) Findings:  See findings.   Signed by: Inés Hung 6/24/2025 9:26 AM Dictation workstation:   [id number]    CT abdomen pelvis w IV contrast  Result Date: 6/21/2025  Interpreted By:  Marshall Ogden, STUDY: CT ABDOMEN PELVIS W IV CONTRAST;  6/21/2025 9:20 am   INDICATION: Signs/Symptoms:history of colostomy, recent ex lap for hematoma evacuation, SBR and ABHISHEK. drain with enteric  content..   COMPARISON: CT 06/04/2025   ACCESSION NUMBER(S): [id number]   ORDERING CLINICIAN: ROXANE BESS   TECHNIQUE: CT of the abdomen and pelvis was performed.  Contiguous axial images were obtained at 3 mm slice thickness through the abdomen and pelvis. Coronal and sagittal reconstructions at 3 mm slice thickness were performed. 75 mL Omnipaque 350 administered intravenously without immediate complication.   FINDINGS: LOWER CHEST: Mild bibasilar atelectasis. Cardiomegaly.   ABDOMEN:   LIVER: Small low-attenuation lesion centrally in the liver is not significantly changed.   BILE DUCTS: Not dilated.   GALLBLADDER: No calcified stone or definite inflammation.   PANCREAS: Unremarkable   SPLEEN: Borderline enlarged measuring 13.3 cm in length.   ADRENAL GLANDS: Unremarkable   KIDNEYS AND URETERS: Unremarkable without hydronephrosis.   PELVIS:   BLADDER: Partially distended.   REPRODUCTIVE ORGANS: Prostatomegaly. Gas tracks along both inguinal canals.   BOWEL: Nasogastric tube terminates in the mid to distal stomach. Proximal small bowel is decompressed. Slightly more distally there is marked dilatation loops of jejunum up to 5.6 cm proximally. There appears to be a caliber transition involving a bowel loop in the right mid abdomen relating to an angulated loop of small bowel on image (Series 201, Image 108), just proximal to a small bowel anastomosis.   There is residual contrast scattered throughout the colon which is otherwise decompressed to the level of the left mid abdominal colostomy. There may be a Surinder pouch present. Appendix not identified.   There appears to be a interloop loculated fluid collection containing a small amount of air as best seen on coronal image (Series 202, Image 48) measuring 7.2 x 6.5 cm.   There appears to be a separate more anterior pelvic fluid collection which measures 9.1 x 3.1 cm on (series 201, Image 119).   VESSELS: Aortoiliac system is patent without aneurysm.  Major  visceral branches are patent.Mild atherosclerosis. IVC and major branches are grossly patent. Major portal venous branches are patent.   PERITONEUM AND RETROPERITONEUM: Pelvic surgical drain is in place. There is scattered trace free fluid. There is trace pneumoperitoneum.   The previous large pelvic hematoma is no longer present. No abdominal or pelvic lymphadenopathy.   BONE AND ABDOMINAL WALL: There is scattered subcutaneous gas in the lower abdomen extending into the inguinal canals and proximal thighs. Skin staples are present along vertically-oriented ventral abdominal incision. Mild degenerative changes of the spine.         1.  Interval evacuation of large pelvic hematoma. 2. Findings suggest small bowel obstruction with proximal dilatation up to 5.6 cm and transition point involving a loop of bowel in the right lower quadrant just proximal to anastomosis. 3. Suspected 7.2 cm loculated fluid collection in the left mid abdomen extending into the pelvis, containing small locules of air. There is also a separate more anterior loculated pelvic fluid collection measuring 9.1 cm. 4. Trace postoperative pneumoperitoneum.   MACRO: None.   Signed by: Marshall Ogden 6/21/2025 11:05 AM Dictation workstation:   [id number]    FL small bowel series  Result Date: 6/13/2025  Interpreted By:  Chintan Tomlinson, STUDY: FL SMALL BOWEL SERIES;  6/13/2025 11:43 am   INDICATION: Signs/Symptoms:evaluate for obstruction, 4 weeks post op colostomy with nonimproving ileus vs obstruction.     COMPARISON: 06/10/2025   ACCESSION NUMBER(S): [id number]   ORDERING CLINICIAN: RUSH DAVIS   TECHNIQUE: An initial radiograph of the abdomen and pelvis was obtained. Following the oral administration of Gastrografin, delayed static fluoroscopic images of the abdomen in the posteroanterior projection were obtained at 0, 20, 40 and 60 minutes was obtained.   FINDINGS: Initial  image demonstrates residual contrast material within the colon. There  is a left-sided colostomy again seen similar to prior. Markedly dilated loops of small bowel with mucosal wall thickening measuring up to 6.1 cm in caliber, left upper quadrant. There is an NG tube in place. There is redemonstration of pigtail catheter in place projecting over the pelvis.   Additional imaging performed subsequently after oral contrast administration through the NG tube. There is opacification of the stomach and proximal duodenum identified. The small bowel are not opacified. Findings are suggestive of complete bowel obstruction. Gastric outlet obstruction or gastroparesis can not be entirely excluded, however, felt less likely.       1. Suspect complete bowel obstruction. Recommend clinical correlation. Further evaluation with CT scan of the abdomen may be performed for better assessment if clinically warranted.   SUPPLEMENTAL INFORMATION: Notifi message was left for RUSH DAVIS regarding this exam by Dr. Tomlinson on 6/13/2025 at approximately 11:50 hours.   MACRO: None   Signed by: Chintan Tomlinson 6/13/2025 12:00 PM Dictation workstation:   [id number]    Bedside PICC Imaging  Result Date: 6/11/2025  These images are not reportable by radiology and will not be interpreted by  Radiologists.    FL enema single contrast water soluble  Result Date: 6/10/2025  Interpreted By:  Beryl Brito, STUDY: FL ENEMA SINGLE CONTRAST WATER SOLUBLE; ;  6/10/2025 9:28 am   INDICATION: Signs/Symptoms:s/p colostomy for perforated diverticulitis, rule out bowel obstruction, ileus. Please do with gastrografin through colostomy..     COMPARISON: None.   ACCESSION NUMBER(S): [id number]   ORDERING CLINICIAN: RUSH DAVIS   TECHNIQUE: Water-soluble contrast was instilled into the colon via colostomy. Total fluoroscopic time 43 seconds.   FINDINGS: The  film a nasogastric tube is present. There is a drain on the right. There are distended loops of small bowel.   There is opacification of the colon to the cecum. There is no  obvious obstruction or leak.       Unremarkable appearance of the colon.   Dilated small bowel.   MACRO: None   Signed by: Beryl Brito 6/10/2025 9:45 AM Dictation workstation:   [id number]    US guided abscess fluid collection drainage  Result Date: 6/5/2025  Interpreted By:  Chintan Tomlinson, STUDY: US GUIDED ABSCESS FLUID COLLECTION DRAINAGE;  6/5/2025 12:11 pm   INDICATION: Signs/Symptoms:Possible drain abdominal hematoma.   COMPARISON: CT scan from 06/04/2025   ACCESSION NUMBER(S): [id number]   ORDERING CLINICIAN: ANIBAL LANGE   TECHNIQUE: Ultrasound guided aspiration and drainage of right ventral abdominal wall fluid collection.   FINDINGS: The procedure and the benefits, risks and potential complications in addition to alternatives of the procedure were discussed with the patient by Dr. Tomlinson and signed informed consent was obtained.     Limited ultrasound images were obtained through the pelvis. The images demonstrated  a heterogeneous fluid collection within the central aspect of the pelvis abutting the urinary bladder. The area of the overlying ventral abdominal wall was prepped in the usual sterile manner. 1% lidocaine was used for local anesthesia at the planned skin insertion site. 100 mcg of IV fentanyl was given for pain.   Under direct ultrasound guidance, a 8 Swiss straight catheter was inserted into the fluid collection. After confirmation of position of the catheter within the collection, the stylet was removed, the pigtail fixed and the catheter secured to the skin. The catheter was then set to gravity drainage. 10 mL of dark hemorrhagic hematoma was collected.   Postprocedure images demonstrated no evidence of hemorrhage.   Patient tolerated the procedure without immediate complication. Fluid was sent to the laboratory for further analysis.       Successful  aspiration and drain placement into the pelvic collection as above.     Signed by: Chintan Tomlinson 6/5/2025 1:13 PM Dictation  workstation:   [id number]    ECG 12 lead  Result Date: 6/5/2025  Normal sinus rhythm Normal ECG When compared with ECG of 22-MAY-2025 21:01, No significant change was found    XR abdomen 1 view  Result Date: 6/4/2025  STUDY: Abdomen Radiographs;  06/04/2025 10:30 PM INDICATION: Status post nasogastric tube. COMPARISON: XR abdomen 05/17/2025.  CT abdomen/pelvis 06/04/2025. ACCESSION NUMBER(S): [id number] ORDERING CLINICIAN: TECHNIQUE:  One view(s) of the abdomen. FINDINGS:  Nasogastric tube shows its tip and side-port in the gastric lumen. There are dilated loops of gas distended small bowel in the midline upper abdomen..  There are no convincing calculi or abnormal calcifications.  No focal osseous abnormalities.      Line/tubes/catheters:  Nasogastric tube tip and side-port are in the gastric lumen. Signed by Lv Jorge MD    CT angio chest for pulmonary embolism  Addendum Date: 6/4/2025  Addendum: NOTIFICATION:  The CT pulmonary arteriogram, abdomen and pelvis results of the study were discussed with, and acknowledged by Dr. Cindy Arias, by telephone on 6/4/2025 at 7:46 PM  Signed by Royce Heller DO    Result Date: 6/4/2025  STUDY: CT Angiogram of the Chest, CT Abdomen and Pelvis with IV Contrast; 06/04/2025 6:43 PM INDICATION: Shortness of breath, tachycardia.  Abdominal distension.   Recent abdominal surgery with colostomy, hyperactive bowel sounds, no output to colostomy bag. COMPARISON: XR chest 05/22/2025.  XR abdomen 05/17/2025.  CT abdomen/pelvis 05/18/2025, 05/13/2025. ACCESSION NUMBER(S): [id number], [id number] ORDERING CLINICIAN: ARUN MCLAUGHLIN TECHNIQUE:  CTA of the chest was performed following rapid injection of intravenous contrast.  Images are reviewed and processed at a workstation according to the CT angiogram protocol with 3-D and/or MIP post processing imaging generated.  CT of the abdomen and pelvis was performed with intravenous contrast.  Omnipaque 350:75 mL was  administered intravenously; positive oral contrast was given. Automated mA/kV exposure control was utilized and patient examination was performed in strict accordance with principles of ALARA. FINDINGS:  CTA CHEST: There is no airspace disease.  There is prominence of the pulmonary vasculature.  No pleural effusion or pneumothorax is present.  There are small areas of subsegmental atelectasis within the right upper lobe.  No intraluminal abnormalities are present within the trachea or mainstem bronchi. There are no hilar or mediastinal masses.  No pericardial effusion is present.  There is normal caliber of the thoracic aorta.  There are no filling defects within the pulmonary arteries. CT ABDOMEN AND PELVIS: Liver, spleen and pancreas have a normal appearance.  Gallbladder is nondistended.  There is no biliary or pancreatic ductal dilatation. There is fluid-filled, distended stomach.  No gastric wall thickening is present.  Duodenum is nondistended. There are no adrenal abnormalities.  Kidneys have a normal appearance with no hydronephrosis.  Ureters and urinary bladder are nondistended.  Prostate gland is enlarged.  The colon is nondistended.  There are multiple dilated small bowel loops involving the jejunum and proximal ileum.  The distal ileal loops are of normal caliber. There is a 7.3 cm length by 13 cm AP by 8.6 cm mass extending from the pelvis into the left lower abdomen.  The mass measures 63 HU.  There is no evidence of abdominal or pelvic ascites.  A left lower quadrant colostomy is present.  There is no evidence of free intraperitoneal gas. There is normal vertebral body height within the thoracic and lumbar spines.  There are no abnormalities of the sacrum, bony pelvis or proximal femurs.    1.  No evidence of pulmonary arterial embolism. 2.  Small areas of subsegmental atelectasis right upper lobe. 3.  7.3 cm x 13 cm x 8.6 cm solid-appearing mass extending the pelvis into the left lower abdominal  quadrant.  This was present on the previous study, however, is better delineated with the addition of intravenous contrast.  Differential diagnosis include solid mass, large abdominal/pelvic hematoma given the recent history of surgery.  4.  Distal small bowel obstruction.  The degree of small bowel distention is greater than that identified on the previous study.  The zone of transition is not visualized but appears to be within the distal ileum.  The terminal ileum is of normal caliber. Signed by Royce Heller DO    CT abdomen pelvis w IV contrast  Addendum Date: 6/4/2025  Addendum: NOTIFICATION:  The CT pulmonary arteriogram, abdomen and pelvis results of the study were discussed with, and acknowledged by Dr. Cindy Arias, by telephone on 6/4/2025 at 7:46 PM  Signed by Royce Heller DO    Result Date: 6/4/2025  STUDY: CT Angiogram of the Chest, CT Abdomen and Pelvis with IV Contrast; 06/04/2025 6:43 PM INDICATION: Shortness of breath, tachycardia.  Abdominal distension.   Recent abdominal surgery with colostomy, hyperactive bowel sounds, no output to colostomy bag. COMPARISON: XR chest 05/22/2025.  XR abdomen 05/17/2025.  CT abdomen/pelvis 05/18/2025, 05/13/2025. ACCESSION NUMBER(S): [id number], [id number] ORDERING CLINICIAN: ARUN MCLAUGHLIN TECHNIQUE:  CTA of the chest was performed following rapid injection of intravenous contrast.  Images are reviewed and processed at a workstation according to the CT angiogram protocol with 3-D and/or MIP post processing imaging generated.  CT of the abdomen and pelvis was performed with intravenous contrast.  Omnipaque 350:75 mL was administered intravenously; positive oral contrast was given. Automated mA/kV exposure control was utilized and patient examination was performed in strict accordance with principles of ALARA. FINDINGS:  CTA CHEST: There is no airspace disease.  There is prominence of the pulmonary vasculature.  No pleural effusion or  pneumothorax is present.  There are small areas of subsegmental atelectasis within the right upper lobe.  No intraluminal abnormalities are present within the trachea or mainstem bronchi. There are no hilar or mediastinal masses.  No pericardial effusion is present.  There is normal caliber of the thoracic aorta.  There are no filling defects within the pulmonary arteries. CT ABDOMEN AND PELVIS: Liver, spleen and pancreas have a normal appearance.  Gallbladder is nondistended.  There is no biliary or pancreatic ductal dilatation. There is fluid-filled, distended stomach.  No gastric wall thickening is present.  Duodenum is nondistended. There are no adrenal abnormalities.  Kidneys have a normal appearance with no hydronephrosis.  Ureters and urinary bladder are nondistended.  Prostate gland is enlarged.  The colon is nondistended.  There are multiple dilated small bowel loops involving the jejunum and proximal ileum.  The distal ileal loops are of normal caliber. There is a 7.3 cm length by 13 cm AP by 8.6 cm mass extending from the pelvis into the left lower abdomen.  The mass measures 63 HU.  There is no evidence of abdominal or pelvic ascites.  A left lower quadrant colostomy is present.  There is no evidence of free intraperitoneal gas. There is normal vertebral body height within the thoracic and lumbar spines.  There are no abnormalities of the sacrum, bony pelvis or proximal femurs.    1.  No evidence of pulmonary arterial embolism. 2.  Small areas of subsegmental atelectasis right upper lobe. 3.  7.3 cm x 13 cm x 8.6 cm solid-appearing mass extending the pelvis into the left lower abdominal quadrant.  This was present on the previous study, however, is better delineated with the addition of intravenous contrast.  Differential diagnosis include solid mass, large abdominal/pelvic hematoma given the recent history of surgery.  4.  Distal small bowel obstruction.  The degree of small bowel distention is greater  than that identified on the previous study.  The zone of transition is not visualized but appears to be within the distal ileum.  The terminal ileum is of normal caliber. Signed by Royce Heller,       Scheduled medications  Scheduled Medications[1]  Continuous medications  Continuous Medications[2]  PRN medications  PRN Medications[3]  Results for orders placed or performed during the hospital encounter of 06/04/25 (from the past 24 hours)   CBC   Result Value Ref Range    WBC 14.2 (H) 4.4 - 11.3 x10*3/uL    nRBC 0.0 0.0 - 0.0 /100 WBCs    RBC 2.70 (L) 4.50 - 5.90 x10*6/uL    Hemoglobin 7.9 (L) 13.5 - 17.5 g/dL    Hematocrit 25.0 (L) 41.0 - 52.0 %    MCV 93 80 - 100 fL    MCH 29.3 26.0 - 34.0 pg    MCHC 31.6 (L) 32.0 - 36.0 g/dL    RDW 16.0 (H) 11.5 - 14.5 %    Platelets 430 150 - 450 x10*3/uL   Renal Function Panel   Result Value Ref Range    Glucose 123 (H) 74 - 99 mg/dL    Sodium 128 (L) 136 - 145 mmol/L    Potassium 4.3 3.5 - 5.3 mmol/L    Chloride 94 (L) 98 - 107 mmol/L    Bicarbonate 25 21 - 32 mmol/L    Anion Gap 13 10 - 20 mmol/L    Urea Nitrogen 24 (H) 6 - 23 mg/dL    Creatinine 0.77 0.50 - 1.30 mg/dL    eGFR >90 >60 mL/min/1.73m*2    Calcium 8.0 (L) 8.6 - 10.3 mg/dL    Phosphorus 3.5 2.5 - 4.9 mg/dL    Albumin 2.6 (L) 3.4 - 5.0 g/dL   Magnesium   Result Value Ref Range    Magnesium 2.06 1.60 - 2.40 mg/dL             This patient has a central line   Reason for the central line remaining today? Parenteral nutrition                 Assessment & Plan  Lower abdominal pain    Small bowel obstruction (Multi)    Abdominal hematoma    Pt is post op day 2 from exploratory laparotomy with creation of loop ileostomy with Dr. Canales 6/24/25   - imaging reviewed. CT abd pelvis 6/23/25 reviewed.  - labs reviewed. Magnesium WNL. Leukocytosis slightly improved this am. Anemia slightly worse this am. Kidney function stable. Hyponatremia and hypochloridemia continues. Cont to monitor labs.   - NPO with ice chips.  -  NG to LIWS.  - KATY with 153 ml in 24 hours. Cont to monitor output.   - stoma bar in loop ileostomy to stay in place today, likely will remove tomorrow.   - cont to record ileostomy and colostomy output.   - change appliances q 3-5 days.   - enc ambulation. Activity as tolerated, not to lift > 10 lb.   - cont TPN with lipids  - cont IV ABX  - cont IVF.   - chloraseptic spray PRN for ST with NG.  - PPI daily.   - flonase 2 sprays both nostrils daily for nasal congestion.    - remainder of care per primary.         I spent 35 minutes in the professional and overall care of this patient.    Pt seen with and discussed with Dr. Maloney who is in agreement with plan of care.     Sylvia Guerrero, APRN-CNP           [1] [Held by provider] aspirin, 81 mg, oral, Daily  diazePAM, 2 mg, intravenous, q8h  erythromycin ethylsuccinate, 400 mg, nasogastric tube, q6h LOU  fat emulsion-plant based, 250 mL, intravenous, Daily  gabapentin, 100 mg, nasogastric tube, q12h LOU  ketorolac, 15 mg, intravenous, q6h  [Held by provider] metoprolol succinate XL, 25 mg, oral, Daily  metoprolol, 5 mg, intravenous, q6h  pantoprazole, 40 mg, oral, BID AC   Or  pantoprazole, 40 mg, intravenous, BID AC  piperacillin-tazobactam, 3.375 g, intravenous, q6h  polyethylene glycol, 17 g, oral, Daily  promethazine, 6.25 mg, intravenous, Once  sodium chloride 0.9%, 10 mL, intravenous, q12h [facility]    [2] Adult Clinimix Parenteral Nutrition Continuous, 70 mL/hr, Last Rate: 70 mL/hr (06/25/25 2042)  sodium chloride 0.9%, 50 mL/hr, Last Rate: 50 mL/hr (06/25/25 2046)    [3] PRN medications: alteplase, benzocaine-menthol, HYDROmorphone, HYDROmorphone, lidocaine, melatonin, [DISCONTINUED] ondansetron **OR** ondansetron, phenoL, prochlorperazine, sodium chloride 0.9%

## 2025-06-26 NOTE — ANESTHESIA POSTPROCEDURE EVALUATION
Patient: Diego Morales    Procedure Summary       Date: 06/24/25 Room / Location: [facility] OR 07 / [facility]    Anesthesia Start: 1425 Anesthesia Stop: 1731    Procedure: LAPAROTOMY, EXPLORATORY Diagnosis:       Small bowel obstruction (Multi)      (Small bowel obstruction (Multi) [K56.609])    Surgeons: Iván Canales MD Responsible Provider: Stuart Murguia MD    Anesthesia Type: general ASA Status: 3 - Emergent            Anesthesia Type: general    Vitals Value Taken Time   /67 06/24/25 18:25   Temp 36.3 °C (97.3 °F) 06/24/25 17:25   Pulse 105 06/24/25 18:25   Resp 16 06/24/25 18:25   SpO2 100 % 06/24/25 17:55       Anesthesia Post Evaluation    Patient location during evaluation: PACU  Patient participation: complete - patient participated  Level of consciousness: awake  Pain score: 2  Pain management: adequate  Multimodal analgesia pain management approach  Airway patency: patent  Two or more strategies used to mitigate risk of obstructive sleep apnea  Cardiovascular status: acceptable  Respiratory status: acceptable  Hydration status: acceptable  Postoperative Nausea and Vomiting: none        No notable events documented.

## 2025-06-26 NOTE — PROGRESS NOTES
Patient: Diego Morales  Room/bed: 108/108-A  Admitted on: 6/4/2025    Age: 70 y.o.   Gender: male  Code Status:  Full Code   Admitting Dx: Small bowel obstruction (Multi) [K56.609]  Hematoma [T14.8XXA]  Lower abdominal pain [R10.30]    MRN: [id number]  PCP: Ramon Gould MD       Subjective   Having a good day. Has ambulated a couple times. Pain controlled adequately. Breathing ok. Urinating ok    Objective    Physical Exam  Constitutional:       Comments: Alert, thin, chronically ill appearing male, nad   HENT:      Head: Normocephalic.      Nose:      Comments: NG in place     Mouth/Throat:      Mouth: Mucous membranes are dry.   Eyes:      Extraocular Movements: Extraocular movements intact.      Pupils: Pupils are equal, round, and reactive to light.   Cardiovascular:      Pulses: Normal pulses.      Comments: Regular, 3-4/6 harsh systolic murmur,  Pulmonary:      Comments: Clear, with decreased breath sounds left base  Abdominal:      Comments: Binder in place  Drain intact  Occasional bowel sounds   Stool in ostomy bag   Musculoskeletal:      Comments: No edema. Pulses equal   Skin:     Comments: Improved color and turgor today   Neurological:      General: No focal deficit present.      Mental Status: He is oriented to person, place, and time.   Psychiatric:         Mood and Affect: Mood normal.         Behavior: Behavior normal.        Temp:  [36.5 °C (97.7 °F)-36.7 °C (98.1 °F)] 36.5 °C (97.7 °F)  Heart Rate:  [] 95  Resp:  [16-20] 19  BP: (107-135)/(73-84) 114/73    Vitals:    06/24/25 0630   Weight: 65.2 kg (143 lb 11.8 oz)           I/Os    Intake/Output Summary (Last 24 hours) at 6/26/2025 1055  Last data filed at 6/26/2025 0955  Gross per 24 hour   Intake 4396.8 ml   Output 3858 ml   Net 538.8 ml       Labs:   Results from last 72 hours   Lab Units 06/26/25  0724 06/25/25  0711 06/24/25  0635   SODIUM mmol/L 128* 128* 130*   POTASSIUM mmol/L 4.3 4.3 3.9   CHLORIDE mmol/L 94* 95* 95*   CO2  "mmol/L 25 23 24   BUN mg/dL 24* 26* 17   CREATININE mg/dL 0.77 0.75 0.72   GLUCOSE mg/dL 123* 182* 159*   CALCIUM mg/dL 8.0* 7.7* 8.1*   ANION GAP mmol/L 13 14 15   EGFR mL/min/1.73m*2 >90 >90 >90   PHOSPHORUS mg/dL 3.5 4.1 4.6      Results from last 72 hours   Lab Units 06/26/25  0724 06/25/25  0711 06/24/25  0635   WBC AUTO x10*3/uL 14.2* 16.0* 11.7*   HEMOGLOBIN g/dL 7.9* 8.8* 8.5*   HEMATOCRIT % 25.0* 26.5* 25.6*   PLATELETS AUTO x10*3/uL 430 470* 346      Lab Results   Component Value Date    CALCIUM 8.0 (L) 06/26/2025    PHOS 3.5 06/26/2025      Lab Results   Component Value Date    CRP 0.17 06/18/2022      [unfilled]     Micro/ID:   No results found for the last 90 days.                   No lab exists for component: \"AGALPCRNB\"   .ID  No results found for: \"URINECULTURE\", \"BLOODCULT\", \"CSFCULTSMEAR\"    Images:       Meds    Scheduled medications  Scheduled Medications[1]  Continuous medications  Continuous Medications[2]  PRN medications  PRN Medications[3]     Assessment and Plan    Diego Morales is a 70 y.o. male , day 22 of admission, POD 3 for ABHISHEK/perf closure.   He continues to do well post op. NG is in, output declining, 375. Drain intact, also with lowered output 153. Continue TPN. Continue to encourage ambulation, he is doing well with this.   Hyponatremia. Consider slight tweak of TPN, defer to surgery  Mitral valve prolapse. One more brief episode of SVT noted on tele, reviewed by myself. He is on metop, iv for now, in case there are any absorption issues.   Hx diverticulosis  Hx hyperlipidemia  DVT prophylaxis    Maddie Singleton MD         [1] [Held by provider] aspirin, 81 mg, oral, Daily  diazePAM, 2 mg, intravenous, q8h  erythromycin ethylsuccinate, 400 mg, nasogastric tube, q6h LOU  fat emulsion-plant based, 250 mL, intravenous, Daily  fluticasone, 2 spray, Each Nostril, Daily  gabapentin, 100 mg, nasogastric tube, q12h LOU  ketorolac, 15 mg, intravenous, q6h  [Held by provider] " metoprolol succinate XL, 25 mg, oral, Daily  metoprolol, 5 mg, intravenous, q6h  pantoprazole, 40 mg, oral, BID AC   Or  pantoprazole, 40 mg, intravenous, BID AC  piperacillin-tazobactam, 3.375 g, intravenous, q6h  polyethylene glycol, 17 g, oral, Daily  promethazine, 6.25 mg, intravenous, Once  sodium chloride 0.9%, 10 mL, intravenous, q12h LOU  [2] Adult Clinimix Parenteral Nutrition Continuous, 70 mL/hr, Last Rate: 70 mL/hr (06/25/25 2042)  [3] PRN medications: alteplase, benzocaine-menthol, HYDROmorphone, HYDROmorphone, lidocaine, melatonin, [DISCONTINUED] ondansetron **OR** ondansetron, phenoL, prochlorperazine, sodium chloride 0.9%

## 2025-06-26 NOTE — CARE PLAN
The patient's goals for the shift include  pt will be able to rest comfortably this shift.    The clinical goals for the shift include patient will have pain controlled this shift      Problem: Pain - Adult  Goal: Verbalizes/displays adequate comfort level or baseline comfort level  Outcome: Progressing     Problem: Safety - Adult  Goal: Free from fall injury  Outcome: Progressing     Problem: Discharge Planning  Goal: Discharge to home or other facility with appropriate resources  Outcome: Progressing     Problem: Chronic Conditions and Co-morbidities  Goal: Patient's chronic conditions and co-morbidity symptoms are monitored and maintained or improved  Outcome: Progressing     Problem: Nutrition  Goal: Nutrient intake appropriate for maintaining nutritional needs  Outcome: Progressing     Problem: Pain  Goal: Takes deep breaths with improved pain control throughout the shift  Outcome: Progressing  Goal: Turns in bed with improved pain control throughout the shift  Outcome: Progressing  Goal: Walks with improved pain control throughout the shift  Outcome: Progressing  Goal: Performs ADL's with improved pain control throughout shift  Outcome: Progressing  Goal: Participates in PT with improved pain control throughout the shift  Outcome: Progressing  Goal: Free from opioid side effects throughout the shift  Outcome: Progressing  Goal: Free from acute confusion related to pain meds throughout the shift  Outcome: Progressing     Problem: Fall/Injury  Goal: Not fall by end of shift  Outcome: Progressing  Goal: Be free from injury by end of the shift  Outcome: Progressing  Goal: Verbalize understanding of personal risk factors for fall in the hospital  Outcome: Progressing  Goal: Verbalize understanding of risk factor reduction measures to prevent injury from fall in the home  Outcome: Progressing  Goal: Use assistive devices by end of the shift  Outcome: Progressing  Goal: Pace activities to prevent fatigue by end of  the shift  Outcome: Progressing     Problem: Skin  Goal: Decreased wound size/increased tissue granulation at next dressing change  Outcome: Progressing  Goal: Participates in plan/prevention/treatment measures  Outcome: Progressing  Goal: Prevent/manage excess moisture  Outcome: Progressing  Goal: Prevent/minimize sheer/friction injuries  Outcome: Progressing  Flowsheets (Taken 6/26/2025 1225)  Prevent/minimize sheer/friction injuries: Turn/reposition every 2 hours/use positioning/transfer devices  Goal: Promote/optimize nutrition  Outcome: Progressing  Goal: Promote skin healing  Outcome: Progressing

## 2025-06-27 LAB
ABO GROUP (TYPE) IN BLOOD: NORMAL
ANION GAP SERPL CALC-SCNC: 11 MMOL/L (ref 10–20)
ANTIBODY SCREEN: NORMAL
ATRIAL RATE: 95 BPM
BLOOD EXPIRATION DATE: NORMAL
BUN SERPL-MCNC: 19 MG/DL (ref 6–23)
CALCIUM SERPL-MCNC: 7.8 MG/DL (ref 8.6–10.3)
CHLORIDE SERPL-SCNC: 95 MMOL/L (ref 98–107)
CO2 SERPL-SCNC: 27 MMOL/L (ref 21–32)
CREAT SERPL-MCNC: 0.71 MG/DL (ref 0.5–1.3)
DISPENSE STATUS: NORMAL
EGFRCR SERPLBLD CKD-EPI 2021: >90 ML/MIN/1.73M*2
ERYTHROCYTE [DISTWIDTH] IN BLOOD BY AUTOMATED COUNT: 15.9 % (ref 11.5–14.5)
ERYTHROCYTE [DISTWIDTH] IN BLOOD BY AUTOMATED COUNT: 15.9 % (ref 11.5–14.5)
GLUCOSE SERPL-MCNC: 132 MG/DL (ref 74–99)
HCT VFR BLD AUTO: 20.5 % (ref 41–52)
HCT VFR BLD AUTO: 20.7 % (ref 41–52)
HCT VFR BLD AUTO: 27.1 % (ref 41–52)
HGB BLD-MCNC: 6.5 G/DL (ref 13.5–17.5)
HGB BLD-MCNC: 6.7 G/DL (ref 13.5–17.5)
HGB BLD-MCNC: 8.7 G/DL (ref 13.5–17.5)
MCH RBC QN AUTO: 28.5 PG (ref 26–34)
MCH RBC QN AUTO: 29.3 PG (ref 26–34)
MCHC RBC AUTO-ENTMCNC: 31.4 G/DL (ref 32–36)
MCHC RBC AUTO-ENTMCNC: 32.7 G/DL (ref 32–36)
MCV RBC AUTO: 90 FL (ref 80–100)
MCV RBC AUTO: 91 FL (ref 80–100)
NRBC BLD-RTO: 0 /100 WBCS (ref 0–0)
NRBC BLD-RTO: 0 /100 WBCS (ref 0–0)
P AXIS: 59 DEGREES
P OFFSET: 204 MS
P ONSET: 141 MS
PLATELET # BLD AUTO: 367 X10*3/UL (ref 150–450)
PLATELET # BLD AUTO: 401 X10*3/UL (ref 150–450)
POTASSIUM SERPL-SCNC: 3.8 MMOL/L (ref 3.5–5.3)
PR INTERVAL: 152 MS
PRODUCT BLOOD TYPE: 6200
PRODUCT CODE: NORMAL
Q ONSET: 217 MS
QRS COUNT: 16 BEATS
QRS DURATION: 96 MS
QT INTERVAL: 356 MS
QTC CALCULATION(BAZETT): 447 MS
QTC FREDERICIA: 414 MS
R AXIS: 37 DEGREES
RBC # BLD AUTO: 2.28 X10*6/UL (ref 4.5–5.9)
RBC # BLD AUTO: 2.29 X10*6/UL (ref 4.5–5.9)
RH FACTOR (ANTIGEN D): NORMAL
SODIUM SERPL-SCNC: 129 MMOL/L (ref 136–145)
T AXIS: 17 DEGREES
T OFFSET: 395 MS
UNIT ABO: NORMAL
UNIT NUMBER: NORMAL
UNIT RH: NORMAL
UNIT VOLUME: 350
VENTRICULAR RATE: 95 BPM
WBC # BLD AUTO: 10.1 X10*3/UL (ref 4.4–11.3)
WBC # BLD AUTO: 9.7 X10*3/UL (ref 4.4–11.3)
XM INTEP: NORMAL

## 2025-06-27 PROCEDURE — 2500000005 HC RX 250 GENERAL PHARMACY W/O HCPCS: Performed by: SURGERY

## 2025-06-27 PROCEDURE — 2500000004 HC RX 250 GENERAL PHARMACY W/ HCPCS (ALT 636 FOR OP/ED): Performed by: INTERNAL MEDICINE

## 2025-06-27 PROCEDURE — 86901 BLOOD TYPING SEROLOGIC RH(D): CPT | Performed by: INTERNAL MEDICINE

## 2025-06-27 PROCEDURE — 36415 COLL VENOUS BLD VENIPUNCTURE: CPT | Performed by: INTERNAL MEDICINE

## 2025-06-27 PROCEDURE — 2500000004 HC RX 250 GENERAL PHARMACY W/ HCPCS (ALT 636 FOR OP/ED): Performed by: SURGERY

## 2025-06-27 PROCEDURE — 85027 COMPLETE CBC AUTOMATED: CPT | Performed by: NURSE PRACTITIONER

## 2025-06-27 PROCEDURE — 2500000001 HC RX 250 WO HCPCS SELF ADMINISTERED DRUGS (ALT 637 FOR MEDICARE OP): Performed by: SURGERY

## 2025-06-27 PROCEDURE — 2500000001 HC RX 250 WO HCPCS SELF ADMINISTERED DRUGS (ALT 637 FOR MEDICARE OP): Performed by: NURSE PRACTITIONER

## 2025-06-27 PROCEDURE — 1100000001 HC PRIVATE ROOM DAILY

## 2025-06-27 PROCEDURE — 36430 TRANSFUSION BLD/BLD COMPNT: CPT

## 2025-06-27 PROCEDURE — 2500000001 HC RX 250 WO HCPCS SELF ADMINISTERED DRUGS (ALT 637 FOR MEDICARE OP): Performed by: INTERNAL MEDICINE

## 2025-06-27 PROCEDURE — 80048 BASIC METABOLIC PNL TOTAL CA: CPT | Performed by: INTERNAL MEDICINE

## 2025-06-27 PROCEDURE — P9016 RBC LEUKOCYTES REDUCED: HCPCS

## 2025-06-27 PROCEDURE — 85014 HEMATOCRIT: CPT | Performed by: INTERNAL MEDICINE

## 2025-06-27 PROCEDURE — 85027 COMPLETE CBC AUTOMATED: CPT | Performed by: INTERNAL MEDICINE

## 2025-06-27 PROCEDURE — 99232 SBSQ HOSP IP/OBS MODERATE 35: CPT | Performed by: INTERNAL MEDICINE

## 2025-06-27 PROCEDURE — 99232 SBSQ HOSP IP/OBS MODERATE 35: CPT | Performed by: NURSE PRACTITIONER

## 2025-06-27 PROCEDURE — 2500000002 HC RX 250 W HCPCS SELF ADMINISTERED DRUGS (ALT 637 FOR MEDICARE OP, ALT 636 FOR OP/ED): Performed by: NURSE PRACTITIONER

## 2025-06-27 RX ADMIN — OXYCODONE HYDROCHLORIDE 5 MG: 5 TABLET ORAL at 05:14

## 2025-06-27 RX ADMIN — ERYTHROMYCIN 400 MG: 400 SUSPENSION ORAL at 17:41

## 2025-06-27 RX ADMIN — GABAPENTIN 100 MG: 250 SOLUTION ORAL at 20:47

## 2025-06-27 RX ADMIN — HYDROMORPHONE HYDROCHLORIDE 0.4 MG: 1 INJECTION, SOLUTION INTRAMUSCULAR; INTRAVENOUS; SUBCUTANEOUS at 01:50

## 2025-06-27 RX ADMIN — Medication 3 MG: at 20:48

## 2025-06-27 RX ADMIN — OXYCODONE HYDROCHLORIDE 5 MG: 5 TABLET ORAL at 17:41

## 2025-06-27 RX ADMIN — PANTOPRAZOLE SODIUM 40 MG: 40 INJECTION, POWDER, FOR SOLUTION INTRAVENOUS at 05:13

## 2025-06-27 RX ADMIN — PIPERACILLIN SODIUM AND TAZOBACTAM SODIUM 3.38 G: 3; .375 INJECTION, SOLUTION INTRAVENOUS at 08:59

## 2025-06-27 RX ADMIN — PIPERACILLIN SODIUM AND TAZOBACTAM SODIUM 3.38 G: 3; .375 INJECTION, SOLUTION INTRAVENOUS at 16:19

## 2025-06-27 RX ADMIN — I.V. FAT EMULSION 50 G: 20 EMULSION INTRAVENOUS at 20:48

## 2025-06-27 RX ADMIN — IRON SUCROSE 200 MG: 20 INJECTION, SOLUTION INTRAVENOUS at 12:35

## 2025-06-27 RX ADMIN — PANTOPRAZOLE SODIUM 40 MG: 40 TABLET, DELAYED RELEASE ORAL at 16:19

## 2025-06-27 RX ADMIN — ERYTHROMYCIN 400 MG: 400 SUSPENSION ORAL at 05:13

## 2025-06-27 RX ADMIN — FLUTICASONE PROPIONATE 2 SPRAY: 50 SPRAY, METERED NASAL at 09:00

## 2025-06-27 RX ADMIN — Medication 10 ML: at 09:00

## 2025-06-27 RX ADMIN — GABAPENTIN 100 MG: 250 SOLUTION ORAL at 08:59

## 2025-06-27 RX ADMIN — ERYTHROMYCIN 400 MG: 400 SUSPENSION ORAL at 00:19

## 2025-06-27 RX ADMIN — ERYTHROMYCIN 400 MG: 400 SUSPENSION ORAL at 12:14

## 2025-06-27 RX ADMIN — METOPROLOL TARTRATE 5 MG: 5 INJECTION INTRAVENOUS at 05:13

## 2025-06-27 RX ADMIN — PIPERACILLIN SODIUM AND TAZOBACTAM SODIUM 3.38 G: 3; .375 INJECTION, SOLUTION INTRAVENOUS at 20:49

## 2025-06-27 RX ADMIN — ASCORBIC ACID, VITAMIN A PALMITATE, CHOLECALCIFEROL, THIAMINE HYDROCHLORIDE, RIBOFLAVIN-5 PHOSPHATE SODIUM, PYRIDOXINE HYDROCHLORIDE, NIACINAMIDE, DEXPANTHENOL, ALPHA-TOCOPHEROL ACETATE, VITAMIN K1, FOLIC ACID, BIOTIN, CYANOCOBALAMIN: 200; 3300; 200; 6; 3.6; 6; 40; 15; 10; 150; 600; 60; 5 INJECTION, SOLUTION INTRAVENOUS at 20:43

## 2025-06-27 RX ADMIN — PIPERACILLIN SODIUM AND TAZOBACTAM SODIUM 3.38 G: 3; .375 INJECTION, SOLUTION INTRAVENOUS at 04:30

## 2025-06-27 RX ADMIN — POLYETHYLENE GLYCOL 3350 17 G: 17 POWDER, FOR SOLUTION ORAL at 09:00

## 2025-06-27 ASSESSMENT — COGNITIVE AND FUNCTIONAL STATUS - GENERAL
MOBILITY SCORE: 18
EATING MEALS: A LITTLE
DRESSING REGULAR UPPER BODY CLOTHING: A LITTLE
DAILY ACTIVITIY SCORE: 18
TOILETING: A LITTLE
DRESSING REGULAR LOWER BODY CLOTHING: A LITTLE
MOVING TO AND FROM BED TO CHAIR: A LITTLE
CLIMB 3 TO 5 STEPS WITH RAILING: A LITTLE
WALKING IN HOSPITAL ROOM: A LITTLE
STANDING UP FROM CHAIR USING ARMS: A LITTLE
MOVING FROM LYING ON BACK TO SITTING ON SIDE OF FLAT BED WITH BEDRAILS: A LITTLE
PERSONAL GROOMING: A LITTLE
HELP NEEDED FOR BATHING: A LITTLE
TURNING FROM BACK TO SIDE WHILE IN FLAT BAD: A LITTLE

## 2025-06-27 ASSESSMENT — PAIN SCALES - GENERAL
PAINLEVEL_OUTOF10: 0 - NO PAIN
PAINLEVEL_OUTOF10: 5 - MODERATE PAIN

## 2025-06-27 ASSESSMENT — PAIN - FUNCTIONAL ASSESSMENT
PAIN_FUNCTIONAL_ASSESSMENT: 0-10
PAIN_FUNCTIONAL_ASSESSMENT: 0-10

## 2025-06-27 ASSESSMENT — PAIN DESCRIPTION - LOCATION: LOCATION: BACK

## 2025-06-27 ASSESSMENT — PAIN DESCRIPTION - ORIENTATION: ORIENTATION: MID

## 2025-06-27 ASSESSMENT — PAIN DESCRIPTION - DESCRIPTORS: DESCRIPTORS: ACHING

## 2025-06-27 NOTE — PROGRESS NOTES
06/27/25 0725   Discharge Planning   Living Arrangements Spouse/significant other   Support Systems Spouse/significant other   Assistance Needed A&Ox4, independent with ADLs, no DME, room air at baseline-currently [facility] Dr[facility] Ramon Gould   Type of Residence Private residence   Number of Stairs to Enter Residence 3   Number of Stairs Within Residence 20   Do you have animals or pets at home? Yes   Type of Animals or Pets 1 dog - [place]   Home or Post Acute Services In home services   Type of Home Care Services Home nursing visits   Expected Discharge Disposition Home Health  (resumed [facility] will need internal referral placed. Pt now with PICC and TPN - per team, not expected to dc with PICC and TPN.)   Does the patient need discharge transport arranged? Yes   RoundTrip coordination needed? Yes   Has discharge transport been arranged? No

## 2025-06-27 NOTE — PROGRESS NOTES
"Nutrition Follow Up Assessment:   Nutrition Assessment       Patient is a 70 y.o. male with h/o perforated diverticulitis - now presenting with SBO s/p NGT in ED (6/4) for decompression.     Pt s/p IR drain placement (6/5) for pelvic hematoma.  PICC placed (6/10) for supplemental TPN.      Pt with prolonged obstruction so taken to OR (6/17) for laparoscopic converted to open extensive ABHISHEK & open small bowel resection w/ anastomosis.      KATY drain output turned bilious now with c/f colocutaneous controlled fistula. Pt returned to OR  (6/24) for ex lap, ABHISHEK, & loop ileostomy creation.    Pt with ROBF (6/25). NGT removed (6/26) & pt subsequently advanced to a clear liquid diet.     Nutrition History:  Food and Nutrient History: Visit made, pt resting in bed w/ multiple family members at bedside. TPN infusing @ 70mL/hr. Pt reports he is feeling much better but struggling to take in much PO. Reports he tolerated a good amount of broth, few bites of jello, & a sip of apple juice at breakfast this AM. Notes the apple juice didn't really taste like anything to him. Discussed that since it has been so long since pt has taken PO that it may be a slow gradual process to increase PO intake. Pt amenable to trial Ensure Clear BID. No additional needs at this time.  Food Allergy:  (None)     Anthropometrics:  Height: 180.3 cm (5' 11\")   Weight: 65.2 kg (143 lb 11.8 oz)   BMI (Calculated): 20.06  IBW/kg (Dietitian Calculated): 78.2 kg  Percent of IBW: 83 %    Weight History:   Weight         6/4/2025  2246 6/19/2025  0507 6/20/2025  1546 6/23/2025  0644 6/24/2025  0630    Weight: 64.1 kg (141 lb 5 oz) 64 kg (141 lb) 65.8 kg (145 lb 1 oz) 64.4 kg (142 lb) 65.2 kg (143 lb 11.8 oz)     Weight Change %:  Weight History / % Weight Change: No weight loss noted since admission.  Significant Weight Loss: No    Nutrition Focused Physical Exam Findings:  Defer: See RDN assessment (6/6/25)    Edema:  Edema: none    Physical Findings:  Skin:  " (Abdominal surgical incision)  Digestive System Findings: Early satiety (Colostomy + Ileostomy)  Mouth Findings:  (None)    Nutrition Significant Labs:  BMP Trend:   Results from last 7 days   Lab Units 06/27/25  0515 06/26/25  0724 06/25/25  0711 06/24/25  0635   GLUCOSE mg/dL 132* 123* 182* 159*   CALCIUM mg/dL 7.8* 8.0* 7.7* 8.1*   SODIUM mmol/L 129* 128* 128* 130*   POTASSIUM mmol/L 3.8 4.3 4.3 3.9   CO2 mmol/L 27 25 23 24   CHLORIDE mmol/L 95* 94* 95* 95*   BUN mg/dL 19 24* 26* 17   CREATININE mg/dL 0.71 0.77 0.75 0.72      Nutrition Specific Medications:  Scheduled medications  Scheduled Medications[1]  Continuous medications  Continuous Medications[2]  PRN medications  PRN Medications[3]    I/O:   +Colostomy: 1700mL x 24hrs  +Ileostomy: 550mL x 24hrs    Dietary Orders (From admission, onward)       Start     Ordered    06/26/25 1734  Adult diet Clear Liquid  Diet effective now        Question:  Diet type  Answer:  Clear Liquid    06/26/25 1734    06/04/25 2251  May Participate in Room Service  ( ROOM SERVICE MAY PARTICIPATE)  Once        Question:  .  Answer:  Yes    06/04/25 2250             Estimated Needs:   Total Energy Estimated Needs in 24 hours (kCal):  (1900+)  Method for Estimating Needs: ~30 kcals/kg x CBW (64.1 kg)  Total Protein Estimated Needs in 24 Hours (g):  (95+)  Method for Estimating 24 Hour Protein Needs: ~1.5 g/kg x admit wt (64.1 kg)  Total Fluid Estimated Needs in 24 Hours (mL):  (1900+)  Method for Estimating 24 Hour Fluid Needs: 1mL/kcal or per team        Nutrition Diagnosis   Malnutrition Diagnosis  Patient has Malnutrition Diagnosis: Yes  Diagnosis Status: Active  Malnutrition Diagnosis: Severe malnutrition related to acute disease or injury  Related to: recent diverticular perforation s/p SBR with colostomy creation -- now c/b SBO  As Evidenced by: meeting <75% EENs for the past x 1 month, significant 5% weight loss in <1 month, & moderate muscle wasting/fat loss      Nutrition  Interventions/Recommendations      Nutrition Recommendations:  Continue clear liquid diet & advance slowly, as tolerated, to a fiber restricted diet    Until pt w/ better established PO intake, continue current TPN at goal rate:  TPN of 5% AA, 15% dextrose to goal rate of 70mL/hr  Include MVI + trace elements  Provide lipids daily @ 20.8mL/hr x 12hrs overnight (250mL total)     TPN monitoring:  Daily weights  RFP + Mg daily; replete lytes PRN  LFTs + TGs weekly  Accuchecks q6h     Provides: 1693 kcals, 84g protein, 252g dextrose, 30% kcal from fat (meeting 89% kcal & 100% protein needs)        Nutrition Interventions/Goals:   Parenteral Nutrition: Management of delivery rate of parenteral nutrition  Goal: Tolerate TPN at goal rate    Nutrition Monitoring and Evaluation   Enteral and Parenteral Nutrition Intake Determination: Parenteral nutrition intake - To meet > 75% estimated energy needs    Time Spent (min): 60 minutes            [1] [Held by provider] aspirin, 81 mg, oral, Daily  erythromycin ethylsuccinate, 400 mg, oral, q6h LOU  fat emulsion-plant based, 250 mL, intravenous, Daily  fluticasone, 2 spray, Each Nostril, Daily  gabapentin, 100 mg, nasogastric tube, q12h LOU  iron sucrose, 200 mg, intravenous, Once  metoprolol succinate XL, 25 mg, oral, Daily  pantoprazole, 40 mg, oral, BID AC  piperacillin-tazobactam, 3.375 g, intravenous, q6h  polyethylene glycol, 17 g, oral, Daily  sodium chloride 0.9%, 10 mL, intravenous, q12h LOU  [2] Adult Clinimix Parenteral Nutrition Continuous, 70 mL/hr, Last Rate: 70 mL/hr (06/26/25 2026)  [3] PRN medications: alteplase, benzocaine-menthol, lidocaine, melatonin, [DISCONTINUED] ondansetron **OR** ondansetron, oxyCODONE, phenoL, prochlorperazine, sodium chloride 0.9%

## 2025-06-27 NOTE — CARE PLAN
The patient's goals for the shift include  pt will tolerate diet this shift.    The clinical goals for the shift include pt will be able to rest comfortably this shift.      Problem: Pain - Adult  Goal: Verbalizes/displays adequate comfort level or baseline comfort level  Outcome: Progressing     Problem: Safety - Adult  Goal: Free from fall injury  Outcome: Progressing     Problem: Discharge Planning  Goal: Discharge to home or other facility with appropriate resources  Outcome: Progressing     Problem: Chronic Conditions and Co-morbidities  Goal: Patient's chronic conditions and co-morbidity symptoms are monitored and maintained or improved  Outcome: Progressing     Problem: Nutrition  Goal: Nutrient intake appropriate for maintaining nutritional needs  Outcome: Progressing     Problem: Pain  Goal: Takes deep breaths with improved pain control throughout the shift  Outcome: Progressing  Goal: Turns in bed with improved pain control throughout the shift  Outcome: Progressing  Goal: Walks with improved pain control throughout the shift  Outcome: Progressing  Goal: Performs ADL's with improved pain control throughout shift  Outcome: Progressing  Goal: Participates in PT with improved pain control throughout the shift  Outcome: Progressing  Goal: Free from opioid side effects throughout the shift  Outcome: Progressing  Goal: Free from acute confusion related to pain meds throughout the shift  Outcome: Progressing     Problem: Fall/Injury  Goal: Not fall by end of shift  Outcome: Progressing  Goal: Be free from injury by end of the shift  Outcome: Progressing  Goal: Verbalize understanding of personal risk factors for fall in the hospital  Outcome: Progressing  Goal: Verbalize understanding of risk factor reduction measures to prevent injury from fall in the home  Outcome: Progressing  Goal: Use assistive devices by end of the shift  Outcome: Progressing  Goal: Pace activities to prevent fatigue by end of the  shift  Outcome: Progressing     Problem: Skin  Goal: Decreased wound size/increased tissue granulation at next dressing change  Outcome: Progressing  Goal: Participates in plan/prevention/treatment measures  Outcome: Progressing  Goal: Prevent/manage excess moisture  Outcome: Progressing  Goal: Prevent/minimize sheer/friction injuries  Outcome: Progressing  Flowsheets (Taken 6/27/2025 1110)  Prevent/minimize sheer/friction injuries:   Turn/reposition every 2 hours/use positioning/transfer devices   Increase activity/out of bed for meals  Goal: Promote/optimize nutrition  Outcome: Progressing  Goal: Promote skin healing  Outcome: Progressing

## 2025-06-27 NOTE — CONSULTS
"Wound Care Consult     Visit Date: 6/27/2025      Patient Name: Diego Morales         MRN: [id number]             Reason for Consult: Ostomy education            Assessment:    Met with patient at bedside, Dr. Eaton in to assess patient ileostomy, removed appliance, stoma bar removed.    -- 1 1/4\" budded red, beefy stoma, some slough noted , cleansed and placed 44 mm [employer] appliance.    Replaced colostomy appliance at this time also.   -- 1 1/4\" budded light red beefy stoma.    Periwound intact on both.  Educated, answered questions, wife came and asked questions, answered.      Supplies from [employer]: double supplies for 2 ostomies  X2 Cera plus skin barrier   #11202  X2 New image 2 piece pouch   #18002  X2 [employer] ring    #8805  X2 [employer] remover wipes   #7760      Colostomy LUQ (Active)   No placement date or time found.   Location: LUQ   Number of days:       Colostomy LUQ (Active)   Stomal Appliance Clean;2 piece;Dry;Intact 06/25/25 2305   Site/Stoma Assessment Clean;Intact 06/27/25 1731   Peristomal Assessment Clean;Intact 06/27/25 1731   Drainage Characteristics Bloody 06/25/25 2305   Output (mL) 100 mL 06/27/25 0334     Ileostomy Loop LLQ (Active)   Placement Date/Time: 06/24/25 1715   Placed by: DR. FLORES AND DR. EATON IN OR  Hand Hygiene Completed: Yes  Ileostomy Type: Loop  Location: LLQ   Number of days: 3      Ileostomy Loop LLQ (Active)   Stomal Appliance Clean;2 piece;Dry;Intact 06/25/25 2305   Site/Stoma Assessment Clean;Intact 06/27/25 1600   Peristomal Assessment Clean;Intact 06/27/25 1600   Output (mL) 50 mL 06/27/25 1715   Output Description Liquid;Brown 06/26/25 1117            Wound Plan: If patient leaves before Monday, please provide 5 changes of appliance and offer secure start sign up for supplies, otherwise; Monday will see patient and provide supplies and final education. Message with questions.      Arabella Wiley RN, [facility]  6/27/2025  6:37 PM        " LOV See Below

## 2025-06-27 NOTE — PROGRESS NOTES
Diego Morales is a 70 y.o. male on day 23 of admission presenting with Small bowel obstruction (Multi).    Subjective   Post op day 3 from exploratory laparotomy with creation of loop ileostomy with Dr. Canales 6/24/25     Pt is post op day 10 from exploratory laparoscopy, resection of small bowel and lysis of abdominal adhesions with Dr. Canales on 6/17/25.    s/p Surinder for perforated diverticulitis 5/15/25.     No acute overnight events.   He states he feels good this morning and that he slept well last night.  No HA this am. No dizziness.   No CP or SOB.   Using IS.   NG removed last evening. Tolerated CLD overnight.  No n/v. No heartburn.  Notes that loop ileostomy bag still with large volume output.  Urinating without diff.   Has been up ambulating in halls.   Feels strength improving some.        Objective     Physical Exam  Vitals reviewed.   Constitutional:       General: He is not in acute distress.     Appearance: He is normal weight. He is not ill-appearing, toxic-appearing or diaphoretic.   HENT:      Head: Normocephalic and atraumatic.   Eyes:      General: No scleral icterus.        Right eye: No discharge.         Left eye: No discharge.      Conjunctiva/sclera: Conjunctivae normal.   Cardiovascular:      Rate and Rhythm: Normal rate and regular rhythm.      Pulses: Normal pulses.      Heart sounds: Murmur (3/6 at apex) heard.      No friction rub. No gallop.   Pulmonary:      Effort: Pulmonary effort is normal. No respiratory distress.      Breath sounds: Normal breath sounds. No stridor. No wheezing, rhonchi or rales.   Chest:      Chest wall: No tenderness.   Abdominal:      General: There is no distension.      Palpations: Abdomen is soft. There is no mass.      Tenderness: There is no abdominal tenderness. There is no guarding or rebound.      Hernia: No hernia is present.      Comments: Right abd with KATY drain. Scant serosanguinous output in bulb but this is cloudier than yesterday.   Left  "upper abd with colostomy. Stoma is red, moist and soft. Brown liquid stool in bag.   Left mid abd distal to the colostomy site is the loop ileostomy. Stoma is red, moist soft. Bar in place. Liquid dark green output in bag.   Midline vertical silver surgical dressing in place.   Abd is soft, pt is thin, hypoactive BS x 4 q.    Musculoskeletal:      Right lower leg: No edema.      Left lower leg: No edema.   Skin:     General: Skin is warm and dry.      Coloration: Skin is pale.   Neurological:      Mental Status: He is alert and oriented to person, place, and time.      Gait: Gait normal.   Psychiatric:         Attention and Perception: Attention and perception normal.         Mood and Affect: Affect is blunt.         Behavior: Behavior is slowed. Behavior is cooperative.         Thought Content: Thought content normal.         Cognition and Memory: Cognition and memory normal.         Judgment: Judgment normal.         Last Recorded Vitals  Blood pressure 131/73, pulse 84, temperature 36.6 °C (97.9 °F), temperature source Temporal, resp. rate 17, height 1.803 m (5' 11\"), weight 65.2 kg (143 lb 11.8 oz), SpO2 94%.  Intake/Output last 3 Shifts:  I/O last 3 completed shifts:  In: 5695.3 (87.4 mL/kg) [P.O.:120; I.V.:1874.2 (28.7 mL/kg); NG/GT:250; IV Piggyback:500]  Out: 6393 (98.1 mL/kg) [Urine:2500 (1.1 mL/kg/hr); Emesis/NG output:425; Drains:188; Stool:3280]  Weight: 65.2 kg     Relevant Results    CT abdomen and pelvis w oral contrast only  Result Date: 6/24/2025  Interpreted By:  Inés Hung, STUDY: CT ABDOMEN AND PELVIS W ORAL CONTRAST ONLY;  6/23/2025 9:32 pm   INDICATION: Signs/Symptoms:Cecal leak?  Colocutaneuos fistula.  Perform 3 horus after NG tube gastrographin contrast..     COMPARISON: Multiple prior CT abdomen pelvis including recent prior dated 06/21/2025.   ACCESSION NUMBER(S): [id number]   ORDERING CLINICIAN: PAULINA FLORES   TECHNIQUE: CT of the abdomen and pelvis was performed without " intravenous contrast. Contiguous axial images were obtained at 3 mm slice thickness through the abdomen and pelvis. Coronal and sagittal reconstructions at 3 mm slice thickness were performed.  No intravenous contrast was administered; positive oral contrast was given.   FINDINGS: Please note that the evaluation of vessels, lymph nodes and organs is limited without intravenous contrast.   LOWER CHEST: Bandlike airspace opacities in the dependent region of bilateral lower lobes are likely favored to represent subsegmental atelectasis. Otherwise lung bases are clear. Visualized heart is mildly enlarged. No pericardial effusion. Nasogastric tube extends through distal thoracic esophagus.   ABDOMEN:   LIVER: Liver is normal in size and morphology. No suspicious focal hepatic mass lesions within limits of noncontrast technique.   BILE DUCTS: The intrahepatic and extrahepatic ducts are not dilated.   GALLBLADDER: Gallbladder is moderately distended and does not demonstrate calcified stones in the lumen.   PANCREAS: The pancreas appears unremarkable without evidence of ductal dilatation or masses.   SPLEEN: The spleen is normal in size.   ADRENAL GLANDS: Bilateral adrenal glands appear normal.   KIDNEYS AND URETERS: The kidneys are normal in size and unremarkable in appearance.  No hydroureteronephrosis or nephroureterolithiasis is identified.   PELVIS:   BLADDER: Urinary bladder is moderately distended and appears grossly unremarkable.   REPRODUCTIVE ORGANS: Prostate gland is mildly enlarged with median lobe hypertrophy indenting on the base of the bladder.   BOWEL/PERITONEUM/LYMPH NODES: Nasogastric tube extends to the distal body of the stomach. Stomach is moderately distended and appears grossly unremarkable within limits of evaluation. Postsurgical changes of partial colectomy with Surinder's pouch and colostomy in the left lower quadrant are noted. There is persistent evidence of diffusely distended small bowel loops  measuring up to 3.5 cm without definite evidence of transition point. These are similar compared to immediate prior CT examination dated 06/21/2025. There is no definite evidence of transition point and these are most likely favored to be related to ileus. There is dense contrast extending throughout the colon to the level of colostomy site. The density of the contrast is similar compared to prior CT examination dated 06/21/2025. This is most likely related to prior contrast. There is extension of the Gastrografin that was given during today's examination to the level of left lower quadrant just adjacent to the previously noted residual collection/hematoma (best seen on axial image 95/164). However it is difficult to determine whether this contrast is extraluminal versus intraluminal in the distal small bowel as there is no pham contrast extravasation. There are also postsurgical changes related to small bowel small bowel anastomosis in the right lower quadrant.   There is persistent evidence of fluid collection extending along the posterior aspect of the left lower quadrant into the pelvis corresponding to the site of previously noted hematoma. This is similar compared to immediate prior CT examination. There is interval evidence of few small foci of gas in close proximity to this collection, which could be related to interval surgery versus bowel perforation. There is trace amount of fluid extending along the anterior peritoneal cavity just adjacent to the level of small bowel small bowel anastomosis (best seen on axial image 111/164). This was also seen on prior CT examination dated 06/21/2024 and is slightly smaller in size compared to prior CT examination. This measures up to 9 x 1.5 cm compared 9 x 3 cm. This fluid collection is contiguous with the midline abdominal wall incision. There is a surgical drain extending from the right lower quadrant with its tip located in the left lower quadrant.   No evidence  of enlarged lymphadenopathy in the abdomen and pelvis.   VESSELS: Mild atherosclerotic calcifications of the abdominal aorta are noted. IVC appears grossly unremarkable within limits of noncontrast technique.     ABDOMINAL WALL: Midline anterior abdominal wall incision is noted.   BONES: No acute osseous findings.       1.  Extensive postsurgical changes of Surinder's pouch with colostomy in the left lower quadrant and postsurgical changes related to partial small-bowel resection with anastomosis in the right lower quadrant are noted. There is no pham contrast extravasation on this examination, however there is suggestion of intraluminal contrast abruptly ending at the level of distal small bowel loops in the left lower quadrant that is in close proximity to the previously noted collection/hematoma (best seen on axial image 95/164). There is also interval evidence of few small foci of air adjacent to this region. This could be related to a possible subtle small bowel perforation. There is also a small pocket of fluid extending along the anterior peritoneal cavity that is in close proximity to the small bowel small bowel anastomosis in the right lower quadrant and possibility of anastomotic leak can not be completely excluded. Recommend attention to this region if surgery is pursued. 2. Few scattered foci of free intraperitoneal air in the lower abdomen, which could be related to recent surgery versus possible small bowel perforation. 3. Redemonstration of dense contrast extending throughout the colon to the level of colostomy site. This is similar compared to prior CT examination and is most likely favored to be related to contrast related to previous examination. 4. Other findings as described above.   MACRO: Dr. Inés Hung discussed the significance and urgency of this critical finding in person with Dr. PAULINA FLORES on 6/24/2025 at 9:25 am.  (**-RCF-**) Findings:  See findings.   Signed by: Inés  Abhilash 6/24/2025 9:26 AM Dictation workstation:   [id number]    CT abdomen pelvis w IV contrast  Result Date: 6/21/2025  Interpreted By:  Marshall Ogden, STUDY: CT ABDOMEN PELVIS W IV CONTRAST;  6/21/2025 9:20 am   INDICATION: Signs/Symptoms:history of colostomy, recent ex lap for hematoma evacuation, SBR and ABHISHEK. drain with enteric content..   COMPARISON: CT 06/04/2025   ACCESSION NUMBER(S): [id number]   ORDERING CLINICIAN: ROXANE BESS   TECHNIQUE: CT of the abdomen and pelvis was performed.  Contiguous axial images were obtained at 3 mm slice thickness through the abdomen and pelvis. Coronal and sagittal reconstructions at 3 mm slice thickness were performed. 75 mL Omnipaque 350 administered intravenously without immediate complication.   FINDINGS: LOWER CHEST: Mild bibasilar atelectasis. Cardiomegaly.   ABDOMEN:   LIVER: Small low-attenuation lesion centrally in the liver is not significantly changed.   BILE DUCTS: Not dilated.   GALLBLADDER: No calcified stone or definite inflammation.   PANCREAS: Unremarkable   SPLEEN: Borderline enlarged measuring 13.3 cm in length.   ADRENAL GLANDS: Unremarkable   KIDNEYS AND URETERS: Unremarkable without hydronephrosis.   PELVIS:   BLADDER: Partially distended.   REPRODUCTIVE ORGANS: Prostatomegaly. Gas tracks along both inguinal canals.   BOWEL: Nasogastric tube terminates in the mid to distal stomach. Proximal small bowel is decompressed. Slightly more distally there is marked dilatation loops of jejunum up to 5.6 cm proximally. There appears to be a caliber transition involving a bowel loop in the right mid abdomen relating to an angulated loop of small bowel on image (Series 201, Image 108), just proximal to a small bowel anastomosis.   There is residual contrast scattered throughout the colon which is otherwise decompressed to the level of the left mid abdominal colostomy. There may be a Surinder pouch present. Appendix not identified.   There appears to be a  interloop loculated fluid collection containing a small amount of air as best seen on coronal image (Series 202, Image 48) measuring 7.2 x 6.5 cm.   There appears to be a separate more anterior pelvic fluid collection which measures 9.1 x 3.1 cm on (series 201, Image 119).   VESSELS: Aortoiliac system is patent without aneurysm.  Major visceral branches are patent.Mild atherosclerosis. IVC and major branches are grossly patent. Major portal venous branches are patent.   PERITONEUM AND RETROPERITONEUM: Pelvic surgical drain is in place. There is scattered trace free fluid. There is trace pneumoperitoneum.   The previous large pelvic hematoma is no longer present. No abdominal or pelvic lymphadenopathy.   BONE AND ABDOMINAL WALL: There is scattered subcutaneous gas in the lower abdomen extending into the inguinal canals and proximal thighs. Skin staples are present along vertically-oriented ventral abdominal incision. Mild degenerative changes of the spine.         1.  Interval evacuation of large pelvic hematoma. 2. Findings suggest small bowel obstruction with proximal dilatation up to 5.6 cm and transition point involving a loop of bowel in the right lower quadrant just proximal to anastomosis. 3. Suspected 7.2 cm loculated fluid collection in the left mid abdomen extending into the pelvis, containing small locules of air. There is also a separate more anterior loculated pelvic fluid collection measuring 9.1 cm. 4. Trace postoperative pneumoperitoneum.   MACRO: None.   Signed by: Marshall Ogden 6/21/2025 11:05 AM Dictation workstation:   [id number]    FL small bowel series  Result Date: 6/13/2025  Interpreted By:  Chintan Tomlinson, STUDY: FL SMALL BOWEL SERIES;  6/13/2025 11:43 am   INDICATION: Signs/Symptoms:evaluate for obstruction, 4 weeks post op colostomy with nonimproving ileus vs obstruction.     COMPARISON: 06/10/2025   ACCESSION NUMBER(S): [id number]   ORDERING CLINICIAN: RUSH DAVIS   TECHNIQUE: An initial  radiograph of the abdomen and pelvis was obtained. Following the oral administration of Gastrografin, delayed static fluoroscopic images of the abdomen in the posteroanterior projection were obtained at 0, 20, 40 and 60 minutes was obtained.   FINDINGS: Initial  image demonstrates residual contrast material within the colon. There is a left-sided colostomy again seen similar to prior. Markedly dilated loops of small bowel with mucosal wall thickening measuring up to 6.1 cm in caliber, left upper quadrant. There is an NG tube in place. There is redemonstration of pigtail catheter in place projecting over the pelvis.   Additional imaging performed subsequently after oral contrast administration through the NG tube. There is opacification of the stomach and proximal duodenum identified. The small bowel are not opacified. Findings are suggestive of complete bowel obstruction. Gastric outlet obstruction or gastroparesis can not be entirely excluded, however, felt less likely.       1. Suspect complete bowel obstruction. Recommend clinical correlation. Further evaluation with CT scan of the abdomen may be performed for better assessment if clinically warranted.   SUPPLEMENTAL INFORMATION: Notifi message was left for RUSH DAVIS regarding this exam by Dr. Tomlinson on 6/13/2025 at approximately 11:50 hours.   MACRO: None   Signed by: Chintan Tomlinson 6/13/2025 12:00 PM Dictation workstation:   [id number]    Bedside PICC Imaging  Result Date: 6/11/2025  These images are not reportable by radiology and will not be interpreted by  Radiologists.    FL enema single contrast water soluble  Result Date: 6/10/2025  Interpreted By:  Beryl Brito, STUDY: FL ENEMA SINGLE CONTRAST WATER SOLUBLE; ;  6/10/2025 9:28 am   INDICATION: Signs/Symptoms:s/p colostomy for perforated diverticulitis, rule out bowel obstruction, ileus. Please do with gastrografin through colostomy..     COMPARISON: None.   ACCESSION NUMBER(S): [id number]    ORDERING CLINICIAN: RUSH DAVIS   TECHNIQUE: Water-soluble contrast was instilled into the colon via colostomy. Total fluoroscopic time 43 seconds.   FINDINGS: The  film a nasogastric tube is present. There is a drain on the right. There are distended loops of small bowel.   There is opacification of the colon to the cecum. There is no obvious obstruction or leak.       Unremarkable appearance of the colon.   Dilated small bowel.   MACRO: None   Signed by: Beryl Brito 6/10/2025 9:45 AM Dictation workstation:   [id number]    US guided abscess fluid collection drainage  Result Date: 6/5/2025  Interpreted By:  Chintan Tomlinson, STUDY: US GUIDED ABSCESS FLUID COLLECTION DRAINAGE;  6/5/2025 12:11 pm   INDICATION: Signs/Symptoms:Possible drain abdominal hematoma.   COMPARISON: CT scan from 06/04/2025   ACCESSION NUMBER(S): [id number]   ORDERING CLINICIAN: ANIBAL LANGE   TECHNIQUE: Ultrasound guided aspiration and drainage of right ventral abdominal wall fluid collection.   FINDINGS: The procedure and the benefits, risks and potential complications in addition to alternatives of the procedure were discussed with the patient by Dr. Tomlinson and signed informed consent was obtained.     Limited ultrasound images were obtained through the pelvis. The images demonstrated  a heterogeneous fluid collection within the central aspect of the pelvis abutting the urinary bladder. The area of the overlying ventral abdominal wall was prepped in the usual sterile manner. 1% lidocaine was used for local anesthesia at the planned skin insertion site. 100 mcg of IV fentanyl was given for pain.   Under direct ultrasound guidance, a 8 [place] straight catheter was inserted into the fluid collection. After confirmation of position of the catheter within the collection, the stylet was removed, the pigtail fixed and the catheter secured to the skin. The catheter was then set to gravity drainage. 10 mL of dark hemorrhagic hematoma was  collected.   Postprocedure images demonstrated no evidence of hemorrhage.   Patient tolerated the procedure without immediate complication. Fluid was sent to the laboratory for further analysis.       Successful  aspiration and drain placement into the pelvic collection as above.     Signed by: Chintan Tomlinson 6/5/2025 1:13 PM Dictation workstation:   [id number]    ECG 12 lead  Result Date: 6/5/2025  Normal sinus rhythm Normal ECG When compared with ECG of 22-MAY-2025 21:01, No significant change was found    XR abdomen 1 view  Result Date: 6/4/2025  STUDY: Abdomen Radiographs;  06/04/2025 10:30 PM INDICATION: Status post nasogastric tube. COMPARISON: XR abdomen 05/17/2025.  CT abdomen/pelvis 06/04/2025. ACCESSION NUMBER(S): [id number] ORDERING CLINICIAN: TECHNIQUE:  One view(s) of the abdomen. FINDINGS:  Nasogastric tube shows its tip and side-port in the gastric lumen. There are dilated loops of gas distended small bowel in the midline upper abdomen..  There are no convincing calculi or abnormal calcifications.  No focal osseous abnormalities.      Line/tubes/catheters:  Nasogastric tube tip and side-port are in the gastric lumen. Signed by Lv Jorge MD    CT angio chest for pulmonary embolism  Addendum Date: 6/4/2025  Addendum: NOTIFICATION:  The CT pulmonary arteriogram, abdomen and pelvis results of the study were discussed with, and acknowledged by Dr. Cindy Arias, by telephone on 6/4/2025 at 7:46 PM  Signed by Royce Heller DO    Result Date: 6/4/2025  STUDY: CT Angiogram of the Chest, CT Abdomen and Pelvis with IV Contrast; 06/04/2025 6:43 PM INDICATION: Shortness of breath, tachycardia.  Abdominal distension.   Recent abdominal surgery with colostomy, hyperactive bowel sounds, no output to colostomy bag. COMPARISON: XR chest 05/22/2025.  XR abdomen 05/17/2025.  CT abdomen/pelvis 05/18/2025, 05/13/2025. ACCESSION NUMBER(S): [id number], [id number] ORDERING CLINICIAN: ARUN MCLAUGHLIN  TECHNIQUE:  CTA of the chest was performed following rapid injection of intravenous contrast.  Images are reviewed and processed at a workstation according to the CT angiogram protocol with 3-D and/or MIP post processing imaging generated.  CT of the abdomen and pelvis was performed with intravenous contrast.  Omnipaque 350:75 mL was administered intravenously; positive oral contrast was given. Automated mA/kV exposure control was utilized and patient examination was performed in strict accordance with principles of ALARA. FINDINGS:  CTA CHEST: There is no airspace disease.  There is prominence of the pulmonary vasculature.  No pleural effusion or pneumothorax is present.  There are small areas of subsegmental atelectasis within the right upper lobe.  No intraluminal abnormalities are present within the trachea or mainstem bronchi. There are no hilar or mediastinal masses.  No pericardial effusion is present.  There is normal caliber of the thoracic aorta.  There are no filling defects within the pulmonary arteries. CT ABDOMEN AND PELVIS: Liver, spleen and pancreas have a normal appearance.  Gallbladder is nondistended.  There is no biliary or pancreatic ductal dilatation. There is fluid-filled, distended stomach.  No gastric wall thickening is present.  Duodenum is nondistended. There are no adrenal abnormalities.  Kidneys have a normal appearance with no hydronephrosis.  Ureters and urinary bladder are nondistended.  Prostate gland is enlarged.  The colon is nondistended.  There are multiple dilated small bowel loops involving the jejunum and proximal ileum.  The distal ileal loops are of normal caliber. There is a 7.3 cm length by 13 cm AP by 8.6 cm mass extending from the pelvis into the left lower abdomen.  The mass measures 63 HU.  There is no evidence of abdominal or pelvic ascites.  A left lower quadrant colostomy is present.  There is no evidence of free intraperitoneal gas. There is normal vertebral body  height within the thoracic and lumbar spines.  There are no abnormalities of the sacrum, bony pelvis or proximal femurs.    1.  No evidence of pulmonary arterial embolism. 2.  Small areas of subsegmental atelectasis right upper lobe. 3.  7.3 cm x 13 cm x 8.6 cm solid-appearing mass extending the pelvis into the left lower abdominal quadrant.  This was present on the previous study, however, is better delineated with the addition of intravenous contrast.  Differential diagnosis include solid mass, large abdominal/pelvic hematoma given the recent history of surgery.  4.  Distal small bowel obstruction.  The degree of small bowel distention is greater than that identified on the previous study.  The zone of transition is not visualized but appears to be within the distal ileum.  The terminal ileum is of normal caliber. Signed by Royce Heller DO    CT abdomen pelvis w IV contrast  Addendum Date: 6/4/2025  Addendum: NOTIFICATION:  The CT pulmonary arteriogram, abdomen and pelvis results of the study were discussed with, and acknowledged by Dr. Cindy Arias, by telephone on 6/4/2025 at 7:46 PM  Signed by Royce Heller DO    Result Date: 6/4/2025  STUDY: CT Angiogram of the Chest, CT Abdomen and Pelvis with IV Contrast; 06/04/2025 6:43 PM INDICATION: Shortness of breath, tachycardia.  Abdominal distension.   Recent abdominal surgery with colostomy, hyperactive bowel sounds, no output to colostomy bag. COMPARISON: XR chest 05/22/2025.  XR abdomen 05/17/2025.  CT abdomen/pelvis 05/18/2025, 05/13/2025. ACCESSION NUMBER(S): [id number], [id number] ORDERING CLINICIAN: ARUN MCLAUGHLIN TECHNIQUE:  CTA of the chest was performed following rapid injection of intravenous contrast.  Images are reviewed and processed at a workstation according to the CT angiogram protocol with 3-D and/or MIP post processing imaging generated.  CT of the abdomen and pelvis was performed with intravenous contrast.  Omnipaque 350:75 mL  was administered intravenously; positive oral contrast was given. Automated mA/kV exposure control was utilized and patient examination was performed in strict accordance with principles of ALARA. FINDINGS:  CTA CHEST: There is no airspace disease.  There is prominence of the pulmonary vasculature.  No pleural effusion or pneumothorax is present.  There are small areas of subsegmental atelectasis within the right upper lobe.  No intraluminal abnormalities are present within the trachea or mainstem bronchi. There are no hilar or mediastinal masses.  No pericardial effusion is present.  There is normal caliber of the thoracic aorta.  There are no filling defects within the pulmonary arteries. CT ABDOMEN AND PELVIS: Liver, spleen and pancreas have a normal appearance.  Gallbladder is nondistended.  There is no biliary or pancreatic ductal dilatation. There is fluid-filled, distended stomach.  No gastric wall thickening is present.  Duodenum is nondistended. There are no adrenal abnormalities.  Kidneys have a normal appearance with no hydronephrosis.  Ureters and urinary bladder are nondistended.  Prostate gland is enlarged.  The colon is nondistended.  There are multiple dilated small bowel loops involving the jejunum and proximal ileum.  The distal ileal loops are of normal caliber. There is a 7.3 cm length by 13 cm AP by 8.6 cm mass extending from the pelvis into the left lower abdomen.  The mass measures 63 HU.  There is no evidence of abdominal or pelvic ascites.  A left lower quadrant colostomy is present.  There is no evidence of free intraperitoneal gas. There is normal vertebral body height within the thoracic and lumbar spines.  There are no abnormalities of the sacrum, bony pelvis or proximal femurs.    1.  No evidence of pulmonary arterial embolism. 2.  Small areas of subsegmental atelectasis right upper lobe. 3.  7.3 cm x 13 cm x 8.6 cm solid-appearing mass extending the pelvis into the left lower abdominal  quadrant.  This was present on the previous study, however, is better delineated with the addition of intravenous contrast.  Differential diagnosis include solid mass, large abdominal/pelvic hematoma given the recent history of surgery.  4.  Distal small bowel obstruction.  The degree of small bowel distention is greater than that identified on the previous study.  The zone of transition is not visualized but appears to be within the distal ileum.  The terminal ileum is of normal caliber. Signed by Royce Heller, DO      Scheduled medications  Scheduled Medications[1]  Continuous medications  Continuous Medications[2]  PRN medications  PRN Medications[3]  Results for orders placed or performed during the hospital encounter of 06/04/25 (from the past 24 hours)   CBC   Result Value Ref Range    WBC 14.2 (H) 4.4 - 11.3 x10*3/uL    nRBC 0.0 0.0 - 0.0 /100 WBCs    RBC 2.70 (L) 4.50 - 5.90 x10*6/uL    Hemoglobin 7.9 (L) 13.5 - 17.5 g/dL    Hematocrit 25.0 (L) 41.0 - 52.0 %    MCV 93 80 - 100 fL    MCH 29.3 26.0 - 34.0 pg    MCHC 31.6 (L) 32.0 - 36.0 g/dL    RDW 16.0 (H) 11.5 - 14.5 %    Platelets 430 150 - 450 x10*3/uL   Renal Function Panel   Result Value Ref Range    Glucose 123 (H) 74 - 99 mg/dL    Sodium 128 (L) 136 - 145 mmol/L    Potassium 4.3 3.5 - 5.3 mmol/L    Chloride 94 (L) 98 - 107 mmol/L    Bicarbonate 25 21 - 32 mmol/L    Anion Gap 13 10 - 20 mmol/L    Urea Nitrogen 24 (H) 6 - 23 mg/dL    Creatinine 0.77 0.50 - 1.30 mg/dL    eGFR >90 >60 mL/min/1.73m*2    Calcium 8.0 (L) 8.6 - 10.3 mg/dL    Phosphorus 3.5 2.5 - 4.9 mg/dL    Albumin 2.6 (L) 3.4 - 5.0 g/dL   Magnesium   Result Value Ref Range    Magnesium 2.06 1.60 - 2.40 mg/dL               This patient has a central line              Reason for the central line remaining today? Parenteral nutrition             Assessment & Plan  Lower abdominal pain    Small bowel obstruction (Multi)    Abdominal hematoma    Post op day 3 from exploratory laparotomy with  creation of loop ileostomy with Dr. Canales 6/24/25     - labs reviewed. Anemia this am and transfusion ordered for H/H 6.7/20.5. hyponatremia and hypochloridemia stable. Cont to monitor labs.   - clear liquid diet.  - KATY with 55 ml in 24 hours. Cont to monitor output.   - stoma bar in loop ileostomy to stay in place at this time.  - cont to record ileostomy and colostomy output.   - change appliances q 3-5 days.   - enc ambulation. Activity as tolerated, not to lift > 10 lb.   - cont TPN with lipids.  - cont IVF.   - PPI daily.   - flonase 2 sprays both nostrils daily for nasal congestion.     - remainder of care per primary.      I spent 35 minutes in the professional and overall care of this patient.     Dr. Maloney also in to see pt, plan of care discussed with Dr. Maloney who is in agreement with plan of care.       I spent 35 minutes in the professional and overall care of this patient.      Sylvia Guerrero, APRN-CNP           [1] [Held by provider] aspirin, 81 mg, oral, Daily  erythromycin ethylsuccinate, 400 mg, oral, q6h LOU  fat emulsion-plant based, 250 mL, intravenous, Daily  fluticasone, 2 spray, Each Nostril, Daily  gabapentin, 100 mg, nasogastric tube, q12h LOU  [Held by provider] metoprolol succinate XL, 25 mg, oral, Daily  metoprolol, 5 mg, intravenous, q6h  pantoprazole, 40 mg, oral, BID AC   Or  pantoprazole, 40 mg, intravenous, BID AC  piperacillin-tazobactam, 3.375 g, intravenous, q6h  polyethylene glycol, 17 g, oral, Daily  promethazine, 6.25 mg, intravenous, Once  sodium chloride 0.9%, 10 mL, intravenous, q12h LOU    [2] Adult Clinimix Parenteral Nutrition Continuous, 70 mL/hr, Last Rate: 70 mL/hr (06/26/25 2026)    [3] PRN medications: alteplase, benzocaine-menthol, HYDROmorphone, lidocaine, melatonin, [DISCONTINUED] ondansetron **OR** ondansetron, oxyCODONE, phenoL, prochlorperazine, sodium chloride 0.9%

## 2025-06-27 NOTE — PROGRESS NOTES
Patient: Diego Morales  Room/bed: 108/108-A  Admitted on: 6/4/2025    Age: 70 y.o.   Gender: male  Code Status:  Full Code   Admitting Dx: Small bowel obstruction (Multi) [K56.609]  Hematoma [T14.8XXA]  Lower abdominal pain [R10.30]    MRN: [id number]  PCP: Ramon Gould MD       Subjective   Very happy to have ng out. Tolerating liquids ok so far, no nausea, increased pain etc.     Objective    Physical Exam  Constitutional:       Appearance: He is ill-appearing.   HENT:      Head: Normocephalic.      Nose: Nose normal.      Mouth/Throat:      Mouth: Mucous membranes are dry.   Eyes:      Extraocular Movements: Extraocular movements intact.      Pupils: Pupils are equal, round, and reactive to light.   Cardiovascular:      Pulses: Normal pulses.      Comments: Regular, 4-5/6 harsh systolic murmur, radiating to axilla  Pulmonary:      Comments: Clear, with decreased breath sounds in bases  Abdominal:      Comments: Binder in place. Drain in place  Ostomy bags intact  Minimal tenderness on palpation. Bowel sounds noted   Musculoskeletal:      Comments: No edema. Pulses equal   Skin:     Coloration: Skin is pale.   Neurological:      General: No focal deficit present.      Mental Status: He is alert and oriented to person, place, and time.   Psychiatric:         Mood and Affect: Mood normal.         Behavior: Behavior normal.        Temp:  [36.5 °C (97.7 °F)-37 °C (98.6 °F)] 36.5 °C (97.7 °F)  Heart Rate:  [] 89  Resp:  [16-20] 20  BP: (113-131)/(71-76) 128/73    Vitals:    06/24/25 0630   Weight: 65.2 kg (143 lb 11.8 oz)           I/Os    Intake/Output Summary (Last 24 hours) at 6/27/2025 1000  Last data filed at 6/27/2025 0935  Gross per 24 hour   Intake 1568.51 ml   Output 4525 ml   Net -2956.49 ml       Labs:   Results from last 72 hours   Lab Units 06/27/25  0515 06/26/25  0724 06/25/25  0711   SODIUM mmol/L 129* 128* 128*   POTASSIUM mmol/L 3.8 4.3 4.3   CHLORIDE mmol/L 95* 94* 95*   CO2 mmol/L 27 25  "23   BUN mg/dL 19 24* 26*   CREATININE mg/dL 0.71 0.77 0.75   GLUCOSE mg/dL 132* 123* 182*   CALCIUM mg/dL 7.8* 8.0* 7.7*   ANION GAP mmol/L 11 13 14   EGFR mL/min/1.73m*2 >90 >90 >90   PHOSPHORUS mg/dL  --  3.5 4.1      Results from last 72 hours   Lab Units 06/27/25  0749 06/27/25  0515 06/26/25  0724   WBC AUTO x10*3/uL 10.1 9.7 14.2*   HEMOGLOBIN g/dL 6.7* 6.5* 7.9*   HEMATOCRIT % 20.5* 20.7* 25.0*   PLATELETS AUTO x10*3/uL 367 401 430      Lab Results   Component Value Date    CALCIUM 7.8 (L) 06/27/2025    PHOS 3.5 06/26/2025      Lab Results   Component Value Date    CRP 0.17 06/18/2022      [unfilled]     Micro/ID:   No results found for the last 90 days.                   No lab exists for component: \"AGALPCRNB\"   .ID  No results found for: \"URINECULTURE\", \"BLOODCULT\", \"CSFCULTSMEAR\"    Images:  CT abdomen and pelvis w oral contrast only  Narrative: Interpreted By:  Inés Hung,   STUDY:  CT ABDOMEN AND PELVIS W ORAL CONTRAST ONLY;  6/23/2025 9:32 pm      INDICATION:  Signs/Symptoms:Cecal leak?  Colocutaneuos fistula.  Perform 3 horus  after NG tube gastrographin contrast..          COMPARISON:  Multiple prior CT abdomen pelvis including recent prior dated  06/21/2025.      ACCESSION NUMBER(S):  [id number]      ORDERING CLINICIAN:  PAULINA FLORES      TECHNIQUE:  CT of the abdomen and pelvis was performed without intravenous  contrast. Contiguous axial images were obtained at 3 mm slice  thickness through the abdomen and pelvis. Coronal and sagittal  reconstructions at 3 mm slice thickness were performed.  No  intravenous contrast was administered; positive oral contrast was  given.      FINDINGS:  Please note that the evaluation of vessels, lymph nodes and organs is  limited without intravenous contrast.      LOWER CHEST:  Bandlike airspace opacities in the dependent region of bilateral  lower lobes are likely favored to represent subsegmental atelectasis.  Otherwise lung bases are clear. " Visualized heart is mildly enlarged.  No pericardial effusion. Nasogastric tube extends through distal  thoracic esophagus.      ABDOMEN:      LIVER:  Liver is normal in size and morphology. No suspicious focal hepatic  mass lesions within limits of noncontrast technique.      BILE DUCTS:  The intrahepatic and extrahepatic ducts are not dilated.      GALLBLADDER:  Gallbladder is moderately distended and does not demonstrate  calcified stones in the lumen.      PANCREAS:  The pancreas appears unremarkable without evidence of ductal  dilatation or masses.      SPLEEN:  The spleen is normal in size.      ADRENAL GLANDS:  Bilateral adrenal glands appear normal.      KIDNEYS AND URETERS:  The kidneys are normal in size and unremarkable in appearance.  No  hydroureteronephrosis or nephroureterolithiasis is identified.      PELVIS:      BLADDER:  Urinary bladder is moderately distended and appears grossly  unremarkable.      REPRODUCTIVE ORGANS:  Prostate gland is mildly enlarged with median lobe hypertrophy  indenting on the base of the bladder.      BOWEL/PERITONEUM/LYMPH NODES:  Nasogastric tube extends to the distal body of the stomach. Stomach  is moderately distended and appears grossly unremarkable within  limits of evaluation. Postsurgical changes of partial colectomy with  Surinder's pouch and colostomy in the left lower quadrant are noted.  There is persistent evidence of diffusely distended small bowel loops  measuring up to 3.5 cm without definite evidence of transition point.  These are similar compared to immediate prior CT examination dated  06/21/2025. There is no definite evidence of transition point and  these are most likely favored to be related to ileus. There is dense  contrast extending throughout the colon to the level of colostomy  site. The density of the contrast is similar compared to prior CT  examination dated 06/21/2025. This is most likely related to prior  contrast. There is extension of  the Gastrografin that was given  during today's examination to the level of left lower quadrant just  adjacent to the previously noted residual collection/hematoma (best  seen on axial image 95/164). However it is difficult to determine  whether this contrast is extraluminal versus intraluminal in the  distal small bowel as there is no pham contrast extravasation. There  are also postsurgical changes related to small bowel small bowel  anastomosis in the right lower quadrant.      There is persistent evidence of fluid collection extending along the  posterior aspect of the left lower quadrant into the pelvis  corresponding to the site of previously noted hematoma. This is  similar compared to immediate prior CT examination. There is interval  evidence of few small foci of gas in close proximity to this  collection, which could be related to interval surgery versus bowel  perforation. There is trace amount of fluid extending along the  anterior peritoneal cavity just adjacent to the level of small bowel  small bowel anastomosis (best seen on axial image 111/164). This was  also seen on prior CT examination dated 06/21/2024 and is slightly  smaller in size compared to prior CT examination. This measures up to  9 x 1.5 cm compared 9 x 3 cm. This fluid collection is contiguous  with the midline abdominal wall incision. There is a surgical drain  extending from the right lower quadrant with its tip located in the  left lower quadrant.      No evidence of enlarged lymphadenopathy in the abdomen and pelvis.      VESSELS:  Mild atherosclerotic calcifications of the abdominal aorta are noted.  IVC appears grossly unremarkable within limits of noncontrast  technique.          ABDOMINAL WALL:  Midline anterior abdominal wall incision is noted.      BONES:  No acute osseous findings.      Impression: 1.  Extensive postsurgical changes of Surinder's pouch with colostomy  in the left lower quadrant and postsurgical changes  related to  partial small-bowel resection with anastomosis in the right lower  quadrant are noted. There is no pham contrast extravasation on this  examination, however there is suggestion of intraluminal contrast  abruptly ending at the level of distal small bowel loops in the left  lower quadrant that is in close proximity to the previously noted  collection/hematoma (best seen on axial image 95/164). There is also  interval evidence of few small foci of air adjacent to this region.  This could be related to a possible subtle small bowel perforation.  There is also a small pocket of fluid extending along the anterior  peritoneal cavity that is in close proximity to the small bowel small  bowel anastomosis in the right lower quadrant and possibility of  anastomotic leak can not be completely excluded. Recommend attention  to this region if surgery is pursued.  2. Few scattered foci of free intraperitoneal air in the lower  abdomen, which could be related to recent surgery versus possible  small bowel perforation.  3. Redemonstration of dense contrast extending throughout the colon  to the level of colostomy site. This is similar compared to prior CT  examination and is most likely favored to be related to contrast  related to previous examination.  4. Other findings as described above.      MACRO:  Dr. Inés Hung discussed the significance and urgency of this  critical finding in person with Dr. PAULINA FLORES on 6/24/2025 at  9:25 am.  (**-RCF-**) Findings:  See findings.      Signed by: Inés Hung 6/24/2025 9:26 AM  Dictation workstation:   [id number]       Meds    Scheduled medications  Scheduled Medications[1]  Continuous medications  Continuous Medications[2]  PRN medications  PRN Medications[3]     Assessment and Plan    Diego Morales is a 70 y.o. male in hospital day 23, POD 4 for ABHISHEK/closure of perforation/iliostomy.   As above. NG is out, tolerating clear liquid diet thus far. Ambulating  in shay several times during the day. Remains on zosyn for now. Surgery managing. Drain output around 50 yesterday, decreased.   Acute anemia. Did have some blood in NG yesterday, small amount. EBL during last procedure around 100 ml, reported. Transfuse one unit and recheck hgb later today.   Protein lexx malnutrition. Continue TPN for time being.   Hyponatremia, stable.   MVP. Tele stable. Sinus rhythm with occasional pvc  Hx hyperlipidemia  Diverticulosis    Maddie Singleton MD         [1] [Held by provider] aspirin, 81 mg, oral, Daily  erythromycin ethylsuccinate, 400 mg, oral, q6h LOU  fat emulsion-plant based, 250 mL, intravenous, Daily  fluticasone, 2 spray, Each Nostril, Daily  gabapentin, 100 mg, nasogastric tube, q12h LOU  [Held by provider] metoprolol succinate XL, 25 mg, oral, Daily  metoprolol, 5 mg, intravenous, q6h  pantoprazole, 40 mg, oral, BID AC   Or  pantoprazole, 40 mg, intravenous, BID AC  piperacillin-tazobactam, 3.375 g, intravenous, q6h  polyethylene glycol, 17 g, oral, Daily  promethazine, 6.25 mg, intravenous, Once  sodium chloride 0.9%, 10 mL, intravenous, q12h LOU  [2] Adult Clinimix Parenteral Nutrition Continuous, 70 mL/hr, Last Rate: 70 mL/hr (06/26/25 2026)  [3] PRN medications: alteplase, benzocaine-menthol, HYDROmorphone, lidocaine, melatonin, [DISCONTINUED] ondansetron **OR** ondansetron, oxyCODONE, phenoL, prochlorperazine, sodium chloride 0.9%

## 2025-06-28 LAB
ACID FAST STN SPEC: NORMAL
ALBUMIN SERPL BCP-MCNC: 2.6 G/DL (ref 3.4–5)
ALP SERPL-CCNC: 374 U/L (ref 33–136)
ALT SERPL W P-5'-P-CCNC: 19 U/L (ref 10–52)
ANION GAP SERPL CALC-SCNC: 12 MMOL/L (ref 10–20)
AST SERPL W P-5'-P-CCNC: 18 U/L (ref 9–39)
BILIRUB SERPL-MCNC: 1.4 MG/DL (ref 0–1.2)
BUN SERPL-MCNC: 17 MG/DL (ref 6–23)
CALCIUM SERPL-MCNC: 8.1 MG/DL (ref 8.6–10.3)
CHLORIDE SERPL-SCNC: 97 MMOL/L (ref 98–107)
CO2 SERPL-SCNC: 24 MMOL/L (ref 21–32)
CREAT SERPL-MCNC: 0.66 MG/DL (ref 0.5–1.3)
EGFRCR SERPLBLD CKD-EPI 2021: >90 ML/MIN/1.73M*2
GLUCOSE SERPL-MCNC: 147 MG/DL (ref 74–99)
HCT VFR BLD AUTO: 26.6 % (ref 41–52)
HGB BLD-MCNC: 8.4 G/DL (ref 13.5–17.5)
MYCOBACTERIUM SPEC CULT: NORMAL
POTASSIUM SERPL-SCNC: 4 MMOL/L (ref 3.5–5.3)
PROT SERPL-MCNC: 5.9 G/DL (ref 6.4–8.2)
SODIUM SERPL-SCNC: 129 MMOL/L (ref 136–145)

## 2025-06-28 PROCEDURE — 2500000001 HC RX 250 WO HCPCS SELF ADMINISTERED DRUGS (ALT 637 FOR MEDICARE OP): Performed by: SURGERY

## 2025-06-28 PROCEDURE — 2500000001 HC RX 250 WO HCPCS SELF ADMINISTERED DRUGS (ALT 637 FOR MEDICARE OP): Performed by: INTERNAL MEDICINE

## 2025-06-28 PROCEDURE — 85014 HEMATOCRIT: CPT | Performed by: INTERNAL MEDICINE

## 2025-06-28 PROCEDURE — 36415 COLL VENOUS BLD VENIPUNCTURE: CPT | Performed by: INTERNAL MEDICINE

## 2025-06-28 PROCEDURE — 84075 ASSAY ALKALINE PHOSPHATASE: CPT | Performed by: INTERNAL MEDICINE

## 2025-06-28 PROCEDURE — 94760 N-INVAS EAR/PLS OXIMETRY 1: CPT

## 2025-06-28 PROCEDURE — 2500000004 HC RX 250 GENERAL PHARMACY W/ HCPCS (ALT 636 FOR OP/ED): Performed by: SURGERY

## 2025-06-28 PROCEDURE — 2500000005 HC RX 250 GENERAL PHARMACY W/O HCPCS: Performed by: SURGERY

## 2025-06-28 PROCEDURE — 99232 SBSQ HOSP IP/OBS MODERATE 35: CPT | Performed by: INTERNAL MEDICINE

## 2025-06-28 PROCEDURE — 2500000001 HC RX 250 WO HCPCS SELF ADMINISTERED DRUGS (ALT 637 FOR MEDICARE OP): Performed by: NURSE PRACTITIONER

## 2025-06-28 PROCEDURE — 1100000001 HC PRIVATE ROOM DAILY

## 2025-06-28 RX ADMIN — ERYTHROMYCIN 400 MG: 400 SUSPENSION ORAL at 01:00

## 2025-06-28 RX ADMIN — PIPERACILLIN SODIUM AND TAZOBACTAM SODIUM 3.38 G: 3; .375 INJECTION, SOLUTION INTRAVENOUS at 21:20

## 2025-06-28 RX ADMIN — Medication 10 ML: at 09:34

## 2025-06-28 RX ADMIN — ERYTHROMYCIN 400 MG: 400 SUSPENSION ORAL at 04:58

## 2025-06-28 RX ADMIN — POLYETHYLENE GLYCOL 3350 17 G: 17 POWDER, FOR SOLUTION ORAL at 09:19

## 2025-06-28 RX ADMIN — PIPERACILLIN SODIUM AND TAZOBACTAM SODIUM 3.38 G: 3; .375 INJECTION, SOLUTION INTRAVENOUS at 15:22

## 2025-06-28 RX ADMIN — OXYCODONE HYDROCHLORIDE 5 MG: 5 TABLET ORAL at 00:00

## 2025-06-28 RX ADMIN — PIPERACILLIN SODIUM AND TAZOBACTAM SODIUM 3.38 G: 3; .375 INJECTION, SOLUTION INTRAVENOUS at 03:51

## 2025-06-28 RX ADMIN — GABAPENTIN 100 MG: 250 SOLUTION ORAL at 09:20

## 2025-06-28 RX ADMIN — I.V. FAT EMULSION 50 G: 20 EMULSION INTRAVENOUS at 20:20

## 2025-06-28 RX ADMIN — OXYCODONE HYDROCHLORIDE 5 MG: 5 TABLET ORAL at 04:57

## 2025-06-28 RX ADMIN — Medication 10 ML: at 20:27

## 2025-06-28 RX ADMIN — PANTOPRAZOLE SODIUM 40 MG: 40 TABLET, DELAYED RELEASE ORAL at 04:58

## 2025-06-28 RX ADMIN — PANTOPRAZOLE SODIUM 40 MG: 40 TABLET, DELAYED RELEASE ORAL at 15:22

## 2025-06-28 RX ADMIN — ERYTHROMYCIN 400 MG: 400 SUSPENSION ORAL at 14:06

## 2025-06-28 RX ADMIN — GABAPENTIN 100 MG: 250 SOLUTION ORAL at 20:21

## 2025-06-28 RX ADMIN — OXYCODONE HYDROCHLORIDE 5 MG: 5 TABLET ORAL at 13:23

## 2025-06-28 RX ADMIN — PIPERACILLIN SODIUM AND TAZOBACTAM SODIUM 3.38 G: 3; .375 INJECTION, SOLUTION INTRAVENOUS at 09:22

## 2025-06-28 RX ADMIN — ERYTHROMYCIN 400 MG: 400 SUSPENSION ORAL at 20:21

## 2025-06-28 RX ADMIN — METOPROLOL SUCCINATE 25 MG: 25 TABLET, EXTENDED RELEASE ORAL at 04:57

## 2025-06-28 ASSESSMENT — COGNITIVE AND FUNCTIONAL STATUS - GENERAL
DRESSING REGULAR LOWER BODY CLOTHING: A LITTLE
CLIMB 3 TO 5 STEPS WITH RAILING: A LITTLE
MOBILITY SCORE: 22
WALKING IN HOSPITAL ROOM: A LITTLE
MOBILITY SCORE: 22
CLIMB 3 TO 5 STEPS WITH RAILING: A LITTLE
DRESSING REGULAR UPPER BODY CLOTHING: A LITTLE
HELP NEEDED FOR BATHING: A LITTLE
DRESSING REGULAR UPPER BODY CLOTHING: A LITTLE
TOILETING: A LITTLE
HELP NEEDED FOR BATHING: A LITTLE
WALKING IN HOSPITAL ROOM: A LITTLE
TOILETING: A LITTLE
DRESSING REGULAR LOWER BODY CLOTHING: A LITTLE
DAILY ACTIVITIY SCORE: 20
DAILY ACTIVITIY SCORE: 20

## 2025-06-28 ASSESSMENT — PAIN DESCRIPTION - LOCATION: LOCATION: ABDOMEN

## 2025-06-28 ASSESSMENT — PAIN SCALES - GENERAL
PAINLEVEL_OUTOF10: 4
PAINLEVEL_OUTOF10: 6

## 2025-06-28 ASSESSMENT — PAIN DESCRIPTION - DESCRIPTORS: DESCRIPTORS: TIGHTNESS;TENDER

## 2025-06-28 ASSESSMENT — PAIN - FUNCTIONAL ASSESSMENT
PAIN_FUNCTIONAL_ASSESSMENT: 0-10
PAIN_FUNCTIONAL_ASSESSMENT: 0-10

## 2025-06-28 NOTE — CARE PLAN
The patient's goals for the shift include      The clinical goals for the shift include pt will tolerate diet this shift.    Over the shift, the patient did not make progress toward the following goals. Barriers to progression include none. Recommendations to address these barriers include none.

## 2025-06-28 NOTE — PROGRESS NOTES
"Diego Morales is a 70 y.o. male on day 24 of admission presenting with Small bowel obstruction (Multi).    Subjective   The patient is lying in bed and he appears comfortable.  He states he feels better and he did get some sleep overnight.  He has been tolerating the full liquid diet without any difficulty.  The ileostomy is functioning.       Objective vital signs stable and afebrile.    Physical Exam the patient's abdomen is flat he is not as distended as he was.  I was able to examine the ileostomy yesterday and I removed the bar.  The midline incision is clean and dry.  This was redressed.  The ileostomy is just at surface level however the functional end is superior and therefore the seal seems to be good.  I remove the bar a day early because I felt that the bar was putting a little bit too much pressure on the bowel wall.  The KATY drain is putting out about 70 cc and it has a slight green tint to it    Last Recorded Vitals  Blood pressure 115/74, pulse 84, temperature 36.6 °C (97.9 °F), temperature source Temporal, resp. rate 18, height 1.803 m (5' 11\"), weight 65.2 kg (143 lb 11.8 oz), SpO2 98%.  Intake/Output last 3 Shifts:  I/O last 3 completed shifts:  In: 1147.1 (17.6 mL/kg) [P.O.:750; Blood:397.1]  Out: 6485 (99.5 mL/kg) [Urine:4500 (1.9 mL/kg/hr); Drains:110; Stool:1875]  Weight: 65.2 kg     Relevant Results             Hemoglobin is up to 8.4 today after the transfusion of 1 unit yesterday.  This patient has a central line   Reason for the central line remaining today? Parenteral nutrition                 Assessment & Plan  Lower abdominal pain    Small bowel obstruction (Multi)    Abdominal hematoma    The patient is gradually improving.  He is now able to eat.  I would recommend continuing the full liquid diet today possibly advancing to a soft diet tomorrow.  Eventually we can begin to taper the TPN in anticipation of him going home sometime this coming week.      I spent 10 minutes in the " professional and overall care of this patient.      Morenita Maloney MD

## 2025-06-28 NOTE — PROGRESS NOTES
Patient: Diego Morales  Room/bed: 108/108-A  Admitted on: 6/4/2025    Age: 70 y.o.   Gender: male  Code Status:  Full Code   Admitting Dx: Small bowel obstruction (Multi) [K56.609]  Hematoma [T14.8XXA]  Lower abdominal pain [R10.30]    MRN: [id number]  PCP: Ramon Gould MD       Subjective   Looking forward to solid food. No new issues    Objective    Physical Exam  HENT:      Head: Normocephalic.      Nose: Nose normal.      Mouth/Throat:      Mouth: Mucous membranes are dry.   Eyes:      Extraocular Movements: Extraocular movements intact.      Pupils: Pupils are equal, round, and reactive to light.   Cardiovascular:      Pulses: Normal pulses.      Comments: Regular, 4-5/6 harsh systolic murmur with valve click  Pulmonary:      Effort: Pulmonary effort is normal.      Breath sounds: Normal breath sounds.   Abdominal:      Comments: Drain with minimal substance within  Both bags working well   Musculoskeletal:         General: Normal range of motion.   Skin:     General: Skin is warm.   Neurological:      General: No focal deficit present.      Mental Status: He is alert.   Psychiatric:         Mood and Affect: Mood normal.         Behavior: Behavior normal.          Temp:  [36.4 °C (97.5 °F)-36.9 °C (98.4 °F)] 36.6 °C (97.9 °F)  Heart Rate:  [] 84  Resp:  [15-18] 18  BP: (115-135)/(74-81) 115/74    Vitals:    06/24/25 0630   Weight: 65.2 kg (143 lb 11.8 oz)             I/Os    Intake/Output Summary (Last 24 hours) at 6/28/2025 1140  Last data filed at 6/28/2025 1125  Gross per 24 hour   Intake 1187.08 ml   Output 5210 ml   Net -4022.92 ml       Labs:   Results from last 72 hours   Lab Units 06/28/25  0644 06/27/25  0515 06/26/25  0724   SODIUM mmol/L 129* 129* 128*   POTASSIUM mmol/L 4.0 3.8 4.3   CHLORIDE mmol/L 97* 95* 94*   CO2 mmol/L 24 27 25   BUN mg/dL 17 19 24*   CREATININE mg/dL 0.66 0.71 0.77   GLUCOSE mg/dL 147* 132* 123*   CALCIUM mg/dL 8.1* 7.8* 8.0*   ANION GAP mmol/L 12 11 13   EGFR  "mL/min/1.73m*2 >90 >90 >90   PHOSPHORUS mg/dL  --   --  3.5      Results from last 72 hours   Lab Units 06/28/25  0644 06/27/25  1457 06/27/25  0749 06/27/25  0515 06/26/25  0724   WBC AUTO x10*3/uL  --   --  10.1 9.7 14.2*   HEMOGLOBIN g/dL 8.4* 8.7* 6.7* 6.5* 7.9*   HEMATOCRIT % 26.6* 27.1* 20.5* 20.7* 25.0*   PLATELETS AUTO x10*3/uL  --   --  367 401 430      Lab Results   Component Value Date    CALCIUM 8.1 (L) 06/28/2025    PHOS 3.5 06/26/2025      Lab Results   Component Value Date    CRP 0.17 06/18/2022      [unfilled]     Micro/ID:   No results found for the last 90 days.                   No lab exists for component: \"AGALPCRNB\"   .ID  No results found for: \"URINECULTURE\", \"BLOODCULT\", \"CSFCULTSMEAR\"    Images:       Meds    Scheduled medications  Scheduled Medications[1]  Continuous medications  Continuous Medications[2]  PRN medications  PRN Medications[3]     Assessment and Plan    Diego Morales is a 70 y.o. male   Protein lexx malnutrition. Full liquid diet, hopefully advance tomorrow. Intake has been quite low still, agree with continuation of the TPN for the next few days.   Post op, ABHISHEK/perf closure/ostomy. He has been on zosyn, duration per ID  Hyperglycemia, mild. Secondary to iv nutrition. Has Left PICC. No need for iss at this time.   Hyponatremia, stable    Maddie Singleton MD         [1] [Held by provider] aspirin, 81 mg, oral, Daily  erythromycin ethylsuccinate, 400 mg, oral, q6h LOU  fat emulsion-plant based, 250 mL, intravenous, Daily  fluticasone, 2 spray, Each Nostril, Daily  gabapentin, 100 mg, nasogastric tube, q12h LOU  metoprolol succinate XL, 25 mg, oral, Daily  pantoprazole, 40 mg, oral, BID AC  piperacillin-tazobactam, 3.375 g, intravenous, q6h  polyethylene glycol, 17 g, oral, Daily  sodium chloride 0.9%, 10 mL, intravenous, q12h LOU  [2] Adult Clinimix Parenteral Nutrition Continuous, 70 mL/hr, Last Rate: 70 mL/hr (06/27/25 2043)  [3] PRN medications: alteplase, " benzocaine-menthol, lidocaine, melatonin, [DISCONTINUED] ondansetron **OR** ondansetron, oxyCODONE, phenoL, prochlorperazine, sodium chloride 0.9%

## 2025-06-29 VITALS
OXYGEN SATURATION: 98 % | TEMPERATURE: 97.9 F | HEART RATE: 101 BPM | DIASTOLIC BLOOD PRESSURE: 78 MMHG | RESPIRATION RATE: 18 BRPM | BODY MASS INDEX: 20.12 KG/M2 | HEIGHT: 71 IN | SYSTOLIC BLOOD PRESSURE: 124 MMHG | WEIGHT: 143.74 LBS

## 2025-06-29 PROCEDURE — 2500000001 HC RX 250 WO HCPCS SELF ADMINISTERED DRUGS (ALT 637 FOR MEDICARE OP): Performed by: SURGERY

## 2025-06-29 PROCEDURE — 99232 SBSQ HOSP IP/OBS MODERATE 35: CPT | Performed by: INTERNAL MEDICINE

## 2025-06-29 PROCEDURE — 2500000001 HC RX 250 WO HCPCS SELF ADMINISTERED DRUGS (ALT 637 FOR MEDICARE OP): Performed by: NURSE PRACTITIONER

## 2025-06-29 PROCEDURE — 94760 N-INVAS EAR/PLS OXIMETRY 1: CPT

## 2025-06-29 PROCEDURE — 1100000001 HC PRIVATE ROOM DAILY

## 2025-06-29 PROCEDURE — 2500000005 HC RX 250 GENERAL PHARMACY W/O HCPCS: Performed by: SURGERY

## 2025-06-29 PROCEDURE — 2500000001 HC RX 250 WO HCPCS SELF ADMINISTERED DRUGS (ALT 637 FOR MEDICARE OP): Performed by: INTERNAL MEDICINE

## 2025-06-29 PROCEDURE — 2500000004 HC RX 250 GENERAL PHARMACY W/ HCPCS (ALT 636 FOR OP/ED): Performed by: SURGERY

## 2025-06-29 RX ORDER — GABAPENTIN 250 MG/5ML
100 SOLUTION ORAL EVERY 12 HOURS SCHEDULED
Status: DISCONTINUED | OUTPATIENT
Start: 2025-06-29 | End: 2025-07-01

## 2025-06-29 RX ORDER — IRON POLYSACCHARIDE COMPLEX 150 MG
150 CAPSULE ORAL DAILY
Status: DISCONTINUED | OUTPATIENT
Start: 2025-06-29 | End: 2025-07-02 | Stop reason: HOSPADM

## 2025-06-29 RX ADMIN — OXYCODONE HYDROCHLORIDE 5 MG: 5 TABLET ORAL at 11:00

## 2025-06-29 RX ADMIN — ERYTHROMYCIN 400 MG: 400 SUSPENSION ORAL at 05:32

## 2025-06-29 RX ADMIN — OXYCODONE HYDROCHLORIDE 5 MG: 5 TABLET ORAL at 23:31

## 2025-06-29 RX ADMIN — ERYTHROMYCIN 400 MG: 400 SUSPENSION ORAL at 00:28

## 2025-06-29 RX ADMIN — OXYCODONE HYDROCHLORIDE 5 MG: 5 TABLET ORAL at 16:57

## 2025-06-29 RX ADMIN — POLYETHYLENE GLYCOL 3350 17 G: 17 POWDER, FOR SOLUTION ORAL at 08:13

## 2025-06-29 RX ADMIN — PIPERACILLIN SODIUM AND TAZOBACTAM SODIUM 3.38 G: 3; .375 INJECTION, SOLUTION INTRAVENOUS at 20:51

## 2025-06-29 RX ADMIN — PANTOPRAZOLE SODIUM 40 MG: 40 TABLET, DELAYED RELEASE ORAL at 15:24

## 2025-06-29 RX ADMIN — PIPERACILLIN SODIUM AND TAZOBACTAM SODIUM 3.38 G: 3; .375 INJECTION, SOLUTION INTRAVENOUS at 09:04

## 2025-06-29 RX ADMIN — METOPROLOL SUCCINATE 25 MG: 25 TABLET, EXTENDED RELEASE ORAL at 04:15

## 2025-06-29 RX ADMIN — I.V. FAT EMULSION 50 G: 20 EMULSION INTRAVENOUS at 20:52

## 2025-06-29 RX ADMIN — GABAPENTIN 100 MG: 250 SOLUTION ORAL at 20:51

## 2025-06-29 RX ADMIN — GABAPENTIN 100 MG: 250 SOLUTION ORAL at 08:13

## 2025-06-29 RX ADMIN — PANTOPRAZOLE SODIUM 40 MG: 40 TABLET, DELAYED RELEASE ORAL at 06:18

## 2025-06-29 RX ADMIN — Medication 10 ML: at 08:17

## 2025-06-29 RX ADMIN — PIPERACILLIN SODIUM AND TAZOBACTAM SODIUM 3.38 G: 3; .375 INJECTION, SOLUTION INTRAVENOUS at 04:01

## 2025-06-29 RX ADMIN — ASCORBIC ACID, VITAMIN A PALMITATE, CHOLECALCIFEROL, THIAMINE HYDROCHLORIDE, RIBOFLAVIN-5 PHOSPHATE SODIUM, PYRIDOXINE HYDROCHLORIDE, NIACINAMIDE, DEXPANTHENOL, ALPHA-TOCOPHEROL ACETATE, VITAMIN K1, FOLIC ACID, BIOTIN, CYANOCOBALAMIN: 200; 3300; 200; 6; 3.6; 6; 40; 15; 10; 150; 600; 60; 5 INJECTION, SOLUTION INTRAVENOUS at 20:52

## 2025-06-29 RX ADMIN — POLYSACCHARIDE-IRON COMPLEX 150 MG: 150 CAPSULE ORAL at 14:39

## 2025-06-29 RX ADMIN — PIPERACILLIN SODIUM AND TAZOBACTAM SODIUM 3.38 G: 3; .375 INJECTION, SOLUTION INTRAVENOUS at 15:24

## 2025-06-29 RX ADMIN — Medication 10 ML: at 21:00

## 2025-06-29 ASSESSMENT — COGNITIVE AND FUNCTIONAL STATUS - GENERAL
DRESSING REGULAR UPPER BODY CLOTHING: A LITTLE
MOBILITY SCORE: 23
HELP NEEDED FOR BATHING: A LITTLE
DRESSING REGULAR UPPER BODY CLOTHING: A LITTLE
DRESSING REGULAR LOWER BODY CLOTHING: A LITTLE
DAILY ACTIVITIY SCORE: 20
MOBILITY SCORE: 23
DRESSING REGULAR LOWER BODY CLOTHING: A LITTLE
CLIMB 3 TO 5 STEPS WITH RAILING: A LITTLE
CLIMB 3 TO 5 STEPS WITH RAILING: A LITTLE
DAILY ACTIVITIY SCORE: 20
TOILETING: A LITTLE
HELP NEEDED FOR BATHING: A LITTLE
TOILETING: A LITTLE

## 2025-06-29 ASSESSMENT — PAIN SCALES - GENERAL
PAINLEVEL_OUTOF10: 0 - NO PAIN
PAINLEVEL_OUTOF10: 5 - MODERATE PAIN
PAINLEVEL_OUTOF10: 3
PAINLEVEL_OUTOF10: 6
PAINLEVEL_OUTOF10: 5 - MODERATE PAIN
PAINLEVEL_OUTOF10: 3

## 2025-06-29 ASSESSMENT — PAIN DESCRIPTION - DESCRIPTORS
DESCRIPTORS: BURNING
DESCRIPTORS: BURNING
DESCRIPTORS: ACHING

## 2025-06-29 ASSESSMENT — PAIN - FUNCTIONAL ASSESSMENT
PAIN_FUNCTIONAL_ASSESSMENT: 0-10

## 2025-06-29 ASSESSMENT — PAIN DESCRIPTION - LOCATION
LOCATION: ABDOMEN

## 2025-06-29 NOTE — PROGRESS NOTES
"Diego Morales is a 70 y.o. male on day 25 of admission presenting with Small bowel obstruction (Multi).    Subjective   The patient is in good spirits.  He states he did not sleep very well last night but otherwise he feels okay.       Objective vital signs are stable he is afebrile.  Urine output is good.  Drain output appears to be only about 25 cc or 35 for all of yesterday.    Physical Exam abdomen is flat and soft basically nontender.  Incision is clean and dry.  Ileostomy is pink and functioning.  The lower half of the loop has some slough but is viable.    Last Recorded Vitals  Blood pressure 135/87, pulse 90, temperature 36.5 °C (97.7 °F), temperature source Temporal, resp. rate 16, height 1.803 m (5' 11\"), weight 65.2 kg (143 lb 11.8 oz), SpO2 99%.  Intake/Output last 3 Shifts:  I/O last 3 completed shifts:  In: 650 (10 mL/kg) [P.O.:540; I.V.:10 (0.2 mL/kg); IV Piggyback:100]  Out: 5765 (88.4 mL/kg) [Urine:3775 (1.6 mL/kg/hr); Drains:90; Stool:1900]  Weight: 65.2 kg     Relevant Results                        No labs from today will most likely recheck tomorrow      Assessment & Plan  Lower abdominal pain    Small bowel obstruction (Multi)    Abdominal hematoma    Status post exploratory laparotomy with diverting loop ileostomy.  Drain output is fairly minimal but looks a little thicker no plans to remove the drain anytime soon.  Will advance to a soft diet today.      I spent 15 minutes in the professional and overall care of this patient.      Morenita Maloney MD    "

## 2025-06-29 NOTE — PROGRESS NOTES
Patient: Diego Morales  Room/bed: 108/108-A  Admitted on: 6/4/2025    Age: 70 y.o.   Gender: male  Code Status:  Full Code   Admitting Dx: Small bowel obstruction (Multi) [K56.609]  Hematoma [T14.8XXA]  Lower abdominal pain [R10.30]    MRN: [id number]  PCP: Ramon Gould MD       Subjective   Had a rough night from sleeping standpoint, but otherwise is doing ok., did his own bag emptying today.     Objective    Physical Exam  Constitutional:       Comments: Thin, alert male, nad   HENT:      Head: Normocephalic.      Nose: Nose normal.      Mouth/Throat:      Mouth: Mucous membranes are moist.   Eyes:      Extraocular Movements: Extraocular movements intact.      Pupils: Pupils are equal, round, and reactive to light.   Cardiovascular:      Pulses: Normal pulses.      Comments: Regular, harsh 4-5/6 systolic murmur, with click  Pulmonary:      Effort: Pulmonary effort is normal.      Breath sounds: Normal breath sounds.   Abdominal:      General: Bowel sounds are normal.      Comments: Bag with brown stool, a little more formed  Drain has grayish white liquid, with some granules and thickness to it-different than previous   Musculoskeletal:         General: Normal range of motion.   Skin:     General: Skin is dry.   Neurological:      General: No focal deficit present.      Mental Status: He is oriented to person, place, and time.   Psychiatric:         Mood and Affect: Mood normal.         Behavior: Behavior normal.          Temp:  [36.5 °C (97.7 °F)-36.8 °C (98.2 °F)] 36.5 °C (97.7 °F)  Heart Rate:  [87-99] 90  Resp:  [16-18] 16  BP: (116-137)/(71-87) 135/87    Vitals:    06/24/25 0630   Weight: 65.2 kg (143 lb 11.8 oz)             I/Os    Intake/Output Summary (Last 24 hours) at 6/29/2025 1127  Last data filed at 6/29/2025 1049  Gross per 24 hour   Intake 1347.52 ml   Output 3985 ml   Net -2637.48 ml       Labs:   Results from last 72 hours   Lab Units 06/28/25  0644 06/27/25  0515   SODIUM mmol/L 129* 129*  "  POTASSIUM mmol/L 4.0 3.8   CHLORIDE mmol/L 97* 95*   CO2 mmol/L 24 27   BUN mg/dL 17 19   CREATININE mg/dL 0.66 0.71   GLUCOSE mg/dL 147* 132*   CALCIUM mg/dL 8.1* 7.8*   ANION GAP mmol/L 12 11   EGFR mL/min/1.73m*2 >90 >90      Results from last 72 hours   Lab Units 06/28/25  0644 06/27/25  1457 06/27/25  0749 06/27/25  0515   WBC AUTO x10*3/uL  --   --  10.1 9.7   HEMOGLOBIN g/dL 8.4* 8.7* 6.7* 6.5*   HEMATOCRIT % 26.6* 27.1* 20.5* 20.7*   PLATELETS AUTO x10*3/uL  --   --  [phone]      Lab Results   Component Value Date    CALCIUM 8.1 (L) 06/28/2025    PHOS 3.5 06/26/2025      Lab Results   Component Value Date    CRP 0.17 06/18/2022      [unfilled]     Micro/ID:   No results found for the last 90 days.                   No lab exists for component: \"AGALPCRNB\"   .ID  No results found for: \"URINECULTURE\", \"BLOODCULT\", \"CSFCULTSMEAR\"    Images:  ECG 12 lead  Normal sinus rhythm  Normal ECG  When compared with ECG of 22-MAY-2025 21:01,  No significant change was found  See ED provider note for full interpretation and clinical correlation  Confirmed by Shama Hernandez ([id number]) on 6/27/2025 11:38:54 AM       Meds    Scheduled medications  Scheduled Medications[1]  Continuous medications  Continuous Medications[2]  PRN medications  PRN Medications[3]     Assessment and Plan    Diego Morales is a 70 y.o. male   Status post diverting ostomy, ABHISHEK, perf closure, drain placement. Progressing fairly well at this time. He is still on zosyn-? If able to dc soon, defer to surgery. Drain material has changed, surgery aware. Advanced to soft diet today.   Severe protein lexx malnutrition. He is on TPN, will continue. Oral intake has been on low side, will see how he does with the diet change. Still can use the extra calories. PICC line in place  Hyponatremia, stable  Anemia, has been stable. Did receive one unit of blood and a dose of iv iron. Start oral. Plan on resuming asa tomorrow.   Mitral regurn/MVP. Continue metop. " Sees Dr cruz in office, had recent echo    Maddie Singleton MD         [1] [Held by provider] aspirin, 81 mg, oral, Daily  [Held by provider] erythromycin ethylsuccinate, 400 mg, oral, q6h LOU  fat emulsion-plant based, 250 mL, intravenous, Daily  fluticasone, 2 spray, Each Nostril, Daily  gabapentin, 100 mg, nasogastric tube, q12h LOU  iron polysaccharides, 150 mg, oral, Daily  metoprolol succinate XL, 25 mg, oral, Daily  pantoprazole, 40 mg, oral, BID AC  piperacillin-tazobactam, 3.375 g, intravenous, q6h  polyethylene glycol, 17 g, oral, Daily  sodium chloride 0.9%, 10 mL, intravenous, q12h LOU  [2] Adult Clinimix Parenteral Nutrition Continuous, 70 mL/hr, Last Rate: Stopped (06/28/25 2020)  [3] PRN medications: alteplase, benzocaine-menthol, lidocaine, melatonin, [DISCONTINUED] ondansetron **OR** ondansetron, oxyCODONE, phenoL, prochlorperazine, sodium chloride 0.9%

## 2025-06-29 NOTE — CARE PLAN
The patient's goals for the shift include      The clinical goals for the shift include pt to continue to tolerate oral medications and fiber restrictede diet    Over the shift, the patient did not make progress toward the following goals. Barriers to progression include none. Recommendations to address these barriers include none.

## 2025-06-30 LAB
ANION GAP SERPL CALC-SCNC: 13 MMOL/L (ref 10–20)
BUN SERPL-MCNC: 20 MG/DL (ref 6–23)
CALCIUM SERPL-MCNC: 8.4 MG/DL (ref 8.6–10.3)
CHLORIDE SERPL-SCNC: 98 MMOL/L (ref 98–107)
CO2 SERPL-SCNC: 23 MMOL/L (ref 21–32)
CREAT SERPL-MCNC: 0.69 MG/DL (ref 0.5–1.3)
EGFRCR SERPLBLD CKD-EPI 2021: >90 ML/MIN/1.73M*2
ERYTHROCYTE [DISTWIDTH] IN BLOOD BY AUTOMATED COUNT: 16.8 % (ref 11.5–14.5)
GLUCOSE SERPL-MCNC: 139 MG/DL (ref 74–99)
HCT VFR BLD AUTO: 29.7 % (ref 41–52)
HGB BLD-MCNC: 9.5 G/DL (ref 13.5–17.5)
MCH RBC QN AUTO: 28.4 PG (ref 26–34)
MCHC RBC AUTO-ENTMCNC: 32 G/DL (ref 32–36)
MCV RBC AUTO: 89 FL (ref 80–100)
NRBC BLD-RTO: 0 /100 WBCS (ref 0–0)
PLATELET # BLD AUTO: 456 X10*3/UL (ref 150–450)
POTASSIUM SERPL-SCNC: 4.3 MMOL/L (ref 3.5–5.3)
RBC # BLD AUTO: 3.35 X10*6/UL (ref 4.5–5.9)
SODIUM SERPL-SCNC: 130 MMOL/L (ref 136–145)
WBC # BLD AUTO: 10.4 X10*3/UL (ref 4.4–11.3)

## 2025-06-30 PROCEDURE — 2500000001 HC RX 250 WO HCPCS SELF ADMINISTERED DRUGS (ALT 637 FOR MEDICARE OP): Performed by: NURSE PRACTITIONER

## 2025-06-30 PROCEDURE — 2500000001 HC RX 250 WO HCPCS SELF ADMINISTERED DRUGS (ALT 637 FOR MEDICARE OP): Performed by: INTERNAL MEDICINE

## 2025-06-30 PROCEDURE — 80048 BASIC METABOLIC PNL TOTAL CA: CPT | Performed by: INTERNAL MEDICINE

## 2025-06-30 PROCEDURE — 2500000001 HC RX 250 WO HCPCS SELF ADMINISTERED DRUGS (ALT 637 FOR MEDICARE OP): Performed by: SURGERY

## 2025-06-30 PROCEDURE — 2500000005 HC RX 250 GENERAL PHARMACY W/O HCPCS: Performed by: SURGERY

## 2025-06-30 PROCEDURE — 2500000004 HC RX 250 GENERAL PHARMACY W/ HCPCS (ALT 636 FOR OP/ED): Performed by: SURGERY

## 2025-06-30 PROCEDURE — 99232 SBSQ HOSP IP/OBS MODERATE 35: CPT | Performed by: INTERNAL MEDICINE

## 2025-06-30 PROCEDURE — 85027 COMPLETE CBC AUTOMATED: CPT | Performed by: INTERNAL MEDICINE

## 2025-06-30 PROCEDURE — 2500000005 HC RX 250 GENERAL PHARMACY W/O HCPCS: Performed by: INTERNAL MEDICINE

## 2025-06-30 PROCEDURE — 1100000001 HC PRIVATE ROOM DAILY

## 2025-06-30 PROCEDURE — 36415 COLL VENOUS BLD VENIPUNCTURE: CPT | Performed by: INTERNAL MEDICINE

## 2025-06-30 RX ADMIN — GABAPENTIN 100 MG: 250 SOLUTION ORAL at 08:26

## 2025-06-30 RX ADMIN — Medication 10 ML: at 08:26

## 2025-06-30 RX ADMIN — POLYETHYLENE GLYCOL 3350 17 G: 17 POWDER, FOR SOLUTION ORAL at 08:26

## 2025-06-30 RX ADMIN — PIPERACILLIN SODIUM AND TAZOBACTAM SODIUM 3.38 G: 3; .375 INJECTION, SOLUTION INTRAVENOUS at 09:00

## 2025-06-30 RX ADMIN — Medication 10 ML: at 21:39

## 2025-06-30 RX ADMIN — I.V. FAT EMULSION 50 G: 20 EMULSION INTRAVENOUS at 21:38

## 2025-06-30 RX ADMIN — PANTOPRAZOLE SODIUM 40 MG: 40 TABLET, DELAYED RELEASE ORAL at 16:29

## 2025-06-30 RX ADMIN — OXYCODONE HYDROCHLORIDE 5 MG: 5 TABLET ORAL at 05:48

## 2025-06-30 RX ADMIN — PANTOPRAZOLE SODIUM 40 MG: 40 TABLET, DELAYED RELEASE ORAL at 05:50

## 2025-06-30 RX ADMIN — GABAPENTIN 100 MG: 250 SOLUTION ORAL at 21:38

## 2025-06-30 RX ADMIN — PIPERACILLIN SODIUM AND TAZOBACTAM SODIUM 3.38 G: 3; .375 INJECTION, SOLUTION INTRAVENOUS at 03:27

## 2025-06-30 RX ADMIN — POLYSACCHARIDE-IRON COMPLEX 150 MG: 150 CAPSULE ORAL at 08:26

## 2025-06-30 RX ADMIN — ASCORBIC ACID, VITAMIN A PALMITATE, CHOLECALCIFEROL, THIAMINE HYDROCHLORIDE, RIBOFLAVIN-5 PHOSPHATE SODIUM, PYRIDOXINE HYDROCHLORIDE, NIACINAMIDE, DEXPANTHENOL, ALPHA-TOCOPHEROL ACETATE, VITAMIN K1, FOLIC ACID, BIOTIN, CYANOCOBALAMIN: 200; 3300; 200; 6; 3.6; 6; 40; 15; 10; 150; 600; 60; 5 INJECTION, SOLUTION INTRAVENOUS at 21:37

## 2025-06-30 RX ADMIN — METOPROLOL SUCCINATE 25 MG: 25 TABLET, EXTENDED RELEASE ORAL at 05:03

## 2025-06-30 RX ADMIN — OXYCODONE HYDROCHLORIDE 5 MG: 5 TABLET ORAL at 12:12

## 2025-06-30 RX ADMIN — OXYCODONE HYDROCHLORIDE 5 MG: 5 TABLET ORAL at 18:16

## 2025-06-30 ASSESSMENT — PAIN SCALES - GENERAL
PAINLEVEL_OUTOF10: 1
PAINLEVEL_OUTOF10: 5 - MODERATE PAIN
PAINLEVEL_OUTOF10: 2
PAINLEVEL_OUTOF10: 4
PAINLEVEL_OUTOF10: 6
PAINLEVEL_OUTOF10: 4

## 2025-06-30 ASSESSMENT — COGNITIVE AND FUNCTIONAL STATUS - GENERAL
DRESSING REGULAR LOWER BODY CLOTHING: A LITTLE
WALKING IN HOSPITAL ROOM: A LITTLE
STANDING UP FROM CHAIR USING ARMS: A LITTLE
CLIMB 3 TO 5 STEPS WITH RAILING: A LITTLE
HELP NEEDED FOR BATHING: A LITTLE
MOVING TO AND FROM BED TO CHAIR: A LITTLE
DRESSING REGULAR UPPER BODY CLOTHING: A LITTLE
DRESSING REGULAR LOWER BODY CLOTHING: A LITTLE
WALKING IN HOSPITAL ROOM: A LITTLE
MOVING TO AND FROM BED TO CHAIR: A LITTLE
DAILY ACTIVITIY SCORE: 20
MOBILITY SCORE: 18
DRESSING REGULAR UPPER BODY CLOTHING: A LITTLE
TURNING FROM BACK TO SIDE WHILE IN FLAT BAD: A LITTLE
DAILY ACTIVITIY SCORE: 20
MOVING FROM LYING ON BACK TO SITTING ON SIDE OF FLAT BED WITH BEDRAILS: A LITTLE
CLIMB 3 TO 5 STEPS WITH RAILING: A LITTLE
MOVING FROM LYING ON BACK TO SITTING ON SIDE OF FLAT BED WITH BEDRAILS: A LITTLE
HELP NEEDED FOR BATHING: A LITTLE
MOBILITY SCORE: 18
TOILETING: A LITTLE
TOILETING: A LITTLE
STANDING UP FROM CHAIR USING ARMS: A LITTLE
TURNING FROM BACK TO SIDE WHILE IN FLAT BAD: A LITTLE

## 2025-06-30 ASSESSMENT — PAIN - FUNCTIONAL ASSESSMENT
PAIN_FUNCTIONAL_ASSESSMENT: 0-10

## 2025-06-30 ASSESSMENT — PAIN DESCRIPTION - LOCATION
LOCATION: ABDOMEN

## 2025-06-30 ASSESSMENT — PAIN DESCRIPTION - DESCRIPTORS: DESCRIPTORS: ACHING

## 2025-06-30 NOTE — CONSULTS
Wound Care Consult     Visit Date: 6/30/2025      Patient Name: Diego Morales         MRN: [id number]                 Wound Plan: Patient seen, wife at bedside, no  needs today, all intact.  [place] supplies brought to room for 5 changes x 2 ostomies. Updated Asha RAM.  Message with questions.      Arabella Wiley RN, [facility]  6/30/2025  6:59 PM

## 2025-06-30 NOTE — PROGRESS NOTES
Patient: Diego Morales  Room/bed: 108/108-A  Admitted on: 6/4/2025    Age: 70 y.o.   Gender: male  Code Status:  Full Code   Admitting Dx: Small bowel obstruction (Multi) [K56.609]  Hematoma [T14.8XXA]  Lower abdominal pain [R10.30]    MRN: [id number]  PCP: Ramon Gould MD       Subjective   Doing pretty well. No new issues. Eating, smaller amounts with snacking    Objective    Physical Exam  Constitutional:       Comments: Alert, thin male, nad   HENT:      Head: Normocephalic.      Nose: Nose normal.      Mouth/Throat:      Mouth: Mucous membranes are moist.   Eyes:      Extraocular Movements: Extraocular movements intact.      Pupils: Pupils are equal, round, and reactive to light.   Cardiovascular:      Rate and Rhythm: Normal rate and regular rhythm.   Pulmonary:      Breath sounds: Normal breath sounds.   Abdominal:      General: Bowel sounds are normal.      Comments: Drain intact   Musculoskeletal:      Comments: Generalized muscle atrophy  No edema   Skin:     General: Skin is warm.   Neurological:      Mental Status: He is oriented to person, place, and time. Mental status is at baseline.   Psychiatric:         Mood and Affect: Mood normal.        Temp:  [36.1 °C (97 °F)-36.9 °C (98.4 °F)] 36.5 °C (97.7 °F)  Heart Rate:  [] 93  Resp:  [16-18] 16  BP: (115-139)/(73-80) 115/74    Vitals:    06/24/25 0630   Weight: 65.2 kg (143 lb 11.8 oz)           I/Os    Intake/Output Summary (Last 24 hours) at 6/30/2025 1201  Last data filed at 6/30/2025 0900  Gross per 24 hour   Intake 650 ml   Output 1780 ml   Net -1130 ml       Labs:   Results from last 72 hours   Lab Units 06/30/25  0710 06/28/25  0644   SODIUM mmol/L 130* 129*   POTASSIUM mmol/L 4.3 4.0   CHLORIDE mmol/L 98 97*   CO2 mmol/L 23 24   BUN mg/dL 20 17   CREATININE mg/dL 0.69 0.66   GLUCOSE mg/dL 139* 147*   CALCIUM mg/dL 8.4* 8.1*   ANION GAP mmol/L 13 12   EGFR mL/min/1.73m*2 >90 >90      Results from last 72 hours   Lab Units 06/30/25  0776  "06/28/25  0644 06/27/25  1457   WBC AUTO x10*3/uL 10.4  --   --    HEMOGLOBIN g/dL 9.5* 8.4* 8.7*   HEMATOCRIT % 29.7* 26.6* 27.1*   PLATELETS AUTO x10*3/uL 456*  --   --       Lab Results   Component Value Date    CALCIUM 8.4 (L) 06/30/2025    PHOS 3.5 06/26/2025      Lab Results   Component Value Date    CRP 0.17 06/18/2022      [unfilled]     Micro/ID:   No results found for the last 90 days.                   No lab exists for component: \"AGALPCRNB\"   .ID  No results found for: \"URINECULTURE\", \"BLOODCULT\", \"CSFCULTSMEAR\"    Images:  ECG 12 lead  Normal sinus rhythm  Normal ECG  When compared with ECG of 22-MAY-2025 21:01,  No significant change was found  See ED provider note for full interpretation and clinical correlation  Confirmed by Shama Hernandez ([id number]) on 6/27/2025 11:38:54 AM       Meds    Scheduled medications  Scheduled Medications[1]  Continuous medications  Continuous Medications[2]  PRN medications  PRN Medications[3]     Assessment and Plan    Diego Morales is a 70 y.o. male , has had 3 abdominal surgeries during this stay, most recently had lap with diverting ileostomy, perf closure, ABHISHEK as well which were quite dense  As above. Drain remains in place, 130 ml out in last 24 hours, has thickened up. Dr Canales will be back I believe. Picc line in LUE  Severe protein lexx  malnutrition. Dr romano would like to lower rate today, and dc tomorrow. Intake is fairly good. Am concerned that his fluid intake will not be able to keep up with his outputs from stool and  urine together.   Mild hyperglycemia, secondary to TPN  Hyponatremia, has been chronic, and stable  Hx diverticulosis  Hx of cardiac arrest in past, has MVP followed by Dr Dominguez  Home soon with CHRISTIE Singleton MD         [1] [Held by provider] aspirin, 81 mg, oral, Daily  [Held by provider] erythromycin ethylsuccinate, 400 mg, oral, q6h LOU  fat emulsion-plant based, 250 mL, intravenous, Daily  fluticasone, 2 spray, Each Nostril, " Daily  gabapentin, 100 mg, oral, q12h LOU  iron polysaccharides, 150 mg, oral, Daily  metoprolol succinate XL, 25 mg, oral, Daily  pantoprazole, 40 mg, oral, BID AC  polyethylene glycol, 17 g, oral, Daily  sodium chloride 0.9%, 10 mL, intravenous, q12h LOU  [2] Adult Clinimix Parenteral Nutrition Continuous, 70 mL/hr, Last Rate: 70 mL/hr (06/29/25 2052)  [3] PRN medications: alteplase, benzocaine-menthol, lidocaine, melatonin, [DISCONTINUED] ondansetron **OR** ondansetron, oxyCODONE, phenoL, prochlorperazine, sodium chloride 0.9%

## 2025-06-30 NOTE — PROGRESS NOTES
06/30/25 1015   Discharge Planning   Living Arrangements Spouse/significant other   Support Systems Spouse/significant other   Assistance Needed A&Ox4, independent with ADLs, no DME, room air at baseline-currently room air. active with [facility](SN). PCP Dr. Ramon Gould   Type of Residence Private residence   Number of Stairs to Enter Residence 3   Number of Stairs Within Residence 20   Do you have animals or pets at home? Yes   Type of Animals or Pets 1 dog - [place]   Home or Post Acute Services In home services   Type of Home Care Services Home nursing visits   Expected Discharge Disposition Home Health  (resumed [facility](SN), will need internal referral placed. Pt now with PICC and TPN and drain- per team, not expected to dc with PICC and TPN (low fiber diet currently))   Patient Choice   Provider Choice list and CMS website (https://medicare.gov/care-compare#search) for post-acute Quality and Resource Measure Data were provided and reviewed with: Family;Patient   Patient / Family choosing to utilize agency / facility established prior to hospitalization Yes   Stroke Family Assessment   Stroke Family Assessment Needed No   Intensity of Service   Intensity of Service 0-30 min

## 2025-06-30 NOTE — CARE PLAN
The patient's goals for the shift include  rest     The clinical goals for the shift include Pt will tolerate treatment and have adequate pain control throughout shift      Problem: Pain - Adult  Goal: Verbalizes/displays adequate comfort level or baseline comfort level  Outcome: Progressing     Problem: Safety - Adult  Goal: Free from fall injury  Outcome: Progressing     Problem: Chronic Conditions and Co-morbidities  Goal: Patient's chronic conditions and co-morbidity symptoms are monitored and maintained or improved  Outcome: Progressing

## 2025-06-30 NOTE — CARE PLAN
The patient's goals for the shift include      The clinical goals for the shift include pt will remain comfortable during shift

## 2025-06-30 NOTE — PROGRESS NOTES
"Nutrition Follow Up Assessment:   Nutrition Assessment       Patient is a 70 y.o. male with h/o perforated diverticulitis - now presenting with SBO s/p NGT in ED (6/4) for decompression.     Pt s/p IR drain placement (6/5) for pelvic hematoma.  PICC placed (6/10) for supplemental TPN.      Pt with prolonged obstruction so taken to OR (6/17) for laparoscopic converted to open extensive ABHISHEK & open small bowel resection w/ anastomosis.      KATY drain output turned bilious with c/f colocutaneous controlled fistula. Pt returned to OR  (6/24) for ex lap, ABHISHEK, & loop ileostomy creation.     Pt with ROBF (6/25). NGT removed (6/26) & pt subsequently advanced to a clear liquid diet. Further advanced to FLD (6/27) and fiber restricted (6/29).    Team reports plan to lower TPN rate today & discontinue tomorrow.    Nutrition History:  Food and Nutrient History: Follow up visit made, pt resting in bed at that time. TPN infusing @ 70mL/hr. Pt reports he is tolerating fiber restricted diet but that it \"sits heavy\" after he eats something. Pt reports tolerating 100% scrambled eggs, 25% hashbrowns, bites of riggins, & 100% x1 slice of toast at breakfast this AM. Is amenable to Ensure Clear BID. Ileostomy diet education provided. No additional needs at this time.  Food Allergy:  (None)     Anthropometrics:  Height: 180.3 cm (5' 11\")   Weight: 65.2 kg (143 lb 11.8 oz)   BMI (Calculated): 20.06  IBW/kg (Dietitian Calculated): 78.2 kg  Percent of IBW: 83 %    Weight History:   Weight         6/4/2025  2246 6/19/2025  0507 6/20/2025  1546 6/23/2025  0644 6/24/2025  0630    Weight: 64.1 kg (141 lb 5 oz) 64 kg (141 lb) 65.8 kg (145 lb 1 oz) 64.4 kg (142 lb) 65.2 kg (143 lb 11.8 oz)     Weight Change %:  Weight History / % Weight Change: Stable weight since admission  Significant Weight Loss: No    Nutrition Focused Physical Exam Findings:  Defer: See RDN assessment (6/6/25)    Edema:  Edema: none    Physical Findings:  Skin: Positive " (Abdominal surgical incision)  Digestive System Findings:  (+Colostomy and ileostomy)  Mouth Findings:  (None)    Nutrition Significant Labs:  BMP Trend:   Results from last 7 days   Lab Units 06/30/25  0710 06/28/25  0644 06/27/25  0515 06/26/25  0724   GLUCOSE mg/dL 139* 147* 132* 123*   CALCIUM mg/dL 8.4* 8.1* 7.8* 8.0*   SODIUM mmol/L 130* 129* 129* 128*   POTASSIUM mmol/L 4.3 4.0 3.8 4.3   CO2 mmol/L 23 24 27 25   CHLORIDE mmol/L 98 97* 95* 94*   BUN mg/dL 20 17 19 24*   CREATININE mg/dL 0.69 0.66 0.71 0.77      Nutrition Specific Medications:  Scheduled medications  Scheduled Medications[1]  Continuous medications  Continuous Medications[2]  PRN medications  PRN Medications[3]    I/O:   Last BM Date: 06/30/25; Stool Appearance: Liquid (06/30/25 0830)    Dietary Orders (From admission, onward)       Start     Ordered    06/29/25 0855  Adult diet Fiber restricted  Diet effective now        Question:  Diet type  Answer:  Fiber restricted    06/29/25 0854    06/04/25 2251  May Participate in Room Service  ( ROOM SERVICE MAY PARTICIPATE)  Once        Question:  .  Answer:  Yes    06/04/25 2250             Estimated Needs:   Total Energy Estimated Needs in 24 hours (kCal):  (1900+)  Method for Estimating Needs: ~30 kcals/kg x CBW (64.1 kg)  Total Protein Estimated Needs in 24 Hours (g):  (95+)  Method for Estimating 24 Hour Protein Needs: ~1.5 g/kg x admit wt (64.1 kg)  Total Fluid Estimated Needs in 24 Hours (mL):  (1900+)  Method for Estimating 24 Hour Fluid Needs: 1mL/kcal or per team        Nutrition Diagnosis   Malnutrition Diagnosis  Patient has Malnutrition Diagnosis: Yes  Diagnosis Status: Active  Malnutrition Diagnosis: Severe malnutrition related to acute disease or injury  Related to: recent diverticular perforation s/p SBR with colostomy creation -- now c/b SBO  As Evidenced by: meeting <75% EENs for the past x 1 month, significant 5% weight loss in <1 month, & moderate muscle wasting/fat  loss      Nutrition Interventions/Recommendations      Nutrition Recommendations:  Now that pt is tolerating solid foods, recommend weaning TPN:  Discontinue daily SMOF lipids  Wean TPN rate to 35mL/hr. If labs remain stable after 24hrs, discontinue order for TPN.    Continue fiber restricted diet as tolerated    Obtain updated measured weight as able      Nutrition Interventions/Goals:   Medical Food Supplement: Commercial beverage medical food supplement therapy  Goal: Ensure Clear BID (240 kcals/8g protein each)      Education Documentation  Diet and Nutrition After Ileostomy Placement, taught by Samantha Dye RDN, LD at 6/30/2025  2:51 PM.  Learner: Patient  Readiness: Acceptance  Method: Explanation, Handout  Response: Verbalizes Understanding      Nutrition Monitoring and Evaluation   Estimated Energy Intake: Energy intake 50 -75% of estimated energy needs    Time Spent (min): 60 minutes            [1] [Held by provider] aspirin, 81 mg, oral, Daily  [Held by provider] erythromycin ethylsuccinate, 400 mg, oral, q6h LOU  fat emulsion-plant based, 250 mL, intravenous, Daily  fluticasone, 2 spray, Each Nostril, Daily  gabapentin, 100 mg, oral, q12h LOU  iron polysaccharides, 150 mg, oral, Daily  metoprolol succinate XL, 25 mg, oral, Daily  pantoprazole, 40 mg, oral, BID AC  polyethylene glycol, 17 g, oral, Daily  sodium chloride 0.9%, 10 mL, intravenous, q12h LOU  [2] Adult Clinimix Parenteral Nutrition Continuous, 70 mL/hr, Last Rate: 70 mL/hr (06/29/25 2052)  [3] PRN medications: alteplase, benzocaine-menthol, lidocaine, melatonin, [DISCONTINUED] ondansetron **OR** ondansetron, oxyCODONE, phenoL, prochlorperazine, sodium chloride 0.9%

## 2025-07-01 PROCEDURE — 2500000004 HC RX 250 GENERAL PHARMACY W/ HCPCS (ALT 636 FOR OP/ED): Performed by: SURGERY

## 2025-07-01 PROCEDURE — 2500000004 HC RX 250 GENERAL PHARMACY W/ HCPCS (ALT 636 FOR OP/ED): Performed by: FAMILY MEDICINE

## 2025-07-01 PROCEDURE — 99232 SBSQ HOSP IP/OBS MODERATE 35: CPT | Performed by: FAMILY MEDICINE

## 2025-07-01 PROCEDURE — 2500000001 HC RX 250 WO HCPCS SELF ADMINISTERED DRUGS (ALT 637 FOR MEDICARE OP): Performed by: NURSE PRACTITIONER

## 2025-07-01 PROCEDURE — 2500000001 HC RX 250 WO HCPCS SELF ADMINISTERED DRUGS (ALT 637 FOR MEDICARE OP): Performed by: INTERNAL MEDICINE

## 2025-07-01 PROCEDURE — 1100000001 HC PRIVATE ROOM DAILY

## 2025-07-01 PROCEDURE — 2500000001 HC RX 250 WO HCPCS SELF ADMINISTERED DRUGS (ALT 637 FOR MEDICARE OP): Performed by: SURGERY

## 2025-07-01 RX ORDER — GABAPENTIN 100 MG/1
100 CAPSULE ORAL 2 TIMES DAILY
Status: DISCONTINUED | OUTPATIENT
Start: 2025-07-01 | End: 2025-07-02 | Stop reason: HOSPADM

## 2025-07-01 RX ORDER — IBUPROFEN 600 MG/1
600 TABLET, FILM COATED ORAL EVERY 6 HOURS SCHEDULED
Status: DISCONTINUED | OUTPATIENT
Start: 2025-07-01 | End: 2025-07-02 | Stop reason: HOSPADM

## 2025-07-01 RX ORDER — CYCLOBENZAPRINE HCL 5 MG
5 TABLET ORAL 3 TIMES DAILY
Status: DISCONTINUED | OUTPATIENT
Start: 2025-07-01 | End: 2025-07-02 | Stop reason: HOSPADM

## 2025-07-01 RX ORDER — FERROUS SULFATE 325(65) MG
65 TABLET ORAL 3 TIMES DAILY
Status: DISCONTINUED | OUTPATIENT
Start: 2025-07-01 | End: 2025-07-01

## 2025-07-01 RX ORDER — CEFAZOLIN SODIUM 2 G/50ML
2 SOLUTION INTRAVENOUS EVERY 6 HOURS
Status: COMPLETED | OUTPATIENT
Start: 2025-07-01 | End: 2025-07-02

## 2025-07-01 RX ORDER — ACETAMINOPHEN 325 MG/1
650 TABLET ORAL EVERY 6 HOURS
Status: DISCONTINUED | OUTPATIENT
Start: 2025-07-01 | End: 2025-07-02 | Stop reason: HOSPADM

## 2025-07-01 RX ORDER — MULTIVIT-MIN/IRON FUM/FOLIC AC 7.5 MG-4
1 TABLET ORAL DAILY
Status: DISCONTINUED | OUTPATIENT
Start: 2025-07-01 | End: 2025-07-02 | Stop reason: HOSPADM

## 2025-07-01 RX ORDER — METRONIDAZOLE 500 MG/100ML
500 INJECTION, SOLUTION INTRAVENOUS EVERY 8 HOURS
Status: DISCONTINUED | OUTPATIENT
Start: 2025-07-01 | End: 2025-07-02 | Stop reason: HOSPADM

## 2025-07-01 RX ADMIN — CEFAZOLIN SODIUM 2 G: 2 SOLUTION INTRAVENOUS at 14:40

## 2025-07-01 RX ADMIN — Medication 1 TABLET: at 09:35

## 2025-07-01 RX ADMIN — GABAPENTIN 100 MG: 250 SOLUTION ORAL at 08:12

## 2025-07-01 RX ADMIN — OXYCODONE HYDROCHLORIDE 5 MG: 5 TABLET ORAL at 00:51

## 2025-07-01 RX ADMIN — METRONIDAZOLE 500 MG: 500 INJECTION, SOLUTION INTRAVENOUS at 20:25

## 2025-07-01 RX ADMIN — METRONIDAZOLE 500 MG: 500 INJECTION, SOLUTION INTRAVENOUS at 12:57

## 2025-07-01 RX ADMIN — IBUPROFEN 600 MG: 600 TABLET ORAL at 12:58

## 2025-07-01 RX ADMIN — ACETAMINOPHEN 650 MG: 325 TABLET, FILM COATED ORAL at 12:58

## 2025-07-01 RX ADMIN — GABAPENTIN 100 MG: 100 CAPSULE ORAL at 20:25

## 2025-07-01 RX ADMIN — OXYCODONE HYDROCHLORIDE 5 MG: 5 TABLET ORAL at 07:25

## 2025-07-01 RX ADMIN — PANTOPRAZOLE SODIUM 40 MG: 40 TABLET, DELAYED RELEASE ORAL at 17:06

## 2025-07-01 RX ADMIN — Medication 10 ML: at 08:08

## 2025-07-01 RX ADMIN — ACETAMINOPHEN 650 MG: 325 TABLET, FILM COATED ORAL at 23:26

## 2025-07-01 RX ADMIN — CYCLOBENZAPRINE HYDROCHLORIDE 5 MG: 5 TABLET, FILM COATED ORAL at 12:58

## 2025-07-01 RX ADMIN — POLYSACCHARIDE-IRON COMPLEX 150 MG: 150 CAPSULE ORAL at 08:08

## 2025-07-01 RX ADMIN — METOPROLOL SUCCINATE 25 MG: 25 TABLET, EXTENDED RELEASE ORAL at 05:44

## 2025-07-01 RX ADMIN — POLYETHYLENE GLYCOL 3350 17 G: 17 POWDER, FOR SOLUTION ORAL at 08:09

## 2025-07-01 RX ADMIN — CYCLOBENZAPRINE HYDROCHLORIDE 5 MG: 5 TABLET, FILM COATED ORAL at 17:06

## 2025-07-01 RX ADMIN — CEFAZOLIN SODIUM 2 G: 2 SOLUTION INTRAVENOUS at 21:50

## 2025-07-01 RX ADMIN — IBUPROFEN 600 MG: 600 TABLET ORAL at 23:26

## 2025-07-01 RX ADMIN — IBUPROFEN 600 MG: 600 TABLET ORAL at 17:06

## 2025-07-01 RX ADMIN — Medication 10 ML: at 21:51

## 2025-07-01 RX ADMIN — PANTOPRAZOLE SODIUM 40 MG: 40 TABLET, DELAYED RELEASE ORAL at 05:44

## 2025-07-01 RX ADMIN — ACETAMINOPHEN 650 MG: 325 TABLET, FILM COATED ORAL at 17:06

## 2025-07-01 RX ADMIN — ASPIRIN 81 MG: 81 TABLET, DELAYED RELEASE ORAL at 08:08

## 2025-07-01 RX ADMIN — CYCLOBENZAPRINE HYDROCHLORIDE 5 MG: 5 TABLET, FILM COATED ORAL at 20:25

## 2025-07-01 ASSESSMENT — COGNITIVE AND FUNCTIONAL STATUS - GENERAL
CLIMB 3 TO 5 STEPS WITH RAILING: A LITTLE
DAILY ACTIVITIY SCORE: 24
WALKING IN HOSPITAL ROOM: A LITTLE
MOBILITY SCORE: 22
DAILY ACTIVITIY SCORE: 24
CLIMB 3 TO 5 STEPS WITH RAILING: A LITTLE
MOBILITY SCORE: 23

## 2025-07-01 ASSESSMENT — PAIN - FUNCTIONAL ASSESSMENT
PAIN_FUNCTIONAL_ASSESSMENT: 0-10

## 2025-07-01 ASSESSMENT — PAIN DESCRIPTION - LOCATION: LOCATION: ABDOMEN

## 2025-07-01 ASSESSMENT — PAIN SCALES - GENERAL
PAINLEVEL_OUTOF10: 7
PAINLEVEL_OUTOF10: 2
PAINLEVEL_OUTOF10: 0 - NO PAIN
PAINLEVEL_OUTOF10: 9
PAINLEVEL_OUTOF10: 0 - NO PAIN

## 2025-07-01 NOTE — PROGRESS NOTES
Diego Morales is a 70 y.o. male on day 27 of admission presenting with Small bowel obstruction (Multi).      Subjective   Patient had cereal this morning and states he was amazing. He denies any current complaints and is eager for discharge home tomorrow.     Objective     Last Recorded Vitals  /74 (BP Location: Right arm, Patient Position: Sitting)   Pulse 91   Temp 36.5 °C (97.7 °F) (Temporal)   Resp 17   Wt 65.2 kg (143 lb 11.8 oz)   SpO2 97%   Intake/Output last 3 Shifts:    Intake/Output Summary (Last 24 hours) at 7/1/2025 1434  Last data filed at 7/1/2025 1350  Gross per 24 hour   Intake 760 ml   Output 1530 ml   Net -770 ml       Admission Weight  Weight: 65.8 kg (145 lb) (06/04/25 1735)    Daily Weight  06/24/25 : 65.2 kg (143 lb 11.8 oz)      Physical Exam  Constitutional: alert and oriented x 3, awake, cooperative, no acute distress but chronically ill-appearing  Skin: warm and dry  Head/Neck: Normocephalic, atraumatic  Eyes: clear sclera  ENMT: mucous membranes moist  Cardio: Regular rate and rhythm  Resp: CTA bilaterally, good respiratory effort  Gastrointestinal: Soft, nontender, nondistended; drain in place with LUQ colostomy and LLQ ileostomy in place  Musculoskeletal: ROM intact, no joint swelling  Extremities: No edema, cyanosis, or clubbing  Neuro: alert and oriented x 3  Psychological: Appropriate mood and behavior      Relevant Results  Scheduled medications  Scheduled Medications[1]  Continuous medications  Continuous Medications[2]  PRN medications  PRN Medications[3]        This patient has a central line   Reason for the central line remaining today? Parenteral nutrition            Assessment & Plan    69yo CM with PMH of mitral valve prolapse, cardiac arrest during cath in 2016 due to vfib needing cardioversion, and recent diverticulitis with perforation s/p resection and colostomy bag on 5/15/25 that presented to the ED with abdominal pain and was admitted for SBO.      Small  bowel obstruction, resolved  - evidenced on admitting imaging  - hx of Surinder for perforated diverticulitis 5/15/25  - SBFT study showed complete bowel obstruction on 6/13  - s/p open ex lap with lysis of adhesions on 6/17  - s/p repeat ex lap with lysis of adhesions and loop ileostomy 6/24  - general surgery following, complete TPN today and plan to discharge on augmentin for 2 week total abx course  - continue PRN meds  - continue to monitor     Abdominal hematoma  - CT showed 7.3cm x 13cm x 8.6cm abnormality in mass vs fluid collection  - IR drain placed with dark hemorrhagic hematoma collected but minimal output  - fluid cultures NGTD  - monitor output closely but limited     Mitral valve prolapse, Hx of arrest  - continue home metoprolol and ASA     DVT Proph: SCDs only     Dispo: Patient requires close inpatient monitoring in setting of SBO and hematoma requiring surgical intervention, discharge likely tomorrow with [facility] for SN and dietician.       Lisa Ansari DO           [1] acetaminophen, 650 mg, oral, q6h  aspirin, 81 mg, oral, Daily  ceFAZolin, 2 g, intravenous, q6h  cyclobenzaprine, 5 mg, oral, TID  fluticasone, 2 spray, Each Nostril, Daily  gabapentin, 100 mg, oral, BID  ibuprofen, 600 mg, oral, q6h LOU  iron polysaccharides, 150 mg, oral, Daily  metoprolol succinate XL, 25 mg, oral, Daily  metroNIDAZOLE, 500 mg, intravenous, q8h  multivitamin with minerals, 1 tablet, oral, Daily  pantoprazole, 40 mg, oral, BID AC  sodium chloride 0.9%, 10 mL, intravenous, q12h LOU  [2] Adult Clinimix Parenteral Nutrition Continuous, 40 mL/hr, Last Rate: 40 mL/hr (06/30/25 213)  [3] PRN medications: alteplase, benzocaine-menthol, lidocaine, melatonin, [DISCONTINUED] ondansetron **OR** ondansetron, oxyCODONE, prochlorperazine, sodium chloride 0.9%

## 2025-07-01 NOTE — CARE PLAN
The patient's goals for the shift include  rest and pain control   The clinical goals for the shift include pt will remain comfortable during shift    Problem: Pain - Adult  Goal: Verbalizes/displays adequate comfort level or baseline comfort level  Outcome: Progressing     Problem: Safety - Adult  Goal: Free from fall injury  Outcome: Progressing     Problem: Discharge Planning  Goal: Discharge to home or other facility with appropriate resources  Outcome: Progressing     Problem: Chronic Conditions and Co-morbidities  Goal: Patient's chronic conditions and co-morbidity symptoms are monitored and maintained or improved  Outcome: Progressing     Problem: Nutrition  Goal: Nutrient intake appropriate for maintaining nutritional needs  Outcome: Progressing     Problem: Pain  Goal: Takes deep breaths with improved pain control throughout the shift  Outcome: Progressing  Goal: Turns in bed with improved pain control throughout the shift  Outcome: Progressing  Goal: Walks with improved pain control throughout the shift  Outcome: Progressing  Goal: Performs ADL's with improved pain control throughout shift  Outcome: Progressing  Goal: Participates in PT with improved pain control throughout the shift  Outcome: Progressing  Goal: Free from opioid side effects throughout the shift  Outcome: Progressing  Goal: Free from acute confusion related to pain meds throughout the shift  Outcome: Progressing     Problem: Fall/Injury  Goal: Not fall by end of shift  Outcome: Progressing  Goal: Be free from injury by end of the shift  Outcome: Progressing  Goal: Verbalize understanding of personal risk factors for fall in the hospital  Outcome: Progressing  Goal: Verbalize understanding of risk factor reduction measures to prevent injury from fall in the home  Outcome: Progressing  Goal: Use assistive devices by end of the shift  Outcome: Progressing  Goal: Pace activities to prevent fatigue by end of the shift  Outcome: Progressing      Problem: Skin  Goal: Decreased wound size/increased tissue granulation at next dressing change  Outcome: Progressing  Goal: Participates in plan/prevention/treatment measures  Outcome: Progressing  Goal: Prevent/manage excess moisture  Outcome: Progressing  Goal: Prevent/minimize sheer/friction injuries  Outcome: Progressing  Goal: Promote/optimize nutrition  Outcome: Progressing  Goal: Promote skin healing  Outcome: Progressing

## 2025-07-01 NOTE — CARE PLAN
The patient's goals for the shift include      The clinical goals for the shift include pt's pain will be managed during shift

## 2025-07-01 NOTE — PROGRESS NOTES
"General Surgery Daily Progress Note      Subjective:  The patient is doing well today.  He is no acute distress.  The patient denies nausea and vomiting.  He reports feeling like he is doing well with minimal pain.  He has been tolerating a diet.  He has been having good output from his ileostomy, and does not feel that it has been high output.        Objective:  Vitals:   Vitals:    07/01/25 0805   BP: 117/83   Pulse: 91   Resp: 16   Temp: 36.5 °C (97.7 °F)   SpO2: 98%       Physical Exam:   General: No acute distress. Sitting up in bed.   Neuro: Alert and oriented ×3. Follows commands.  Head: Atraumatic  Eyes: Pupils equal reactive to light. Extraocular motions intact.  Ears: Hears normal speaking voice.  Neck: Supple. No appreciable masses.  Heart: Regular rate  Lungs: No audible wheeze.  Breathing comfortably.  Abdomen: Soft. Nondistended.  Midline wound is healthy.  Left upper quadrant colostomy is healthy.  Left lower quadrant loop ileostomy is healthy appearing.  The KATY drain is putting out minimal fibrinous exudate.  Musculoskeletal: Moves all extremities.  Normal range of motion.  Skin: No rashes or lesions.  Psychological: Normal affect        Labs:  CBC:   Lab Results   Component Value Date    WBC 10.4 06/30/2025    RBC 3.35 (L) 06/30/2025    HGB 9.5 (L) 06/30/2025    HCT 29.7 (L) 06/30/2025     (H) 06/30/2025       RFP:   Lab Results   Component Value Date     (L) 06/30/2025    K 4.3 06/30/2025    CL 98 06/30/2025    CO2 23 06/30/2025    BUN 20 06/30/2025    CREATININE 0.69 06/30/2025    CALCIUM 8.4 (L) 06/30/2025        LFTs:   No results found for: \"PROT\", \"ALBUMIN\", \"BILITOT\", \"BILIDIR\", \"ALKPHOS\", \"AST\", \"ALT\"             Images:    No new      Assessment:  This is a 70 y.o. 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  His ileus has finally resolved.  He is tolerating a diet.  His KATY drain is putting out a murky fibrinous exudate, but it is minimal and of unclear clinical significance.  Overall, he is doing well.      Plan:  -- Multimodal pain control  -- Stop TPN after this bag runs out  -- Continue with low fiber food with protein supplements  -- Recommend 2 weeks of antibiotics for the bowel perforation  -- Iron and vitamin supplementation  -- Plan to discharge home tomorrow and take out the KATY drain.  Plan to have the PICC line at home in case supplemental fluids are needed for the ileostomy.  -- Plan to remove staples in the office in a week      Iván Canales MD  General Surgery  Office: [phone]  Fax: [phone]  11:04 AM  07/01/25

## 2025-07-02 ENCOUNTER — PHARMACY VISIT (OUTPATIENT)
Dept: PHARMACY | Facility: CLINIC | Age: 70
End: 2025-07-02
Payer: COMMERCIAL

## 2025-07-02 VITALS
TEMPERATURE: 97.7 F | HEART RATE: 93 BPM | OXYGEN SATURATION: 97 % | HEIGHT: 71 IN | BODY MASS INDEX: 20.12 KG/M2 | SYSTOLIC BLOOD PRESSURE: 134 MMHG | DIASTOLIC BLOOD PRESSURE: 81 MMHG | WEIGHT: 143.74 LBS | RESPIRATION RATE: 22 BRPM

## 2025-07-02 DIAGNOSIS — K63.1: ICD-10-CM

## 2025-07-02 DIAGNOSIS — D50.8 OTHER IRON DEFICIENCY ANEMIA: ICD-10-CM

## 2025-07-02 DIAGNOSIS — E44.0 MODERATE PROTEIN-CALORIE MALNUTRITION (MULTI): ICD-10-CM

## 2025-07-02 PROBLEM — K56.609 SMALL BOWEL OBSTRUCTION (MULTI): Status: RESOLVED | Noted: 2025-06-04 | Resolved: 2025-07-02

## 2025-07-02 LAB
ACID FAST STN SPEC: NORMAL
ALBUMIN SERPL BCP-MCNC: 3.2 G/DL (ref 3.4–5)
ANION GAP SERPL CALC-SCNC: 15 MMOL/L (ref 10–20)
BUN SERPL-MCNC: 29 MG/DL (ref 6–23)
CALCIUM SERPL-MCNC: 9.2 MG/DL (ref 8.6–10.3)
CHLORIDE SERPL-SCNC: 99 MMOL/L (ref 98–107)
CO2 SERPL-SCNC: 21 MMOL/L (ref 21–32)
CREAT SERPL-MCNC: 1 MG/DL (ref 0.5–1.3)
EGFRCR SERPLBLD CKD-EPI 2021: 80 ML/MIN/1.73M*2
ERYTHROCYTE [DISTWIDTH] IN BLOOD BY AUTOMATED COUNT: 16.8 % (ref 11.5–14.5)
GLUCOSE SERPL-MCNC: 83 MG/DL (ref 74–99)
HCT VFR BLD AUTO: 32.5 % (ref 41–52)
HGB BLD-MCNC: 10.4 G/DL (ref 13.5–17.5)
MAGNESIUM SERPL-MCNC: 2.18 MG/DL (ref 1.6–2.4)
MCH RBC QN AUTO: 28.3 PG (ref 26–34)
MCHC RBC AUTO-ENTMCNC: 32 G/DL (ref 32–36)
MCV RBC AUTO: 89 FL (ref 80–100)
MYCOBACTERIUM SPEC CULT: NORMAL
NRBC BLD-RTO: 0 /100 WBCS (ref 0–0)
PHOSPHATE SERPL-MCNC: 4.7 MG/DL (ref 2.5–4.9)
PLATELET # BLD AUTO: 448 X10*3/UL (ref 150–450)
POTASSIUM SERPL-SCNC: 4.8 MMOL/L (ref 3.5–5.3)
RBC # BLD AUTO: 3.67 X10*6/UL (ref 4.5–5.9)
SODIUM SERPL-SCNC: 130 MMOL/L (ref 136–145)
WBC # BLD AUTO: 6.8 X10*3/UL (ref 4.4–11.3)

## 2025-07-02 PROCEDURE — 83735 ASSAY OF MAGNESIUM: CPT | Performed by: SURGERY

## 2025-07-02 PROCEDURE — 80069 RENAL FUNCTION PANEL: CPT | Performed by: SURGERY

## 2025-07-02 PROCEDURE — 2500000004 HC RX 250 GENERAL PHARMACY W/ HCPCS (ALT 636 FOR OP/ED): Performed by: FAMILY MEDICINE

## 2025-07-02 PROCEDURE — 2500000001 HC RX 250 WO HCPCS SELF ADMINISTERED DRUGS (ALT 637 FOR MEDICARE OP): Performed by: SURGERY

## 2025-07-02 PROCEDURE — 85027 COMPLETE CBC AUTOMATED: CPT | Performed by: SURGERY

## 2025-07-02 PROCEDURE — 2500000001 HC RX 250 WO HCPCS SELF ADMINISTERED DRUGS (ALT 637 FOR MEDICARE OP): Performed by: INTERNAL MEDICINE

## 2025-07-02 PROCEDURE — 36415 COLL VENOUS BLD VENIPUNCTURE: CPT | Performed by: SURGERY

## 2025-07-02 PROCEDURE — RXMED WILLOW AMBULATORY MEDICATION CHARGE

## 2025-07-02 PROCEDURE — 2500000004 HC RX 250 GENERAL PHARMACY W/ HCPCS (ALT 636 FOR OP/ED): Performed by: SURGERY

## 2025-07-02 PROCEDURE — 99239 HOSP IP/OBS DSCHRG MGMT >30: CPT

## 2025-07-02 RX ORDER — CYCLOBENZAPRINE HCL 5 MG
5 TABLET ORAL 3 TIMES DAILY PRN
Start: 2025-07-02

## 2025-07-02 RX ORDER — CYCLOBENZAPRINE HCL 5 MG
5 TABLET ORAL 3 TIMES DAILY
Qty: 90 TABLET | Refills: 0 | Status: SHIPPED | OUTPATIENT
Start: 2025-07-02 | End: 2025-07-02

## 2025-07-02 RX ORDER — CEFAZOLIN SODIUM 2 G/50ML
2 SOLUTION INTRAVENOUS EVERY 6 HOURS
Status: DISCONTINUED | OUTPATIENT
Start: 2025-07-02 | End: 2025-07-02 | Stop reason: HOSPADM

## 2025-07-02 RX ORDER — PANTOPRAZOLE SODIUM 40 MG/1
40 TABLET, DELAYED RELEASE ORAL
Qty: 60 TABLET | Refills: 0 | Status: SHIPPED | OUTPATIENT
Start: 2025-07-02

## 2025-07-02 RX ORDER — AMOXICILLIN AND CLAVULANATE POTASSIUM 875; 125 MG/1; MG/1
875 TABLET, FILM COATED ORAL 2 TIMES DAILY
Qty: 14 TABLET | Refills: 0 | Status: SHIPPED | OUTPATIENT
Start: 2025-07-02 | End: 2025-07-09

## 2025-07-02 RX ORDER — AMOXICILLIN AND CLAVULANATE POTASSIUM 875; 125 MG/1; MG/1
875 TABLET, FILM COATED ORAL 2 TIMES DAILY
Qty: 28 TABLET | Refills: 0 | Status: SHIPPED | OUTPATIENT
Start: 2025-07-02 | End: 2025-07-02

## 2025-07-02 RX ORDER — ACETAMINOPHEN 325 MG/1
650 TABLET ORAL EVERY 6 HOURS
Qty: 240 TABLET | Refills: 0 | Status: SHIPPED | OUTPATIENT
Start: 2025-07-02

## 2025-07-02 RX ORDER — POLYETHYLENE GLYCOL 3350 17 G/17G
17 POWDER, FOR SOLUTION ORAL DAILY
Qty: 238 G | Refills: 0 | Status: SHIPPED | OUTPATIENT
Start: 2025-07-02

## 2025-07-02 RX ORDER — CYCLOBENZAPRINE HCL 5 MG
5 TABLET ORAL 3 TIMES DAILY
Qty: 42 TABLET | Refills: 0 | Status: SHIPPED | OUTPATIENT
Start: 2025-07-02 | End: 2025-07-02

## 2025-07-02 RX ORDER — IRON POLYSACCHARIDE COMPLEX 150 MG
150 CAPSULE ORAL DAILY
Qty: 30 CAPSULE | Refills: 0 | Status: SHIPPED | OUTPATIENT
Start: 2025-07-03 | End: 2025-08-02

## 2025-07-02 RX ORDER — OXYCODONE HYDROCHLORIDE 5 MG/1
5 TABLET ORAL EVERY 8 HOURS PRN
Qty: 21 TABLET | Refills: 0 | Status: SHIPPED | OUTPATIENT
Start: 2025-07-02

## 2025-07-02 RX ADMIN — CYCLOBENZAPRINE HYDROCHLORIDE 5 MG: 5 TABLET, FILM COATED ORAL at 08:54

## 2025-07-02 RX ADMIN — IBUPROFEN 600 MG: 600 TABLET ORAL at 12:00

## 2025-07-02 RX ADMIN — ACETAMINOPHEN 650 MG: 325 TABLET, FILM COATED ORAL at 12:00

## 2025-07-02 RX ADMIN — IBUPROFEN 600 MG: 600 TABLET ORAL at 05:59

## 2025-07-02 RX ADMIN — PANTOPRAZOLE SODIUM 40 MG: 40 TABLET, DELAYED RELEASE ORAL at 05:59

## 2025-07-02 RX ADMIN — POLYSACCHARIDE-IRON COMPLEX 150 MG: 150 CAPSULE ORAL at 08:55

## 2025-07-02 RX ADMIN — METRONIDAZOLE 500 MG: 500 INJECTION, SOLUTION INTRAVENOUS at 03:42

## 2025-07-02 RX ADMIN — CEFAZOLIN SODIUM 2 G: 2 SOLUTION INTRAVENOUS at 05:01

## 2025-07-02 RX ADMIN — ACETAMINOPHEN 650 MG: 325 TABLET, FILM COATED ORAL at 05:59

## 2025-07-02 RX ADMIN — ASPIRIN 81 MG: 81 TABLET, DELAYED RELEASE ORAL at 08:54

## 2025-07-02 RX ADMIN — METOPROLOL SUCCINATE 25 MG: 25 TABLET, EXTENDED RELEASE ORAL at 05:59

## 2025-07-02 RX ADMIN — METRONIDAZOLE 500 MG: 500 INJECTION, SOLUTION INTRAVENOUS at 12:00

## 2025-07-02 RX ADMIN — Medication 10 ML: at 08:55

## 2025-07-02 RX ADMIN — CEFAZOLIN SODIUM 2 G: 2 SOLUTION INTRAVENOUS at 08:54

## 2025-07-02 RX ADMIN — Medication 1 TABLET: at 08:54

## 2025-07-02 RX ADMIN — GABAPENTIN 100 MG: 100 CAPSULE ORAL at 08:54

## 2025-07-02 ASSESSMENT — PAIN DESCRIPTION - LOCATION: LOCATION: ABDOMEN

## 2025-07-02 ASSESSMENT — COGNITIVE AND FUNCTIONAL STATUS - GENERAL
MOBILITY SCORE: 24
DAILY ACTIVITIY SCORE: 24

## 2025-07-02 ASSESSMENT — PAIN DESCRIPTION - ORIENTATION: ORIENTATION: MID

## 2025-07-02 ASSESSMENT — PAIN SCALES - GENERAL: PAINLEVEL_OUTOF10: 3

## 2025-07-02 ASSESSMENT — PAIN - FUNCTIONAL ASSESSMENT: PAIN_FUNCTIONAL_ASSESSMENT: 0-10

## 2025-07-02 NOTE — PROGRESS NOTES
General Surgery Daily Progress Note      Subjective:  The patient is doing well today.  He is no acute distress.  The patient denies nausea and vomiting.  He reports feeling like he is doing well with minimal pain.  He has been tolerating a diet.  He has been having good output from his ileostomy, and does not feel that it has been high output.        Objective:  Vitals:   Vitals:    07/02/25 1215   BP: 134/81   Pulse: 93   Resp: 22   Temp: 36.5 °C (97.7 °F)   SpO2: 97%       Physical Exam:   General: No acute distress. Sitting up in bed.   Neuro: Alert and oriented ×3. Follows commands.  Head: Atraumatic  Eyes: Pupils equal reactive to light. Extraocular motions intact.  Ears: Hears normal speaking voice.  Neck: Supple. No appreciable masses.  Heart: Regular rate  Lungs: No audible wheeze.  Breathing comfortably.  Abdomen: Soft. Nondistended.  Midline wound is healthy.  Left upper quadrant colostomy is healthy.  Left lower quadrant loop ileostomy is healthy appearing.  The KATY drain is putting out minimal fibrinous exudate.  Musculoskeletal: Moves all extremities.  Normal range of motion.  Skin: No rashes or lesions.  Psychological: Normal affect        Labs:  CBC:   Lab Results   Component Value Date    WBC 6.8 07/02/2025    RBC 3.67 (L) 07/02/2025    HGB 10.4 (L) 07/02/2025    HCT 32.5 (L) 07/02/2025     07/02/2025       RFP:   Lab Results   Component Value Date     (L) 07/02/2025    K 4.8 07/02/2025    CL 99 07/02/2025    CO2 21 07/02/2025    BUN 29 (H) 07/02/2025    CREATININE 1.00 07/02/2025    CALCIUM 9.2 07/02/2025    MG 2.18 07/02/2025    PHOS 4.7 07/02/2025        LFTs:   Lab Results   Component Value Date    ALBUMIN 3.2 (L) 07/02/2025                Images:    No new      Assessment:  This is a 70 y.o. 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  His ileus has finally resolved.  He is tolerating a diet.  His KATY drain is putting out a murky fibrinous exudate, but it is minimal and of unclear clinical significance.  Overall, he is doing well.      Plan:  -- Multimodal pain control  -- Remove KATY drain today  -- Continue with low fiber food with protein supplements  -- Recommend 2 weeks of antibiotics for the bowel perforation: 1 week of Augmentin on discharge.  -- Iron and vitamin supplementation  -- Plan to discharge home today and take out the KATY drain.  Plan to have the PICC line at home in case supplemental fluids are needed for the ileostomy.  -- Plan to remove staples in the office in a week      Iván Canales MD  General Surgery  Office: [phone]  Fax: [phone]  12:37 PM  07/02/25

## 2025-07-02 NOTE — CARE PLAN
The patient's goals for the shift include  rest, pain control, and go on walks     The clinical goals for the shift include pt's pain will be managed during shift    Problem: Pain - Adult  Goal: Verbalizes/displays adequate comfort level or baseline comfort level  Outcome: Progressing     Problem: Safety - Adult  Goal: Free from fall injury  Outcome: Progressing     Problem: Discharge Planning  Goal: Discharge to home or other facility with appropriate resources  Outcome: Progressing     Problem: Chronic Conditions and Co-morbidities  Goal: Patient's chronic conditions and co-morbidity symptoms are monitored and maintained or improved  Outcome: Progressing     Problem: Nutrition  Goal: Nutrient intake appropriate for maintaining nutritional needs  Outcome: Progressing     Problem: Pain  Goal: Takes deep breaths with improved pain control throughout the shift  Outcome: Progressing  Goal: Turns in bed with improved pain control throughout the shift  Outcome: Progressing  Goal: Walks with improved pain control throughout the shift  Outcome: Progressing  Goal: Performs ADL's with improved pain control throughout shift  Outcome: Progressing  Goal: Participates in PT with improved pain control throughout the shift  Outcome: Progressing  Goal: Free from opioid side effects throughout the shift  Outcome: Progressing  Goal: Free from acute confusion related to pain meds throughout the shift  Outcome: Progressing     Problem: Fall/Injury  Goal: Not fall by end of shift  Outcome: Progressing  Goal: Be free from injury by end of the shift  Outcome: Progressing  Goal: Verbalize understanding of personal risk factors for fall in the hospital  Outcome: Progressing  Goal: Verbalize understanding of risk factor reduction measures to prevent injury from fall in the home  Outcome: Progressing  Goal: Use assistive devices by end of the shift  Outcome: Progressing  Goal: Pace activities to prevent fatigue by end of the shift  Outcome:  Progressing     Problem: Skin  Goal: Decreased wound size/increased tissue granulation at next dressing change  Outcome: Progressing  Goal: Participates in plan/prevention/treatment measures  Outcome: Progressing  Goal: Prevent/manage excess moisture  Outcome: Progressing  Goal: Prevent/minimize sheer/friction injuries  Outcome: Progressing  Goal: Promote/optimize nutrition  Outcome: Progressing  Goal: Promote skin healing  Outcome: Progressing

## 2025-07-02 NOTE — NURSING NOTE
7069 Discharge instructions reviewed with pt. All questions and concerns addressed. PICC line in place.

## 2025-07-02 NOTE — CARE PLAN
The patient's goals for the shift include      The clinical goals for the shift include Patient will report pain of 3 or less, tolerate ambulating      Problem: Pain - Adult  Goal: Verbalizes/displays adequate comfort level or baseline comfort level  Outcome: Progressing     Problem: Discharge Planning  Goal: Discharge to home or other facility with appropriate resources  Outcome: Progressing     Problem: Chronic Conditions and Co-morbidities  Goal: Patient's chronic conditions and co-morbidity symptoms are monitored and maintained or improved  Outcome: Progressing     Problem: Nutrition  Goal: Nutrient intake appropriate for maintaining nutritional needs  Outcome: Progressing     Problem: Pain  Goal: Takes deep breaths with improved pain control throughout the shift  Outcome: Progressing  Goal: Turns in bed with improved pain control throughout the shift  Outcome: Progressing  Goal: Walks with improved pain control throughout the shift  Outcome: Progressing  Goal: Performs ADL's with improved pain control throughout shift  Outcome: Progressing  Goal: Participates in PT with improved pain control throughout the shift  Outcome: Progressing  Goal: Free from opioid side effects throughout the shift  Outcome: Progressing  Goal: Free from acute confusion related to pain meds throughout the shift  Outcome: Progressing     Problem: Fall/Injury  Goal: Not fall by end of shift  Outcome: Progressing  Goal: Be free from injury by end of the shift  Outcome: Progressing  Goal: Verbalize understanding of personal risk factors for fall in the hospital  Outcome: Progressing  Goal: Verbalize understanding of risk factor reduction measures to prevent injury from fall in the home  Outcome: Progressing  Goal: Use assistive devices by end of the shift  Outcome: Progressing  Goal: Pace activities to prevent fatigue by end of the shift  Outcome: Progressing     Problem: Skin  Goal: Decreased wound size/increased tissue granulation at next  dressing change  Outcome: Progressing  Flowsheets (Taken 6/23/2025 0936 by Shanna Mcmahon RN)  Decreased wound size/increased tissue granulation at next dressing change: Promote sleep for wound healing  Goal: Participates in plan/prevention/treatment measures  Outcome: Progressing  Flowsheets (Taken 6/23/2025 0936 by Shanna Mcmahon RN)  Participates in plan/prevention/treatment measures: Increase activity/out of bed for meals  Goal: Prevent/manage excess moisture  Outcome: Progressing  Flowsheets (Taken 6/30/2025 0000 by Ernestine Wells RN)  Prevent/manage excess moisture: Monitor for/manage infection if present  Goal: Prevent/minimize sheer/friction injuries  Outcome: Progressing  Flowsheets (Taken 6/30/2025 0000 by Ernestine Wells RN)  Prevent/minimize sheer/friction injuries: Increase activity/out of bed for meals  Goal: Promote/optimize nutrition  Outcome: Progressing  Flowsheets (Taken 6/30/2025 0000 by Ernestine Wells RN)  Promote/optimize nutrition: Monitor/record intake including meals  Goal: Promote skin healing  Outcome: Progressing  Flowsheets (Taken 6/30/2025 0000 by Ernestine Wells RN)  Promote skin healing: Assess skin/pad under line(s)/device(s)

## 2025-07-02 NOTE — DISCHARGE SUMMARY
Discharge Diagnosis  Small bowel obstruction (Multi)    Issues Requiring Follow-Up  Small bowel obstriction with perforation/Abdominal Hematoma/s/p colostomy/ileostomy  - discharged on Augmentin BID x 7 days  - discharged with home health care  - follow up with general surgery in 1 week for staple removal, will have PICC line at home in case supplemental fluids are needed    Malnutrition   - recommend low fiber diet with high protein supplementation     Iron Deficiency Anemia  - continue iron supplementation       Test Results Pending At Discharge  Pending Labs       No current pending labs.            Hospital Course   Diego Morales is a 70 y.o. male with a past medical history of diverticulitis of large intestine with perforation status post bowel resection on May 15 with colostomy bag,, mitral valve prolapse, hyperlipidemia, cardiac arrest during cardiac catheterization in 2016 secondary to ventricular arrhythmia/V-fib requiring cardioversion (normal coronaries), who was admitted to the hospital for small bowel obstruction and abdominal hematoma.  The general surgery team has been following the patient. The patient has a history of Surinder's procedure for perforated diverticulitis on 5/15/25. A small bowel follow-through (SBFT) study on 6/13 showed a complete bowel obstruction. The patient underwent an open exploratory laparotomy with lysis of adhesions on 6/17, followed by a repeat exploratory laparotomy with lysis of adhesions and loop ileostomy on 6/24. In addition, A CT scan revealed a 7.3cm x 13cm x 8.6cm abnormality, suggestive of a mass versus fluid collection. An interventional radiology (IR) drain was placed, which collected dark hemorrhagic hematoma with minimal output. Fluid cultures showed no growth to date.  He was started on TPN for his protein calorie malnutrition. Once his ileus improved his diet was advanced. Patent is now tolerating his diet. Currently on a low fiber diet with high protein  supplementation.  Patient's KATY drain was removed prior to discharge.  Patient showed no signs of infection following his SBO with perforation, he was treated with 1 week of cefazolin and flagyl and will be discharged on 1 week of Augmentin for antibiotic prophylaxis.  In addition, patient was discharged on oxycodone, flexeril and tylenol for pain control, educated on use of oxycodone and flexeril and recommended that patient do not use them together. Discharged with home health as he will have a PICC line at home in case supplemental fluids are needed.        Visit Vitals  /81 (BP Location: Right arm, Patient Position: Lying)   Pulse 93   Temp 36.5 °C (97.7 °F) (Temporal)   Resp 22     Vitals:    06/24/25 0630   Weight: 65.2 kg (143 lb 11.8 oz)         There is no immunization history on file for this patient.    Results        Pertinent Physical Exam At Time of Discharge  Physical Exam  Constitutional: alert and oriented x 3, awake, cooperative, no acute distress but chronically ill-appearing  Skin: warm and dry  Head/Neck: Normocephalic, atraumatic  Eyes: clear sclera  ENMT: mucous membranes moist  Cardio: Regular rate and rhythm, systolic murmur  Resp: CTA bilaterally, good respiratory effort  Gastrointestinal: Soft, nontender, nondistended; wearing abdominal binder drain in place with LUQ colostomy and LLQ ileostomy in place  Musculoskeletal: ROM intact, no joint swelling  Extremities: No edema, cyanosis, or clubbing  Neuro: alert and oriented x 3  Psychological: Appropriate mood and behavior       Home Medications     Medication List      START taking these medications     Ferrex 150 150 mg iron capsule; Generic drug: iron polysaccharides; Take   1 capsule (150 mg) by mouth once daily.; Start taking on: July 3, 2025   oxyCODONE 5 mg immediate release tablet; Commonly known as: Roxicodone;   Take 1 tablet (5 mg) by mouth every 8 hours if needed for moderate pain (4   - 6).   pantoprazole 40 mg EC tablet;  Commonly known as: ProtoNix; Take 1 tablet   (40 mg) by mouth 2 times a day before meals. Do not crush, chew, or split.     CHANGE how you take these medications     polyethylene glycol 17 gram/dose powder; Commonly known as: Glycolax,   Miralax; Take 17 g by mouth once daily.; What changed: medication strength     CONTINUE taking these medications     acetaminophen 325 mg tablet; Commonly known as: Tylenol; Take 2 tablets   (650 mg) by mouth every 6 hours.   amoxicillin-clavulanate 875-125 mg tablet; Commonly known as: Augmentin;   Take 1 tablet (875 mg of amoxicillin) by mouth 2 times a day for 7 days.   aspirin 81 mg EC tablet   cyclobenzaprine 5 mg tablet; Commonly known as: Flexeril; Take 1 tablet   (5 mg) by mouth 3 times a day.   gabapentin 100 mg capsule; Commonly known as: Neurontin; Take 1 capsule   (100 mg) by mouth 2 times a day.   metoprolol succinate XL 25 mg 24 hr tablet; Commonly known as: Toprol-XL   multivitamin with minerals tablet; Take 1 tablet by mouth once daily.     STOP taking these medications     ferrous sulfate 325 mg (65 mg elemental) tablet       Outpatient Follow-Up  Future Appointments   Date Time Provider Department Center   8/29/2025  8:20 AM Iván Canales MD [id number] Richard Underwood DO  [facility] 3  Family Medicine

## 2025-07-02 NOTE — CONSULTS
Wound Care Consult     Visit Date: 7/2/2025      Patient Name: Diego Morales         MRN: [id number]             Wound Plan: Patient seen with wife at bedside. Reviewed homegoing education. Appliances for colostomy and ileostomy changed while educating patient/wife and getting their input. Stomas were both dark pink, slightly budded. Skin under appliances intact, cleansed while changing. Patient/wife confirmed they have homegoing supplies and follow-up appointments in place.      NICHOLAS BIRMINGHAM RN  7/2/2025  4:59 PM

## 2025-07-02 NOTE — PROGRESS NOTES
07/02/25 1105   Discharge Planning   Living Arrangements Spouse/significant other   Support Systems Spouse/significant other   Assistance Needed A&Ox4, independent with ADLs, no DME, room air at baseline-currently room air. active with [facility] ([facility]). PCP Dr. Ramon Gould   Type of Residence Private residence   Number of Stairs to Enter Residence 3   Number of Stairs Within Residence 20   Do you have animals or pets at home? Yes   Type of Animals or Pets 1 dog - [place]   Home or Post Acute Services In home services   Type of Home Care Services Home nursing visits   Expected Discharge Disposition Home Health  (resumed [facility]. DC with PICC at this time- will reassess need for IV fluid supplementation at time of follow up.)   Patient Choice   Provider Choice list and CMS website (https://medicare.gov/care-compare#search) for post-acute Quality and Resource Measure Data were provided and reviewed with: Family;Patient   Patient / Family choosing to utilize agency / facility established prior to hospitalization Yes   Stroke Family Assessment   Stroke Family Assessment Needed No   Intensity of Service   Intensity of Service 0-30 min

## 2025-07-03 ENCOUNTER — DOCUMENTATION (OUTPATIENT)
Dept: HOME HEALTH SERVICES | Facility: HOME HEALTH | Age: 70
End: 2025-07-03
Payer: MEDICARE

## 2025-07-03 ENCOUNTER — HOME INFUSION (OUTPATIENT)
Dept: INFUSION THERAPY | Age: 70
End: 2025-07-03
Payer: MEDICARE

## 2025-07-03 NOTE — HH CARE COORDINATION
Home Care received a Referral to Resume Care for Infusion, Nursing, and Registered Dietician. We have processed the referral for a Resumption of Care on 7/4/2025.     If you have any questions or concerns, please feel free to contact us at [phone]. Follow the prompts, enter your five digit zip code, and you will be directed to your care team on [facility].

## 2025-07-03 NOTE — PROGRESS NOTES
REVIEWED PT INFO AS CORRECT.   DX...  possible fluid need at home if high output ileostomy  REVIEWED ALLERGIES... nka  PMH... diverticulitis of large intestine w/ perforation s/p bowel resection 5/15 w/ colostomy bag, mitral valve prolapse, hld, cardiac arrest during cardiac catheterization 2/2 ventricular arrhythmia/vfib  NO MEDICATION INTERACTIONS.  REVIEWED RELEVANT BASELINE VALUES  LAB ARE ... cbc, bmp, iron studies, mag level x1 by 7/8/25  PT HAS ….. 2l picc 43cm placed 6/10/25  FLUSH PER [facility] PROTOCOL.  CATH CARE ONLY - NO CARE PLAN DONE TODAY    Patient is a new admit to [facility] for receipt of supplies for his 2L picc. Patient discharged home today with picc line in place for possible hydration needs at home if ileostomy output is high. 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    Dr Canales from general surgery following at home for picc line.    Tel call with wife. Delivery address confirmed: [address]; [place] [address]. Wife is requesting delivery of a 2 week supply today. She states she was told if he is able to stay hydrated that they will remove the line.    Patient rep to send 2 week supply of picc supplies to patient today.    Follow up 7/16/25. Check status of picc line and send further supplies if needed.

## 2025-07-04 ENCOUNTER — HOME CARE VISIT (OUTPATIENT)
Dept: HOME HEALTH SERVICES | Facility: HOME HEALTH | Age: 70
End: 2025-07-04
Payer: MEDICARE

## 2025-07-04 DIAGNOSIS — K63.1: ICD-10-CM

## 2025-07-04 DIAGNOSIS — D50.8 OTHER IRON DEFICIENCY ANEMIA: ICD-10-CM

## 2025-07-04 DIAGNOSIS — E44.0 MODERATE PROTEIN-CALORIE MALNUTRITION (MULTI): ICD-10-CM

## 2025-07-04 PROCEDURE — G0162 HHC RN E&M PLAN SVS, 15 MIN: HCPCS | Mod: HHH

## 2025-07-04 ASSESSMENT — ENCOUNTER SYMPTOMS
CHANGE IN APPETITE: DECREASED
PERSON REPORTING PAIN: PATIENT
DYSPNEA ON EXERTION: 1
LOWEST PAIN SEVERITY IN PAST 24 HOURS: 0/10
FATIGUE: 1
PAIN LOCATION - PAIN SEVERITY: 4/10
APPETITE LEVEL: POOR
FATIGUES EASILY: 1
MUSCLE WEAKNESS: 1
SUBJECTIVE PAIN PROGRESSION: GRADUALLY IMPROVING
LOSS OF SENSATION IN FEET: 0
PAIN SEVERITY GOAL: 0/10
HIGHEST PAIN SEVERITY IN PAST 24 HOURS: 4/10
DEPRESSION: 0
OCCASIONAL FEELINGS OF UNSTEADINESS: 1
PAIN LOCATION: ABDOMEN
ABDOMINAL PAIN: 1
PAIN: 1
STOOL FREQUENCY: TWICE DAILY

## 2025-07-04 ASSESSMENT — ACTIVITIES OF DAILY LIVING (ADL)
PHYSICAL TRANSFERS ASSESSED: 1
CURRENT_FUNCTION: STAND BY ASSIST
OASIS_M1830: 05
ENTERING_EXITING_HOME: CONTACT GUARD ASSIST

## 2025-07-06 ENCOUNTER — HOME CARE VISIT (OUTPATIENT)
Dept: HOME HEALTH SERVICES | Facility: HOME HEALTH | Age: 70
End: 2025-07-06
Payer: MEDICARE

## 2025-07-06 VITALS
RESPIRATION RATE: 18 BRPM | DIASTOLIC BLOOD PRESSURE: 70 MMHG | SYSTOLIC BLOOD PRESSURE: 116 MMHG | TEMPERATURE: 97.8 F | OXYGEN SATURATION: 99 % | HEART RATE: 100 BPM

## 2025-07-06 PROCEDURE — G0299 HHS/HOSPICE OF RN EA 15 MIN: HCPCS | Mod: HHH

## 2025-07-06 RX ADMIN — Medication 10 UNITS: at 16:25

## 2025-07-06 RX ADMIN — Medication 10 ML: at 16:25

## 2025-07-07 ENCOUNTER — HOME CARE VISIT (OUTPATIENT)
Dept: HOME HEALTH SERVICES | Facility: HOME HEALTH | Age: 70
End: 2025-07-07
Payer: MEDICARE

## 2025-07-07 SDOH — HEALTH STABILITY: MENTAL HEALTH: NUTRITION HISTORY: PT TOLERATING SMALL, FREQUENT MEALS OF LOW FIBER, HIGH PRO FOODS.

## 2025-07-08 LAB
ANION GAP SERPL CALCULATED.4IONS-SCNC: 14 MMOL/L (CALC) (ref 7–17)
BUN SERPL-MCNC: 27 MG/DL (ref 7–25)
BUN/CREAT SERPL: 31 (CALC) (ref 6–22)
CALCIUM SERPL-MCNC: 9.4 MG/DL (ref 8.6–10.3)
CHLORIDE SERPL-SCNC: 99 MMOL/L (ref 98–110)
CO2 SERPL-SCNC: 20 MMOL/L (ref 20–32)
CREAT SERPL-MCNC: 0.87 MG/DL (ref 0.7–1.28)
EGFRCR SERPLBLD CKD-EPI 2021: 92 ML/MIN/1.73M2
ERYTHROCYTE [DISTWIDTH] IN BLOOD BY AUTOMATED COUNT: 16.6 % (ref 11–15)
GLUCOSE SERPL-MCNC: 84 MG/DL (ref 65–99)
HCT VFR BLD AUTO: 41.6 % (ref 38.5–50)
HGB BLD-MCNC: 12.5 G/DL (ref 13.2–17.1)
IRON SATN MFR SERPL: 20 % (CALC) (ref 20–48)
IRON SERPL-MCNC: 62 MCG/DL (ref 50–180)
MAGNESIUM SERPL-MCNC: 2.3 MG/DL (ref 1.5–2.5)
MCH RBC QN AUTO: 28 PG (ref 27–33)
MCHC RBC AUTO-ENTMCNC: 30 G/DL (ref 32–36)
MCV RBC AUTO: 93.3 FL (ref 80–100)
PLATELET # BLD AUTO: 535 THOUSAND/UL (ref 140–400)
PMV BLD REES-ECKER: 10.3 FL (ref 7.5–12.5)
POTASSIUM SERPL-SCNC: 4.9 MMOL/L (ref 3.5–5.3)
RBC # BLD AUTO: 4.46 MILLION/UL (ref 4.2–5.8)
SODIUM SERPL-SCNC: 133 MMOL/L (ref 135–146)
TIBC SERPL-MCNC: 317 MCG/DL (CALC) (ref 250–425)
WBC # BLD AUTO: 6.7 THOUSAND/UL (ref 3.8–10.8)

## 2025-07-09 LAB
ACID FAST STN SPEC: NORMAL
MYCOBACTERIUM SPEC CULT: NORMAL

## 2025-07-11 ENCOUNTER — APPOINTMENT (OUTPATIENT)
Dept: SURGERY | Facility: CLINIC | Age: 70
End: 2025-07-11
Payer: MEDICARE

## 2025-07-11 VITALS — BODY MASS INDEX: 16.64 KG/M2 | WEIGHT: 119.3 LBS

## 2025-07-11 DIAGNOSIS — T14.8XXA HEMATOMA: ICD-10-CM

## 2025-07-11 DIAGNOSIS — E44.0 MODERATE PROTEIN-CALORIE MALNUTRITION (MULTI): ICD-10-CM

## 2025-07-11 DIAGNOSIS — D50.8 OTHER IRON DEFICIENCY ANEMIA: ICD-10-CM

## 2025-07-11 DIAGNOSIS — S30.1XXA ABDOMINAL HEMATOMA: ICD-10-CM

## 2025-07-11 DIAGNOSIS — R10.30 LOWER ABDOMINAL PAIN: ICD-10-CM

## 2025-07-11 DIAGNOSIS — K63.1: ICD-10-CM

## 2025-07-11 DIAGNOSIS — K56.609 SMALL BOWEL OBSTRUCTION (MULTI): Primary | ICD-10-CM

## 2025-07-11 PROCEDURE — 99024 POSTOP FOLLOW-UP VISIT: CPT | Performed by: SURGERY

## 2025-07-11 PROCEDURE — 1111F DSCHRG MED/CURRENT MED MERGE: CPT | Performed by: SURGERY

## 2025-07-11 NOTE — PROGRESS NOTES
General Surgery Follow-Up Note    Referring Provider:   MD Elan Coppola Malathi P, DO      Chief Complaint:  Surgery follow-up    History of Present Illness:  This is a 70 y.o. male who presents for surgery follow-up.  He was last seen 1 week ago.  He originally presented with perforated diverticulitis, and underwent a Guerrero colectomy.  He had some postoperative bleeding that stopped itself.  He was discharged and was doing well at home, then represented with an intense bowel obstruction.  He underwent exploratory laparotomy and evacuation of hematoma and lysis of adhesions.  He had a small bowel resection with anastomosis at that time.  He then developed a small bowel leak at a different location, and underwent another exploratory laparotomy with a loop ileostomy.  He was discharged home, and presents now.  He denies any fevers.  He denies any nausea or vomiting.  However, he reports that he does not have much of an appetite.  He reports that his ileostomy output is between 750 and 900 mL a day.  He is trying to force feed himself with additional protein shakes.      Vitals:   There were no vitals filed for this visit.      Physical Exam:  General: No acute distress. Sitting up in bed.   Neuro: Alert and oriented ×3. Follows commands.  Head: Atraumatic  Eyes: Pupils equal reactive to light. Extraocular motions intact.  Ears: Hears normal speaking voice.  Mouth, Nose, Throat: Mucous membranes moist.  Normal dentition.  Neck: Supple. No appreciable masses.  Chest: No crepitus.  No appreciable scars.  Heart: Regular rate and rhythm.  Lung: Clear to auscultation bilaterally.  Vascular: No carotid bruits.  Palpable radial pulses bilaterally.  Abdomen: Soft. Nondistended. Nontender.  Midline incision is healthy.  Healthy colostomy.  Healthy ileostomy.  Musculoskeletal: Moves all extremities.  Normal range of motion.  Lymphatic: No palpable lymph nodes.  Skin: No rashes or lesions.  Psychological: Normal  affect    Labs:  CBC:   Lab Results   Component Value Date    WBC 6.7 07/06/2025    RBC 4.46 07/06/2025    HGB 12.5 (L) 07/06/2025    HCT 41.6 07/06/2025     (H) 07/06/2025       RFP:   Lab Results   Component Value Date     (L) 07/06/2025    K 4.9 07/06/2025    CL 99 07/06/2025    CO2 20 07/06/2025    BUN 27 (H) 07/06/2025    CREATININE 0.87 07/06/2025    CALCIUM 9.4 07/06/2025    MG 2.3 07/06/2025    PHOS 4.7 07/02/2025    PHOS 4.0 05/28/2025        LFTs:   Lab Results   Component Value Date    PROT 5.9 (L) 06/28/2025    ALBUMIN 3.2 (L) 07/02/2025    ALBUMIN 3.5 (L) 05/28/2025    BILITOT 1.4 (H) 06/28/2025    BILIDIR 1.5 (H) 06/18/2025    ALKPHOS 374 (H) 06/28/2025    AST 18 06/28/2025    ALT 19 06/28/2025            Imaging:   None        Assessment:  This is a 70 y.o.-year-old male who presents for follow-up. He originally presented with perforated diverticulitis, and underwent a Guerrero colectomy.  He had some postoperative bleeding that stopped itself.  He was discharged and was doing well at home, then represented with an intense bowel obstruction.  He underwent exploratory laparotomy and evacuation of hematoma and lysis of adhesions.  He had a small bowel resection with anastomosis at that time.  He then developed a small bowel leak at a different location, and underwent another exploratory laparotomy with a loop ileostomy.  He was discharged home.    His blood work earlier this week was basically normal.  He is starting to get disheartened.  However, there is no signs of bowel obstruction.  His ileostomy is right in the expected normal output.  He has not had any fevers nor felt like he has a viral infection.  I believe he is healing as expected given what he went through, but his demeanor and wasting is concerning.      Plan:  -- Repeat CBC, renal function panel, magnesium, and LFTs  -- CT abdomen and pelvis with IV contrast  -- Follow-up in 1 to 2 weeks  -- Continue checking ostomy output  --  Continue pushing diet and supplementation with protein shakes and multivitamins    Anoop Canales MD  General Surgery  Office: [phone]  Fax: [phone]  9:24 AM  07/11/25        Past Medical History:  Medical History[1]     Past Surgical History:  Surgical History[2]     Medications:  Current Outpatient Medications   Medication Instructions    acetaminophen (TYLENOL) 650 mg, oral, Every 6 hours    aspirin 81 mg EC tablet 1 tablet, oral, Daily    cyclobenzaprine (FLEXERIL) 5 mg, oral, 3 times daily PRN    Ferrex 150 150 mg, oral, Daily    gabapentin (NEURONTIN) 100 mg, oral, 2 times daily    metoprolol succinate XL (TOPROL-XL) 25 mg, oral, Daily    multivitamin with minerals tablet 1 tablet, oral, Daily    oxyCODONE (ROXICODONE) 5 mg, oral, Every 8 hours PRN    pantoprazole (PROTONIX) 40 mg, oral, 2 times daily before meals, Do not crush, chew, or split.    polyethylene glycol (Glycolax, Miralax) 17 gram/dose powder Take 17 g by mouth once daily.        Allergies:  RX Allergies[3]     Family History:  Family History[4]     Social History:  Social History[5]     Review of Systems:  A complete 12 point review of systems was performed and is negative except as noted in the history of present illness.         [1]   Past Medical History:  Diagnosis Date    Allergic dermatitis 01/09/2022    Cardiac arrest with successful resuscitation 2016    During cardiac catheterization / V-fib requiring cardioversion (normal coronary arteries    Diverticulitis of large intestine with perforation 05/12/2025    Heart murmur 05/13/2016    Hyperlipidemia     Mitral valve prolapse     Sigmoid diverticulitis 05/12/2025    Small bowel obstruction 06/04/2025   [2]   Past Surgical History:  Procedure Laterality Date    CARDIAC CATHETERIZATION  06/17/2016    Compa Dominguez MD Normal Coronary Arteries.    CARDIOVERSION  06/17/2016    Compa Dominguez MD    COLOSTOMY  05/15/2025    EXPLORATORY LAPAROTOMY  06/17/2025    RESECTION, SMALL BOWL AND  LYSIS, ADHESIONS, ABDOMINAL    SIGMOIDECTOMY  05/15/2025    Guerrero's colectomy    TONSILLECTOMY     [3] No Known Allergies  [4] No family history on file.  [5]   Social History  Socioeconomic History    Marital status:    Tobacco Use    Smoking status: Never    Smokeless tobacco: Never   Substance and Sexual Activity    Alcohol use: Yes     Comment: social    Drug use: Never    Sexual activity: Defer     Social Drivers of Health     Financial Resource Strain: Low Risk  (6/9/2025)    Overall Financial Resource Strain (CARDIA)     Difficulty of Paying Living Expenses: Not very hard   Food Insecurity: No Food Insecurity (6/4/2025)    Hunger Vital Sign     Worried About Running Out of Food in the Last Year: Never true     Ran Out of Food in the Last Year: Never true   Transportation Needs: No Transportation Needs (7/4/2025)    OASIS : Transportation     Lack of Transportation (Medical): No     Lack of Transportation (Non-Medical): No     Patient Unable or Declines to Respond: No   Stress: No Stress Concern Present (6/4/2025)    [employer] [place] - Occupational Stress Questionnaire     Feeling of Stress : Not at all   Social Connections: Feeling Socially Integrated (7/4/2025)    OASIS : Social Isolation     Frequency of experiencing loneliness or isolation: Never   Intimate Partner Violence: Not At Risk (6/4/2025)    Humiliation, Afraid, Rape, and Kick questionnaire     Fear of Current or Ex-Partner: No     Emotionally Abused: No     Physically Abused: No     Sexually Abused: No   Housing Stability: Low Risk  (6/9/2025)    Housing Stability Vital Sign     Unable to Pay for Housing in the Last Year: No     Number of Times Moved in the Last Year: 0     Homeless in the Last Year: No

## 2025-07-12 ENCOUNTER — DOCUMENTATION (OUTPATIENT)
Dept: INFUSION THERAPY | Age: 70
End: 2025-07-12
Payer: MEDICARE

## 2025-07-12 ASSESSMENT — ENCOUNTER SYMPTOMS
PERSON REPORTING PAIN: PATIENT
PAIN: 1
SHORTNESS OF BREATH: 1
MUSCLE WEAKNESS: 1
APPETITE LEVEL: FAIR
ABDOMINAL PAIN: 1
LIMITED RANGE OF MOTION: 1
LOWEST PAIN SEVERITY IN PAST 24 HOURS: 0/10
SUBJECTIVE PAIN PROGRESSION: GRADUALLY IMPROVING
CHANGE IN APPETITE: UNCHANGED
FATIGUES EASILY: 1
STOOL FREQUENCY: TWICE DAILY
DYSPNEA ACTIVITY LEVEL: AFTER AMBULATING MORE THAN 20 FT
HIGHEST PAIN SEVERITY IN PAST 24 HOURS: 7/10
PAIN LOCATION: ABDOMEN

## 2025-07-12 NOTE — PROGRESS NOTES
Patient's wife called. She requested a delivery for her 's  DL PICC  at this time.     Sending 2 dressing kits, line care supplies, alcohol etc + associated flushes (36x 10 unit heparin and 40x NS)  for 2 weeks worth of daily flushing plus extras for labs and dressing changes per wife's request.     Delivery will be Monday 7/14 by 9 pm with the  to call on the way. Delivery to same address as last time as confirmed with patient.     Patient's wife had no questions for pharmacist

## 2025-07-14 ENCOUNTER — HOME CARE VISIT (OUTPATIENT)
Dept: HOME HEALTH SERVICES | Facility: HOME HEALTH | Age: 70
End: 2025-07-14
Payer: MEDICARE

## 2025-07-14 VITALS
WEIGHT: 121.25 LBS | RESPIRATION RATE: 18 BRPM | SYSTOLIC BLOOD PRESSURE: 108 MMHG | HEART RATE: 104 BPM | DIASTOLIC BLOOD PRESSURE: 70 MMHG | BODY MASS INDEX: 16.91 KG/M2 | OXYGEN SATURATION: 98 % | TEMPERATURE: 97.8 F

## 2025-07-14 PROCEDURE — G0299 HHS/HOSPICE OF RN EA 15 MIN: HCPCS | Mod: HHH

## 2025-07-14 RX ADMIN — Medication 5 ML: at 15:45

## 2025-07-14 RX ADMIN — Medication 10 ML: at 15:43

## 2025-07-15 LAB
ALBUMIN SERPL-MCNC: 4 G/DL (ref 3.6–5.1)
ALBUMIN SERPL-MCNC: 4 G/DL (ref 3.6–5.1)
ALBUMIN/GLOB SERPL: 1.3 (CALC) (ref 1–2.5)
ALP SERPL-CCNC: 350 U/L (ref 35–144)
ALT SERPL-CCNC: 63 U/L (ref 9–46)
AST SERPL-CCNC: 36 U/L (ref 10–35)
BILIRUB DIRECT SERPL-MCNC: 0.3 MG/DL
BILIRUB INDIRECT SERPL-MCNC: 0.3 MG/DL (CALC) (ref 0.2–1.2)
BILIRUB SERPL-MCNC: 0.6 MG/DL (ref 0.2–1.2)
BUN SERPL-MCNC: 41 MG/DL (ref 7–25)
BUN/CREAT SERPL: 44 (CALC) (ref 6–22)
CALCIUM SERPL-MCNC: 9.3 MG/DL (ref 8.6–10.3)
CHLORIDE SERPL-SCNC: 100 MMOL/L (ref 98–110)
CO2 SERPL-SCNC: 19 MMOL/L (ref 20–32)
CREAT SERPL-MCNC: 0.93 MG/DL (ref 0.7–1.28)
EGFRCR SERPLBLD CKD-EPI 2021: 88 ML/MIN/1.73M2
GLOBULIN SER CALC-MCNC: 3 G/DL (CALC) (ref 1.9–3.7)
GLUCOSE SERPL-MCNC: 99 MG/DL (ref 65–99)
MAGNESIUM SERPL-MCNC: 2 MG/DL (ref 1.5–2.5)
PHOSPHATE SERPL-MCNC: 4.3 MG/DL (ref 2.1–4.3)
POTASSIUM SERPL-SCNC: 4.5 MMOL/L (ref 3.5–5.3)
PROT SERPL-MCNC: 7 G/DL (ref 6.1–8.1)
QUEST CLIENT EDUCATION TRACKING: NORMAL
SODIUM SERPL-SCNC: 132 MMOL/L (ref 135–146)

## 2025-07-16 ENCOUNTER — TELEPHONE (OUTPATIENT)
Dept: INFUSION THERAPY | Age: 70
End: 2025-07-16
Payer: MEDICARE

## 2025-07-16 ENCOUNTER — HOME INFUSION (OUTPATIENT)
Dept: INFUSION THERAPY | Age: 70
End: 2025-07-16
Payer: MEDICARE

## 2025-07-16 ASSESSMENT — ACTIVITIES OF DAILY LIVING (ADL)
CURRENT_FUNCTION: ONE PERSON
AMBULATION ASSISTANCE: ONE PERSON

## 2025-07-16 ASSESSMENT — ENCOUNTER SYMPTOMS
MUSCLE WEAKNESS: 1
SUBJECTIVE PAIN PROGRESSION: GRADUALLY IMPROVING
HIGHEST PAIN SEVERITY IN PAST 24 HOURS: 7/10
PAIN: 1
APPETITE LEVEL: FAIR
FATIGUE: 1
PAIN LOCATION: ABDOMEN
LIMITED RANGE OF MOTION: 1
FATIGUES EASILY: 1
CHANGE IN APPETITE: UNCHANGED
LOWEST PAIN SEVERITY IN PAST 24 HOURS: 0/10
STOOL FREQUENCY: MORE THAN TWICE DAILY
PERSON REPORTING PAIN: PATIENT
PAIN SEVERITY GOAL: 0/10

## 2025-07-16 NOTE — TELEPHONE ENCOUNTER
Left message for patient's wife regarding patient's cath care needs for his DL PICC. Requested call back at [phone] if they needed anything at this time. Follow up on ~7/18 or 7/21.      Did note on vm that 2-3 weeks will be current delivery per MD suggestion for how long to maintain PICC line.

## 2025-07-17 ENCOUNTER — HOSPITAL ENCOUNTER (OUTPATIENT)
Dept: RADIOLOGY | Facility: HOSPITAL | Age: 70
Discharge: HOME | End: 2025-07-17
Payer: MEDICARE

## 2025-07-17 DIAGNOSIS — R10.30 LOWER ABDOMINAL PAIN: ICD-10-CM

## 2025-07-17 DIAGNOSIS — T14.8XXA HEMATOMA: ICD-10-CM

## 2025-07-17 DIAGNOSIS — K56.609 SMALL BOWEL OBSTRUCTION (MULTI): ICD-10-CM

## 2025-07-17 DIAGNOSIS — K63.1: ICD-10-CM

## 2025-07-17 PROCEDURE — 74177 CT ABD & PELVIS W/CONTRAST: CPT

## 2025-07-17 PROCEDURE — A9698 NON-RAD CONTRAST MATERIALNOC: HCPCS | Performed by: SURGERY

## 2025-07-17 PROCEDURE — 2550000001 HC RX 255 CONTRASTS: Performed by: SURGERY

## 2025-07-17 RX ADMIN — IOHEXOL 75 ML: 350 INJECTION, SOLUTION INTRAVENOUS at 09:01

## 2025-07-17 RX ADMIN — IOHEXOL 500 ML: 12 SOLUTION ORAL at 09:04

## 2025-07-21 ENCOUNTER — HOME CARE VISIT (OUTPATIENT)
Dept: HOME HEALTH SERVICES | Facility: HOME HEALTH | Age: 70
End: 2025-07-21
Payer: MEDICARE

## 2025-07-21 VITALS
WEIGHT: 119.25 LBS | HEART RATE: 100 BPM | DIASTOLIC BLOOD PRESSURE: 56 MMHG | BODY MASS INDEX: 16.63 KG/M2 | SYSTOLIC BLOOD PRESSURE: 98 MMHG | TEMPERATURE: 97.4 F | RESPIRATION RATE: 18 BRPM | OXYGEN SATURATION: 100 %

## 2025-07-21 PROCEDURE — G0299 HHS/HOSPICE OF RN EA 15 MIN: HCPCS | Mod: HHH

## 2025-07-21 ASSESSMENT — ENCOUNTER SYMPTOMS
ABDOMINAL PAIN: 1
HIGHEST PAIN SEVERITY IN PAST 24 HOURS: 3/10
LOSS OF SENSATION IN FEET: 0
PAIN SEVERITY GOAL: 0/10
PAIN: 1
CHANGE IN APPETITE: INCREASED
LOWEST PAIN SEVERITY IN PAST 24 HOURS: 0/10
LIMITED RANGE OF MOTION: 1
ANGER WITHIN DEFINED LIMITS: 1
STOOL FREQUENCY: MORE THAN TWICE DAILY
PAIN LOCATION: ABDOMEN
AGGRESSION WITHIN DEFINED LIMITS: 1
PERSON REPORTING PAIN: PATIENT
DEPRESSED MOOD: 1
DEPRESSION: 0
APPETITE LEVEL: FAIR
SLEEP QUALITY: ADEQUATE
SUBJECTIVE PAIN PROGRESSION: GRADUALLY IMPROVING
FATIGUE: 1
OCCASIONAL FEELINGS OF UNSTEADINESS: 0

## 2025-07-21 ASSESSMENT — ACTIVITIES OF DAILY LIVING (ADL)
OASIS_M1830: 03
PHYSICAL TRANSFERS ASSESSED: 1
ENTERING_EXITING_HOME: CONTACT GUARD ASSIST
CURRENT_FUNCTION: STAND BY ASSIST

## 2025-07-25 ENCOUNTER — HOME INFUSION (OUTPATIENT)
Dept: INFUSION THERAPY | Age: 70
End: 2025-07-25

## 2025-07-25 ENCOUNTER — APPOINTMENT (OUTPATIENT)
Dept: SURGERY | Facility: CLINIC | Age: 70
End: 2025-07-25
Payer: MEDICARE

## 2025-07-25 DIAGNOSIS — K57.92 DIVERTICULITIS: Primary | ICD-10-CM

## 2025-07-25 PROCEDURE — 1111F DSCHRG MED/CURRENT MED MERGE: CPT | Performed by: SURGERY

## 2025-07-25 PROCEDURE — 99024 POSTOP FOLLOW-UP VISIT: CPT | Performed by: SURGERY

## 2025-07-25 NOTE — PROGRESS NOTES
General Surgery Follow-Up Note    Referring Provider:   Ramon Gould MD      Chief Complaint:  Follow-up from: Surgery    History of Present Illness:  This is a 70 y.o. male who presents for follow-up from colon surgery.  He was last seen 2 weeks ago.  He has been doing much better than when we last met.  He is still having only 5-600 output from his ileostomy.  He is eating much better.  His appetite is improving.  His weight has stabilized.      Vitals:   There were no vitals filed for this visit.      Physical Exam:  General: No acute distress. Sitting up in bed.   Neuro: Alert and oriented ×3. Follows commands.  Head: Atraumatic  Eyes: Pupils equal reactive to light. Extraocular motions intact.  Ears: Hears normal speaking voice.  Mouth, Nose, Throat: Mucous membranes moist.  Normal dentition.  Neck: Supple. No appreciable masses.  Chest: No crepitus.  No appreciable scars.  Heart: Regular rate and rhythm.  Lung: Clear to auscultation bilaterally.  Vascular: No carotid bruits.  Palpable radial pulses bilaterally.  Abdomen: Soft. Nondistended. Nontender.  Well-healed midline incision.  Healthy appearing colostomy.  Healthy appearing ileostomy.  Musculoskeletal: Moves all extremities.  Normal range of motion.  Lymphatic: No palpable lymph nodes.  Skin: No rashes or lesions.  Psychological: Normal affect    Labs:  CBC:   Lab Results   Component Value Date    WBC 6.7 07/06/2025    RBC 4.46 07/06/2025    HGB 12.5 (L) 07/06/2025    HCT 41.6 07/06/2025     (H) 07/06/2025       RFP:   Lab Results   Component Value Date     (L) 07/14/2025    K 4.5 07/14/2025     07/14/2025    CO2 19 (L) 07/14/2025    BUN 41 (H) 07/14/2025    CREATININE 0.93 07/14/2025    CALCIUM 9.3 07/14/2025    MG 2.0 07/14/2025    PHOS 4.3 07/14/2025        LFTs:   Lab Results   Component Value Date    PROT 7.0 07/14/2025    ALBUMIN 4.0 07/14/2025    ALBUMIN 4.0 07/14/2025    BILITOT 0.6 07/14/2025    BILIDIR 0.3 (H) 07/14/2025     ALKPHOS 350 (H) 07/14/2025    AST 36 (H) 07/14/2025    ALT 63 (H) 07/14/2025            Imaging: I have personally reviewed the images and the radiologist's report.  CT abdomen pelvis: No signs of obstruction.  Small fluid collections in the abdomen.      Assessment:  This is a 70 y.o.-year-old male who presents for follow-up of colon surgery requiring multiple reoperations.  He is finally improving.  He is significantly better than 2 weeks ago.      Plan:  -- Follow-up at the end of August    Anoop Canales MD  General Surgery  Office: [phone]  Fax: [phone]  10:54 AM  07/25/25        Past Medical History:  Medical History[1]     Past Surgical History:  Surgical History[2]     Medications:  Current Outpatient Medications   Medication Instructions    acetaminophen (TYLENOL) 650 mg, oral, Every 6 hours    aspirin 81 mg EC tablet 1 tablet, oral, Daily    cyclobenzaprine (FLEXERIL) 5 mg, oral, 3 times daily PRN    Ferrex 150 150 mg, oral, Daily    gabapentin (NEURONTIN) 100 mg, oral, 2 times daily    heparin lock flush, porcine, 10 unit/mL injection 5 mL, intravenous, Daily, SASH  flush each lumen  daily and prn after lab work    metoprolol succinate XL (TOPROL-XL) 25 mg, oral, Daily    multivitamin with minerals tablet 1 tablet, oral, Daily    oxyCODONE (ROXICODONE) 5 mg, oral, Every 8 hours PRN    pantoprazole (PROTONIX) 40 mg, oral, 2 times daily before meals, Do not crush, chew, or split.    polyethylene glycol (Glycolax, Miralax) 17 gram/dose powder Take 17 g by mouth once daily.    sodium chloride 0.9% flush 10 mL, intravenous, Daily, SASH flush each lumen daily and prn after lab work  5 ml prior to lab draw 20 ml after lab draw        Allergies:  RX Allergies[3]     Family History:  Family History[4]     Social History:  Social History[5]     Review of Systems:  A complete 12 point review of systems was performed and is negative except as noted in the history of present illness.         [1]   Past  Medical History:  Diagnosis Date    Allergic dermatitis 01/09/2022    Cardiac arrest with successful resuscitation 2016    During cardiac catheterization / V-fib requiring cardioversion (normal coronary arteries    Diverticulitis of large intestine with perforation 05/12/2025    Heart murmur 05/13/2016    Hyperlipidemia     Mitral valve prolapse     Sigmoid diverticulitis 05/12/2025    Small bowel obstruction 06/04/2025   [2]   Past Surgical History:  Procedure Laterality Date    CARDIAC CATHETERIZATION  06/17/2016    Compa Dominguez MD Normal Coronary Arteries.    CARDIOVERSION  06/17/2016    Compa Dominguez MD    COLOSTOMY  05/15/2025    EXPLORATORY LAPAROTOMY  06/17/2025    RESECTION, SMALL BOWL AND LYSIS, ADHESIONS, ABDOMINAL    SIGMOIDECTOMY  05/15/2025    Guerrero's colectomy    TONSILLECTOMY     [3] No Known Allergies  [4] No family history on file.  [5]   Social History  Socioeconomic History    Marital status:    Tobacco Use    Smoking status: Never    Smokeless tobacco: Never   Substance and Sexual Activity    Alcohol use: Yes     Comment: social    Drug use: Never    Sexual activity: Defer     Social Drivers of Health     Financial Resource Strain: Low Risk  (6/9/2025)    Overall Financial Resource Strain (CARDIA)     Difficulty of Paying Living Expenses: Not very hard   Food Insecurity: No Food Insecurity (6/4/2025)    Hunger Vital Sign     Worried About Running Out of Food in the Last Year: Never true     Ran Out of Food in the Last Year: Never true   Transportation Needs: No Transportation Needs (7/4/2025)    OASIS : Transportation     Lack of Transportation (Medical): No     Lack of Transportation (Non-Medical): No     Patient Unable or Declines to Respond: No   Stress: No Stress Concern Present (6/4/2025)    [place] - Occupational Stress Questionnaire     Feeling of Stress : Not at all   Social Connections: Feeling Socially Integrated (7/4/2025)    OASIS :  Social Isolation     Frequency of experiencing loneliness or isolation: Never   Intimate Partner Violence: Not At Risk (6/4/2025)    Humiliation, Afraid, Rape, and Kick questionnaire     Fear of Current or Ex-Partner: No     Emotionally Abused: No     Physically Abused: No     Sexually Abused: No   Housing Stability: Low Risk  (6/9/2025)    Housing Stability Vital Sign     Unable to Pay for Housing in the Last Year: No     Number of Times Moved in the Last Year: 0     Homeless in the Last Year: No

## 2025-07-25 NOTE — PROGRESS NOTES
Message received from [facility]  know that picc line was removed by dr Canales today.  Discharge from pharmacy service.

## 2025-07-28 ENCOUNTER — HOME CARE VISIT (OUTPATIENT)
Dept: HOME HEALTH SERVICES | Facility: HOME HEALTH | Age: 70
End: 2025-07-28
Payer: MEDICARE

## 2025-07-28 VITALS
BODY MASS INDEX: 16.6 KG/M2 | TEMPERATURE: 98.3 F | SYSTOLIC BLOOD PRESSURE: 108 MMHG | OXYGEN SATURATION: 98 % | RESPIRATION RATE: 18 BRPM | HEART RATE: 108 BPM | DIASTOLIC BLOOD PRESSURE: 56 MMHG | WEIGHT: 119 LBS

## 2025-07-28 PROCEDURE — G0299 HHS/HOSPICE OF RN EA 15 MIN: HCPCS | Mod: HHH

## 2025-07-28 ASSESSMENT — ENCOUNTER SYMPTOMS: DENIES PAIN: 1

## 2025-08-01 ASSESSMENT — ENCOUNTER SYMPTOMS
DEPRESSION: 0
LOSS OF SENSATION IN FEET: 0
STOOL FREQUENCY: MORE THAN TWICE DAILY
CHANGE IN APPETITE: VARYING
APPETITE LEVEL: GOOD
OCCASIONAL FEELINGS OF UNSTEADINESS: 0

## 2025-08-01 ASSESSMENT — ACTIVITIES OF DAILY LIVING (ADL)
AMBULATION ASSISTANCE: 1
OASIS_M1830: 00
AMBULATION ASSISTANCE: INDEPENDENT
HOME_HEALTH_OASIS: 00

## 2025-08-12 ENCOUNTER — CLINICAL SUPPORT (OUTPATIENT)
Dept: SURGERY | Facility: CLINIC | Age: 70
End: 2025-08-12
Payer: MEDICARE

## 2025-08-12 DIAGNOSIS — Z43.2 ILEOSTOMY CARE: ICD-10-CM

## 2025-08-12 PROCEDURE — 99211 OFF/OP EST MAY X REQ PHY/QHP: CPT | Performed by: SURGERY

## 2025-08-13 ENCOUNTER — OFFICE VISIT (OUTPATIENT)
Dept: SURGERY | Facility: CLINIC | Age: 70
End: 2025-08-13
Payer: MEDICARE

## 2025-08-13 DIAGNOSIS — R19.8 HIGH OUTPUT ILEOSTOMY (MULTI): Primary | ICD-10-CM

## 2025-08-13 DIAGNOSIS — Z93.2 HIGH OUTPUT ILEOSTOMY (MULTI): Primary | ICD-10-CM

## 2025-08-13 PROCEDURE — 99024 POSTOP FOLLOW-UP VISIT: CPT | Performed by: PHYSICIAN ASSISTANT

## 2025-08-13 RX ORDER — LOPERAMIDE HCL 2 MG
2 TABLET ORAL 2 TIMES DAILY PRN
Qty: 60 TABLET | Refills: 0 | Status: SHIPPED | OUTPATIENT
Start: 2025-08-13 | End: 2025-09-12

## 2025-08-15 ENCOUNTER — CLINICAL SUPPORT (OUTPATIENT)
Dept: SURGERY | Facility: CLINIC | Age: 70
End: 2025-08-15
Payer: MEDICARE

## 2025-08-15 DIAGNOSIS — Z43.2 ILEOSTOMY CARE: ICD-10-CM

## 2025-08-15 PROCEDURE — 99211 OFF/OP EST MAY X REQ PHY/QHP: CPT | Performed by: COLON & RECTAL SURGERY

## 2025-08-22 ENCOUNTER — CLINICAL SUPPORT (OUTPATIENT)
Dept: SURGERY | Facility: CLINIC | Age: 70
End: 2025-08-22
Payer: MEDICARE

## 2025-08-22 DIAGNOSIS — Z43.2 ILEOSTOMY CARE: ICD-10-CM

## 2025-08-22 PROCEDURE — 99211 OFF/OP EST MAY X REQ PHY/QHP: CPT | Performed by: PHYSICIAN ASSISTANT

## 2025-08-22 RX ORDER — NYSTATIN 100000 [USP'U]/G
1 POWDER TOPICAL ONCE
Status: CANCELLED | OUTPATIENT
Start: 2025-08-22 | End: 2025-08-22

## 2025-08-27 ENCOUNTER — OFFICE VISIT (OUTPATIENT)
Dept: SURGERY | Facility: CLINIC | Age: 70
End: 2025-08-27
Payer: MEDICARE

## 2025-08-27 VITALS
BODY MASS INDEX: 17.43 KG/M2 | HEART RATE: 88 BPM | DIASTOLIC BLOOD PRESSURE: 67 MMHG | OXYGEN SATURATION: 96 % | SYSTOLIC BLOOD PRESSURE: 106 MMHG | WEIGHT: 125 LBS

## 2025-08-27 DIAGNOSIS — Z93.2 HIGH OUTPUT ILEOSTOMY (MULTI): Primary | ICD-10-CM

## 2025-08-27 DIAGNOSIS — R19.8 HIGH OUTPUT ILEOSTOMY (MULTI): Primary | ICD-10-CM

## 2025-08-29 ENCOUNTER — APPOINTMENT (OUTPATIENT)
Dept: SURGERY | Facility: CLINIC | Age: 70
End: 2025-08-29
Payer: MEDICARE

## 2025-09-05 ENCOUNTER — CLINICAL SUPPORT (OUTPATIENT)
Dept: SURGERY | Facility: CLINIC | Age: 70
End: 2025-09-05
Payer: MEDICARE

## 2025-09-05 DIAGNOSIS — Z43.2 ILEOSTOMY CARE: ICD-10-CM

## 2025-09-05 PROCEDURE — 99211 OFF/OP EST MAY X REQ PHY/QHP: CPT | Performed by: PHYSICIAN ASSISTANT

## (undated) DEVICE — TOWEL, OR XRAY, RFID DETECT, WHITE, 17X26, STERILE

## (undated) DEVICE — HANDPIECE, POOLE SUCTION, W/O TUBING

## (undated) DEVICE — TRAY, MINOR, SINGLE BASIN, STERILE

## (undated) DEVICE — BINDER, ABDOMINAL, 3 PANEL, 9 X 46-62 IN, MED/LG

## (undated) DEVICE — ADHESIVE, SKIN, DERMABOND ADVANCED, 15CM, PEN-STYLE

## (undated) DEVICE — DRESSING, MEPILEX BORDER AG, 3 X 3

## (undated) DEVICE — STAPLER, PROXI, 3.5 60MM, RELOAD, LEG TX LINEAR

## (undated) DEVICE — TIP, SUCTION, YANKAUER, BULB, ADULT

## (undated) DEVICE — DRAPE, INSTRUMENT, W/POUCH, STERI DRAPE, 9 5/8 X 18 LONG

## (undated) DEVICE — GLOVE, SURGICAL, PROTEXIS PI BLUE W/NEUTHERA, 7.5, PF, LF

## (undated) DEVICE — COVER, TABLE, 44X90

## (undated) DEVICE — CAUTERY, PENCIL, PUSH BUTTON, SMOKE EVAC, 70MM

## (undated) DEVICE — GLOVE, SURGICAL, PROTEXIS PI MICRO, 7.5, PF, LF

## (undated) DEVICE — POUCH KIT, NEW IMAGE, DRAINABLE, 1-3/4 IN

## (undated) DEVICE — DRAPE PACK, LAPAROSCOPIC CHOLECYSTECTOMY, CUSTOM, GEAUGA

## (undated) DEVICE — SUTURE, PDSII, 1, TP-1, VIL, MONO, 48LP

## (undated) DEVICE — STAPLER, LINEAR, 3.5 60MM, RELOADABLE, BLUE

## (undated) DEVICE — GOWN, ASTOUND, L

## (undated) DEVICE — LIGASURE, V SEALER/DIVIDER  5MM BLUNT TIP

## (undated) DEVICE — SUTURE, VICRYL, 3-0, 27 IN, SH-1, VIOLET

## (undated) DEVICE — SUTURE, VICRYL, 3-0, 18 IN, SH, VIOLET

## (undated) DEVICE — SUTURE, VICRYL PLUS 3-0, SH, 27IN

## (undated) DEVICE — DRESSING, MEPILEX BORDER, POST-OP AG, 4 X 10 IN

## (undated) DEVICE — CLEAN KIT, ANTIFOG SCOPE, SEE SHARP 150MM

## (undated) DEVICE — NEEDLE, SAFETY, 21 G X 1.5 IN

## (undated) DEVICE — LIGASURE IMPACT, 18CM

## (undated) DEVICE — CONTAINER, SPECIMEN, 120ML

## (undated) DEVICE — KIT, APPLICATOR, DUPLOSPRAY, 30CM

## (undated) DEVICE — APPLICATOR, CHLORAPREP, W/ORANGE TINT, 26ML

## (undated) DEVICE — SUTURE, VICRYL, 4-0, 18 IN, PS2, UNDYED

## (undated) DEVICE — NEEDLE, INSUFFLATION, 13GAX120MM, DISP

## (undated) DEVICE — Device

## (undated) DEVICE — ELECTRODE, ELECTROSURGICAL, BLADE, INSULATED, ENT/IMA, STERILE

## (undated) DEVICE — TRAY, FOLEY, ADVANCE, 16FR, SILICONE, W/STATLOCK

## (undated) DEVICE — SUTURE, PROLENE, 2-0, 30 IN, CT2, BLUE

## (undated) DEVICE — DRESSING, TRANSPARENT, TEGADERM, 6 X 8 IN

## (undated) DEVICE — SUTURE, VICRYL, 2-0, 54 IN, TIE, VIOLET

## (undated) DEVICE — CUTTER, PROXIMATE LINEAR RELOAD, 75MM, BLUE

## (undated) DEVICE — DRESSING, MEPILEX BORDER, POST-OP AG, 4 X 12 IN

## (undated) DEVICE — DRAIN, CHANNEL, HUBLESS, 1/4 ROUND FULL FLUTE, 19 FR/W 1/4" TROCAR"

## (undated) DEVICE — TRAY, FOLEY, URINE METER, SURESTEP, 16FR, W/STATLOCK

## (undated) DEVICE — TIP,  ELECTRODE COATED INSULATED, EXTENDED, LF

## (undated) DEVICE — SOLUTION, IV 0.9% NACL INJECTION USP 1000ML EXCEL PLUS, BAG

## (undated) DEVICE — TROCAR, KII OPTICAL BLADELESS 5MM Z THREAD 100MM LNGTH

## (undated) DEVICE — CUTTER, PROX LINEAR, 75MM, REG TISSUE, W/ SAFETY LOCK OUT

## (undated) DEVICE — ASSEMBLY, STRYKER FLOW 2, SUCTION IRRIGATOR, WITH TIP

## (undated) DEVICE — SOLUTION, IRRIGATION, 0.9% SODIUM CHLORIDE, 1000 ML, HANG BOTTLE

## (undated) DEVICE — EVACUATOR, WOUND, SUCTION, CLOSED, JACKSON-PRATT, 100 CC, SILICONE

## (undated) DEVICE — DRAPE PACK, MAJOR, CUSTOM, GEAUGA

## (undated) DEVICE — COLLECTION/DELIVERY SYSTEM, COPAN ESWAB, REG SIZE SWAB

## (undated) DEVICE — STAPLER, SKIN, PLUS, WIDE, 35

## (undated) DEVICE — SUTURE, VICRYL, 0, 27 IN, CT-1, UNDYED

## (undated) DEVICE — SCISSOR, MINI ENDO CUT, TIPS, DISP

## (undated) DEVICE — TISSEEL FIBRIN SEALANT, PRIMA, FROZEN, 10ML

## (undated) DEVICE — ASCOPE, GASTRO, SINGLE-USE

## (undated) DEVICE — APPLICATOR, FOAM BARRIER, LARGE

## (undated) DEVICE — TUBE SET, PNEUMOCLEAR, SMOKE EVACU, HIGH-FLOW

## (undated) DEVICE — ACCESS SYS, KII SHIELDED BLADED, Z-THREAD, 5X100CM

## (undated) DEVICE — SUTURE, PDS II, 2-0, 27 IN, CT2, VIOLET

## (undated) DEVICE — SPONGE, LAP, XRAY DECT, 18IN X 18IN, W/LOOP, STERILE

## (undated) DEVICE — BINDER, ABDOMINAL, 3 PANEL, 9 X 30-45 IN, SM/MED

## (undated) DEVICE — SUTURE, VICRYL, 0, 18 IN, TIE, VIOLET